# Patient Record
Sex: MALE | Race: BLACK OR AFRICAN AMERICAN | NOT HISPANIC OR LATINO | Employment: OTHER | RURAL
[De-identification: names, ages, dates, MRNs, and addresses within clinical notes are randomized per-mention and may not be internally consistent; named-entity substitution may affect disease eponyms.]

---

## 2020-08-09 ENCOUNTER — HISTORICAL (OUTPATIENT)
Dept: ADMINISTRATIVE | Facility: HOSPITAL | Age: 50
End: 2020-08-09

## 2020-08-09 LAB
ANION GAP SERPL CALCULATED.3IONS-SCNC: 14 MMOL/L
ANISOCYTOSIS BLD QL SMEAR: ABNORMAL
APTT PPP: 38.9 SECONDS (ref 25.2–37.3)
BASOPHILS # BLD AUTO: 0.03 X10E3/UL (ref 0–0.2)
BASOPHILS NFR BLD AUTO: 0.6 % (ref 0–1)
BUN SERPL-MCNC: 41 MG/DL (ref 7–18)
CALCIUM SERPL-MCNC: 7.2 MG/DL (ref 8.5–10.1)
CHLORIDE SERPL-SCNC: 88 MMOL/L (ref 98–107)
CK MB SERPL-MCNC: <1 NG/ML (ref 1–3.6)
CK SERPL-CCNC: 189 U/L (ref 39–308)
CO2 SERPL-SCNC: 31 MMOL/L (ref 21–32)
CREAT SERPL-MCNC: 12.18 MG/DL (ref 0.7–1.3)
D DIMER PPP FEU-MCNC: 5.9 UG/ML (ref 0–0.47)
EOSINOPHIL # BLD AUTO: 0.04 X10E3/UL (ref 0–0.5)
EOSINOPHIL NFR BLD AUTO: 0.8 % (ref 1–4)
EOSINOPHIL NFR BLD MANUAL: 1 % (ref 1–4)
ERYTHROCYTE [DISTWIDTH] IN BLOOD BY AUTOMATED COUNT: 16.8 % (ref 11.5–14.5)
GLUCOSE SERPL-MCNC: 124 MG/DL (ref 74–106)
HCT VFR BLD AUTO: 33.2 % (ref 40–54)
HGB BLD-MCNC: 10.8 G/DL (ref 13.5–18)
IMM GRANULOCYTES # BLD AUTO: 0.07 X10E3/UL (ref 0–0.04)
IMM GRANULOCYTES NFR BLD: 1.4 % (ref 0–0.4)
INR BLD: 1.17 (ref 0–3.3)
LACTATE SERPL-SCNC: 0.8 MMOL/L (ref 0.4–2)
LYMPHOCYTES # BLD AUTO: 0.78 X10E3/UL (ref 1–4.8)
LYMPHOCYTES NFR BLD AUTO: 15.8 % (ref 27–41)
LYMPHOCYTES NFR BLD MANUAL: 12 % (ref 27–41)
MAGNESIUM SERPL-MCNC: 2 MG/DL (ref 1.7–2.3)
MCH RBC QN AUTO: 31.4 PG (ref 27–31)
MCHC RBC AUTO-ENTMCNC: 32.5 G/DL (ref 32–36)
MCV RBC AUTO: 96.5 FL (ref 80–96)
MONOCYTES # BLD AUTO: 0.17 X10E3/UL (ref 0–0.8)
MONOCYTES NFR BLD AUTO: 3.4 % (ref 2–6)
MONOCYTES NFR BLD MANUAL: 2 % (ref 2–6)
MPC BLD CALC-MCNC: 12.6 FL (ref 9.4–12.4)
MYOGLOBIN SERPL-MCNC: 585 NG/ML (ref 16–116)
NEUTROPHILS # BLD AUTO: 3.86 X10E3/UL (ref 1.8–7.7)
NEUTROPHILS NFR BLD AUTO: 78 % (ref 53–65)
NEUTS BAND NFR BLD MANUAL: 10 % (ref 1–5)
NEUTS SEG NFR BLD MANUAL: 75 % (ref 50–62)
NRBC # BLD AUTO: 0 X10E3/UL (ref 0–0)
NRBC, AUTO (.00): 0 /100 (ref 0–0)
NT-PROBNP SERPL-MCNC: ABNORMAL PG/ML (ref 1–125)
PHOSPHATE SERPL-MCNC: 3.8 MG/DL (ref 2.5–4.5)
PLATELET # BLD AUTO: 172 X10E3/UL (ref 150–400)
PLATELET MORPHOLOGY: ABNORMAL
POTASSIUM SERPL-SCNC: 4.3 MMOL/L (ref 3.5–5.1)
PROTHROMBIN TIME: 14.3 SECONDS (ref 11.7–14.7)
RBC # BLD AUTO: 3.44 X10E6/UL (ref 4.6–6.2)
SARS-COV-2 RNA AMPLIFICATION, QUAL: POSITIVE
SODIUM SERPL-SCNC: 129 MMOL/L (ref 136–145)
TROPONIN I SERPL-MCNC: 0.11 NG/ML (ref 0–0.06)
TROPONIN I SERPL-MCNC: 0.12 NG/ML (ref 0–0.06)
TROPONIN I SERPL-MCNC: 0.14 NG/ML (ref 0–0.06)
WBC # BLD AUTO: 4.95 X10E3/UL (ref 4.5–11)

## 2020-08-10 ENCOUNTER — HISTORICAL (OUTPATIENT)
Dept: ADMINISTRATIVE | Facility: HOSPITAL | Age: 50
End: 2020-08-10

## 2020-08-10 LAB
ALBUMIN SERPL BCP-MCNC: 3 G/DL (ref 3.5–5)
ALBUMIN/GLOB SERPL: 0.6 {RATIO}
ALP SERPL-CCNC: 44 U/L (ref 45–115)
ALT SERPL W P-5'-P-CCNC: 11 U/L (ref 16–61)
ANION GAP SERPL CALCULATED.3IONS-SCNC: 19 MMOL/L
ANISOCYTOSIS BLD QL SMEAR: ABNORMAL
AST SERPL W P-5'-P-CCNC: 50 U/L (ref 15–37)
BASOPHILS # BLD AUTO: 0.03 X10E3/UL (ref 0–0.2)
BASOPHILS NFR BLD AUTO: 0.5 % (ref 0–1)
BILIRUB SERPL-MCNC: 0.6 MG/DL (ref 0–1.2)
BUN SERPL-MCNC: 36 MG/DL (ref 7–18)
BUN/CREAT SERPL: 3.6
CALCIUM SERPL-MCNC: 8.1 MG/DL (ref 8.5–10.1)
CHLORIDE SERPL-SCNC: 92 MMOL/L (ref 98–107)
CO2 SERPL-SCNC: 28 MMOL/L (ref 21–32)
CREAT SERPL-MCNC: 9.95 MG/DL (ref 0.7–1.3)
EOSINOPHIL # BLD AUTO: 0 X10E3/UL (ref 0–0.5)
EOSINOPHIL NFR BLD AUTO: 0 % (ref 1–4)
ERYTHROCYTE [DISTWIDTH] IN BLOOD BY AUTOMATED COUNT: 16.8 % (ref 11.5–14.5)
GLOBULIN SER-MCNC: 5.1 G/DL (ref 2–4)
GLUCOSE SERPL-MCNC: 188 MG/DL (ref 74–106)
HAV IGM SER QL: NORMAL
HBV CORE IGM SER QL: NORMAL
HBV SURFACE AG SERPL QL IA: NORMAL
HCT VFR BLD AUTO: 34.9 % (ref 40–54)
HCV AB SER QL: NORMAL
HGB BLD-MCNC: 11.3 G/DL (ref 13.5–18)
IMM GRANULOCYTES # BLD AUTO: 0.1 X10E3/UL (ref 0–0.04)
IMM GRANULOCYTES NFR BLD: 1.7 % (ref 0–0.4)
LYMPHOCYTES # BLD AUTO: 0.39 X10E3/UL (ref 1–4.8)
LYMPHOCYTES NFR BLD AUTO: 6.6 % (ref 27–41)
LYMPHOCYTES NFR BLD MANUAL: 4 % (ref 27–41)
MAGNESIUM SERPL-MCNC: 2.3 MG/DL (ref 1.7–2.3)
MCH RBC QN AUTO: 31.2 PG (ref 27–31)
MCHC RBC AUTO-ENTMCNC: 32.4 G/DL (ref 32–36)
MCV RBC AUTO: 96.4 FL (ref 80–96)
MONOCYTES # BLD AUTO: 0.11 X10E3/UL (ref 0–0.8)
MONOCYTES NFR BLD AUTO: 1.9 % (ref 2–6)
MONOCYTES NFR BLD MANUAL: 2 % (ref 2–6)
MPC BLD CALC-MCNC: 12.2 FL (ref 9.4–12.4)
NEUTROPHILS # BLD AUTO: 5.3 X10E3/UL (ref 1.8–7.7)
NEUTROPHILS NFR BLD AUTO: 89.3 % (ref 53–65)
NEUTS BAND NFR BLD MANUAL: 10 % (ref 1–5)
NEUTS SEG NFR BLD MANUAL: 84 % (ref 50–62)
NRBC # BLD AUTO: 0 X10E3/UL (ref 0–0)
NRBC, AUTO (.00): 0 /100 (ref 0–0)
PHOSPHATE SERPL-MCNC: 5.1 MG/DL (ref 2.5–4.5)
PLATELET # BLD AUTO: 200 X10E3/UL (ref 150–400)
PLATELET MORPHOLOGY: ABNORMAL
POTASSIUM SERPL-SCNC: 4.6 MMOL/L (ref 3.5–5.1)
PROT SERPL-MCNC: 8.1 G/DL (ref 6.4–8.2)
RBC # BLD AUTO: 3.62 X10E6/UL (ref 4.6–6.2)
SODIUM SERPL-SCNC: 134 MMOL/L (ref 136–145)
TROPONIN I SERPL-MCNC: 0.1 NG/ML (ref 0–0.06)
WBC # BLD AUTO: 5.93 X10E3/UL (ref 4.5–11)

## 2020-08-11 ENCOUNTER — HISTORICAL (OUTPATIENT)
Dept: ADMINISTRATIVE | Facility: HOSPITAL | Age: 50
End: 2020-08-11

## 2020-08-11 LAB
ALBUMIN SERPL BCP-MCNC: 2.8 G/DL (ref 3.5–5)
ALBUMIN/GLOB SERPL: 0.6 {RATIO}
ALP SERPL-CCNC: 44 U/L (ref 45–115)
ALT SERPL W P-5'-P-CCNC: 12 U/L (ref 16–61)
ANION GAP SERPL CALCULATED.3IONS-SCNC: 18 MMOL/L
AST SERPL W P-5'-P-CCNC: 41 U/L (ref 15–37)
BASOPHILS # BLD AUTO: 0.01 X10E3/UL (ref 0–0.2)
BASOPHILS NFR BLD AUTO: 0.2 % (ref 0–1)
BILIRUB SERPL-MCNC: 0.5 MG/DL (ref 0–1.2)
BUN SERPL-MCNC: 53 MG/DL (ref 7–18)
BUN/CREAT SERPL: 4.6
CALCIUM SERPL-MCNC: 7.9 MG/DL (ref 8.5–10.1)
CHLORIDE SERPL-SCNC: 92 MMOL/L (ref 98–107)
CO2 SERPL-SCNC: 26 MMOL/L (ref 21–32)
CREAT SERPL-MCNC: 11.53 MG/DL (ref 0.7–1.3)
EOSINOPHIL # BLD AUTO: 0 X10E3/UL (ref 0–0.5)
EOSINOPHIL NFR BLD AUTO: 0 % (ref 1–4)
ERYTHROCYTE [DISTWIDTH] IN BLOOD BY AUTOMATED COUNT: 16.7 % (ref 11.5–14.5)
GLOBULIN SER-MCNC: 4.8 G/DL (ref 2–4)
GLUCOSE SERPL-MCNC: 182 MG/DL (ref 74–106)
HCT VFR BLD AUTO: 31.3 % (ref 40–54)
HGB BLD-MCNC: 10.3 G/DL (ref 13.5–18)
IMM GRANULOCYTES # BLD AUTO: 0.11 X10E3/UL (ref 0–0.04)
IMM GRANULOCYTES NFR BLD: 1.7 % (ref 0–0.4)
LYMPHOCYTES # BLD AUTO: 0.47 X10E3/UL (ref 1–4.8)
LYMPHOCYTES NFR BLD AUTO: 7.2 % (ref 27–41)
LYMPHOCYTES NFR BLD MANUAL: 5 % (ref 27–41)
MCH RBC QN AUTO: 30.9 PG (ref 27–31)
MCHC RBC AUTO-ENTMCNC: 32.9 G/DL (ref 32–36)
MCV RBC AUTO: 94 FL (ref 80–96)
MONOCYTES # BLD AUTO: 0.22 X10E3/UL (ref 0–0.8)
MONOCYTES NFR BLD AUTO: 3.4 % (ref 2–6)
MONOCYTES NFR BLD MANUAL: 2 % (ref 2–6)
MPC BLD CALC-MCNC: 12.9 FL (ref 9.4–12.4)
NEUTROPHILS # BLD AUTO: 5.75 X10E3/UL (ref 1.8–7.7)
NEUTROPHILS NFR BLD AUTO: 87.5 % (ref 53–65)
NEUTS BAND NFR BLD MANUAL: 2 % (ref 1–5)
NEUTS SEG NFR BLD MANUAL: 91 % (ref 50–62)
NRBC # BLD AUTO: 0 X10E3/UL (ref 0–0)
NRBC, AUTO (.00): 0.3 /100 (ref 0–0)
PHOSPHATE SERPL-MCNC: 3.8 MG/DL (ref 2.5–4.5)
PLATELET # BLD AUTO: 244 X10E3/UL (ref 150–400)
PLATELET MORPHOLOGY: ABNORMAL
POLYCHROMASIA BLD QL SMEAR: ABNORMAL
POTASSIUM SERPL-SCNC: 4.5 MMOL/L (ref 3.5–5.1)
PROT SERPL-MCNC: 7.6 G/DL (ref 6.4–8.2)
RBC # BLD AUTO: 3.33 X10E6/UL (ref 4.6–6.2)
SODIUM SERPL-SCNC: 131 MMOL/L (ref 136–145)
WBC # BLD AUTO: 6.56 X10E3/UL (ref 4.5–11)

## 2020-08-12 ENCOUNTER — HISTORICAL (OUTPATIENT)
Dept: ADMINISTRATIVE | Facility: HOSPITAL | Age: 50
End: 2020-08-12

## 2020-08-12 LAB
ALBUMIN SERPL BCP-MCNC: 2.9 G/DL (ref 3.5–5)
ALBUMIN/GLOB SERPL: 0.6 {RATIO}
ALP SERPL-CCNC: 42 U/L (ref 45–115)
ALT SERPL W P-5'-P-CCNC: 21 U/L (ref 16–61)
ANION GAP SERPL CALCULATED.3IONS-SCNC: 18 MMOL/L
AST SERPL W P-5'-P-CCNC: 41 U/L (ref 15–37)
BASOPHILS # BLD AUTO: 0.02 X10E3/UL (ref 0–0.2)
BASOPHILS NFR BLD AUTO: 0.2 % (ref 0–1)
BILIRUB SERPL-MCNC: 0.4 MG/DL (ref 0–1.2)
BUN SERPL-MCNC: 42 MG/DL (ref 7–18)
BUN/CREAT SERPL: 4.6
CALCIUM SERPL-MCNC: 8 MG/DL (ref 8.5–10.1)
CHLORIDE SERPL-SCNC: 93 MMOL/L (ref 98–107)
CO2 SERPL-SCNC: 25 MMOL/L (ref 21–32)
CREAT SERPL-MCNC: 9.11 MG/DL (ref 0.7–1.3)
EOSINOPHIL # BLD AUTO: 0 X10E3/UL (ref 0–0.5)
EOSINOPHIL NFR BLD AUTO: 0 % (ref 1–4)
ERYTHROCYTE [DISTWIDTH] IN BLOOD BY AUTOMATED COUNT: 16.9 % (ref 11.5–14.5)
GLOBULIN SER-MCNC: 5.1 G/DL (ref 2–4)
GLUCOSE SERPL-MCNC: 178 MG/DL (ref 74–106)
HCT VFR BLD AUTO: 33.5 % (ref 40–54)
HGB BLD-MCNC: 10.8 G/DL (ref 13.5–18)
IMM GRANULOCYTES # BLD AUTO: 0.28 X10E3/UL (ref 0–0.04)
IMM GRANULOCYTES NFR BLD: 3.3 % (ref 0–0.4)
LYMPHOCYTES # BLD AUTO: 0.64 X10E3/UL (ref 1–4.8)
LYMPHOCYTES NFR BLD AUTO: 7.6 % (ref 27–41)
LYMPHOCYTES NFR BLD MANUAL: 8 % (ref 27–41)
MCH RBC QN AUTO: 30.4 PG (ref 27–31)
MCHC RBC AUTO-ENTMCNC: 32.2 G/DL (ref 32–36)
MCV RBC AUTO: 94.4 FL (ref 80–96)
MONOCYTES # BLD AUTO: 0.43 X10E3/UL (ref 0–0.8)
MONOCYTES NFR BLD AUTO: 5.1 % (ref 2–6)
MONOCYTES NFR BLD MANUAL: 6 % (ref 2–6)
MPC BLD CALC-MCNC: 12.5 FL (ref 9.4–12.4)
MYELOCYTES NFR BLD MANUAL: 1 %
NEUTROPHILS # BLD AUTO: 7.01 X10E3/UL (ref 1.8–7.7)
NEUTROPHILS NFR BLD AUTO: 83.8 % (ref 53–65)
NEUTS BAND NFR BLD MANUAL: 3 % (ref 1–5)
NEUTS SEG NFR BLD MANUAL: 82 % (ref 50–62)
NRBC # BLD AUTO: 0 X10E3/UL (ref 0–0)
NRBC, AUTO (.00): 0.2 /100 (ref 0–0)
PHOSPHATE SERPL-MCNC: 3.8 MG/DL (ref 2.5–4.5)
PLATELET # BLD AUTO: 311 X10E3/UL (ref 150–400)
PLATELET MORPHOLOGY: ABNORMAL
POTASSIUM SERPL-SCNC: 4.6 MMOL/L (ref 3.5–5.1)
PROT SERPL-MCNC: 8 G/DL (ref 6.4–8.2)
RBC # BLD AUTO: 3.55 X10E6/UL (ref 4.6–6.2)
RBC MORPH BLD: NORMAL
SODIUM SERPL-SCNC: 131 MMOL/L (ref 136–145)
WBC # BLD AUTO: 8.38 X10E3/UL (ref 4.5–11)

## 2020-08-13 ENCOUNTER — HISTORICAL (OUTPATIENT)
Dept: ADMINISTRATIVE | Facility: HOSPITAL | Age: 50
End: 2020-08-13

## 2020-08-13 LAB
ALBUMIN SERPL BCP-MCNC: 2.9 G/DL (ref 3.5–5)
ALBUMIN/GLOB SERPL: 0.6 {RATIO}
ALP SERPL-CCNC: 46 U/L (ref 45–115)
ALT SERPL W P-5'-P-CCNC: 22 U/L (ref 16–61)
ANION GAP SERPL CALCULATED.3IONS-SCNC: 16 MMOL/L
AST SERPL W P-5'-P-CCNC: 30 U/L (ref 15–37)
BASOPHILS # BLD AUTO: 0.02 X10E3/UL (ref 0–0.2)
BASOPHILS NFR BLD AUTO: 0.2 % (ref 0–1)
BILIRUB SERPL-MCNC: 0.4 MG/DL (ref 0–1.2)
BUN SERPL-MCNC: 42 MG/DL (ref 7–18)
BUN/CREAT SERPL: 5.1
CALCIUM SERPL-MCNC: 8.2 MG/DL (ref 8.5–10.1)
CHLORIDE SERPL-SCNC: 96 MMOL/L (ref 98–107)
CO2 SERPL-SCNC: 25 MMOL/L (ref 21–32)
CREAT SERPL-MCNC: 8.25 MG/DL (ref 0.7–1.3)
EOSINOPHIL # BLD AUTO: 0 X10E3/UL (ref 0–0.5)
EOSINOPHIL NFR BLD AUTO: 0 % (ref 1–4)
ERYTHROCYTE [DISTWIDTH] IN BLOOD BY AUTOMATED COUNT: 17 % (ref 11.5–14.5)
GLOBULIN SER-MCNC: 5.2 G/DL (ref 2–4)
GLUCOSE SERPL-MCNC: 207 MG/DL (ref 74–106)
HCT VFR BLD AUTO: 34.7 % (ref 40–54)
HGB BLD-MCNC: 11.2 G/DL (ref 13.5–18)
IMM GRANULOCYTES # BLD AUTO: 0.47 X10E3/UL (ref 0–0.04)
IMM GRANULOCYTES NFR BLD: 5.2 % (ref 0–0.4)
LYMPHOCYTES # BLD AUTO: 1.23 X10E3/UL (ref 1–4.8)
LYMPHOCYTES NFR BLD AUTO: 13.6 % (ref 27–41)
LYMPHOCYTES NFR BLD MANUAL: 16 % (ref 27–41)
MCH RBC QN AUTO: 31.6 PG (ref 27–31)
MCHC RBC AUTO-ENTMCNC: 32.3 G/DL (ref 32–36)
MCV RBC AUTO: 98 FL (ref 80–96)
MONOCYTES # BLD AUTO: 0.59 X10E3/UL (ref 0–0.8)
MONOCYTES NFR BLD AUTO: 6.5 % (ref 2–6)
MONOCYTES NFR BLD MANUAL: 6 % (ref 2–6)
MPC BLD CALC-MCNC: 12.1 FL (ref 9.4–12.4)
MYELOCYTES NFR BLD MANUAL: 2 %
NEUTROPHILS # BLD AUTO: 6.74 X10E3/UL (ref 1.8–7.7)
NEUTROPHILS NFR BLD AUTO: 74.5 % (ref 53–65)
NEUTS BAND NFR BLD MANUAL: 1 % (ref 1–5)
NEUTS SEG NFR BLD MANUAL: 75 % (ref 50–62)
NRBC # BLD AUTO: 0 X10E3/UL (ref 0–0)
NRBC BLD MANUAL-RTO: 1 /100 (ref 0–0)
NRBC, AUTO (.00): 0 /100 (ref 0–0)
PHOSPHATE SERPL-MCNC: 3.9 MG/DL (ref 2.5–4.5)
PLATELET # BLD AUTO: 352 X10E3/UL (ref 150–400)
PLATELET MORPHOLOGY: ABNORMAL
POLYCHROMASIA BLD QL SMEAR: ABNORMAL
POTASSIUM SERPL-SCNC: 4.2 MMOL/L (ref 3.5–5.1)
PROT SERPL-MCNC: 8.1 G/DL (ref 6.4–8.2)
RBC # BLD AUTO: 3.54 X10E6/UL (ref 4.6–6.2)
SODIUM SERPL-SCNC: 133 MMOL/L (ref 136–145)
WBC # BLD AUTO: 9.05 X10E3/UL (ref 4.5–11)

## 2020-08-14 ENCOUNTER — HISTORICAL (OUTPATIENT)
Dept: ADMINISTRATIVE | Facility: HOSPITAL | Age: 50
End: 2020-08-14

## 2020-08-14 LAB
ALBUMIN SERPL BCP-MCNC: 2.7 G/DL (ref 3.5–5)
ALBUMIN/GLOB SERPL: 0.6 {RATIO}
ALP SERPL-CCNC: 46 U/L (ref 45–115)
ALT SERPL W P-5'-P-CCNC: 20 U/L (ref 16–61)
ANION GAP SERPL CALCULATED.3IONS-SCNC: 19 MMOL/L
ANISOCYTOSIS BLD QL SMEAR: ABNORMAL
AST SERPL W P-5'-P-CCNC: 23 U/L (ref 15–37)
BASOPHILS # BLD AUTO: 0.03 X10E3/UL (ref 0–0.2)
BASOPHILS NFR BLD AUTO: 0.2 % (ref 0–1)
BILIRUB SERPL-MCNC: 0.4 MG/DL (ref 0–1.2)
BUN SERPL-MCNC: 68 MG/DL (ref 7–18)
BUN/CREAT SERPL: 6.2
CALCIUM SERPL-MCNC: 7.8 MG/DL (ref 8.5–10.1)
CHLORIDE SERPL-SCNC: 93 MMOL/L (ref 98–107)
CO2 SERPL-SCNC: 23 MMOL/L (ref 21–32)
CREAT SERPL-MCNC: 10.97 MG/DL (ref 0.7–1.3)
EOSINOPHIL # BLD AUTO: 0.01 X10E3/UL (ref 0–0.5)
EOSINOPHIL NFR BLD AUTO: 0.1 % (ref 1–4)
ERYTHROCYTE [DISTWIDTH] IN BLOOD BY AUTOMATED COUNT: 16.3 % (ref 11.5–14.5)
GLOBULIN SER-MCNC: 4.7 G/DL (ref 2–4)
GLUCOSE SERPL-MCNC: 244 MG/DL (ref 74–106)
HCT VFR BLD AUTO: 32.1 % (ref 40–54)
HGB BLD-MCNC: 10.6 G/DL (ref 13.5–18)
IMM GRANULOCYTES # BLD AUTO: 0.9 X10E3/UL (ref 0–0.04)
IMM GRANULOCYTES NFR BLD: 7.2 % (ref 0–0.4)
LYMPHOCYTES # BLD AUTO: 1.61 X10E3/UL (ref 1–4.8)
LYMPHOCYTES NFR BLD AUTO: 12.8 % (ref 27–41)
LYMPHOCYTES NFR BLD MANUAL: 19 % (ref 27–41)
MCH RBC QN AUTO: 31.9 PG (ref 27–31)
MCHC RBC AUTO-ENTMCNC: 33 G/DL (ref 32–36)
MCV RBC AUTO: 96.7 FL (ref 80–96)
MONOCYTES # BLD AUTO: 0.86 X10E3/UL (ref 0–0.8)
MONOCYTES NFR BLD AUTO: 6.8 % (ref 2–6)
MONOCYTES NFR BLD MANUAL: 6 % (ref 2–6)
MPC BLD CALC-MCNC: 12 FL (ref 9.4–12.4)
NEUTROPHILS # BLD AUTO: 9.16 X10E3/UL (ref 1.8–7.7)
NEUTROPHILS NFR BLD AUTO: 72.9 % (ref 53–65)
NEUTS SEG NFR BLD MANUAL: 75 % (ref 50–62)
NRBC # BLD AUTO: 0 X10E3/UL (ref 0–0)
NRBC, AUTO (.00): 0.2 /100 (ref 0–0)
PHOSPHATE SERPL-MCNC: 4.9 MG/DL (ref 2.5–4.5)
PLATELET # BLD AUTO: 363 X10E3/UL (ref 150–400)
PLATELET MORPHOLOGY: ABNORMAL
POLYCHROMASIA BLD QL SMEAR: ABNORMAL
POTASSIUM SERPL-SCNC: 4.6 MMOL/L (ref 3.5–5.1)
PROT SERPL-MCNC: 7.4 G/DL (ref 6.4–8.2)
RBC # BLD AUTO: 3.32 X10E6/UL (ref 4.6–6.2)
SODIUM SERPL-SCNC: 130 MMOL/L (ref 136–145)
WBC # BLD AUTO: 12.57 X10E3/UL (ref 4.5–11)

## 2020-08-15 LAB
REPORT: NORMAL

## 2020-08-25 ENCOUNTER — HISTORICAL (OUTPATIENT)
Dept: ADMINISTRATIVE | Facility: HOSPITAL | Age: 50
End: 2020-08-25

## 2020-11-01 ENCOUNTER — HISTORICAL (OUTPATIENT)
Dept: ADMINISTRATIVE | Facility: HOSPITAL | Age: 50
End: 2020-11-01

## 2020-11-01 LAB
ANION GAP SERPL CALCULATED.3IONS-SCNC: 16 MMOL/L
APTT PPP: 33.1 SECONDS (ref 25.2–37.3)
BASOPHILS # BLD AUTO: 0.08 X10E3/UL (ref 0–0.2)
BASOPHILS NFR BLD AUTO: 0.9 % (ref 0–1)
BUN SERPL-MCNC: 33 MG/DL (ref 7–18)
CALCIUM SERPL-MCNC: 9.1 MG/DL (ref 8.5–10.1)
CHLORIDE SERPL-SCNC: 97 MMOL/L (ref 98–107)
CO2 SERPL-SCNC: 31 MMOL/L (ref 21–32)
CREAT SERPL-MCNC: 8.96 MG/DL (ref 0.7–1.3)
EOSINOPHIL # BLD AUTO: 0.33 X10E3/UL (ref 0–0.5)
EOSINOPHIL NFR BLD AUTO: 3.9 % (ref 1–4)
ERYTHROCYTE [DISTWIDTH] IN BLOOD BY AUTOMATED COUNT: 15.8 % (ref 11.5–14.5)
GLUCOSE SERPL-MCNC: 118 MG/DL (ref 74–106)
HCT VFR BLD AUTO: 32.6 % (ref 40–54)
HGB BLD-MCNC: 10.2 G/DL (ref 13.5–18)
IMM GRANULOCYTES # BLD AUTO: 0.1 X10E3/UL (ref 0–0.04)
IMM GRANULOCYTES NFR BLD: 1.2 % (ref 0–0.4)
INR BLD: 1.05 (ref 0–3.3)
LYMPHOCYTES # BLD AUTO: 1.73 X10E3/UL (ref 1–4.8)
LYMPHOCYTES NFR BLD AUTO: 20.5 % (ref 27–41)
MCH RBC QN AUTO: 31.5 PG (ref 27–31)
MCHC RBC AUTO-ENTMCNC: 31.3 G/DL (ref 32–36)
MCV RBC AUTO: 100.6 FL (ref 80–96)
MONOCYTES # BLD AUTO: 0.8 X10E3/UL (ref 0–0.8)
MONOCYTES NFR BLD AUTO: 9.5 % (ref 2–6)
MPC BLD CALC-MCNC: 11.7 FL (ref 9.4–12.4)
NEUTROPHILS # BLD AUTO: 5.41 X10E3/UL (ref 1.8–7.7)
NEUTROPHILS NFR BLD AUTO: 64 % (ref 53–65)
NRBC # BLD AUTO: 0 X10E3/UL (ref 0–0)
NRBC, AUTO (.00): 0 /100 (ref 0–0)
NT-PROBNP SERPL-MCNC: ABNORMAL PG/ML (ref 1–125)
PLATELET # BLD AUTO: 218 X10E3/UL (ref 150–400)
POTASSIUM SERPL-SCNC: 4.2 MMOL/L (ref 3.5–5.1)
PROTHROMBIN TIME: 13.2 SECONDS (ref 11.7–14.7)
RBC # BLD AUTO: 3.24 X10E6/UL (ref 4.6–6.2)
SODIUM SERPL-SCNC: 140 MMOL/L (ref 136–145)
WBC # BLD AUTO: 8.45 X10E3/UL (ref 4.5–11)

## 2020-11-16 ENCOUNTER — HISTORICAL (OUTPATIENT)
Dept: ADMINISTRATIVE | Facility: HOSPITAL | Age: 50
End: 2020-11-16

## 2020-11-16 LAB
FLUAV AG UPPER RESP QL IA.RAPID: NEGATIVE
FLUBV AG UPPER RESP QL IA.RAPID: NEGATIVE
SARS-COV-2 RNA AMPLIFICATION, QUAL: NEGATIVE

## 2021-05-04 ENCOUNTER — HOSPITAL ENCOUNTER (EMERGENCY)
Facility: HOSPITAL | Age: 51
Discharge: HOME | End: 2021-05-04
Attending: FAMILY MEDICINE
Payer: MEDICARE

## 2021-05-04 VITALS
HEART RATE: 73 BPM | WEIGHT: 250 LBS | TEMPERATURE: 99 F | BODY MASS INDEX: 31.08 KG/M2 | SYSTOLIC BLOOD PRESSURE: 172 MMHG | DIASTOLIC BLOOD PRESSURE: 102 MMHG | HEIGHT: 75 IN | OXYGEN SATURATION: 96 % | RESPIRATION RATE: 22 BRPM

## 2021-05-04 DIAGNOSIS — R07.9 CHEST PAIN OF UNCERTAIN ETIOLOGY: ICD-10-CM

## 2021-05-04 DIAGNOSIS — S81.812A LACERATION OF LEFT LEG: ICD-10-CM

## 2021-05-04 DIAGNOSIS — S21.139A: ICD-10-CM

## 2021-05-04 DIAGNOSIS — T14.8XXA PENETRATING WOUND: ICD-10-CM

## 2021-05-04 DIAGNOSIS — S89.92XA LEFT LEG INJURY, INITIAL ENCOUNTER: ICD-10-CM

## 2021-05-04 LAB
ALBUMIN SERPL BCP-MCNC: 3.9 G/DL (ref 3.5–5)
ALBUMIN/GLOB SERPL: 0.9 {RATIO}
ALP SERPL-CCNC: 80 U/L (ref 45–115)
ALT SERPL W P-5'-P-CCNC: 21 U/L (ref 16–61)
ANION GAP SERPL CALCULATED.3IONS-SCNC: 20 MMOL/L (ref 7–16)
APTT PPP: 30.9 SECONDS (ref 25.2–37.3)
AST SERPL W P-5'-P-CCNC: 24 U/L (ref 15–37)
BASOPHILS # BLD AUTO: 0.07 K/UL (ref 0–0.2)
BASOPHILS NFR BLD AUTO: 0.9 % (ref 0–1)
BILIRUB SERPL-MCNC: 0.4 MG/DL (ref 0–1.2)
BUN SERPL-MCNC: 30 MG/DL (ref 7–18)
BUN/CREAT SERPL: 3 (ref 6–20)
CALCIUM SERPL-MCNC: 8.1 MG/DL (ref 8.5–10.1)
CHLORIDE SERPL-SCNC: 97 MMOL/L (ref 98–107)
CO2 SERPL-SCNC: 28 MMOL/L (ref 21–32)
CREAT SERPL-MCNC: 9.74 MG/DL (ref 0.7–1.3)
DIFFERENTIAL METHOD BLD: ABNORMAL
EOSINOPHIL # BLD AUTO: 0.26 K/UL (ref 0–0.5)
EOSINOPHIL NFR BLD AUTO: 3.5 % (ref 1–4)
ERYTHROCYTE [DISTWIDTH] IN BLOOD BY AUTOMATED COUNT: 17.8 % (ref 11.5–14.5)
ETHANOL, BLOOD (CATEGORY): NOT DETECTED
GLOBULIN SER-MCNC: 4.2 G/DL (ref 2–4)
GLUCOSE SERPL-MCNC: 99 MG/DL (ref 74–106)
HCT VFR BLD AUTO: 35 % (ref 40–54)
HGB BLD-MCNC: 11.1 G/DL (ref 13.5–18)
HOLD SPECIMEN: NORMAL
IMM GRANULOCYTES # BLD AUTO: 0.06 K/UL (ref 0–0.04)
IMM GRANULOCYTES NFR BLD: 0.8 % (ref 0–0.4)
INR BLD: 0.99 (ref 0.9–1.1)
LACTATE SERPL-SCNC: 4.6 MMOL/L (ref 0.4–2)
LYMPHOCYTES # BLD AUTO: 1.47 K/UL (ref 1–4.8)
LYMPHOCYTES NFR BLD AUTO: 19.7 % (ref 27–41)
MCH RBC QN AUTO: 30.4 PG (ref 27–31)
MCHC RBC AUTO-ENTMCNC: 31.7 G/DL (ref 32–36)
MCV RBC AUTO: 95.9 FL (ref 80–96)
MONOCYTES # BLD AUTO: 0.74 K/UL (ref 0–0.8)
MONOCYTES NFR BLD AUTO: 9.9 % (ref 2–6)
MPC BLD CALC-MCNC: 12.2 FL (ref 9.4–12.4)
NEUTROPHILS # BLD AUTO: 4.85 K/UL (ref 1.8–7.7)
NEUTROPHILS NFR BLD AUTO: 65.2 % (ref 53–65)
NRBC # BLD AUTO: 0 X10E3/UL
NRBC, AUTO (.00): 0 %
PLATELET # BLD AUTO: 214 K/UL (ref 150–400)
POTASSIUM SERPL-SCNC: 4.4 MMOL/L (ref 3.5–5.1)
PROT SERPL-MCNC: 8.1 G/DL (ref 6.4–8.2)
PROTHROMBIN TIME: 12.6 SECONDS (ref 11.7–14.7)
RBC # BLD AUTO: 3.65 M/UL (ref 4.6–6.2)
SODIUM SERPL-SCNC: 141 MMOL/L (ref 136–145)
WBC # BLD AUTO: 7.45 K/UL (ref 4.5–11)

## 2021-05-04 PROCEDURE — 83605 ASSAY OF LACTIC ACID: CPT | Performed by: FAMILY MEDICINE

## 2021-05-04 PROCEDURE — 96365 THER/PROPH/DIAG IV INF INIT: CPT

## 2021-05-04 PROCEDURE — 85025 COMPLETE CBC W/AUTO DIFF WBC: CPT | Performed by: FAMILY MEDICINE

## 2021-05-04 PROCEDURE — 96361 HYDRATE IV INFUSION ADD-ON: CPT

## 2021-05-04 PROCEDURE — 85730 THROMBOPLASTIN TIME PARTIAL: CPT | Performed by: FAMILY MEDICINE

## 2021-05-04 PROCEDURE — 99285 EMERGENCY DEPT VISIT HI MDM: CPT | Mod: ,,, | Performed by: SURGERY

## 2021-05-04 PROCEDURE — 63600175 PHARM REV CODE 636 W HCPCS: Performed by: FAMILY MEDICINE

## 2021-05-04 PROCEDURE — 99285 EMERGENCY DEPT VISIT HI MDM: CPT | Mod: 25

## 2021-05-04 PROCEDURE — 99283 EMERGENCY DEPT VISIT LOW MDM: CPT | Mod: ,,, | Performed by: FAMILY MEDICINE

## 2021-05-04 PROCEDURE — 25000003 PHARM REV CODE 250: Performed by: FAMILY MEDICINE

## 2021-05-04 PROCEDURE — G0390 TRAUMA RESPONS W/HOSP CRITI: HCPCS

## 2021-05-04 PROCEDURE — 99285 PR EMERGENCY DEPT VISIT,LEVEL V: ICD-10-PCS | Mod: ,,, | Performed by: SURGERY

## 2021-05-04 PROCEDURE — 99283 PR EMERGENCY DEPT VISIT,LEVEL III: ICD-10-PCS | Mod: ,,, | Performed by: FAMILY MEDICINE

## 2021-05-04 PROCEDURE — 36415 COLL VENOUS BLD VENIPUNCTURE: CPT | Performed by: FAMILY MEDICINE

## 2021-05-04 PROCEDURE — 25000003 PHARM REV CODE 250: Performed by: EMERGENCY MEDICINE

## 2021-05-04 PROCEDURE — 85610 PROTHROMBIN TIME: CPT | Performed by: FAMILY MEDICINE

## 2021-05-04 PROCEDURE — 80053 COMPREHEN METABOLIC PANEL: CPT | Performed by: FAMILY MEDICINE

## 2021-05-04 PROCEDURE — 80307 DRUG TEST PRSMV CHEM ANLYZR: CPT | Performed by: FAMILY MEDICINE

## 2021-05-04 RX ORDER — HYDROCODONE BITARTRATE AND ACETAMINOPHEN 7.5; 325 MG/1; MG/1
1 TABLET ORAL EVERY 4 HOURS PRN
Qty: 7 TABLET | Refills: 0 | Status: ON HOLD | OUTPATIENT
Start: 2021-05-04 | End: 2023-04-22 | Stop reason: HOSPADM

## 2021-05-04 RX ORDER — SODIUM CHLORIDE 9 MG/ML
INJECTION, SOLUTION INTRAVENOUS
Status: COMPLETED | OUTPATIENT
Start: 2021-05-04 | End: 2021-05-04

## 2021-05-04 RX ADMIN — SODIUM CHLORIDE: 9 INJECTION, SOLUTION INTRAVENOUS at 06:05

## 2021-05-04 RX ADMIN — CEFAZOLIN 500 MG: 1 INJECTION, POWDER, FOR SOLUTION INTRAMUSCULAR; INTRAVENOUS at 06:05

## 2022-03-10 ENCOUNTER — HOSPITAL ENCOUNTER (EMERGENCY)
Facility: HOSPITAL | Age: 52
Discharge: HOME | End: 2022-03-11
Attending: EMERGENCY MEDICINE
Payer: MEDICARE

## 2022-03-10 DIAGNOSIS — K13.79 BLEEDING FROM MOUTH: Primary | ICD-10-CM

## 2022-03-10 DIAGNOSIS — R07.9 CHEST PAIN OF UNCERTAIN ETIOLOGY: ICD-10-CM

## 2022-03-10 DIAGNOSIS — I16.9 HYPERTENSIVE CRISIS: ICD-10-CM

## 2022-03-10 DIAGNOSIS — S89.92XA LEFT LEG INJURY, INITIAL ENCOUNTER: ICD-10-CM

## 2022-03-10 LAB
ALBUMIN SERPL BCP-MCNC: 3.6 G/DL (ref 3.5–5)
ALBUMIN/GLOB SERPL: 0.9 {RATIO}
ALP SERPL-CCNC: 68 U/L (ref 45–115)
ALT SERPL W P-5'-P-CCNC: 19 U/L (ref 16–61)
ANION GAP SERPL CALCULATED.3IONS-SCNC: 16 MMOL/L (ref 7–16)
APTT PPP: 28.2 SECONDS (ref 25.2–37.3)
AST SERPL W P-5'-P-CCNC: 19 U/L (ref 15–37)
BASOPHILS # BLD AUTO: 0.02 K/UL (ref 0–0.2)
BASOPHILS NFR BLD AUTO: 0.2 % (ref 0–1)
BILIRUB SERPL-MCNC: 0.4 MG/DL (ref 0–1.2)
BUN SERPL-MCNC: 51 MG/DL (ref 7–18)
BUN/CREAT SERPL: 5 (ref 6–20)
CALCIUM SERPL-MCNC: 8.8 MG/DL (ref 8.5–10.1)
CHLORIDE SERPL-SCNC: 98 MMOL/L (ref 98–107)
CO2 SERPL-SCNC: 28 MMOL/L (ref 21–32)
CREAT SERPL-MCNC: 10.7 MG/DL (ref 0.7–1.3)
DIFFERENTIAL METHOD BLD: ABNORMAL
EOSINOPHIL # BLD AUTO: 0.01 K/UL (ref 0–0.5)
EOSINOPHIL NFR BLD AUTO: 0.1 % (ref 1–4)
ERYTHROCYTE [DISTWIDTH] IN BLOOD BY AUTOMATED COUNT: 19.9 % (ref 11.5–14.5)
GLOBULIN SER-MCNC: 4.2 G/DL (ref 2–4)
GLUCOSE SERPL-MCNC: 183 MG/DL (ref 74–106)
HCT VFR BLD AUTO: 30.2 % (ref 40–54)
HGB BLD-MCNC: 9.7 G/DL (ref 13.5–18)
IMM GRANULOCYTES # BLD AUTO: 0.04 K/UL (ref 0–0.04)
IMM GRANULOCYTES NFR BLD: 0.4 % (ref 0–0.4)
INR BLD: 0.98 (ref 0.9–1.1)
LYMPHOCYTES # BLD AUTO: 0.67 K/UL (ref 1–4.8)
LYMPHOCYTES NFR BLD AUTO: 6.8 % (ref 27–41)
MCH RBC QN AUTO: 29.7 PG (ref 27–31)
MCHC RBC AUTO-ENTMCNC: 32.1 G/DL (ref 32–36)
MCV RBC AUTO: 92.4 FL (ref 80–96)
MONOCYTES # BLD AUTO: 0.52 K/UL (ref 0–0.8)
MONOCYTES NFR BLD AUTO: 5.3 % (ref 2–6)
MPC BLD CALC-MCNC: 11.9 FL (ref 9.4–12.4)
NEUTROPHILS # BLD AUTO: 8.55 K/UL (ref 1.8–7.7)
NEUTROPHILS NFR BLD AUTO: 87.2 % (ref 53–65)
NRBC # BLD AUTO: 0 X10E3/UL
NRBC, AUTO (.00): 0 %
PLATELET # BLD AUTO: 235 K/UL (ref 150–400)
POTASSIUM SERPL-SCNC: 5.8 MMOL/L (ref 3.5–5.1)
PROT SERPL-MCNC: 7.8 G/DL (ref 6.4–8.2)
PROTHROMBIN TIME: 13 SECONDS (ref 11.7–14.7)
RBC # BLD AUTO: 3.27 M/UL (ref 4.6–6.2)
SODIUM SERPL-SCNC: 136 MMOL/L (ref 136–145)
WBC # BLD AUTO: 9.81 K/UL (ref 4.5–11)

## 2022-03-10 PROCEDURE — 36415 COLL VENOUS BLD VENIPUNCTURE: CPT | Performed by: EMERGENCY MEDICINE

## 2022-03-10 PROCEDURE — 96365 THER/PROPH/DIAG IV INF INIT: CPT

## 2022-03-10 PROCEDURE — 85730 THROMBOPLASTIN TIME PARTIAL: CPT | Performed by: EMERGENCY MEDICINE

## 2022-03-10 PROCEDURE — 99291 CRITICAL CARE FIRST HOUR: CPT | Mod: ,,, | Performed by: EMERGENCY MEDICINE

## 2022-03-10 PROCEDURE — 99291 PR CRITICAL CARE, E/M 30-74 MINUTES: ICD-10-PCS | Mod: ,,, | Performed by: EMERGENCY MEDICINE

## 2022-03-10 PROCEDURE — 85025 COMPLETE CBC W/AUTO DIFF WBC: CPT | Performed by: EMERGENCY MEDICINE

## 2022-03-10 PROCEDURE — 99291 CRITICAL CARE FIRST HOUR: CPT

## 2022-03-10 PROCEDURE — 80053 COMPREHEN METABOLIC PANEL: CPT | Performed by: EMERGENCY MEDICINE

## 2022-03-10 PROCEDURE — 25000003 PHARM REV CODE 250: Performed by: EMERGENCY MEDICINE

## 2022-03-10 PROCEDURE — 96375 TX/PRO/DX INJ NEW DRUG ADDON: CPT

## 2022-03-10 RX ORDER — LABETALOL HYDROCHLORIDE 5 MG/ML
10 INJECTION, SOLUTION INTRAVENOUS
Status: COMPLETED | OUTPATIENT
Start: 2022-03-10 | End: 2022-03-11

## 2022-03-10 RX ORDER — AMLODIPINE BESYLATE 10 MG/1
TABLET ORAL
Status: ON HOLD | COMMUNITY
End: 2023-04-22 | Stop reason: HOSPADM

## 2022-03-10 RX ORDER — CLONIDINE HYDROCHLORIDE 0.1 MG/1
0.1 TABLET ORAL
Status: ON HOLD | COMMUNITY
Start: 2021-07-30 | End: 2023-04-22 | Stop reason: HOSPADM

## 2022-03-10 RX ORDER — TRANEXAMIC ACID 100 MG/ML
INJECTION, SOLUTION INTRAVENOUS
Status: COMPLETED
Start: 2022-03-10 | End: 2022-03-11

## 2022-03-10 RX ADMIN — TRANEXAMIC ACID 1000 MG: 100 INJECTION, SOLUTION INTRAVENOUS at 10:03

## 2022-03-10 RX ADMIN — NITROGLYCERIN 1 INCH: 20 OINTMENT TOPICAL at 10:03

## 2022-03-11 VITALS
DIASTOLIC BLOOD PRESSURE: 85 MMHG | BODY MASS INDEX: 34.07 KG/M2 | OXYGEN SATURATION: 97 % | HEIGHT: 69 IN | HEART RATE: 85 BPM | RESPIRATION RATE: 14 BRPM | WEIGHT: 230 LBS | SYSTOLIC BLOOD PRESSURE: 155 MMHG | TEMPERATURE: 99 F

## 2022-03-11 DIAGNOSIS — Z71.89 COMPLEX CARE COORDINATION: ICD-10-CM

## 2022-03-11 PROCEDURE — 25000003 PHARM REV CODE 250: Performed by: EMERGENCY MEDICINE

## 2022-03-11 PROCEDURE — 25000003 PHARM REV CODE 250

## 2022-03-11 RX ORDER — MINOXIDIL 2.5 MG/1
2.5 TABLET ORAL ONCE
Status: COMPLETED | OUTPATIENT
Start: 2022-03-11 | End: 2022-03-11

## 2022-03-11 RX ORDER — LABETALOL HYDROCHLORIDE 5 MG/ML
10 INJECTION, SOLUTION INTRAVENOUS ONCE
Status: COMPLETED | OUTPATIENT
Start: 2022-03-11 | End: 2022-03-11

## 2022-03-11 RX ADMIN — MINOXIDIL 2.5 MG: 2.5 TABLET ORAL at 01:03

## 2022-03-11 RX ADMIN — LABETALOL HYDROCHLORIDE 10 MG: 5 INJECTION INTRAVENOUS at 12:03

## 2022-03-11 RX ADMIN — TRANEXAMIC ACID 500 MG: 100 INJECTION, SOLUTION INTRAVENOUS at 12:03

## 2022-03-11 RX ADMIN — PHENYLEPHRINE HYDROCHLORIDE 2 SPRAY: 0.5 SPRAY NASAL at 01:03

## 2022-03-11 RX ADMIN — TRANEXAMIC ACID 1000 MG: 100 INJECTION, SOLUTION INTRAVENOUS at 12:03
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
## 2022-09-13 ENCOUNTER — OFFICE VISIT (OUTPATIENT)
Dept: FAMILY MEDICINE | Facility: CLINIC | Age: 52
End: 2022-09-13
Payer: MEDICARE

## 2022-09-13 VITALS
DIASTOLIC BLOOD PRESSURE: 84 MMHG | WEIGHT: 225 LBS | TEMPERATURE: 98 F | RESPIRATION RATE: 17 BRPM | BODY MASS INDEX: 33.33 KG/M2 | HEIGHT: 69 IN | OXYGEN SATURATION: 100 % | SYSTOLIC BLOOD PRESSURE: 136 MMHG | HEART RATE: 71 BPM

## 2022-09-13 DIAGNOSIS — Z11.52 ENCOUNTER FOR SCREENING LABORATORY TESTING FOR COVID-19 VIRUS: ICD-10-CM

## 2022-09-13 DIAGNOSIS — M54.50 ACUTE LEFT-SIDED LOW BACK PAIN WITHOUT SCIATICA: Primary | ICD-10-CM

## 2022-09-13 DIAGNOSIS — M62.838 MUSCLE SPASM: ICD-10-CM

## 2022-09-13 LAB
CTP QC/QA: YES
SARS-COV-2 AG RESP QL IA.RAPID: NEGATIVE

## 2022-09-13 PROCEDURE — 87426 SARSCOV CORONAVIRUS AG IA: CPT | Mod: RHCUB,CR | Performed by: FAMILY MEDICINE

## 2022-09-13 PROCEDURE — 99213 PR OFFICE/OUTPT VISIT, EST, LEVL III, 20-29 MIN: ICD-10-PCS | Mod: ,,, | Performed by: FAMILY MEDICINE

## 2022-09-13 PROCEDURE — 99213 OFFICE O/P EST LOW 20 MIN: CPT | Mod: ,,, | Performed by: FAMILY MEDICINE

## 2022-09-13 RX ORDER — HYDROCODONE BITARTRATE AND ACETAMINOPHEN 7.5; 325 MG/1; MG/1
1 TABLET ORAL EVERY 6 HOURS PRN
Qty: 8 TABLET | Refills: 0 | Status: ON HOLD | OUTPATIENT
Start: 2022-09-13 | End: 2023-04-22 | Stop reason: HOSPADM

## 2022-09-13 NOTE — PROGRESS NOTES
Subjective:       Patient ID: Saúl Butt is a 52 y.o. male.    Chief Complaint: COVID-19 Concerns (Chills for 2-3wks. )    Pt reports intermittent chills for 2 weeks. Pt reports last episode was yesterday. Reports 2 days of left lower back pain after moving dresser, pt is on dialysis and requests pain meds as unable to take nsaids.     Review of Systems   Constitutional:  Positive for chills. Negative for activity change, appetite change, diaphoresis, fatigue, fever and unexpected weight change.   HENT:  Negative for nasal congestion, dental problem, drooling, ear discharge, ear pain, facial swelling, hearing loss, mouth sores, nosebleeds, postnasal drip, rhinorrhea, sinus pressure/congestion, sneezing, sore throat, tinnitus, trouble swallowing, voice change and goiter.    Eyes:  Negative for photophobia, pain, discharge, redness, itching and visual disturbance.   Respiratory:  Negative for apnea, cough, choking, chest tightness, shortness of breath, wheezing and stridor.    Cardiovascular:  Negative for chest pain, palpitations, leg swelling and claudication.   Gastrointestinal:  Negative for abdominal distention, abdominal pain, anal bleeding, blood in stool, change in bowel habit, constipation, diarrhea, nausea, vomiting, reflux, fecal incontinence and change in bowel habit.   Endocrine: Negative for cold intolerance, heat intolerance, polydipsia, polyphagia and polyuria.   Genitourinary:  Negative for bladder incontinence, decreased urine volume, difficulty urinating, discharge, dysuria, enuresis, erectile dysfunction, flank pain, frequency, genital sores, hematuria, penile pain, testicular pain and urgency.   Musculoskeletal:  Positive for back pain. Negative for arthralgias, gait problem, joint swelling, leg pain, myalgias, neck pain, neck stiffness and joint deformity.   Integumentary:  Negative for pallor, rash, wound and mole/lesion.   Allergic/Immunologic: Negative for environmental allergies, food  allergies and frequent infections.   Neurological:  Negative for dizziness, vertigo, tremors, seizures, syncope, facial asymmetry, speech difficulty, weakness, light-headedness, numbness, headaches, coordination difficulties, memory loss and coordination difficulties.   Hematological:  Negative for adenopathy. Does not bruise/bleed easily.   Psychiatric/Behavioral:  Negative for agitation, behavioral problems, confusion, decreased concentration, dysphoric mood, hallucinations, self-injury, sleep disturbance and suicidal ideas. The patient is not nervous/anxious and is not hyperactive.        Objective:      Physical Exam  Vitals reviewed.   Constitutional:       Appearance: Normal appearance. He is normal weight.   HENT:      Head: Normocephalic and atraumatic.      Right Ear: Tympanic membrane and ear canal normal.      Left Ear: Tympanic membrane, ear canal and external ear normal.      Nose: Nose normal.      Mouth/Throat:      Mouth: Mucous membranes are moist.      Pharynx: Oropharynx is clear.   Eyes:      Extraocular Movements: Extraocular movements intact.      Conjunctiva/sclera: Conjunctivae normal.      Pupils: Pupils are equal, round, and reactive to light.   Cardiovascular:      Rate and Rhythm: Normal rate and regular rhythm.      Pulses: Normal pulses.      Heart sounds: Normal heart sounds.   Pulmonary:      Effort: Pulmonary effort is normal.      Breath sounds: Normal breath sounds.   Abdominal:      General: Abdomen is flat. Bowel sounds are normal.      Palpations: Abdomen is soft.   Musculoskeletal:         General: Tenderness present. Normal range of motion.      Cervical back: Normal range of motion and neck supple.      Comments: Left lower back ttp.    Skin:     General: Skin is warm and dry.   Neurological:      General: No focal deficit present.      Mental Status: He is alert and oriented to person, place, and time. Mental status is at baseline.   Psychiatric:         Mood and Affect:  Mood normal.         Behavior: Behavior normal.         Thought Content: Thought content normal.         Judgment: Judgment normal.       Assessment:       1. Acute left-sided low back pain without sciatica    2. Encounter for screening laboratory testing for COVID-19 virus    3. Muscle spasm          Plan:     Acute left-sided low back pain without sciatica  -     HYDROcodone-acetaminophen (NORCO) 7.5-325 mg per tablet; Take 1 tablet by mouth every 6 (six) hours as needed for Pain.  Dispense: 8 tablet; Refill: 0    Encounter for screening laboratory testing for COVID-19 virus  -     SARS Coronavirus 2 Antigen, POCT    Muscle spasm       Covid negative, will treat with small amount of pain meds, f/u if no improvement.

## 2022-10-09 DIAGNOSIS — Z71.89 COMPLEX CARE COORDINATION: ICD-10-CM

## 2022-12-27 ENCOUNTER — HOSPITAL ENCOUNTER (EMERGENCY)
Facility: HOSPITAL | Age: 52
Discharge: HOME | End: 2022-12-27
Payer: MEDICARE

## 2022-12-27 VITALS
DIASTOLIC BLOOD PRESSURE: 83 MMHG | SYSTOLIC BLOOD PRESSURE: 126 MMHG | OXYGEN SATURATION: 98 % | RESPIRATION RATE: 18 BRPM | HEART RATE: 67 BPM | HEIGHT: 72 IN | WEIGHT: 230 LBS | BODY MASS INDEX: 31.15 KG/M2 | TEMPERATURE: 98 F

## 2022-12-27 DIAGNOSIS — M62.830 SPASM OF LUMBAR PARASPINOUS MUSCLE: Primary | ICD-10-CM

## 2022-12-27 PROCEDURE — 63600175 PHARM REV CODE 636 W HCPCS: Performed by: NURSE PRACTITIONER

## 2022-12-27 PROCEDURE — 99284 PR EMERGENCY DEPT VISIT,LEVEL IV: ICD-10-PCS | Mod: ,,, | Performed by: NURSE PRACTITIONER

## 2022-12-27 PROCEDURE — 96372 THER/PROPH/DIAG INJ SC/IM: CPT | Performed by: NURSE PRACTITIONER

## 2022-12-27 PROCEDURE — 99284 EMERGENCY DEPT VISIT MOD MDM: CPT

## 2022-12-27 PROCEDURE — 99284 EMERGENCY DEPT VISIT MOD MDM: CPT | Mod: ,,, | Performed by: NURSE PRACTITIONER

## 2022-12-27 RX ORDER — TIZANIDINE 4 MG/1
4 TABLET ORAL EVERY 8 HOURS PRN
Qty: 12 TABLET | Refills: 0 | Status: SHIPPED | OUTPATIENT
Start: 2022-12-27 | End: 2023-01-06

## 2022-12-27 RX ORDER — ORPHENADRINE CITRATE 30 MG/ML
30 INJECTION INTRAMUSCULAR; INTRAVENOUS
Status: COMPLETED | OUTPATIENT
Start: 2022-12-27 | End: 2022-12-27

## 2022-12-27 RX ADMIN — ORPHENADRINE CITRATE 30 MG: 30 INJECTION INTRAMUSCULAR; INTRAVENOUS at 08:12

## 2022-12-27 NOTE — ED TRIAGE NOTES
Presents to ED for complaints of lower back pain for 4 days.  Denies any injury or heavy lifting.  Denies problems with bowel or bladder.  Patient is currently on dialysis.
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
## 2022-12-27 NOTE — DISCHARGE INSTRUCTIONS
Take prescriptions as directed. Alternate with tylenol as needed. Alternate with your prescribed pain medication, do not take them together. Follow up wit your primary care provider as needed. Return to the ED for worsening signs and symptoms or otherwise as needed.

## 2023-01-24 ENCOUNTER — HOSPITAL ENCOUNTER (EMERGENCY)
Facility: HOSPITAL | Age: 53
Discharge: HOME | End: 2023-01-24
Attending: EMERGENCY MEDICINE
Payer: MEDICARE

## 2023-01-24 VITALS
HEART RATE: 53 BPM | HEIGHT: 72 IN | RESPIRATION RATE: 14 BRPM | TEMPERATURE: 98 F | DIASTOLIC BLOOD PRESSURE: 74 MMHG | BODY MASS INDEX: 30.61 KG/M2 | SYSTOLIC BLOOD PRESSURE: 162 MMHG | WEIGHT: 226 LBS | OXYGEN SATURATION: 97 %

## 2023-01-24 DIAGNOSIS — R07.9 CHEST PAIN: ICD-10-CM

## 2023-01-24 DIAGNOSIS — E87.5 HYPERKALEMIA: ICD-10-CM

## 2023-01-24 DIAGNOSIS — N18.6 END STAGE RENAL DISEASE: Primary | ICD-10-CM

## 2023-01-24 LAB
ANION GAP SERPL CALCULATED.3IONS-SCNC: 14 MMOL/L (ref 7–16)
ANISOCYTOSIS BLD QL SMEAR: ABNORMAL
BASOPHILS # BLD AUTO: 0.07 K/UL (ref 0–0.2)
BASOPHILS NFR BLD AUTO: 1.1 % (ref 0–1)
BUN SERPL-MCNC: 49 MG/DL (ref 7–18)
BUN/CREAT SERPL: 5 (ref 6–20)
CALCIUM SERPL-MCNC: 8.4 MG/DL (ref 8.5–10.1)
CHLORIDE SERPL-SCNC: 99 MMOL/L (ref 98–107)
CO2 SERPL-SCNC: 32 MMOL/L (ref 21–32)
CREAT SERPL-MCNC: 10.46 MG/DL (ref 0.7–1.3)
DIFFERENTIAL METHOD BLD: ABNORMAL
EGFR (NO RACE VARIABLE) (RUSH/TITUS): 5 ML/MIN/1.73M²
EOSINOPHIL # BLD AUTO: 0.34 K/UL (ref 0–0.5)
EOSINOPHIL NFR BLD AUTO: 5.4 % (ref 1–4)
EOSINOPHIL NFR BLD MANUAL: 5 % (ref 1–4)
ERYTHROCYTE [DISTWIDTH] IN BLOOD BY AUTOMATED COUNT: 17.6 % (ref 11.5–14.5)
GLUCOSE SERPL-MCNC: 122 MG/DL (ref 74–106)
HCT VFR BLD AUTO: 28 % (ref 40–54)
HGB BLD-MCNC: 8.8 G/DL (ref 13.5–18)
HYPOCHROMIA BLD QL SMEAR: ABNORMAL
IMM GRANULOCYTES # BLD AUTO: 0.01 K/UL (ref 0–0.04)
IMM GRANULOCYTES NFR BLD: 0.2 % (ref 0–0.4)
LYMPHOCYTES # BLD AUTO: 1.36 K/UL (ref 1–4.8)
LYMPHOCYTES NFR BLD AUTO: 21.7 % (ref 27–41)
LYMPHOCYTES NFR BLD MANUAL: 23 % (ref 27–41)
MACROCYTES BLD QL SMEAR: ABNORMAL
MCH RBC QN AUTO: 30.7 PG (ref 27–31)
MCHC RBC AUTO-ENTMCNC: 31.4 G/DL (ref 32–36)
MCV RBC AUTO: 97.6 FL (ref 80–96)
MONOCYTES # BLD AUTO: 0.84 K/UL (ref 0–0.8)
MONOCYTES NFR BLD AUTO: 13.4 % (ref 2–6)
MONOCYTES NFR BLD MANUAL: 10 % (ref 2–6)
MPC BLD CALC-MCNC: 12.1 FL (ref 9.4–12.4)
NEUTROPHILS # BLD AUTO: 3.66 K/UL (ref 1.8–7.7)
NEUTROPHILS NFR BLD AUTO: 58.2 % (ref 53–65)
NEUTS SEG NFR BLD MANUAL: 62 % (ref 50–62)
NRBC # BLD AUTO: 0 X10E3/UL
NRBC, AUTO (.00): 0 %
OVALOCYTES BLD QL SMEAR: ABNORMAL
PLATELET # BLD AUTO: 154 K/UL (ref 150–400)
PLATELET MORPHOLOGY: ABNORMAL
POLYCHROMASIA BLD QL SMEAR: ABNORMAL
POTASSIUM SERPL-SCNC: 4.7 MMOL/L (ref 3.5–5.1)
RBC # BLD AUTO: 2.87 M/UL (ref 4.6–6.2)
SODIUM SERPL-SCNC: 140 MMOL/L (ref 136–145)
WBC # BLD AUTO: 6.28 K/UL (ref 4.5–11)

## 2023-01-24 PROCEDURE — 99284 EMERGENCY DEPT VISIT MOD MDM: CPT | Mod: ,,, | Performed by: EMERGENCY MEDICINE

## 2023-01-24 PROCEDURE — 99284 PR EMERGENCY DEPT VISIT,LEVEL IV: ICD-10-PCS | Mod: ,,, | Performed by: EMERGENCY MEDICINE

## 2023-01-24 PROCEDURE — 85025 COMPLETE CBC W/AUTO DIFF WBC: CPT | Performed by: EMERGENCY MEDICINE

## 2023-01-24 PROCEDURE — 99284 EMERGENCY DEPT VISIT MOD MDM: CPT | Mod: 25

## 2023-01-24 PROCEDURE — 93005 ELECTROCARDIOGRAM TRACING: CPT

## 2023-01-24 PROCEDURE — 80048 BASIC METABOLIC PNL TOTAL CA: CPT | Performed by: EMERGENCY MEDICINE

## 2023-01-24 PROCEDURE — 93010 ELECTROCARDIOGRAM REPORT: CPT | Mod: ,,, | Performed by: STUDENT IN AN ORGANIZED HEALTH CARE EDUCATION/TRAINING PROGRAM

## 2023-01-24 PROCEDURE — 93010 EKG 12-LEAD: ICD-10-PCS | Mod: ,,, | Performed by: STUDENT IN AN ORGANIZED HEALTH CARE EDUCATION/TRAINING PROGRAM
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
## 2023-01-24 NOTE — ED TRIAGE NOTES
Presents to ED for complaints of high potassium levels.  Patient states that he had dialysis yesterday and they called him and told him his potassium was elevated and that he needs to be evaluated in the Emergency Department.  States that he did have chest tightness last night but denies chest pain at present.

## 2023-01-24 NOTE — DISCHARGE INSTRUCTIONS
Continue with dialysis tomorrow as scheduled.    Seek care if symptoms are worsening or new symptoms develop

## 2023-04-21 ENCOUNTER — HOSPITAL ENCOUNTER (OUTPATIENT)
Facility: HOSPITAL | Age: 53
Discharge: HOME | End: 2023-04-22
Attending: EMERGENCY MEDICINE | Admitting: FAMILY MEDICINE
Payer: MEDICARE

## 2023-04-21 DIAGNOSIS — Z99.2 ESRD ON HEMODIALYSIS: ICD-10-CM

## 2023-04-21 DIAGNOSIS — I10 HTN (HYPERTENSION), BENIGN: ICD-10-CM

## 2023-04-21 DIAGNOSIS — N18.6 ESRD ON HEMODIALYSIS: ICD-10-CM

## 2023-04-21 DIAGNOSIS — R07.9 CHEST PAIN: Primary | ICD-10-CM

## 2023-04-21 DIAGNOSIS — I48.0 PAROXYSMAL A-FIB: ICD-10-CM

## 2023-04-21 DIAGNOSIS — I21.A1 TYPE 2 MYOCARDIAL INFARCTION: ICD-10-CM

## 2023-04-21 DIAGNOSIS — I48.91 ATRIAL FIBRILLATION WITH RVR: ICD-10-CM

## 2023-04-21 DIAGNOSIS — R07.9 CHEST PAIN, UNSPECIFIED TYPE: ICD-10-CM

## 2023-04-21 LAB
ALBUMIN SERPL BCP-MCNC: 3.5 G/DL (ref 3.5–5)
ALBUMIN/GLOB SERPL: 0.8 {RATIO}
ALP SERPL-CCNC: 92 U/L (ref 45–115)
ALT SERPL W P-5'-P-CCNC: 19 U/L (ref 16–61)
ANION GAP SERPL CALCULATED.3IONS-SCNC: 16 MMOL/L (ref 7–16)
ANISOCYTOSIS BLD QL SMEAR: ABNORMAL
AST SERPL W P-5'-P-CCNC: 18 U/L (ref 15–37)
BASOPHILS # BLD AUTO: 0.05 K/UL (ref 0–0.2)
BASOPHILS NFR BLD AUTO: 0.8 % (ref 0–1)
BILIRUB SERPL-MCNC: 0.3 MG/DL (ref ?–1.2)
BUN SERPL-MCNC: 24 MG/DL (ref 7–18)
BUN/CREAT SERPL: 3 (ref 6–20)
CALCIUM SERPL-MCNC: 8.9 MG/DL (ref 8.5–10.1)
CHLORIDE SERPL-SCNC: 96 MMOL/L (ref 98–107)
CO2 SERPL-SCNC: 31 MMOL/L (ref 21–32)
CREAT SERPL-MCNC: 8.18 MG/DL (ref 0.7–1.3)
DIFFERENTIAL METHOD BLD: ABNORMAL
EGFR (NO RACE VARIABLE) (RUSH/TITUS): 7 ML/MIN/1.73M²
EOSINOPHIL # BLD AUTO: 0.33 K/UL (ref 0–0.5)
EOSINOPHIL NFR BLD AUTO: 5 % (ref 1–4)
EOSINOPHIL NFR BLD MANUAL: 5 % (ref 1–4)
ERYTHROCYTE [DISTWIDTH] IN BLOOD BY AUTOMATED COUNT: 16.5 % (ref 11.5–14.5)
GLOBULIN SER-MCNC: 4.2 G/DL (ref 2–4)
GLUCOSE SERPL-MCNC: 186 MG/DL (ref 74–106)
HCT VFR BLD AUTO: 35.7 % (ref 40–54)
HGB BLD-MCNC: 11.3 G/DL (ref 13.5–18)
IMM GRANULOCYTES # BLD AUTO: 0.02 K/UL (ref 0–0.04)
IMM GRANULOCYTES NFR BLD: 0.3 % (ref 0–0.4)
LYMPHOCYTES # BLD AUTO: 1.05 K/UL (ref 1–4.8)
LYMPHOCYTES NFR BLD AUTO: 15.8 % (ref 27–41)
LYMPHOCYTES NFR BLD MANUAL: 16 % (ref 27–41)
MACROCYTES BLD QL SMEAR: ABNORMAL
MCH RBC QN AUTO: 31.9 PG (ref 27–31)
MCHC RBC AUTO-ENTMCNC: 31.7 G/DL (ref 32–36)
MCV RBC AUTO: 100.8 FL (ref 80–96)
METAMYELOCYTES NFR BLD MANUAL: 1 %
MONOCYTES # BLD AUTO: 0.78 K/UL (ref 0–0.8)
MONOCYTES NFR BLD AUTO: 11.7 % (ref 2–6)
MONOCYTES NFR BLD MANUAL: 12 % (ref 2–6)
MPC BLD CALC-MCNC: 11.8 FL (ref 9.4–12.4)
NEUTROPHILS # BLD AUTO: 4.41 K/UL (ref 1.8–7.7)
NEUTROPHILS NFR BLD AUTO: 66.4 % (ref 53–65)
NEUTS SEG NFR BLD MANUAL: 66 % (ref 50–62)
NRBC # BLD AUTO: 0 X10E3/UL
NRBC, AUTO (.00): 0 %
NT-PROBNP SERPL-MCNC: ABNORMAL PG/ML (ref 1–125)
PLATELET # BLD AUTO: 247 K/UL (ref 150–400)
PLATELET MORPHOLOGY: ABNORMAL
POTASSIUM SERPL-SCNC: 4 MMOL/L (ref 3.5–5.1)
PROT SERPL-MCNC: 7.7 G/DL (ref 6.4–8.2)
RBC # BLD AUTO: 3.54 M/UL (ref 4.6–6.2)
SARS-COV-2 RDRP RESP QL NAA+PROBE: NEGATIVE
SODIUM SERPL-SCNC: 139 MMOL/L (ref 136–145)
STOMATOCYTES BLD QL SMEAR: ABNORMAL
TROPONIN I SERPL HS-MCNC: 56.6 PG/ML
TROPONIN I SERPL HS-MCNC: 66.2 PG/ML
WBC # BLD AUTO: 6.64 K/UL (ref 4.5–11)

## 2023-04-21 PROCEDURE — 99285 EMERGENCY DEPT VISIT HI MDM: CPT | Mod: 25,CS

## 2023-04-21 PROCEDURE — 80053 COMPREHEN METABOLIC PANEL: CPT | Performed by: NURSE PRACTITIONER

## 2023-04-21 PROCEDURE — 83880 ASSAY OF NATRIURETIC PEPTIDE: CPT | Performed by: NURSE PRACTITIONER

## 2023-04-21 PROCEDURE — 93010 EKG 12-LEAD: ICD-10-PCS | Mod: ,,, | Performed by: STUDENT IN AN ORGANIZED HEALTH CARE EDUCATION/TRAINING PROGRAM

## 2023-04-21 PROCEDURE — G0378 HOSPITAL OBSERVATION PER HR: HCPCS

## 2023-04-21 PROCEDURE — 87635 SARS-COV-2 COVID-19 AMP PRB: CPT | Performed by: FAMILY MEDICINE

## 2023-04-21 PROCEDURE — 25000003 PHARM REV CODE 250: Performed by: NURSE PRACTITIONER

## 2023-04-21 PROCEDURE — 93010 ELECTROCARDIOGRAM REPORT: CPT | Mod: ,,, | Performed by: STUDENT IN AN ORGANIZED HEALTH CARE EDUCATION/TRAINING PROGRAM

## 2023-04-21 PROCEDURE — 85025 COMPLETE CBC W/AUTO DIFF WBC: CPT | Performed by: NURSE PRACTITIONER

## 2023-04-21 PROCEDURE — 84484 ASSAY OF TROPONIN QUANT: CPT | Performed by: NURSE PRACTITIONER

## 2023-04-21 PROCEDURE — 99285 EMERGENCY DEPT VISIT HI MDM: CPT | Mod: ,,, | Performed by: EMERGENCY MEDICINE

## 2023-04-21 PROCEDURE — G0378 HOSPITAL OBSERVATION PER HR: HCPCS | Mod: CS

## 2023-04-21 PROCEDURE — 99223 1ST HOSP IP/OBS HIGH 75: CPT | Mod: AI,,, | Performed by: FAMILY MEDICINE

## 2023-04-21 PROCEDURE — 93005 ELECTROCARDIOGRAM TRACING: CPT

## 2023-04-21 PROCEDURE — 99223 PR INITIAL HOSPITAL CARE,LEVL III: ICD-10-PCS | Mod: AI,,, | Performed by: FAMILY MEDICINE

## 2023-04-21 PROCEDURE — 99285 PR EMERGENCY DEPT VISIT,LEVEL V: ICD-10-PCS | Mod: ,,, | Performed by: EMERGENCY MEDICINE

## 2023-04-21 RX ORDER — ASPIRIN 325 MG
325 TABLET ORAL
Status: COMPLETED | OUTPATIENT
Start: 2023-04-21 | End: 2023-04-21

## 2023-04-21 RX ORDER — HYDRALAZINE HYDROCHLORIDE 100 MG/1
100 TABLET, FILM COATED ORAL 3 TIMES DAILY
COMMUNITY
Start: 2023-02-27

## 2023-04-21 RX ORDER — ISOSORBIDE MONONITRATE 60 MG/1
60 TABLET, EXTENDED RELEASE ORAL EVERY MORNING
COMMUNITY
Start: 2023-03-22

## 2023-04-21 RX ORDER — SODIUM CHLORIDE 0.9 % (FLUSH) 0.9 %
10 SYRINGE (ML) INJECTION
Status: DISCONTINUED | OUTPATIENT
Start: 2023-04-21 | End: 2023-04-22 | Stop reason: HOSPADM

## 2023-04-21 RX ORDER — METOPROLOL TARTRATE 100 MG/1
100 TABLET ORAL 2 TIMES DAILY
Status: ON HOLD | COMMUNITY
Start: 2023-02-20 | End: 2023-04-22 | Stop reason: SDUPTHER

## 2023-04-21 RX ORDER — METOPROLOL TARTRATE 100 MG/1
100 TABLET ORAL 2 TIMES DAILY
Status: DISCONTINUED | OUTPATIENT
Start: 2023-04-21 | End: 2023-04-22 | Stop reason: HOSPADM

## 2023-04-21 RX ORDER — PRAZOSIN HYDROCHLORIDE 1 MG/1
1 CAPSULE ORAL 2 TIMES DAILY
Status: ON HOLD | COMMUNITY
Start: 2023-02-07 | End: 2023-04-22 | Stop reason: SDUPTHER

## 2023-04-21 RX ORDER — LOSARTAN POTASSIUM 50 MG/1
50 TABLET ORAL 2 TIMES DAILY
COMMUNITY
Start: 2023-03-22

## 2023-04-21 RX ORDER — NIFEDIPINE 60 MG/1
60 TABLET, EXTENDED RELEASE ORAL
COMMUNITY
Start: 2023-03-07

## 2023-04-21 RX ADMIN — ASPIRIN 325 MG ORAL TABLET 325 MG: 325 PILL ORAL at 02:04

## 2023-04-21 NOTE — ASSESSMENT & PLAN NOTE
Patient undergoes hemodialysis on Monday Wednesday Friday.  Will need consultation to Nephrology if he stays beyond the weekend.
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
## 2023-04-21 NOTE — ASSESSMENT & PLAN NOTE
Chest pain associated AFib with RVR.  History of coronary artery disease.  Cardiology consulted to rule out ACS.
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
## 2023-04-21 NOTE — ASSESSMENT & PLAN NOTE
Patient with Paroxysmal (<7 days) atrial fibrillation which is uncontrolled currently with Beta Blocker. Patient is currently in sinus rhythm.  Patient presented to the emergency room with AFib with RVR.  Patient is not on any beta-blocker as he has not filled his metoprolol in the last 3 days.  Anticoagulation indicated. Anticoagulation done with Eliquis.  Patient has not refilled Eliquis in the last 2 months.

## 2023-04-21 NOTE — HPI
Patient is a 52-year-old  male with past medical history of ESRD, AFib, essential hypertension presents to the emergency room with complaints of chest pain and palpitation with shortness of breadth.  Patient informs that patient's chest pain started during the dialysis.  This was localized with no radiation.  This was shared with increased palpitation and shortness of breath.  Patient was at his regarding his episode.  Patient came to the emergency room after his dialysis for further evaluation where EKG noted to have AFib with RVR with ischemic changes.  Labs noted to have elevated proBNP and troponin.  Upon further questioning patient informs that he had ran out of his metoprolol for the last 3 days and his Eliquis for the last 2-3 months.  Patient's social situations which he could not control that has caused a lapse in his care.  He has not called his doctor for refills.  Last refilled at [facility] in February 2023.  Hospitalist service consulted for further evaluation management.  Will consult cardiology for recommendations.
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
## 2023-04-22 VITALS
HEART RATE: 57 BPM | RESPIRATION RATE: 16 BRPM | HEIGHT: 72 IN | SYSTOLIC BLOOD PRESSURE: 208 MMHG | TEMPERATURE: 97 F | DIASTOLIC BLOOD PRESSURE: 101 MMHG | BODY MASS INDEX: 30.88 KG/M2 | OXYGEN SATURATION: 95 % | WEIGHT: 228 LBS

## 2023-04-22 PROBLEM — I35.0 MODERATE AORTIC STENOSIS: Status: ACTIVE | Noted: 2023-04-22

## 2023-04-22 PROBLEM — I21.A1 TYPE 2 MYOCARDIAL INFARCTION: Status: RESOLVED | Noted: 2023-04-22 | Resolved: 2023-04-22

## 2023-04-22 PROBLEM — I48.0 PAROXYSMAL ATRIAL FIBRILLATION: Status: ACTIVE | Noted: 2023-04-21

## 2023-04-22 PROBLEM — I48.0 PAROXYSMAL A-FIB: Status: RESOLVED | Noted: 2023-04-21 | Resolved: 2023-04-22

## 2023-04-22 PROBLEM — R07.9 CHEST PAIN: Status: RESOLVED | Noted: 2021-05-04 | Resolved: 2023-04-22

## 2023-04-22 PROBLEM — I48.91 ATRIAL FIBRILLATION WITH RVR: Status: ACTIVE | Noted: 2023-04-22

## 2023-04-22 PROBLEM — I21.A1 TYPE 2 MYOCARDIAL INFARCTION: Status: ACTIVE | Noted: 2023-04-22

## 2023-04-22 PROBLEM — I10 HTN (HYPERTENSION), BENIGN: Status: ACTIVE | Noted: 2023-04-22

## 2023-04-22 LAB
AORTIC VALVE CUSP SEPERATION: 2.17 CM
AV INDEX (PROSTH): 0.39
AV MEAN GRADIENT: 22 MMHG
AV PEAK GRADIENT: 41 MMHG
AV REGURGITATION PRESSURE HALF TIME: 585 MS
AV VALVE AREA: 1.35 CM2
AV VELOCITY RATIO: 0.38
BASOPHILS # BLD AUTO: 0.08 K/UL (ref 0–0.2)
BASOPHILS NFR BLD AUTO: 1.3 % (ref 0–1)
BSA FOR ECHO PROCEDURE: 2.29 M2
CV ECHO LV RWT: 0.42 CM
DIFFERENTIAL METHOD BLD: ABNORMAL
DOP CALC AO PEAK VEL: 3.2 M/S
DOP CALC AO VTI: 73.23 CM
DOP CALC LVOT AREA: 3.5 CM2
DOP CALC LVOT DIAMETER: 2.1 CM
DOP CALC LVOT PEAK VEL: 1.2 M/S
DOP CALC LVOT STROKE VOLUME: 98.7 CM3
DOP CALC MV VTI: 41.6 CM
DOP CALCLVOT PEAK VEL VTI: 28.51 CM
E WAVE DECELERATION TIME: 238 MSEC
ECHO LV POSTERIOR WALL: 1.24 CM (ref 0.6–1.1)
EJECTION FRACTION: 50 %
EOSINOPHIL # BLD AUTO: 0.4 K/UL (ref 0–0.5)
EOSINOPHIL NFR BLD AUTO: 6.5 % (ref 1–4)
ERYTHROCYTE [DISTWIDTH] IN BLOOD BY AUTOMATED COUNT: 16.6 % (ref 11.5–14.5)
FRACTIONAL SHORTENING: 28 % (ref 28–44)
HCT VFR BLD AUTO: 35.8 % (ref 40–54)
HGB BLD-MCNC: 11.3 G/DL (ref 13.5–18)
IMM GRANULOCYTES # BLD AUTO: 0.03 K/UL (ref 0–0.04)
IMM GRANULOCYTES NFR BLD: 0.5 % (ref 0–0.4)
INTERVENTRICULAR SEPTUM: 1.43 CM (ref 0.6–1.1)
LEFT ATRIUM SIZE: 4.4 CM
LEFT INTERNAL DIMENSION IN SYSTOLE: 4.27 CM (ref 2.1–4)
LEFT VENTRICLE DIASTOLIC VOLUME INDEX: 77.58 ML/M2
LEFT VENTRICLE DIASTOLIC VOLUME: 174.56 ML
LEFT VENTRICLE MASS INDEX: 158 G/M2
LEFT VENTRICLE SYSTOLIC VOLUME INDEX: 36.3 ML/M2
LEFT VENTRICLE SYSTOLIC VOLUME: 81.71 ML
LEFT VENTRICULAR INTERNAL DIMENSION IN DIASTOLE: 5.92 CM (ref 3.5–6)
LEFT VENTRICULAR MASS: 355.34 G
LVOT MG: 3 MMHG
LYMPHOCYTES # BLD AUTO: 1.19 K/UL (ref 1–4.8)
LYMPHOCYTES NFR BLD AUTO: 19.3 % (ref 27–41)
MCH RBC QN AUTO: 31.7 PG (ref 27–31)
MCHC RBC AUTO-ENTMCNC: 31.6 G/DL (ref 32–36)
MCV RBC AUTO: 100.3 FL (ref 80–96)
MONOCYTES # BLD AUTO: 0.81 K/UL (ref 0–0.8)
MONOCYTES NFR BLD AUTO: 13.2 % (ref 2–6)
MPC BLD CALC-MCNC: 11.6 FL (ref 9.4–12.4)
MV MEAN GRADIENT: 4 MMHG
MV PEAK E VEL: 1.11 M/S
MV VALVE AREA BY CONTINUITY EQUATION: 2.37 CM2
NEUTROPHILS # BLD AUTO: 3.64 K/UL (ref 1.8–7.7)
NEUTROPHILS NFR BLD AUTO: 59.2 % (ref 53–65)
NRBC # BLD AUTO: 0 X10E3/UL
NRBC, AUTO (.00): 0 %
PISA AR MAX VEL: 4.71 M/S
PISA TR MAX VEL: 2.9 M/S
PLATELET # BLD AUTO: 241 K/UL (ref 150–400)
RA PRESSURE: 15 MMHG
RBC # BLD AUTO: 3.57 M/UL (ref 4.6–6.2)
RIGHT ATRIAL AREA: 22.3 CM2
RIGHT VENTRICULAR END-DIASTOLIC DIMENSION: 3.6 CM
TR MAX PG: 34 MMHG
TRICUSPID ANNULAR PLANE SYSTOLIC EXCURSION: 2.3 CM
TROPONIN I SERPL HS-MCNC: 98.4 PG/ML
TV REST PULMONARY ARTERY PRESSURE: 49 MMHG
WBC # BLD AUTO: 6.15 K/UL (ref 4.5–11)

## 2023-04-22 PROCEDURE — G0378 HOSPITAL OBSERVATION PER HR: HCPCS | Mod: CS

## 2023-04-22 PROCEDURE — 25000003 PHARM REV CODE 250

## 2023-04-22 PROCEDURE — 99232 PR SUBSEQUENT HOSPITAL CARE,LEVL II: ICD-10-PCS | Mod: ,,, | Performed by: FAMILY MEDICINE

## 2023-04-22 PROCEDURE — 25000003 PHARM REV CODE 250: Performed by: FAMILY MEDICINE

## 2023-04-22 PROCEDURE — 99232 SBSQ HOSP IP/OBS MODERATE 35: CPT | Mod: ,,, | Performed by: FAMILY MEDICINE

## 2023-04-22 PROCEDURE — 84484 ASSAY OF TROPONIN QUANT: CPT | Performed by: STUDENT IN AN ORGANIZED HEALTH CARE EDUCATION/TRAINING PROGRAM

## 2023-04-22 PROCEDURE — 85025 COMPLETE CBC W/AUTO DIFF WBC: CPT | Performed by: STUDENT IN AN ORGANIZED HEALTH CARE EDUCATION/TRAINING PROGRAM

## 2023-04-22 PROCEDURE — 99223 1ST HOSP IP/OBS HIGH 75: CPT | Mod: ,,, | Performed by: STUDENT IN AN ORGANIZED HEALTH CARE EDUCATION/TRAINING PROGRAM

## 2023-04-22 PROCEDURE — 99223 PR INITIAL HOSPITAL CARE,LEVL III: ICD-10-PCS | Mod: ,,, | Performed by: STUDENT IN AN ORGANIZED HEALTH CARE EDUCATION/TRAINING PROGRAM

## 2023-04-22 RX ORDER — HYDRALAZINE HYDROCHLORIDE 100 MG/1
100 TABLET, FILM COATED ORAL EVERY 8 HOURS
Status: DISCONTINUED | OUTPATIENT
Start: 2023-04-22 | End: 2023-04-22 | Stop reason: HOSPADM

## 2023-04-22 RX ORDER — METOPROLOL TARTRATE 100 MG/1
100 TABLET ORAL 2 TIMES DAILY
Qty: 60 TABLET | Refills: 1 | Status: SHIPPED | OUTPATIENT
Start: 2023-04-22 | End: 2023-06-21

## 2023-04-22 RX ORDER — NIFEDIPINE 60 MG/1
60 TABLET, EXTENDED RELEASE ORAL DAILY
Status: DISCONTINUED | OUTPATIENT
Start: 2023-04-22 | End: 2023-04-22 | Stop reason: HOSPADM

## 2023-04-22 RX ORDER — PRAZOSIN HYDROCHLORIDE 1 MG/1
1 CAPSULE ORAL 2 TIMES DAILY
Qty: 60 CAPSULE | Refills: 1 | Status: SHIPPED | OUTPATIENT
Start: 2023-04-22 | End: 2023-06-21

## 2023-04-22 RX ADMIN — METOPROLOL TARTRATE 100 MG: 100 TABLET, FILM COATED ORAL at 09:04

## 2023-04-22 RX ADMIN — HYDRALAZINE HYDROCHLORIDE 100 MG: 100 TABLET, FILM COATED ORAL at 02:04

## 2023-04-22 RX ADMIN — NIFEDIPINE 60 MG: 60 TABLET, FILM COATED, EXTENDED RELEASE ORAL at 02:04

## 2023-04-22 RX ADMIN — METOPROLOL TARTRATE 100 MG: 100 TABLET, FILM COATED ORAL at 12:04
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
## 2023-04-22 NOTE — ASSESSMENT & PLAN NOTE
Chest pain consistent with palpitations  Troponin elevation in the setting of moderate concentric LVH, ESRD and moderate aortic stenosis  2/2 to demand  Restart home meds  Patient reports having a [facility] at [facility] - will get records  Currently CP free; no ischemic evaluation at this time  Follow up in outpatient clinic in 1-2 weeks.

## 2023-04-22 NOTE — DISCHARGE SUMMARY
[facility]  [facility] Medicine  Discharge Summary      Patient Name: Saúl Butt  MRN: [id number]  JAY: [id number]  Patient Class: OP- Observation  Admission Date: 4/21/2023  Hospital Length of Stay: 0 days  Discharge Date and Time:  04/22/2023 11:37 AM  Attending Physician: Kiet Velasquez Jr., MD   Discharging Provider: Kiet Velasquez Jr, MD  Primary Care Provider: JW Zapien    Primary Care Team: Networked reference to record PCT     HPI:   Patient is a 52-year-old  male with past medical history of ESRD, AFib, essential hypertension presents to the emergency room with complaints of chest pain and palpitation with shortness of breadth.  Patient informs that patient's chest pain started during the dialysis.  This was localized with no radiation.  This was shared with increased palpitation and shortness of breath.  Patient was at his regarding his episode.  Patient came to the emergency room after his dialysis for further evaluation where EKG noted to have AFib with RVR with ischemic changes.  Labs noted to have elevated proBNP and troponin.  Upon further questioning patient informs that he had ran out of his metoprolol for the last 3 days and his Eliquis for the last 2-3 months.  Patient's social situations which he could not control that has caused a lapse in his care.  He has not called his doctor for refills.  Last refilled at [facility] in February 2023.  Hospitalist service consulted for further evaluation management.  Will consult cardiology for recommendations.      * No surgery found *      Hospital Course:   Patient is a 51 y/o AA male with ESRD with hx of PAF who presented to ER with palpitations, chest pain.  Patient in Afib with RVR and given BB and subsequently converted to NSR.  Mild troponin elevation noted. Last cath 2 years ago with [facility] with no intervention.  We consulted cardiology and it was felt that symptoms and labs explained by afib  RVR.  No need for invasive evaluation at this time.  His doctor is leaving [facility] and he elects to f/u at our cardiology clinic.  At this point patient has reached maximum hospital benefit and is being discharged home.   Echo showed EF of 50% with moderate AS.   Contributing to this event is patients lack of metoprolol the past 3 days. This is being refilled.  IN addition he has been out of Eliquis for 2 months. We are resuming this as well.         F/u Dr. Hilton's clinic in 2 weeks. MWF HD continues at [address].        Goals of Care Treatment Preferences:  Code Status: Full Code      Consults:   Consults (From admission, onward)        Status Ordering Provider     Inpatient consult to Cardiology  Once        Provider:  Sudeep Hilton MD    Completed COURTNEY POWELL          Cardiac/Vascular  HTN (hypertension), benign    Urged compliance. Has been out of meds.     Moderate aortic stenosis    FAMILIA 1.35    Atrial fibrillation with RVR        Renal/  ESRD on hemodialysis    Patient to continue HD MWF.      Final Active Diagnoses:    Diagnosis Date Noted POA    Atrial fibrillation with RVR [I48.91] 04/22/2023 Yes    Moderate aortic stenosis [I35.0] 04/22/2023 Yes    ESRD on hemodialysis [N18.6, Z99.2] 06/17/2021 Not Applicable    HTN (hypertension), benign [I10] 04/22/2023 Unknown    Anemia in chronic kidney disease [N18.9, D63.1] 12/06/2014 Yes      Problems Resolved During this Admission:    Diagnosis Date Noted Date Resolved POA    PRINCIPAL PROBLEM:  Chest pain [R07.9] 05/04/2021 04/22/2023 Yes    Type 2 myocardial infarction [I21.A1] 04/22/2023 04/22/2023 Unknown    Paroxysmal A-fib [I48.0] 04/21/2023 04/22/2023 Yes       Discharged Condition: good    Disposition: Home or Self Care    Follow Up:   Follow-up Information     Sudeep Hilton MD Follow up in 2 day(s).    Specialties: Interventional Cardiology, Cardiology  Contact information:  [address] [place] [address] [place]  [id number]  [phone]                       Patient Instructions:   No discharge procedures on file.    Significant Diagnostic Studies: N/A    Pending Diagnostic Studies:     Procedure Component Value Units Date/Time    EKG 12-lead [[id number]]     Order Status: Sent Lab Status: No result     EXTRA TUBES [[id number]] Collected: 04/22/23 0956    Order Status: Sent Lab Status: In process Updated: 04/22/23 0956    Specimen: Blood, Venous     Narrative:      The following orders were created for panel order EXTRA TUBES.  Procedure                               Abnormality         Status                     ---------                               -----------         ------                     Gold Top Hold[[id number]]                                    In process                   Please view results for these tests on the individual orders.         Medications:  Reconciled Home Medications:      Medication List      START taking these medications    apixaban 2.5 mg Tab  Commonly known as: ELIQUIS  Take 1 tablet (2.5 mg total) by mouth 2 (two) times daily.        CONTINUE taking these medications    hydrALAZINE 100 MG tablet  Commonly known as: APRESOLINE  Take 100 mg by mouth 3 (three) times daily.     isosorbide mononitrate 60 MG 24 hr tablet  Commonly known as: IMDUR  Take 60 mg by mouth every morning.     losartan 50 MG tablet  Commonly known as: COZAAR  Take 50 mg by mouth 2 (two) times daily.     metoprolol tartrate 100 MG tablet  Commonly known as: LOPRESSOR  Take 1 tablet (100 mg total) by mouth 2 (two) times daily.     NIFEdipine 60 MG (OSM) 24 hr tablet  Commonly known as: PROCARDIA-XL  Take 60 mg by mouth.     prazosin 1 MG Cap  Commonly known as: MINIPRESS  Take 1 capsule (1 mg total) by mouth 2 (two) times daily.        STOP taking these medications    amLODIPine 10 MG tablet  Commonly known as: NORVASC     cloNIDine 0.1 MG tablet  Commonly known as: CATAPRES     HYDROcodone-acetaminophen 7.5-325 mg per  tablet  Commonly known as: NORCO            Indwelling Lines/Drains at time of discharge:   Lines/Drains/Airways     None                 Time spent on the discharge of patient: 35 minutes         Kiet Velasquez Jr, MD  Department of Hospital Medicine  [facility]
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
## 2023-04-22 NOTE — HOSPITAL COURSE
Patient is a 51 y/o AA male with ESRD with hx of PAF who presented to ER with palpitations, chest pain.  Patient in Afib with RVR and given BB and subsequently converted to NSR.  Mild troponin elevation noted. Last cath 2 years ago with [facility] with no intervention.  We consulted cardiology and it was felt that symptoms and labs explained by afib RVR.  No need for invasive evaluation at this time.  His doctor is leaving [facility] and he elects to f/u at our cardiology clinic.  At this point patient has reached maximum hospital benefit and is being discharged home.   Echo showed EF of 50% with moderate AS.   Contributing to this event is patients lack of metoprolol the past 3 days. This is being refilled.  IN addition he has been out of Eliquis for 2 months. We are resuming this as well.         F/u Dr. Hilton's clinic in 2 weeks. MWF HD continues at [address].

## 2023-04-22 NOTE — ASSESSMENT & PLAN NOTE
Known hx  Symptoms consistent with afib with RVR  Spontaneously cardioverted with po metoprolol  Restart eliquis 5mg bid  Follwouemilee in clinic

## 2023-04-22 NOTE — HPI
Patient is a 52-year-old  male with past medical history of ESRD, AFib, essential hypertension presents to the emergency room with complaints of chest pain and palpitation with shortness of breath.     Patient informs that patient's chest pain started during the dialysis.  This was localized with no radiation.  This was shared with increased palpitation and shortness of breath.  Patient was at his regarding his episode.  Patient came to the emergency room after his dialysis for further evaluation where EKG noted to have AFib with RVR with ischemic changes.  Labs noted to have elevated proBNP and troponin.  Upon further questioning patient informs that he had ran out of his metoprolol for the last 3 days and his Eliquis for the last 2-3 months.  Patient's social situations which he could not control that has caused a lapse in his care.  He has not called his doctor for refills.  Last refilled at [facility] in February 2023.    Cardiology consulted for ACS evaluation.   Patient reports having a heart catheterization without stent placement at [facility] 2 years back - will get records

## 2023-05-04 DIAGNOSIS — I10 HTN (HYPERTENSION), BENIGN: Primary | ICD-10-CM

## 2023-08-07 ENCOUNTER — HOSPITAL ENCOUNTER (EMERGENCY)
Facility: HOSPITAL | Age: 53
Discharge: HOME | End: 2023-08-07
Attending: EMERGENCY MEDICINE
Payer: MEDICARE

## 2023-08-07 VITALS
WEIGHT: 230 LBS | TEMPERATURE: 99 F | BODY MASS INDEX: 31.15 KG/M2 | HEIGHT: 72 IN | RESPIRATION RATE: 14 BRPM | HEART RATE: 88 BPM | SYSTOLIC BLOOD PRESSURE: 108 MMHG | OXYGEN SATURATION: 97 % | DIASTOLIC BLOOD PRESSURE: 71 MMHG

## 2023-08-07 DIAGNOSIS — I48.92 ATRIAL FLUTTER WITH RAPID VENTRICULAR RESPONSE: Primary | ICD-10-CM

## 2023-08-07 DIAGNOSIS — I49.9 CARDIAC RHYTHM DISORDER OR DISTURBANCE OR CHANGE: ICD-10-CM

## 2023-08-07 DIAGNOSIS — Z91.148 NONCOMPLIANCE WITH MEDICATION REGIMEN: ICD-10-CM

## 2023-08-07 DIAGNOSIS — R06.02 SHORTNESS OF BREATH: ICD-10-CM

## 2023-08-07 LAB
ALBUMIN SERPL BCP-MCNC: 3.2 G/DL (ref 3.5–5)
ALBUMIN/GLOB SERPL: 0.6 {RATIO}
ALP SERPL-CCNC: 71 U/L (ref 45–115)
ALT SERPL W P-5'-P-CCNC: 10 U/L (ref 16–61)
ANION GAP SERPL CALCULATED.3IONS-SCNC: 19 MMOL/L (ref 7–16)
AST SERPL W P-5'-P-CCNC: 18 U/L (ref 15–37)
BASOPHILS # BLD AUTO: 0.06 K/UL (ref 0–0.2)
BASOPHILS NFR BLD AUTO: 0.5 % (ref 0–1)
BILIRUB SERPL-MCNC: 0.5 MG/DL (ref ?–1.2)
BUN SERPL-MCNC: 30 MG/DL (ref 7–18)
BUN/CREAT SERPL: 3 (ref 6–20)
CALCIUM SERPL-MCNC: 9.5 MG/DL (ref 8.5–10.1)
CHLORIDE SERPL-SCNC: 94 MMOL/L (ref 98–107)
CO2 SERPL-SCNC: 25 MMOL/L (ref 21–32)
CREAT SERPL-MCNC: 9.1 MG/DL (ref 0.7–1.3)
DIFFERENTIAL METHOD BLD: ABNORMAL
EGFR (NO RACE VARIABLE) (RUSH/TITUS): 6 ML/MIN/1.73M2
EOSINOPHIL # BLD AUTO: 0.23 K/UL (ref 0–0.5)
EOSINOPHIL NFR BLD AUTO: 2.1 % (ref 1–4)
ERYTHROCYTE [DISTWIDTH] IN BLOOD BY AUTOMATED COUNT: 15.9 % (ref 11.5–14.5)
GLOBULIN SER-MCNC: 5.1 G/DL (ref 2–4)
GLUCOSE SERPL-MCNC: 133 MG/DL (ref 74–106)
HCT VFR BLD AUTO: 38.4 % (ref 40–54)
HGB BLD-MCNC: 12.3 G/DL (ref 13.5–18)
IMM GRANULOCYTES # BLD AUTO: 0.05 K/UL (ref 0–0.04)
IMM GRANULOCYTES NFR BLD: 0.5 % (ref 0–0.4)
LYMPHOCYTES # BLD AUTO: 1.06 K/UL (ref 1–4.8)
LYMPHOCYTES NFR BLD AUTO: 9.5 % (ref 27–41)
MCH RBC QN AUTO: 31.5 PG (ref 27–31)
MCHC RBC AUTO-ENTMCNC: 32 G/DL (ref 32–36)
MCV RBC AUTO: 98.5 FL (ref 80–96)
MONOCYTES # BLD AUTO: 0.97 K/UL (ref 0–0.8)
MONOCYTES NFR BLD AUTO: 8.7 % (ref 2–6)
MPC BLD CALC-MCNC: 12.1 FL (ref 9.4–12.4)
NEUTROPHILS # BLD AUTO: 8.73 K/UL (ref 1.8–7.7)
NEUTROPHILS NFR BLD AUTO: 78.7 % (ref 53–65)
NRBC # BLD AUTO: 0 X10E3/UL
NRBC, AUTO (.00): 0 %
NT-PROBNP SERPL-MCNC: ABNORMAL PG/ML (ref 1–125)
PLATELET # BLD AUTO: 228 K/UL (ref 150–400)
POTASSIUM SERPL-SCNC: 3.9 MMOL/L (ref 3.5–5.1)
PROT SERPL-MCNC: 8.3 G/DL (ref 6.4–8.2)
RBC # BLD AUTO: 3.9 M/UL (ref 4.6–6.2)
SODIUM SERPL-SCNC: 134 MMOL/L (ref 136–145)
TROPONIN I SERPL DL<=0.01 NG/ML-MCNC: 62.4 PG/ML
WBC # BLD AUTO: 11.1 K/UL (ref 4.5–11)

## 2023-08-07 PROCEDURE — 85025 COMPLETE CBC W/AUTO DIFF WBC: CPT | Performed by: EMERGENCY MEDICINE

## 2023-08-07 PROCEDURE — 25000003 PHARM REV CODE 250: Performed by: EMERGENCY MEDICINE

## 2023-08-07 PROCEDURE — 96361 HYDRATE IV INFUSION ADD-ON: CPT

## 2023-08-07 PROCEDURE — 99284 EMERGENCY DEPT VISIT MOD MDM: CPT | Mod: ,,, | Performed by: EMERGENCY MEDICINE

## 2023-08-07 PROCEDURE — 80053 COMPREHEN METABOLIC PANEL: CPT | Performed by: EMERGENCY MEDICINE

## 2023-08-07 PROCEDURE — 99284 PR EMERGENCY DEPT VISIT,LEVEL IV: ICD-10-PCS | Mod: ,,, | Performed by: EMERGENCY MEDICINE

## 2023-08-07 PROCEDURE — 93010 EKG 12-LEAD: ICD-10-PCS | Mod: ,,, | Performed by: STUDENT IN AN ORGANIZED HEALTH CARE EDUCATION/TRAINING PROGRAM

## 2023-08-07 PROCEDURE — 93010 ELECTROCARDIOGRAM REPORT: CPT | Mod: 76,,, | Performed by: STUDENT IN AN ORGANIZED HEALTH CARE EDUCATION/TRAINING PROGRAM

## 2023-08-07 PROCEDURE — 93005 ELECTROCARDIOGRAM TRACING: CPT

## 2023-08-07 PROCEDURE — 83880 ASSAY OF NATRIURETIC PEPTIDE: CPT | Performed by: EMERGENCY MEDICINE

## 2023-08-07 PROCEDURE — 99284 EMERGENCY DEPT VISIT MOD MDM: CPT | Mod: 25

## 2023-08-07 PROCEDURE — 96374 THER/PROPH/DIAG INJ IV PUSH: CPT

## 2023-08-07 PROCEDURE — 84484 ASSAY OF TROPONIN QUANT: CPT | Performed by: EMERGENCY MEDICINE

## 2023-08-07 RX ORDER — DILTIAZEM HYDROCHLORIDE 5 MG/ML
20 INJECTION INTRAVENOUS
Status: COMPLETED | OUTPATIENT
Start: 2023-08-07 | End: 2023-08-07

## 2023-08-07 RX ADMIN — SODIUM CHLORIDE 500 ML: 9 INJECTION, SOLUTION INTRAVENOUS at 01:08

## 2023-08-07 RX ADMIN — DILTIAZEM HYDROCHLORIDE 20 MG: 5 INJECTION INTRAVENOUS at 01:08
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
## 2023-08-07 NOTE — ED NOTES
Lab attempted to call to give ED a critical but line disconnected so nurse called lab back to get critical.

## 2023-08-07 NOTE — DISCHARGE INSTRUCTIONS
Get your metoprolol prescription filled today and start taking it.  Return to emergency department for any worsening or further problems.  Follow up in clinic with primary care provider in 2-3 days for recheck.
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
## 2023-08-08 ENCOUNTER — TELEPHONE (OUTPATIENT)
Dept: EMERGENCY MEDICINE | Facility: HOSPITAL | Age: 53
End: 2023-08-08
Payer: MEDICARE

## 2023-09-07 ENCOUNTER — LAB VISIT (OUTPATIENT)
Dept: PRIMARY CARE CLINIC | Facility: CLINIC | Age: 53
End: 2023-09-07
Payer: MEDICARE

## 2023-09-07 DIAGNOSIS — Z02.83 ENCOUNTER FOR DRUG SCREENING: Primary | ICD-10-CM

## 2023-09-07 PROCEDURE — 99000 PR SPECIMEN HANDLING,DR OFF->LAB: ICD-10-PCS | Mod: ,,, | Performed by: NURSE PRACTITIONER

## 2023-09-07 PROCEDURE — 99000 SPECIMEN HANDLING OFFICE-LAB: CPT | Mod: ,,, | Performed by: NURSE PRACTITIONER
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
## 2024-01-29 ENCOUNTER — HOSPITAL ENCOUNTER (EMERGENCY)
Facility: HOSPITAL | Age: 54
Discharge: HOME | End: 2024-01-29
Payer: MEDICARE

## 2024-01-29 VITALS
OXYGEN SATURATION: 99 % | TEMPERATURE: 97 F | DIASTOLIC BLOOD PRESSURE: 77 MMHG | RESPIRATION RATE: 18 BRPM | BODY MASS INDEX: 32.51 KG/M2 | HEART RATE: 79 BPM | HEIGHT: 72 IN | WEIGHT: 240 LBS | SYSTOLIC BLOOD PRESSURE: 121 MMHG
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
## 2024-01-29 DIAGNOSIS — M54.50 LOW BACK PAIN, UNSPECIFIED BACK PAIN LATERALITY, UNSPECIFIED CHRONICITY, UNSPECIFIED WHETHER SCIATICA PRESENT: ICD-10-CM

## 2024-01-29 DIAGNOSIS — M79.605 PAIN OF LEFT LOWER EXTREMITY: Primary | ICD-10-CM

## 2024-01-29 PROCEDURE — 99284 EMERGENCY DEPT VISIT MOD MDM: CPT

## 2024-01-29 PROCEDURE — 99284 EMERGENCY DEPT VISIT MOD MDM: CPT | Mod: ,,, | Performed by: NURSE PRACTITIONER

## 2024-01-29 RX ORDER — ORPHENADRINE CITRATE 30 MG/ML
60 INJECTION INTRAMUSCULAR; INTRAVENOUS
Status: DISCONTINUED | OUTPATIENT
Start: 2024-01-29 | End: 2024-01-29 | Stop reason: HOSPADM

## 2024-01-29 NOTE — ED NOTES
Called patient's name twice to bring him back to give him his Orphenadrine IM shot, no response was given; Will try again 5 minutes

## 2024-01-30 ENCOUNTER — TELEPHONE (OUTPATIENT)
Dept: EMERGENCY MEDICINE | Facility: HOSPITAL | Age: 54
End: 2024-01-30
Payer: MEDICARE

## 2024-02-01 DIAGNOSIS — M54.50 LOW BACK PAIN, UNSPECIFIED: Primary | ICD-10-CM

## 2024-02-08 ENCOUNTER — TELEPHONE (OUTPATIENT)
Dept: TRANSPLANT | Facility: CLINIC | Age: 54
End: 2024-02-08

## 2024-02-13 ENCOUNTER — CLINICAL SUPPORT (OUTPATIENT)
Dept: REHABILITATION | Facility: HOSPITAL | Age: 54
End: 2024-02-13
Payer: MEDICARE

## 2024-02-13 DIAGNOSIS — R20.2 TINGLING: ICD-10-CM

## 2024-02-13 DIAGNOSIS — R52 PAIN: ICD-10-CM

## 2024-02-13 DIAGNOSIS — M54.42 ACUTE LEFT-SIDED LOW BACK PAIN WITH LEFT-SIDED SCIATICA: ICD-10-CM

## 2024-02-13 DIAGNOSIS — M54.50 LOW BACK PAIN, UNSPECIFIED: Primary | ICD-10-CM

## 2024-02-13 PROCEDURE — 97161 PT EVAL LOW COMPLEX 20 MIN: CPT
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
## 2024-02-20 ENCOUNTER — CLINICAL SUPPORT (OUTPATIENT)
Dept: REHABILITATION | Facility: HOSPITAL | Age: 54
End: 2024-02-20
Payer: MEDICARE

## 2024-02-20 DIAGNOSIS — M54.42 ACUTE LEFT-SIDED LOW BACK PAIN WITH LEFT-SIDED SCIATICA: Primary | ICD-10-CM

## 2024-02-20 PROCEDURE — 97112 NEUROMUSCULAR REEDUCATION: CPT | Mod: CQ

## 2024-02-20 PROCEDURE — 97110 THERAPEUTIC EXERCISES: CPT | Mod: CQ

## 2024-02-20 NOTE — PROGRESS NOTES
[facility]   Physical Therapy Treatment Note      Name: Saúl Butt  Clinic Number: [id number]    Therapy Diagnosis: No diagnosis found.  Physician: Martínez Munoz II, MD    Visit Date: 2/20/2024    Physician Orders: PT Eval and Treat   Medical Diagnosis from Referral: LBP  Evaluation Date: 2/13/2024  Authorization Period Expiration: 1/31/25  Plan of Care Expiration: 4/7/24  Visit # / Visits authorized: 2/25     Time In: 10:45 am  Time Out: 11:23 am   Total Appointment Time (timed & untimed codes): 38 minutes     Precautions: Standard  PTA Visit #: 1/5       Subjective     Patient reports: He is in severe pain .  He was compliant with home exercise program.  Response to previous treatment: first treatment after evaluation   Functional change: none     Pain: 9/10  Location: bilateral back      Objective      Objective Measures updated at progress report unless specified.     Treatment     Saúl received the treatments listed below:      therapeutic exercises to develop strength, endurance, posture, and core stabilization for 28 minutes including:  Slant board 3 x 30 second hold   Hamstring stretch 3 x 30 second hold   Ball roll outs 5 x 5 second hold   Truck rotation stretch 4 x 15 second hold     manual therapy techniques: Joint mobilizations, Manual traction, and Myofacial release were applied to the: - for - minutes, including:    neuromuscular re-education activities to improve: Balance, Coordination, Proprioception, and Posture for 10 minutes. The following activities were included:  Clamshells 2 x 10 blue theraband       Patient Education and Home Exercises       Education provided:   - home exercise program will be given     Written Home Exercises Provided:  N/A . Exercises were reviewed and Saúl was able to demonstrate them prior to the end of the session.  Saúl demonstrated  N/A  understanding of the education provided. See Electronic Medical Record under Patient  Instructions for exercises provided during therapy sessions    Assessment     Case conference with Nanda Diaz DPT. Patient was informed on correct body mechanics to perform exercises correctly. Patient was able to perform exercises today but had complaints of pain with every exercise. He states that he is unable to lay on his back or side. He was grunting with stretches also. Patient needed cues throughout treatment session to perform exercises correctly. Will continue to progress patient as tolerated. Traction was not performed today due to patient unable to lay on his back for over an extended period of time.    Saúl Is progressing well towards his goals.   Patient prognosis is Fair.     Patient will continue to benefit from skilled outpatient physical therapy to address the deficits listed in the problem list box on initial evaluation, provide pt/family education and to maximize pt's level of independence in the home and community environment.     Patient's spiritual, cultural and educational needs considered and pt agreeable to plan of care and goals.     Anticipated barriers to physical therapy: none     Goals:   Short Term Goals: 6 weeks   Patient will be able to sleep ~ 3 hours without waking from pain  Patient will improve manual muscle test to 4/5 for lower extremities stability  Patient will maintain prolonged positions x 15 minutes with 2 /10 pain  Patient will report no radicular symptoms by 6 weeks     Long Term Goals: 12 weeks   Patient will be able to sleep through the night without waking from pain  Patient will improve manual muscle test to 4+/5 for lower extremities stability  Patient will maintain prolonged positions x 30 minutes with  0 /10 pain    Plan       Plan of care Certification: 2/13/2024 to 4/7/24.     Outpatient Physical Therapy 2 times weekly for 12 weeks to include the following interventions: Aquatic Therapy, Hybresis with Dex, Cervical/Lumbar Traction, Electrical  Stimulation IFC/Premod, Gait Training, Manual Therapy, Moist Heat/ Ice, Neuromuscular Re-ed, Patient Education, Self Care, Therapeutic Activities, Therapeutic Exercise, Ultrasound, and Dry Needling.   Randi Doyle, PTA

## 2024-02-27 ENCOUNTER — TELEPHONE (OUTPATIENT)
Dept: TRANSPLANT | Facility: CLINIC | Age: 54
End: 2024-02-27

## 2024-02-29 ENCOUNTER — CLINICAL SUPPORT (OUTPATIENT)
Dept: REHABILITATION | Facility: HOSPITAL | Age: 54
End: 2024-02-29
Payer: MEDICARE

## 2024-02-29 DIAGNOSIS — R20.2 TINGLING: ICD-10-CM

## 2024-02-29 DIAGNOSIS — R52 PAIN: Primary | ICD-10-CM

## 2024-02-29 PROCEDURE — 97012 MECHANICAL TRACTION THERAPY: CPT

## 2024-02-29 PROCEDURE — 97110 THERAPEUTIC EXERCISES: CPT

## 2024-02-29 NOTE — PROGRESS NOTES
[facility]   Physical Therapy Treatment Note      Name: Saúl Butt  Clinic Number: [id number]    Therapy Diagnosis:   Encounter Diagnoses   Name Primary?    Pain Yes    Tingling      Physician: Martínez Munoz II, MD    Visit Date: 2/29/2024    Physician Orders: PT Eval and Treat   Medical Diagnosis from Referral: LBP  Evaluation Date: 2/13/2024  Authorization Period Expiration: 1/31/25  Plan of Care Expiration: 4/7/24  Visit # / Visits authorized: 3/25 until 4/7     Time In: 10:45 am  Time Out: 1130  am   Total Appointment Time (timed & untimed codes):  45 minutes     Precautions: Standard  PTA Visit #: 1/5       Subjective     Patient reports: He is in severe pain . No change and hurt so bad. Still having trouble with toileting, putting on clothes. The tennis ball is helping.     He was compliant with home exercise program.  Response to previous treatment: first treatment after evaluation   Functional change: none     Pain: 8-9/10  Location: bilateral back      Objective      Objective Measures updated at progress report unless specified.     Treatment     Saúl received the treatments listed below:      Mechanical lumbar traction 90/90 x 8 minutes 45/15 rest 75lb pull      therapeutic exercises to develop strength, endurance, posture, and core stabilization for 25 minutes including:  Nustep x 5 min  Slant board 3 x 30 second hold   Hamstring stretch 3 x 30 second hold   Ball roll outs 5 x 5 second hold   Truck rotation stretch 4 x 15 second hold (not today)    manual therapy techniques: Joint mobilizations, Manual traction, and Myofacial release were applied to the: - for - minutes, including:    neuromuscular re-education activities to improve: Balance, Coordination, Proprioception, and Posture for 7 minutes. The following activities were included:  Cybex back extension 4pl x 20    (Not today)  Clamshells 2 x 10 blue theraband       Patient Education and Home Exercises        Education provided:   - home exercise program will be given     Written Home Exercises Provided:  N/A . Exercises were reviewed and Saúl was able to demonstrate them prior to the end of the session.  Saúl demonstrated  N/A  understanding of the education provided. See Electronic Medical Record under Patient Instructions for exercises provided during therapy sessions    Assessment     Started treatment with lumbar traction to attempt to ease the pain with spinal decompression. Patient was unsure if relief or no change during the traction treatment. Patient pushed the button to stop the machine at 8 minutes secondary to feeling anxiety and not liking be strapped in. The patient had relief and pain went to a 6/10 after the traction. Patient was agreeable to try the traction again if someone is sitting in the room with him. Patient moves guarded and slow. Patient was informed on correct body mechanics to perform exercises correctly.  Will continue to progress patient as tolerated.       Saúl Is progressing well towards his goals.   Patient prognosis is Fair.     Patient will continue to benefit from skilled outpatient physical therapy to address the deficits listed in the problem list box on initial evaluation, provide pt/family education and to maximize pt's level of independence in the home and community environment.     Patient's spiritual, cultural and educational needs considered and pt agreeable to plan of care and goals.     Anticipated barriers to physical therapy: none     Goals:   Short Term Goals: 6 weeks   Patient will be able to sleep ~ 3 hours without waking from pain  Patient will improve manual muscle test to 4/5 for lower extremities stability  Patient will maintain prolonged positions x 15 minutes with 2 /10 pain  Patient will report no radicular symptoms by 6 weeks     Long Term Goals: 12 weeks   Patient will be able to sleep through the night without waking from pain  Patient will  improve manual muscle test to 4+/5 for lower extremities stability  Patient will maintain prolonged positions x 30 minutes with  0 /10 pain    Plan       Plan of care Certification: 2/13/2024 to 4/7/24.     Outpatient Physical Therapy 2 times weekly for 12 weeks to include the following interventions: Aquatic Therapy, Hybresis with Dex, Cervical/Lumbar Traction, Electrical Stimulation IFC/Premod, Gait Training, Manual Therapy, Moist Heat/ Ice, Neuromuscular Re-ed, Patient Education, Self Care, Therapeutic Activities, Therapeutic Exercise, Ultrasound, and Dry Needling.   IVORY BOB, PT

## 2024-03-07 ENCOUNTER — CLINICAL SUPPORT (OUTPATIENT)
Dept: REHABILITATION | Facility: HOSPITAL | Age: 54
End: 2024-03-07
Payer: MEDICARE

## 2024-03-07 DIAGNOSIS — R52 PAIN: Primary | ICD-10-CM

## 2024-03-07 DIAGNOSIS — R20.2 TINGLING: ICD-10-CM

## 2024-03-07 PROCEDURE — 97110 THERAPEUTIC EXERCISES: CPT | Mod: CQ

## 2024-03-07 PROCEDURE — 97112 NEUROMUSCULAR REEDUCATION: CPT | Mod: CQ

## 2024-03-07 PROCEDURE — 97012 MECHANICAL TRACTION THERAPY: CPT | Mod: CQ

## 2024-03-07 NOTE — PROGRESS NOTES
[facility]   Physical Therapy Treatment Note      Name: Saúl Butt  Clinic Number: [id number]    Therapy Diagnosis:   No diagnosis found.    Physician: Martínez Munoz II, MD    Visit Date: 3/7/2024    Physician Orders: PT Eval and Treat   Medical Diagnosis from Referral: LBP  Evaluation Date: 2/13/2024  Authorization Period Expiration: 1/31/25  Plan of Care Expiration: 4/7/24  Visit # / Visits authorized: 4/25 until 4/7     Time In: 10:50 am  Time Out: 11:43 am   Total Appointment Time (timed & untimed codes): 53 minutes     Precautions: Standard  PTA Visit #: 1/5       Subjective     Patient reports: He is in severe pain . No change and hurt so bad.     He was compliant with home exercise program.  Response to previous treatment: first treatment after evaluation   Functional change: none     Pain: 8-9/10  Location: bilateral back      Objective      Objective Measures updated at progress report unless specified.     Treatment     Saúl received the treatments listed below:      Mechanical lumbar traction 90/90 x 8 minutes 45/15 rest 75lb pull      therapeutic exercises to develop strength, endurance, posture, and core stabilization for 25 minutes including:  Nustep x 5 min  Slant board 3 x 30 second hold   Hamstring stretch 3 x 30 second hold   Ball roll outs 5 x 5 second hold   Truck rotation stretch 4 x 15 second hold (not today)    manual therapy techniques: Joint mobilizations, Manual traction, and Myofacial release were applied to the: - for - minutes, including:    neuromuscular re-education activities to improve: Balance, Coordination, Proprioception, and Posture for 13 minutes. The following activities were included:  Cybex back extension 5pl x 20  Cybex hip abduction 2 x 10 2#    (Not today)  Clamshells 2 x 10 blue theraband       Patient Education and Home Exercises       Education provided:   - home exercise program will be given     Written Home Exercises Provided:   N/A . Exercises were reviewed and Saúl was able to demonstrate them prior to the end of the session.  Saúl demonstrated  N/A  understanding of the education provided. See Electronic Medical Record under Patient Instructions for exercises provided during therapy sessions    Assessment     Started treatment with lumbar traction to attempt to ease the pain with spinal decompression. Patinet was Patient moves guarded and slow. Patient was informed on correct body mechanics to perform exercises correctly.  Ended session with traction for decompression of the spine. Will continue to progress patient as tolerated.       Saúl Is progressing well towards his goals.   Patient prognosis is Fair.     Patient will continue to benefit from skilled outpatient physical therapy to address the deficits listed in the problem list box on initial evaluation, provide pt/family education and to maximize pt's level of independence in the home and community environment.     Patient's spiritual, cultural and educational needs considered and pt agreeable to plan of care and goals.     Anticipated barriers to physical therapy: none     Goals:   Short Term Goals: 6 weeks   Patient will be able to sleep ~ 3 hours without waking from pain  Patient will improve manual muscle test to 4/5 for lower extremities stability  Patient will maintain prolonged positions x 15 minutes with 2 /10 pain  Patient will report no radicular symptoms by 6 weeks     Long Term Goals: 12 weeks   Patient will be able to sleep through the night without waking from pain  Patient will improve manual muscle test to 4+/5 for lower extremities stability  Patient will maintain prolonged positions x 30 minutes with  0 /10 pain    Plan       Plan of care Certification: 2/13/2024 to 4/7/24.     Outpatient Physical Therapy 2 times weekly for 12 weeks to include the following interventions: Aquatic Therapy, Hybresis with Dex, Cervical/Lumbar Traction, Electrical Stimulation  IFC/Premod, Gait Training, Manual Therapy, Moist Heat/ Ice, Neuromuscular Re-ed, Patient Education, Self Care, Therapeutic Activities, Therapeutic Exercise, Ultrasound, and Dry Needling.   Randi Doyle, PTA

## 2024-03-22 ENCOUNTER — DOCUMENTATION ONLY (OUTPATIENT)
Dept: REHABILITATION | Facility: HOSPITAL | Age: 54
End: 2024-03-22
Payer: MEDICARE

## 2024-03-22 PROBLEM — R20.2 TINGLING: Status: RESOLVED | Noted: 2024-02-13 | Resolved: 2024-03-22

## 2024-03-22 PROBLEM — R52 PAIN: Status: RESOLVED | Noted: 2024-02-13 | Resolved: 2024-03-22

## 2024-03-22 NOTE — PROGRESS NOTES
"Called patient to discuss no-shows this past week. Patient states "y'all went on vacation and I never heard back".  Patient was informed of future sessions and that a notification should have been sent to inform of appointments. Patient said he did receive those but would like to keep using the tennis ball that was given to him for carryover.  Patient would like to discharge from therapy.    "

## 2024-04-01 ENCOUNTER — TELEPHONE (OUTPATIENT)
Dept: TRANSPLANT | Facility: CLINIC | Age: 54
End: 2024-04-01
Payer: MEDICARE

## 2024-04-02 ENCOUNTER — TELEPHONE (OUTPATIENT)
Dept: TRANSPLANT | Facility: CLINIC | Age: 54
End: 2024-04-02
Payer: MEDICARE

## 2024-04-02 DIAGNOSIS — Z76.82 ORGAN TRANSPLANT CANDIDATE: Primary | ICD-10-CM

## 2024-04-02 NOTE — TELEPHONE ENCOUNTER
"Returned call and informed patient that he will need to wait until he receives his appointment paperwork to determine what general testing he can have done prior to his appointment on 4/30/2024. Patient verbalized understanding.     ----- Message from Chaim Jaquez sent at 4/2/2024 11:34 AM CDT -----  Consult/Advisory:      Name Of Caller: Self    Contact Preference?: [phone]     What is the nature of the call?: Calling to speak w/ Ross about what is needed for his Cardiologist       Additional Notes:  "Thank you for all that you do for our patients"          "

## 2024-04-22 ENCOUNTER — TELEPHONE (OUTPATIENT)
Dept: TRANSPLANT | Facility: CLINIC | Age: 54
End: 2024-04-22
Payer: MEDICARE

## 2024-04-25 DIAGNOSIS — M54.9 DORSALGIA: Primary | ICD-10-CM

## 2024-05-22 ENCOUNTER — OFFICE VISIT (OUTPATIENT)
Dept: PAIN MEDICINE | Facility: CLINIC | Age: 54
End: 2024-05-22
Payer: MEDICARE

## 2024-05-22 VITALS
DIASTOLIC BLOOD PRESSURE: 66 MMHG | SYSTOLIC BLOOD PRESSURE: 112 MMHG | WEIGHT: 236 LBS | BODY MASS INDEX: 31.97 KG/M2 | RESPIRATION RATE: 18 BRPM | HEART RATE: 75 BPM | HEIGHT: 72 IN

## 2024-05-22 DIAGNOSIS — Z79.899 ENCOUNTER FOR LONG-TERM (CURRENT) USE OF OTHER MEDICATIONS: Primary | ICD-10-CM

## 2024-05-22 DIAGNOSIS — M54.17 LUMBOSACRAL RADICULOPATHY: Chronic | ICD-10-CM

## 2024-05-22 DIAGNOSIS — M54.9 DORSALGIA: Chronic | ICD-10-CM

## 2024-05-22 PROCEDURE — 99204 OFFICE O/P NEW MOD 45 MIN: CPT | Mod: S$PBB,,, | Performed by: PAIN MEDICINE

## 2024-05-22 PROCEDURE — 99215 OFFICE O/P EST HI 40 MIN: CPT | Mod: PBBFAC | Performed by: PAIN MEDICINE

## 2024-05-22 PROCEDURE — 3078F DIAST BP <80 MM HG: CPT | Mod: CPTII,,, | Performed by: PAIN MEDICINE

## 2024-05-22 PROCEDURE — 3008F BODY MASS INDEX DOCD: CPT | Mod: CPTII,,, | Performed by: PAIN MEDICINE

## 2024-05-22 PROCEDURE — 4010F ACE/ARB THERAPY RXD/TAKEN: CPT | Mod: CPTII,,, | Performed by: PAIN MEDICINE

## 2024-05-22 PROCEDURE — 3074F SYST BP LT 130 MM HG: CPT | Mod: CPTII,,, | Performed by: PAIN MEDICINE

## 2024-05-22 PROCEDURE — 1159F MED LIST DOCD IN RCRD: CPT | Mod: CPTII,,, | Performed by: PAIN MEDICINE

## 2024-05-22 RX ORDER — CALCIUM CARBONATE 750 MG/1
2 TABLET ORAL
Status: ON HOLD | COMMUNITY
Start: 2024-03-05

## 2024-05-22 RX ORDER — AMLODIPINE BESYLATE 10 MG/1
1 TABLET ORAL DAILY
Status: ON HOLD | COMMUNITY
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
## 2024-05-22 NOTE — PATIENT INSTRUCTIONS
YOU ARE SCHEDULED ON 06/11/2024 AT 7:45 AM FOR YOUR PROCEDURE- LEFT L5-S1 TFESI WITH  .      Procedure Instructions:    Nothing to eat or drink for 8 hours or after midnight including gum, candy, mints, or tobacco products.  If you are scheduled for 1:30 or later nothing to eat or drink after 5 a.m. the morning of the procedure, including gum, candy, mints, or tobacco products.  Must have a  at least 18 yrs of age to stay present at all times  No Diabetic medications the morning of procedure, check blood sugar the morning of procedure, if it is greater than 200 call the office at [phone]  If you are started on antibiotics or have been prescribed antibiotics, have a fever, or have any other type of infection call to reschedule procedure.  If you take blood pressure medications you can take it at your regular scheduled time with a small sip of WATER!  Dentures are to be removed prior to procedure or we can provide you with a denture cup to remove them prior to being taken back for procedure.   False eyelashes are to be removed before the morning of procedure.    Contacts will have to be removed prior to procedure.  No jewelry is to be worn to the procedure.     HOLD ASPIRIN AND ASPIRIN PRODUCTS  (ASPIRIN, BC POWDER ETC. ) FOR 7 DAYS  PRIOR TO PROCEDURE  HOLD NSAIDS( ibuprofen, mobic, meloxicam, advil, diclofenac, naproxen, relafen, celebrex,  methotrexate, aleve etc....)  FOR 3 DAYS   PRIOR TO PROCEDURE        SIGNATURE________________________________________________________________________________________
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
## 2024-05-30 ENCOUNTER — TELEPHONE (OUTPATIENT)
Dept: TRANSPLANT | Facility: CLINIC | Age: 54
End: 2024-05-30
Payer: MEDICARE

## 2024-06-03 ENCOUNTER — TELEPHONE (OUTPATIENT)
Dept: TRANSPLANT | Facility: CLINIC | Age: 54
End: 2024-06-03
Payer: MEDICARE

## 2024-06-03 NOTE — TELEPHONE ENCOUNTER
MA notes per Adherence form     FOR THE PAST THREE MONTHS:    37-AMA's 3/1/24 46 min, 3/15/24 39 min, 3/20/24 27 min, 3/22/24 41 min, 3/29/24 60 min, 4/1/24 59 min, 4/3/24 18 min, 4/5/24 45 min, 4/8/24 50 min, 4/10/24 29 min, 4/15/24 41 min, 4/17/24 29 min, 4/19/24 40 min, 4/22/24 50 min, 4/29/24 19 min, 5/1/24 17 min, 5/3/24 36 min, 5/6/24 27 min, 5/8/24 42 min, 5/10/24 34 min, 5/13/24 39 min, 5/20/24 49 min, 5/24/24 31 min, 5/27/24 37 min, 5/29/24 57 min all due to late arrivals unable to extend tx's.     3/4/24 35 min reason is other    3/8/24 43 min, 3/11/24 37 min, 3/13/24 31 min, 3/18/24 32 min, 4/12/24 31 min, 5/31/24 16 min all due to pt request     5/15/24 161 min medical emergency/redirected to ER    3/27/24 51 min, 4/24/24 36 min, 4/26/24 32 min, 5/22/24 33 min transportation    1-No-shows 5/17/24 hospitalization    No concerns with caregiver.    MA spoke to Anna haile) with [facility]   [phone] she states that the state of [place] is having a major transportation problem and that is the reason for pts ama's.  She also states she has spoken to the pt about the transportation issue but  pt's mother can only bring him for clinic appts.  Pts only means of transportation is medical. Pt does have anxiety but it is unspecified.     Scanned in pt's media    Edith Ruba  Abdominal Transplant MA

## 2024-06-13 ENCOUNTER — HOSPITAL ENCOUNTER (OUTPATIENT)
Facility: HOSPITAL | Age: 54
Discharge: HOME | End: 2024-06-13
Attending: PAIN MEDICINE | Admitting: PAIN MEDICINE
Payer: MEDICARE

## 2024-06-13 ENCOUNTER — ANESTHESIA EVENT (OUTPATIENT)
Dept: PAIN MEDICINE | Facility: HOSPITAL | Age: 54
End: 2024-06-13
Payer: MEDICARE

## 2024-06-13 ENCOUNTER — ANESTHESIA (OUTPATIENT)
Dept: PAIN MEDICINE | Facility: HOSPITAL | Age: 54
End: 2024-06-13
Payer: MEDICARE

## 2024-06-13 VITALS
HEIGHT: 72 IN | SYSTOLIC BLOOD PRESSURE: 163 MMHG | TEMPERATURE: 97 F | HEART RATE: 87 BPM | OXYGEN SATURATION: 94 % | DIASTOLIC BLOOD PRESSURE: 109 MMHG | RESPIRATION RATE: 12 BRPM | WEIGHT: 233 LBS | BODY MASS INDEX: 31.56 KG/M2

## 2024-06-13 DIAGNOSIS — M54.17 LUMBOSACRAL RADICULOPATHY: Primary | ICD-10-CM

## 2024-06-13 LAB — GLUCOSE SERPL-MCNC: 129 MG/DL (ref 70–105)

## 2024-06-13 PROCEDURE — 64483 NJX AA&/STRD TFRM EPI L/S 1: CPT | Mod: LT,,, | Performed by: PAIN MEDICINE

## 2024-06-13 PROCEDURE — 63600175 PHARM REV CODE 636 W HCPCS: Performed by: NURSE ANESTHETIST, CERTIFIED REGISTERED

## 2024-06-13 PROCEDURE — 25000003 PHARM REV CODE 250: Performed by: NURSE ANESTHETIST, CERTIFIED REGISTERED

## 2024-06-13 PROCEDURE — D9220A PRA ANESTHESIA: Mod: 23,,, | Performed by: NURSE ANESTHETIST, CERTIFIED REGISTERED

## 2024-06-13 PROCEDURE — 82962 GLUCOSE BLOOD TEST: CPT

## 2024-06-13 PROCEDURE — 64483 NJX AA&/STRD TFRM EPI L/S 1: CPT | Mod: LT | Performed by: PAIN MEDICINE

## 2024-06-13 PROCEDURE — 25000003 PHARM REV CODE 250: Performed by: PAIN MEDICINE

## 2024-06-13 PROCEDURE — 37000008 HC ANESTHESIA 1ST 15 MINUTES: Performed by: PAIN MEDICINE

## 2024-06-13 PROCEDURE — 63600175 PHARM REV CODE 636 W HCPCS: Performed by: PAIN MEDICINE

## 2024-06-13 RX ORDER — LABETALOL HYDROCHLORIDE 5 MG/ML
20 INJECTION, SOLUTION INTRAVENOUS ONCE
Status: DISCONTINUED | OUTPATIENT
Start: 2024-06-13 | End: 2024-06-13

## 2024-06-13 RX ORDER — LIDOCAINE HYDROCHLORIDE 20 MG/ML
INJECTION, SOLUTION EPIDURAL; INFILTRATION; INTRACAUDAL; PERINEURAL
Status: DISCONTINUED | OUTPATIENT
Start: 2024-06-13 | End: 2024-06-13

## 2024-06-13 RX ORDER — TRIAMCINOLONE ACETONIDE 40 MG/ML
INJECTION, SUSPENSION INTRA-ARTICULAR; INTRAMUSCULAR CODE/TRAUMA/SEDATION MEDICATION
Status: DISCONTINUED | OUTPATIENT
Start: 2024-06-13 | End: 2024-06-13 | Stop reason: HOSPADM

## 2024-06-13 RX ORDER — PROPOFOL 10 MG/ML
INJECTION, EMULSION INTRAVENOUS
Status: DISCONTINUED | OUTPATIENT
Start: 2024-06-13 | End: 2024-06-13

## 2024-06-13 RX ORDER — LABETALOL HYDROCHLORIDE 5 MG/ML
10 INJECTION, SOLUTION INTRAVENOUS ONCE
Status: COMPLETED | OUTPATIENT
Start: 2024-06-13 | End: 2024-06-13

## 2024-06-13 RX ORDER — SODIUM CHLORIDE 9 MG/ML
INJECTION, SOLUTION INTRAVENOUS CONTINUOUS
Status: DISCONTINUED | OUTPATIENT
Start: 2024-06-13 | End: 2024-06-13 | Stop reason: HOSPADM

## 2024-06-13 RX ADMIN — PROPOFOL 100 MG: 10 INJECTION, EMULSION INTRAVENOUS at 08:06

## 2024-06-13 RX ADMIN — LIDOCAINE HYDROCHLORIDE 50 MG: 20 INJECTION, SOLUTION EPIDURAL; INFILTRATION; INTRACAUDAL; PERINEURAL at 08:06

## 2024-06-13 RX ADMIN — SODIUM CHLORIDE: 9 INJECTION, SOLUTION INTRAVENOUS at 08:06

## 2024-06-13 RX ADMIN — LABETALOL HYDROCHLORIDE 10 MG: 5 INJECTION, SOLUTION INTRAVENOUS at 09:06

## 2024-06-13 NOTE — OP NOTE
Procedure Note    Procedure Date: 6/13/2024    Procedure Performed: Left  Transforaminal Epidural @ L5-S1 with Fluoroscopic Guidance    Indications: Patient has failed conservative therapy.      Pre-op diagnosis: Lumbar Radiculopathy    Post-op diagnosis: same    Physician: Lily Cuellar MD    Anesthesia: MAC    Medications injected:  kenalog 40mg, sterile preservative-free normal saline.    IVF: Per Anesthesia    Estimated Blood Loss: Less than 1cc    Complications: None    Technique:  The patient was interviewed in the holding area and Risks/Benefits were discussed, including, but not limited to the possibility of new or different pain, bleeding or infection.   All questions were answered.  The patient understood and accepted risks.  Consent was signed.  A time out was taken to identify the patient, procedure and side of the procedure. The patient was placed in a prone position, then prepped and draped in the usual sterile fashion using ChloraPrep and sterile towels.  The left  L L5-S1 neural foramen were identified under fluoroscopic guidance in AP and oblique view.  Local anesthetic was given by raising a skin wheal with a 25-gauge 1.5 inch needle.  In the oblique view, a 4 inch 22-gauge  touhy needle was introduced into the foramen @ left L4-5 and positioned in the posterior superior quarter of the foramen.  After negative aspiration 1cc from a  mixture of 40 mg Kenalog and  1mL sterile  preservative-free normal saline  was injected slowly and incrementally.  The needle was removed.  The patient tolerated the procedure well.  The patient was monitored after the procedure.  Patient was given post procedure and discharge instructions to follow at home. The patient was discharged in a stable condition and accompanied by an adult .

## 2024-06-13 NOTE — ANESTHESIA POSTPROCEDURE EVALUATION
Anesthesia Post Evaluation    Patient: Saúl Butt    Procedure(s) Performed: Procedure(s) (LRB):  Left L5-S1 TFESi (Left)    Final Anesthesia Type: general      Patient location during evaluation: PACU  Patient participation: Yes- Able to Participate  Level of consciousness: awake and alert  Post-procedure vital signs: reviewed and stable  Pain management: adequate  Airway patency: patent    PONV status at discharge: No PONV  Anesthetic complications: no      Cardiovascular status: blood pressure returned to baseline  Respiratory status: unassisted  Hydration status: euvolemic  Follow-up not needed.              Vitals Value Taken Time   /93 06/13/24 0922   Temp 36.1 °C (97 °F) 06/13/24 0906   Pulse 70 06/13/24 0924   Resp 8 06/13/24 0924   SpO2 97 % 06/13/24 0924   Vitals shown include unfiled device data.      No case tracking events are documented in the log.      Pain/Bernadette Score: Bernadette Score: 10 (6/13/2024  9:19 AM)

## 2024-06-13 NOTE — BRIEF OP NOTE
The  Discharge Note  Short Stay    Admit Date: 6/13/2024    Discharge Date: 6/13/2024    Attending Physician: Lily Cuellar     Discharge Provider: Lily Cuellar    Diagnosis: Lumbosacral radiculopathy    Procedure performed: Left L5-S1 TFESI and epiduralgram under fluoroscopy    Findings: Procedure tolerated well and without complications. Consistent with diagnosis.    EBL: 0cc    Specimens: None    Discharged Condition: Good    Final Diagnoses: Lumbosacral radiculopathy [M54.17]    Disposition: Home or Self Care    Hospital Course: No complications, uneventful    Outcome of Hospitalization, Treatment, Procedure, or Surgery:  Patient was admitted for outpatient interventional pain management procedure. The patient tolerated the procedure well with no complications.    Follow up/Patient Instructions:  Follow up as scheduled in Pain Management office in 3-4 weeks.  Patient has received instructions and follow up date and time.    Medications:  Continue previous medications

## 2024-06-13 NOTE — PLAN OF CARE
Plan:  D/c pt via wheelchair at 1040  Informed pt if does not void in 8 hours to go to ER. Notify if redness, drainage, from injection site or fever over next 3-4 days. Rest and drink plenty of fluids for the remainder of the day. No lifting over 5 lbs. For the remainder of the day. Continue regular medications as prescribed. May take pain medications as prescribed.     Pain [id number]%

## 2024-06-13 NOTE — TRANSFER OF CARE
Anesthesia Transfer of Care Note    Patient: Saúl Butt    Procedure(s) Performed: Procedure(s) (LRB):  Left L5-S1 TFESi (Left)    Patient location: PACU    Anesthesia Type: general    Transport from OR: Transported from OR on room air with adequate spontaneous ventilation    Post pain: adequate analgesia    Post assessment: no apparent anesthetic complications    Post vital signs: stable    Level of consciousness: sedated    Nausea/Vomiting: no nausea/vomiting    Complications: none    Transfer of care protocol was followed      Last vitals: Visit Vitals  BP (!) 181/100   Pulse 65   Temp 36.1 °C (97 °F) (Skin)   Resp 11   Ht 6' (1.829 m)   Wt 105.7 kg (233 lb)   SpO2 (!) 94%   BMI 31.60 kg/m²

## 2024-06-13 NOTE — DISCHARGE SUMMARY
[facility] - Pain Management  Discharge Note  Short Stay    Procedure(s) (LRB):  Left L5-S1 TFESi (Left)      OUTCOME: Patient tolerated treatment/procedure well without complication and is now ready for discharge.    DISPOSITION: Home or Self Care    FINAL DIAGNOSIS:  Lumbosacral radiculopathy    FOLLOWUP: In clinic    DISCHARGE INSTRUCTIONS:  See nurse's notes     TIME SPENT ON DISCHARGE: 5 minutes

## 2024-06-13 NOTE — ANESTHESIA PREPROCEDURE EVALUATION
06/13/2024  Saúl Butt is a 53 y.o., male.      Pre-op Assessment    I have reviewed the Patient Summary Reports.     I have reviewed the Nursing Notes. I have reviewed the NPO Status.   I have reviewed the Medications.     Review of Systems  Anesthesia Hx:  No problems with previous Anesthesia                Social:  Smoker, Recreational Drugs       Cardiovascular:     Hypertension Valvular problems/Murmurs, AS  CAD    Dysrhythmias atrial fibrillation  CHF                                 Renal/:  Chronic Renal Disease, ESRD, Dialysis                Endocrine:        Obesity / BMI > 30      Physical Exam  General: Well nourished, Cooperative and Alert    Airway:  Mouth Opening: Normal  Tongue: Normal  Neck ROM: Normal ROM    Dental:  Loose teeth, Periodontal disease        Anesthesia Plan  Type of Anesthesia, risks & benefits discussed:    Anesthesia Type: MAC  Intra-op Monitoring Plan: Standard ASA Monitors  Post Op Pain Control Plan: multimodal analgesia  Induction:  IV  Informed Consent: Informed consent signed with the Patient and all parties understand the risks and agree with anesthesia plan.  All questions answered.   ASA Score: 4  Day of Surgery Review of History & Physical: H&P Update referred to the surgeon/provider.I have interviewed and examined the patient. I have reviewed the patient's H&P dated: There are no significant changes.     Ready For Surgery From Anesthesia Perspective.     .

## 2024-06-17 ENCOUNTER — OFFICE VISIT (OUTPATIENT)
Dept: FAMILY MEDICINE | Facility: CLINIC | Age: 54
End: 2024-06-17
Payer: MEDICARE

## 2024-06-17 VITALS
WEIGHT: 228 LBS | TEMPERATURE: 101 F | BODY MASS INDEX: 30.88 KG/M2 | OXYGEN SATURATION: 90 % | DIASTOLIC BLOOD PRESSURE: 102 MMHG | SYSTOLIC BLOOD PRESSURE: 189 MMHG | HEART RATE: 105 BPM | HEIGHT: 72 IN | RESPIRATION RATE: 17 BRPM

## 2024-06-17 DIAGNOSIS — R07.89 ATYPICAL CHEST PAIN: ICD-10-CM

## 2024-06-17 DIAGNOSIS — Z20.828 CONTACT WITH AND (SUSPECTED) EXPOSURE TO OTHER VIRAL COMMUNICABLE DISEASES: ICD-10-CM

## 2024-06-17 DIAGNOSIS — J40 BRONCHITIS: Primary | ICD-10-CM

## 2024-06-17 LAB
CTP QC/QA: YES
MOLECULAR STREP A: NEGATIVE
POC MOLECULAR INFLUENZA A AGN: NEGATIVE
POC MOLECULAR INFLUENZA B AGN: NEGATIVE
SARS-COV-2 RDRP RESP QL NAA+PROBE: NEGATIVE
TROPONIN I SERPL DL<=0.01 NG/ML-MCNC: 56.1 PG/ML

## 2024-06-17 PROCEDURE — 4010F ACE/ARB THERAPY RXD/TAKEN: CPT | Mod: ,,, | Performed by: FAMILY MEDICINE

## 2024-06-17 PROCEDURE — 3008F BODY MASS INDEX DOCD: CPT | Mod: ,,, | Performed by: FAMILY MEDICINE

## 2024-06-17 PROCEDURE — 87651 STREP A DNA AMP PROBE: CPT | Mod: QW,,, | Performed by: FAMILY MEDICINE

## 2024-06-17 PROCEDURE — 99214 OFFICE O/P EST MOD 30 MIN: CPT | Mod: 25,,, | Performed by: FAMILY MEDICINE

## 2024-06-17 PROCEDURE — 3077F SYST BP >= 140 MM HG: CPT | Mod: ,,, | Performed by: FAMILY MEDICINE

## 2024-06-17 PROCEDURE — 93010 ELECTROCARDIOGRAM REPORT: CPT | Mod: ,,, | Performed by: FAMILY MEDICINE

## 2024-06-17 PROCEDURE — 93005 ELECTROCARDIOGRAM TRACING: CPT | Mod: ,,, | Performed by: FAMILY MEDICINE

## 2024-06-17 PROCEDURE — 3080F DIAST BP >= 90 MM HG: CPT | Mod: ,,, | Performed by: FAMILY MEDICINE

## 2024-06-17 PROCEDURE — 87635 SARS-COV-2 COVID-19 AMP PRB: CPT | Mod: QW,,, | Performed by: FAMILY MEDICINE

## 2024-06-17 PROCEDURE — 96372 THER/PROPH/DIAG INJ SC/IM: CPT | Mod: ,,, | Performed by: FAMILY MEDICINE

## 2024-06-17 PROCEDURE — 1160F RVW MEDS BY RX/DR IN RCRD: CPT | Mod: ,,, | Performed by: FAMILY MEDICINE

## 2024-06-17 PROCEDURE — 84484 ASSAY OF TROPONIN QUANT: CPT | Mod: ,,, | Performed by: CLINICAL MEDICAL LABORATORY

## 2024-06-17 PROCEDURE — 87502 INFLUENZA DNA AMP PROBE: CPT | Mod: QW,,, | Performed by: FAMILY MEDICINE

## 2024-06-17 PROCEDURE — 1159F MED LIST DOCD IN RCRD: CPT | Mod: ,,, | Performed by: FAMILY MEDICINE

## 2024-06-17 RX ORDER — CEFTRIAXONE 500 MG/1
500 INJECTION, POWDER, FOR SOLUTION INTRAMUSCULAR; INTRAVENOUS
Status: DISCONTINUED | OUTPATIENT
Start: 2024-06-17 | End: 2024-06-17 | Stop reason: HOSPADM

## 2024-06-17 RX ORDER — PREDNISONE 10 MG/1
10 TABLET ORAL DAILY
Qty: 5 TABLET | Refills: 0 | Status: ON HOLD | OUTPATIENT
Start: 2024-06-17 | End: 2024-06-21 | Stop reason: HOSPADM

## 2024-06-17 RX ORDER — BENZONATATE 100 MG/1
100 CAPSULE ORAL 3 TIMES DAILY PRN
Qty: 20 CAPSULE | Refills: 0 | Status: SHIPPED | OUTPATIENT
Start: 2024-06-17

## 2024-06-17 RX ORDER — AZITHROMYCIN 250 MG/1
TABLET, FILM COATED ORAL
Qty: 6 TABLET | Refills: 0 | Status: ON HOLD | OUTPATIENT
Start: 2024-06-17 | End: 2024-06-21 | Stop reason: HOSPADM

## 2024-06-17 RX ADMIN — CEFTRIAXONE 500 MG: 500 INJECTION, POWDER, FOR SOLUTION INTRAMUSCULAR; INTRAVENOUS at 01:06

## 2024-06-17 NOTE — PROGRESS NOTES
Subjective:       Patient ID: Saúl Butt is a 53 y.o. male.    Chief Complaint: Chills (Chills for 2 days. No fever. ), Cough (Productive cough for 2 days.), and Fatigue (/Fatigue for 2 days. )    Patient reports cough, congestion, fatigue, sinus drainage x 2 days. Also reports right side of chest pain where port is, painful to palpation. Not associated with exertion.       Review of Systems   Constitutional:  Negative for activity change, appetite change, chills, diaphoresis, fatigue, fever and unexpected weight change.   HENT:  Negative for nasal congestion, dental problem, drooling, ear discharge, ear pain, facial swelling, hearing loss, mouth sores, nosebleeds, postnasal drip, rhinorrhea, sinus pressure/congestion, sneezing, sore throat, tinnitus, trouble swallowing, voice change and goiter.    Eyes:  Negative for photophobia, pain, discharge, redness, itching and visual disturbance.   Respiratory:  Positive for cough. Negative for apnea, choking, chest tightness, shortness of breath, wheezing and stridor.    Cardiovascular:  Positive for chest pain. Negative for palpitations, leg swelling and claudication.   Gastrointestinal:  Negative for abdominal distention, abdominal pain, anal bleeding, blood in stool, change in bowel habit, constipation, diarrhea, nausea, vomiting, reflux and fecal incontinence.   Endocrine: Negative for cold intolerance, heat intolerance, polydipsia, polyphagia and polyuria.   Genitourinary:  Negative for bladder incontinence, decreased urine volume, difficulty urinating, discharge, dysuria, enuresis, erectile dysfunction, flank pain, frequency, genital sores, hematuria, penile pain, testicular pain and urgency.   Musculoskeletal:  Negative for arthralgias, back pain, gait problem, joint swelling, leg pain, myalgias, neck pain, neck stiffness and joint deformity.   Integumentary:  Negative for pallor, rash, wound and mole/lesion.   Allergic/Immunologic: Negative for environmental  allergies, food allergies and frequent infections.   Neurological:  Negative for dizziness, vertigo, tremors, seizures, syncope, facial asymmetry, speech difficulty, weakness, light-headedness, numbness, headaches, memory loss and coordination difficulties.   Hematological:  Negative for adenopathy. Does not bruise/bleed easily.   Psychiatric/Behavioral:  Negative for agitation, behavioral problems, confusion, decreased concentration, dysphoric mood, hallucinations, self-injury, sleep disturbance and suicidal ideas. The patient is not nervous/anxious and is not hyperactive.          Objective:      Physical Exam  Vitals reviewed.   Constitutional:       Appearance: Normal appearance. He is normal weight.   HENT:      Head: Normocephalic and atraumatic.      Right Ear: Tympanic membrane and ear canal normal.      Left Ear: Tympanic membrane, ear canal and external ear normal.      Nose: Congestion and rhinorrhea present.      Mouth/Throat:      Mouth: Mucous membranes are moist.      Pharynx: Oropharynx is clear.   Eyes:      Extraocular Movements: Extraocular movements intact.      Conjunctiva/sclera: Conjunctivae normal.      Pupils: Pupils are equal, round, and reactive to light.   Cardiovascular:      Rate and Rhythm: Normal rate and regular rhythm.      Pulses: Normal pulses.      Heart sounds: Normal heart sounds.   Pulmonary:      Effort: Pulmonary effort is normal.      Breath sounds: Rhonchi present.   Abdominal:      General: Abdomen is flat. Bowel sounds are normal.      Palpations: Abdomen is soft.   Musculoskeletal:         General: Tenderness present. Normal range of motion.      Cervical back: Normal range of motion and neck supple.      Comments: Right anterior chest ttp near port. This reproduces chest pain patient reports.    Skin:     General: Skin is warm and dry.   Neurological:      General: No focal deficit present.      Mental Status: He is alert and oriented to person, place, and time. Mental  status is at baseline.   Psychiatric:         Mood and Affect: Mood normal.         Behavior: Behavior normal.         Thought Content: Thought content normal.         Judgment: Judgment normal.         Assessment:       1. Bronchitis    2. Contact with and (suspected) exposure to other viral communicable diseases    3. Atypical chest pain        Plan:     Bronchitis  -     cefTRIAXone injection 500 mg  -     azithromycin (Z-MARQUITA) 250 MG tablet; Take 2 tablets by mouth on day 1; Take 1 tablet by mouth on days 2-5  Dispense: 6 tablet; Refill: 0  -     predniSONE (DELTASONE) 10 MG tablet; Take 1 tablet (10 mg total) by mouth once daily. for 5 days  Dispense: 5 tablet; Refill: 0  -     benzonatate (TESSALON) 100 MG capsule; Take 1 capsule (100 mg total) by mouth 3 (three) times daily as needed for Cough.  Dispense: 20 capsule; Refill: 0    Contact with and (suspected) exposure to other viral communicable diseases  -     POCT COVID-19 Rapid Screening  -     POCT Influenza A/B Molecular  -     POCT Strep A, Molecular    Atypical chest pain  -     EKG 12-lead; Future  -     Troponin I; Future; Expected date: 06/17/2024       EKG shows no acute process, will check troponin. Chest pain appears to be musculoskeletal. Will treat for bronchitis. Instructed to take his bp meds when he gets home.

## 2024-06-18 ENCOUNTER — HOSPITAL ENCOUNTER (INPATIENT)
Facility: HOSPITAL | Age: 54
LOS: 1 days | Discharge: HOME | DRG: 291 | End: 2024-06-21
Attending: HOSPITALIST | Admitting: HOSPITALIST
Payer: MEDICARE

## 2024-06-18 DIAGNOSIS — N18.6 ESRD (END STAGE RENAL DISEASE) ON DIALYSIS: ICD-10-CM

## 2024-06-18 DIAGNOSIS — R07.9 CHEST PAIN: ICD-10-CM

## 2024-06-18 DIAGNOSIS — Z99.2 ESRD (END STAGE RENAL DISEASE) ON DIALYSIS: ICD-10-CM

## 2024-06-18 DIAGNOSIS — I50.9 CONGESTIVE HEART FAILURE, UNSPECIFIED HF CHRONICITY, UNSPECIFIED HEART FAILURE TYPE: Primary | ICD-10-CM

## 2024-06-18 DIAGNOSIS — J96.01 ACUTE HYPOXEMIC RESPIRATORY FAILURE: ICD-10-CM

## 2024-06-18 DIAGNOSIS — R06.02 SHORTNESS OF BREATH: ICD-10-CM

## 2024-06-18 PROBLEM — I35.0 MODERATE AORTIC STENOSIS: Status: RESOLVED | Noted: 2023-04-22 | Resolved: 2024-06-18

## 2024-06-18 PROBLEM — I50.33 ACUTE ON CHRONIC DIASTOLIC CHF (CONGESTIVE HEART FAILURE): Status: ACTIVE | Noted: 2024-06-18

## 2024-06-18 PROBLEM — S89.92XA LEFT LEG INJURY, INITIAL ENCOUNTER: Status: RESOLVED | Noted: 2021-05-04 | Resolved: 2024-06-18

## 2024-06-18 LAB
ALBUMIN SERPL BCP-MCNC: 3.3 G/DL (ref 3.5–5)
ALBUMIN/GLOB SERPL: 0.7 {RATIO}
ALP SERPL-CCNC: 76 U/L (ref 45–115)
ALT SERPL W P-5'-P-CCNC: 10 U/L (ref 16–61)
ANION GAP SERPL CALCULATED.3IONS-SCNC: 15 MMOL/L (ref 7–16)
ANISOCYTOSIS BLD QL SMEAR: ABNORMAL
AST SERPL W P-5'-P-CCNC: 20 U/L (ref 15–37)
BASOPHILS # BLD AUTO: 0.05 K/UL (ref 0–0.2)
BASOPHILS NFR BLD AUTO: 0.5 % (ref 0–1)
BILIRUB SERPL-MCNC: 0.9 MG/DL (ref ?–1.2)
BUN SERPL-MCNC: 38 MG/DL (ref 7–18)
BUN/CREAT SERPL: 4 (ref 6–20)
CALCIUM SERPL-MCNC: 9 MG/DL (ref 8.5–10.1)
CHLORIDE SERPL-SCNC: 94 MMOL/L (ref 98–107)
CO2 SERPL-SCNC: 32 MMOL/L (ref 21–32)
CREAT SERPL-MCNC: 10.43 MG/DL (ref 0.7–1.3)
DIFFERENTIAL METHOD BLD: ABNORMAL
EGFR (NO RACE VARIABLE) (RUSH/TITUS): 5 ML/MIN/1.73M2
EOSINOPHIL # BLD AUTO: 0.06 K/UL (ref 0–0.5)
EOSINOPHIL NFR BLD AUTO: 0.6 % (ref 1–4)
ERYTHROCYTE [DISTWIDTH] IN BLOOD BY AUTOMATED COUNT: 17.5 % (ref 11.5–14.5)
GLOBULIN SER-MCNC: 4.6 G/DL (ref 2–4)
GLUCOSE SERPL-MCNC: 141 MG/DL (ref 74–106)
HCT VFR BLD AUTO: 32.9 % (ref 40–54)
HGB BLD-MCNC: 9.9 G/DL (ref 13.5–18)
IMM GRANULOCYTES # BLD AUTO: 0.11 K/UL (ref 0–0.04)
IMM GRANULOCYTES NFR BLD: 1.2 % (ref 0–0.4)
LYMPHOCYTES # BLD AUTO: 0.68 K/UL (ref 1–4.8)
LYMPHOCYTES NFR BLD AUTO: 7.2 % (ref 27–41)
MCH RBC QN AUTO: 29.1 PG (ref 27–31)
MCHC RBC AUTO-ENTMCNC: 30.1 G/DL (ref 32–36)
MCV RBC AUTO: 96.8 FL (ref 80–96)
MONOCYTES # BLD AUTO: 0.47 K/UL (ref 0–0.8)
MONOCYTES NFR BLD AUTO: 5 % (ref 2–6)
MPC BLD CALC-MCNC: 12.7 FL (ref 9.4–12.4)
NEUTROPHILS # BLD AUTO: 8.09 K/UL (ref 1.8–7.7)
NEUTROPHILS NFR BLD AUTO: 85.5 % (ref 53–65)
NRBC # BLD AUTO: 0 X10E3/UL
NRBC, AUTO (.00): 0 %
NT-PROBNP SERPL-MCNC: ABNORMAL PG/ML (ref 1–125)
PLATELET # BLD AUTO: 197 K/UL (ref 150–400)
PLATELET MORPHOLOGY: ABNORMAL
POTASSIUM SERPL-SCNC: 5 MMOL/L (ref 3.5–5.1)
PROT SERPL-MCNC: 7.9 G/DL (ref 6.4–8.2)
RBC # BLD AUTO: 3.4 M/UL (ref 4.6–6.2)
SODIUM SERPL-SCNC: 136 MMOL/L (ref 136–145)
TROPONIN I SERPL DL<=0.01 NG/ML-MCNC: 64.7 PG/ML
WBC # BLD AUTO: 9.46 K/UL (ref 4.5–11)

## 2024-06-18 PROCEDURE — 85025 COMPLETE CBC W/AUTO DIFF WBC: CPT

## 2024-06-18 PROCEDURE — 93005 ELECTROCARDIOGRAM TRACING: CPT

## 2024-06-18 PROCEDURE — 83880 ASSAY OF NATRIURETIC PEPTIDE: CPT

## 2024-06-18 PROCEDURE — 93010 ELECTROCARDIOGRAM REPORT: CPT | Mod: ,,, | Performed by: INTERNAL MEDICINE

## 2024-06-18 PROCEDURE — 99285 EMERGENCY DEPT VISIT HI MDM: CPT | Mod: 25

## 2024-06-18 PROCEDURE — 99223 1ST HOSP IP/OBS HIGH 75: CPT | Mod: AI,,, | Performed by: HOSPITALIST

## 2024-06-18 PROCEDURE — 84484 ASSAY OF TROPONIN QUANT: CPT

## 2024-06-18 PROCEDURE — G0378 HOSPITAL OBSERVATION PER HR: HCPCS

## 2024-06-18 PROCEDURE — 80053 COMPREHEN METABOLIC PANEL: CPT

## 2024-06-18 PROCEDURE — 25000003 PHARM REV CODE 250

## 2024-06-18 RX ORDER — CLONIDINE HYDROCHLORIDE 0.1 MG/1
0.1 TABLET ORAL
Status: COMPLETED | OUTPATIENT
Start: 2024-06-18 | End: 2024-06-18

## 2024-06-18 RX ORDER — PREDNISONE 10 MG/1
10 TABLET ORAL DAILY
Status: CANCELLED | OUTPATIENT
Start: 2024-06-19

## 2024-06-18 RX ORDER — GABAPENTIN 600 MG/1
600 TABLET ORAL 2 TIMES DAILY
COMMUNITY
Start: 2024-05-28

## 2024-06-18 RX ADMIN — CLONIDINE HYDROCHLORIDE 0.1 MG: 0.1 TABLET ORAL at 03:06

## 2024-06-18 NOTE — ASSESSMENT & PLAN NOTE
May be from aortic stenosis and acute diastolic CHF exacerbation; will place on Lasix  Negative COVID and Influenza  Negative CXR for infection or fluid  He was recently diagnosed with bronchitis and placed on abx by PCP--he does not appear to have infection; will not resume these abx

## 2024-06-18 NOTE — HPI
53 year old male with a PMH of ESRD and HTN presents to the ER from HD with SOB for the last 3 days.  He was also having trouble with his BP.  He is sleepy and not a good historian.  Family at bedside.

## 2024-06-18 NOTE — ASSESSMENT & PLAN NOTE
Will resume home BP meds; he was placed on steroids, which will increase his BP--will d/c steroids
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
## 2024-06-18 NOTE — ASSESSMENT & PLAN NOTE
Echo shows ejection fraction is 50%; grade II left ventricular diastolic dysfunction; will start Lasix
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
## 2024-06-18 NOTE — ED NOTES
Called to check on status of Pro-BNP lab result. They state that they are looking into it and will get the result sent out.

## 2024-06-18 NOTE — ED TRIAGE NOTES
Chief Complaint   Patient presents with    Shortness of Breath     Pt presents to the ed w/ c/o of shortness of breath for the past three days

## 2024-06-19 LAB
ANION GAP SERPL CALCULATED.3IONS-SCNC: 16 MMOL/L (ref 7–16)
BASOPHILS # BLD AUTO: 0.09 K/UL (ref 0–0.2)
BASOPHILS NFR BLD AUTO: 0.8 % (ref 0–1)
BUN SERPL-MCNC: 49 MG/DL (ref 7–18)
BUN/CREAT SERPL: 4 (ref 6–20)
CALCIUM SERPL-MCNC: 8.1 MG/DL (ref 8.5–10.1)
CHLORIDE SERPL-SCNC: 94 MMOL/L (ref 98–107)
CO2 SERPL-SCNC: 31 MMOL/L (ref 21–32)
CREAT SERPL-MCNC: 12.2 MG/DL (ref 0.7–1.3)
DIFFERENTIAL METHOD BLD: ABNORMAL
EGFR (NO RACE VARIABLE) (RUSH/TITUS): 5 ML/MIN/1.73M2
EOSINOPHIL # BLD AUTO: 0.26 K/UL (ref 0–0.5)
EOSINOPHIL NFR BLD AUTO: 2.2 % (ref 1–4)
ERYTHROCYTE [DISTWIDTH] IN BLOOD BY AUTOMATED COUNT: 17.7 % (ref 11.5–14.5)
GLUCOSE SERPL-MCNC: 154 MG/DL (ref 74–106)
HBV SURFACE AG SERPL QL IA: NORMAL
HCT VFR BLD AUTO: 32.8 % (ref 40–54)
HGB BLD-MCNC: 10.1 G/DL (ref 13.5–18)
IMM GRANULOCYTES # BLD AUTO: 0.1 K/UL (ref 0–0.04)
IMM GRANULOCYTES NFR BLD: 0.9 % (ref 0–0.4)
LYMPHOCYTES # BLD AUTO: 0.99 K/UL (ref 1–4.8)
LYMPHOCYTES NFR BLD AUTO: 8.5 % (ref 27–41)
MCH RBC QN AUTO: 29.2 PG (ref 27–31)
MCHC RBC AUTO-ENTMCNC: 30.8 G/DL (ref 32–36)
MCV RBC AUTO: 94.8 FL (ref 80–96)
MONOCYTES # BLD AUTO: 0.78 K/UL (ref 0–0.8)
MONOCYTES NFR BLD AUTO: 6.7 % (ref 2–6)
MPC BLD CALC-MCNC: 12.4 FL (ref 9.4–12.4)
NEUTROPHILS # BLD AUTO: 9.41 K/UL (ref 1.8–7.7)
NEUTROPHILS NFR BLD AUTO: 80.9 % (ref 53–65)
NRBC # BLD AUTO: 0 X10E3/UL
NRBC, AUTO (.00): 0 %
PLATELET # BLD AUTO: 245 K/UL (ref 150–400)
POTASSIUM SERPL-SCNC: 4.6 MMOL/L (ref 3.5–5.1)
RBC # BLD AUTO: 3.46 M/UL (ref 4.6–6.2)
SODIUM SERPL-SCNC: 136 MMOL/L (ref 136–145)
WBC # BLD AUTO: 11.63 K/UL (ref 4.5–11)

## 2024-06-19 PROCEDURE — 25000003 PHARM REV CODE 250: Performed by: INTERNAL MEDICINE

## 2024-06-19 PROCEDURE — 94761 N-INVAS EAR/PLS OXIMETRY MLT: CPT

## 2024-06-19 PROCEDURE — 90935 HEMODIALYSIS ONE EVALUATION: CPT

## 2024-06-19 PROCEDURE — 63600175 PHARM REV CODE 636 W HCPCS: Performed by: HOSPITALIST

## 2024-06-19 PROCEDURE — 96375 TX/PRO/DX INJ NEW DRUG ADDON: CPT

## 2024-06-19 PROCEDURE — 96376 TX/PRO/DX INJ SAME DRUG ADON: CPT

## 2024-06-19 PROCEDURE — 25000003 PHARM REV CODE 250: Performed by: HOSPITALIST

## 2024-06-19 PROCEDURE — 5A1D70Z PERFORMANCE OF URINARY FILTRATION, INTERMITTENT, LESS THAN 6 HOURS PER DAY: ICD-10-PCS | Performed by: INTERNAL MEDICINE

## 2024-06-19 PROCEDURE — G0378 HOSPITAL OBSERVATION PER HR: HCPCS

## 2024-06-19 PROCEDURE — 85025 COMPLETE CBC W/AUTO DIFF WBC: CPT | Performed by: HOSPITALIST

## 2024-06-19 PROCEDURE — 63600175 PHARM REV CODE 636 W HCPCS: Performed by: INTERNAL MEDICINE

## 2024-06-19 PROCEDURE — 36415 COLL VENOUS BLD VENIPUNCTURE: CPT | Mod: 91 | Performed by: INTERNAL MEDICINE

## 2024-06-19 PROCEDURE — 87340 HEPATITIS B SURFACE AG IA: CPT | Performed by: INTERNAL MEDICINE

## 2024-06-19 PROCEDURE — 99232 SBSQ HOSP IP/OBS MODERATE 35: CPT | Mod: ,,, | Performed by: HOSPITALIST

## 2024-06-19 PROCEDURE — 96372 THER/PROPH/DIAG INJ SC/IM: CPT | Performed by: HOSPITALIST

## 2024-06-19 PROCEDURE — 80048 BASIC METABOLIC PNL TOTAL CA: CPT | Performed by: HOSPITALIST

## 2024-06-19 PROCEDURE — 36415 COLL VENOUS BLD VENIPUNCTURE: CPT | Performed by: HOSPITALIST

## 2024-06-19 RX ORDER — AMLODIPINE BESYLATE 10 MG/1
10 TABLET ORAL DAILY
Status: DISCONTINUED | OUTPATIENT
Start: 2024-06-19 | End: 2024-06-21 | Stop reason: HOSPADM

## 2024-06-19 RX ORDER — SIMETHICONE 80 MG
1 TABLET,CHEWABLE ORAL 3 TIMES DAILY PRN
Status: DISCONTINUED | OUTPATIENT
Start: 2024-06-19 | End: 2024-06-21 | Stop reason: HOSPADM

## 2024-06-19 RX ORDER — IBUPROFEN 200 MG
16 TABLET ORAL
Status: DISCONTINUED | OUTPATIENT
Start: 2024-06-19 | End: 2024-06-21 | Stop reason: HOSPADM

## 2024-06-19 RX ORDER — FUROSEMIDE 40 MG/1
40 TABLET ORAL DAILY
Status: DISCONTINUED | OUTPATIENT
Start: 2024-06-20 | End: 2024-06-21 | Stop reason: HOSPADM

## 2024-06-19 RX ORDER — FUROSEMIDE 10 MG/ML
40 INJECTION INTRAMUSCULAR; INTRAVENOUS EVERY 12 HOURS
Status: DISCONTINUED | OUTPATIENT
Start: 2024-06-19 | End: 2024-06-19

## 2024-06-19 RX ORDER — ONDANSETRON HYDROCHLORIDE 2 MG/ML
8 INJECTION, SOLUTION INTRAVENOUS EVERY 6 HOURS PRN
Status: DISCONTINUED | OUTPATIENT
Start: 2024-06-19 | End: 2024-06-21 | Stop reason: HOSPADM

## 2024-06-19 RX ORDER — HEPARIN SODIUM 5000 [USP'U]/ML
5000 INJECTION, SOLUTION INTRAVENOUS; SUBCUTANEOUS EVERY 8 HOURS
Status: DISCONTINUED | OUTPATIENT
Start: 2024-06-19 | End: 2024-06-21 | Stop reason: HOSPADM

## 2024-06-19 RX ORDER — LOSARTAN POTASSIUM 50 MG/1
50 TABLET ORAL 2 TIMES DAILY
Status: DISCONTINUED | OUTPATIENT
Start: 2024-06-19 | End: 2024-06-21 | Stop reason: HOSPADM

## 2024-06-19 RX ORDER — SODIUM CHLORIDE 9 MG/ML
INJECTION, SOLUTION INTRAVENOUS
Status: DISCONTINUED | OUTPATIENT
Start: 2024-06-19 | End: 2024-06-21 | Stop reason: HOSPADM

## 2024-06-19 RX ORDER — LABETALOL HYDROCHLORIDE 5 MG/ML
10 INJECTION, SOLUTION INTRAVENOUS ONCE
Status: COMPLETED | OUTPATIENT
Start: 2024-06-19 | End: 2024-06-19

## 2024-06-19 RX ORDER — METOPROLOL TARTRATE 50 MG/1
100 TABLET ORAL 2 TIMES DAILY
Status: DISCONTINUED | OUTPATIENT
Start: 2024-06-19 | End: 2024-06-21 | Stop reason: HOSPADM

## 2024-06-19 RX ORDER — ACETAMINOPHEN 325 MG/1
650 TABLET ORAL EVERY 4 HOURS PRN
Status: DISCONTINUED | OUTPATIENT
Start: 2024-06-19 | End: 2024-06-19

## 2024-06-19 RX ORDER — HYDRALAZINE HYDROCHLORIDE 100 MG/1
100 TABLET, FILM COATED ORAL 3 TIMES DAILY
Status: DISCONTINUED | OUTPATIENT
Start: 2024-06-19 | End: 2024-06-21 | Stop reason: HOSPADM

## 2024-06-19 RX ORDER — ISOSORBIDE MONONITRATE 60 MG/1
60 TABLET, EXTENDED RELEASE ORAL EVERY MORNING
Status: DISCONTINUED | OUTPATIENT
Start: 2024-06-19 | End: 2024-06-21 | Stop reason: HOSPADM

## 2024-06-19 RX ORDER — TRAZODONE HYDROCHLORIDE 50 MG/1
50 TABLET ORAL NIGHTLY PRN
Status: DISCONTINUED | OUTPATIENT
Start: 2024-06-19 | End: 2024-06-21 | Stop reason: HOSPADM

## 2024-06-19 RX ORDER — HEPARIN SODIUM 1000 [USP'U]/ML
4000 INJECTION, SOLUTION INTRAVENOUS; SUBCUTANEOUS
Status: DISCONTINUED | OUTPATIENT
Start: 2024-06-19 | End: 2024-06-21 | Stop reason: HOSPADM

## 2024-06-19 RX ORDER — NALOXONE HCL 0.4 MG/ML
0.02 VIAL (ML) INJECTION
Status: DISCONTINUED | OUTPATIENT
Start: 2024-06-19 | End: 2024-06-21 | Stop reason: HOSPADM

## 2024-06-19 RX ORDER — PRAZOSIN HYDROCHLORIDE 1 MG/1
1 CAPSULE ORAL 2 TIMES DAILY
Status: DISCONTINUED | OUTPATIENT
Start: 2024-06-19 | End: 2024-06-20

## 2024-06-19 RX ORDER — PANTOPRAZOLE SODIUM 40 MG/1
40 TABLET, DELAYED RELEASE ORAL DAILY
COMMUNITY
Start: 2024-05-21

## 2024-06-19 RX ORDER — IBUPROFEN 200 MG
24 TABLET ORAL
Status: DISCONTINUED | OUTPATIENT
Start: 2024-06-19 | End: 2024-06-21 | Stop reason: HOSPADM

## 2024-06-19 RX ORDER — SODIUM CHLORIDE 0.9 % (FLUSH) 0.9 %
10 SYRINGE (ML) INJECTION EVERY 12 HOURS PRN
Status: DISCONTINUED | OUTPATIENT
Start: 2024-06-19 | End: 2024-06-21 | Stop reason: HOSPADM

## 2024-06-19 RX ORDER — GLUCAGON 1 MG
1 KIT INJECTION
Status: DISCONTINUED | OUTPATIENT
Start: 2024-06-19 | End: 2024-06-21 | Stop reason: HOSPADM

## 2024-06-19 RX ORDER — BISACODYL 5 MG
10 TABLET, DELAYED RELEASE (ENTERIC COATED) ORAL DAILY PRN
Status: DISCONTINUED | OUTPATIENT
Start: 2024-06-19 | End: 2024-06-21 | Stop reason: HOSPADM

## 2024-06-19 RX ORDER — HYDRALAZINE HYDROCHLORIDE 20 MG/ML
10 INJECTION INTRAMUSCULAR; INTRAVENOUS EVERY 4 HOURS PRN
Status: DISCONTINUED | OUTPATIENT
Start: 2024-06-19 | End: 2024-06-21 | Stop reason: HOSPADM

## 2024-06-19 RX ORDER — GUAIFENESIN AND DEXTROMETHORPHAN HYDROBROMIDE 10; 100 MG/5ML; MG/5ML
10 SYRUP ORAL EVERY 6 HOURS PRN
Status: DISCONTINUED | OUTPATIENT
Start: 2024-06-19 | End: 2024-06-21 | Stop reason: HOSPADM

## 2024-06-19 RX ORDER — TALC
6 POWDER (GRAM) TOPICAL NIGHTLY PRN
Status: DISCONTINUED | OUTPATIENT
Start: 2024-06-19 | End: 2024-06-21 | Stop reason: HOSPADM

## 2024-06-19 RX ORDER — ACETAMINOPHEN 500 MG
1000 TABLET ORAL EVERY 6 HOURS PRN
Status: DISCONTINUED | OUTPATIENT
Start: 2024-06-19 | End: 2024-06-21 | Stop reason: HOSPADM

## 2024-06-19 RX ADMIN — NITROGLYCERIN 1 INCH: 20 OINTMENT TOPICAL at 12:06

## 2024-06-19 RX ADMIN — HYDRALAZINE HYDROCHLORIDE 10 MG: 20 INJECTION INTRAMUSCULAR; INTRAVENOUS at 02:06

## 2024-06-19 RX ADMIN — LOSARTAN POTASSIUM 50 MG: 50 TABLET, FILM COATED ORAL at 08:06

## 2024-06-19 RX ADMIN — ISOSORBIDE MONONITRATE 60 MG: 60 TABLET, EXTENDED RELEASE ORAL at 08:06

## 2024-06-19 RX ADMIN — HEPARIN SODIUM 5000 UNITS: 5000 INJECTION, SOLUTION INTRAVENOUS; SUBCUTANEOUS at 05:06

## 2024-06-19 RX ADMIN — METOPROLOL TARTRATE 100 MG: 50 TABLET, FILM COATED ORAL at 08:06

## 2024-06-19 RX ADMIN — NITROGLYCERIN 1 INCH: 20 OINTMENT TOPICAL at 11:06

## 2024-06-19 RX ADMIN — Medication 6 MG: at 08:06

## 2024-06-19 RX ADMIN — HYDRALAZINE HYDROCHLORIDE 100 MG: 100 TABLET ORAL at 08:06

## 2024-06-19 RX ADMIN — GUAIFENESIN AND DEXTROMETHORPHAN 10 ML: 100; 10 SYRUP ORAL at 04:06

## 2024-06-19 RX ADMIN — PRAZOSIN HYDROCHLORIDE 1 MG: 1 CAPSULE ORAL at 08:06

## 2024-06-19 RX ADMIN — SODIUM CHLORIDE 2000 ML: 9 INJECTION, SOLUTION INTRAVENOUS at 03:06

## 2024-06-19 RX ADMIN — HYDRALAZINE HYDROCHLORIDE 10 MG: 20 INJECTION INTRAMUSCULAR; INTRAVENOUS at 08:06

## 2024-06-19 RX ADMIN — NITROGLYCERIN 1 INCH: 20 OINTMENT TOPICAL at 06:06

## 2024-06-19 RX ADMIN — HEPARIN SODIUM 5000 UNITS: 5000 INJECTION, SOLUTION INTRAVENOUS; SUBCUTANEOUS at 09:06

## 2024-06-19 RX ADMIN — NITROGLYCERIN 1 INCH: 20 OINTMENT TOPICAL at 05:06

## 2024-06-19 RX ADMIN — LABETALOL HYDROCHLORIDE 10 MG: 5 INJECTION, SOLUTION INTRAVENOUS at 06:06

## 2024-06-19 RX ADMIN — HEPARIN SODIUM 4000 UNITS: 1000 INJECTION, SOLUTION INTRAVENOUS; SUBCUTANEOUS at 03:06

## 2024-06-19 RX ADMIN — AMLODIPINE BESYLATE 10 MG: 10 TABLET ORAL at 08:06

## 2024-06-19 RX ADMIN — FUROSEMIDE 40 MG: 10 INJECTION, SOLUTION INTRAMUSCULAR; INTRAVENOUS at 08:06
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
## 2024-06-19 NOTE — PROGRESS NOTES
[facility] Hospital Medicine  Progress Note    Patient Name: Saúl Butt  MRN: [id number]  Patient Class: OP- Observation   Admission Date: 6/18/2024  Length of Stay: 0 days  Attending Physician: Guerda Wheat DO  Primary Care Provider: Ruth, Primary Doctor        Subjective:     Principal Problem:Acute hypoxemic respiratory failure        HPI:  53 year old male with a PMH of ESRD and HTN presents to the ER from HD with SOB for the last 3 days.  He was also having trouble with his BP.  He is sleepy and not a good historian.  Family at bedside.     Overview/Hospital Course:  53 year old male presents with SOB--this has resolved.  Patient denies chest pain, shortness of breath, nausea, vomiting, or diarrhea.        Vitals:    06/19/24 1400 06/19/24 1430 06/19/24 1500 06/19/24 1530   BP: (!) 148/99 (!) 160/100 (!) 170/94 (!) 183/112   BP Location:       Patient Position:       Pulse: 73 78 87 88   Resp:       Temp:       TempSrc:       SpO2:       Weight:       Height:             PHYSICAL EXAMINATION:    GEN: NAD; alert and oriented x 3  CVS: regular rate and rhythm  RESP: normal respiratory effort; no audible wheezing noted  GI: non-distended  EXTR: mild swelling noted      Assessment/Plan:      * Acute hypoxemic respiratory failure  May be from aortic stenosis and acute diastolic CHF exacerbation; on Lasix--change to po; resolved  Negative COVID and Influenza  Negative CXR for infection or fluid    Acute on chronic diastolic CHF (congestive heart failure)  Echo shows ejection fraction is 50%; grade II left ventricular diastolic dysfunction; on Lasix    HTN (hypertension), benign  BP elevated; Nephrology aware; will try to keep it under better control; continue meds    Atrial fibrillation with RVR  Does not appear to be on anticoagulation; will place on telemetry; on Lopressor    ESRD on hemodialysis  Consult Nephrology    Anemia in chronic kidney disease  Monitor  LINDA Wheat, DO  Department of Hospital Medicine   [facility] - [address] [place] [address]

## 2024-06-19 NOTE — ASSESSMENT & PLAN NOTE
Patient has long history of hypertension, his blood pressure has been severe admission with a systolic in 200 220 mmHg range.  We will continue his present antihypertensives and dialyze him with vigorous ultrafiltration which should help his blood pressure
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
## 2024-06-19 NOTE — PLAN OF CARE
Problem: Adult Inpatient Plan of Care  Goal: Plan of Care Review  6/19/2024 0524 by Stacia Roberts RN  Outcome: Progressing  6/19/2024 0523 by Stacia Roberts RN  Outcome: Progressing  Goal: Patient-Specific Goal (Individualized)  6/19/2024 0524 by Stacia Roberts RN  Outcome: Progressing  6/19/2024 0523 by Stacia Roberts RN  Outcome: Progressing  Goal: Absence of Hospital-Acquired Illness or Injury  6/19/2024 0524 by Stacia Roberts RN  Outcome: Progressing  6/19/2024 0523 by Stacia Roberts RN  Outcome: Progressing  Goal: Optimal Comfort and Wellbeing  6/19/2024 0524 by Stacia Roberts RN  Outcome: Progressing  6/19/2024 0523 by Stacia Roberts RN  Outcome: Progressing  Goal: Readiness for Transition of Care  6/19/2024 0524 by Stacia Roberts RN  Outcome: Progressing  6/19/2024 0523 by Stacia Roberts RN  Outcome: Progressing     Problem: Skin Injury Risk Increased  Goal: Skin Health and Integrity  6/19/2024 0524 by Stacia Roberts RN  Outcome: Progressing  6/19/2024 0523 by Stacia Roberts RN  Outcome: Progressing

## 2024-06-19 NOTE — PLAN OF CARE
Problem: Adult Inpatient Plan of Care  Goal: Plan of Care Review  6/19/2024 0526 by Stacia Roberts RN  Outcome: Progressing  6/19/2024 0524 by Stacia Roberts RN  Outcome: Progressing  6/19/2024 0523 by Stacia Roberts RN  Outcome: Progressing  Goal: Patient-Specific Goal (Individualized)  6/19/2024 0526 by Stacia Roberts RN  Outcome: Progressing  6/19/2024 0524 by Stacia Roberts RN  Outcome: Progressing  6/19/2024 0523 by Stacia Roberts RN  Outcome: Progressing  Goal: Absence of Hospital-Acquired Illness or Injury  6/19/2024 0526 by Stacia Roberts RN  Outcome: Progressing  6/19/2024 0524 by Stacia Roberts RN  Outcome: Progressing  6/19/2024 0523 by Stacia Roberts RN  Outcome: Progressing  Goal: Optimal Comfort and Wellbeing  6/19/2024 0526 by Stacia Roberts RN  Outcome: Progressing  6/19/2024 0524 by Stacia Roberts RN  Outcome: Progressing  6/19/2024 0523 by Stacia Roberts RN  Outcome: Progressing  Goal: Readiness for Transition of Care  6/19/2024 0526 by Stacia Roberts RN  Outcome: Progressing  6/19/2024 0524 by Stacia Roberts RN  Outcome: Progressing  6/19/2024 0523 by Stacia Roberts RN  Outcome: Progressing     Problem: Skin Injury Risk Increased  Goal: Skin Health and Integrity  6/19/2024 0526 by Stacia Roberts, RN  Outcome: Progressing  6/19/2024 0524 by Stacia Roberts RN  Outcome: Progressing  6/19/2024 0523 by Staica Roberts RN  Outcome: Progressing

## 2024-06-19 NOTE — PLAN OF CARE
[facility]  Initial Discharge Assessment       Primary Care Provider: No, Primary Doctor    Admission Diagnosis: Shortness of breath [R06.02]  ESRD (end stage renal disease) on dialysis [N18.6, Z99.2]  Acute hypoxemic respiratory failure [J96.01]  Congestive heart failure, unspecified HF chronicity, unspecified heart failure type [I50.9]    Admission Date: 6/18/2024  Expected Discharge Date:     Transition of Care Barriers: None    Payor: HUMA[employer] MANAGED MEDICARE / Plan: HU[employer]NA MEDICARE PPO / Product Type: Medicare Advantage /     Extended Emergency Contact Information  Primary Emergency Contact: CANDICE McfarlandATUL  Address: [facility]  Home Phone: [phone]  Mobile Phone: [phone]  Relation: Mother  Preferred language: English   needed? No    Discharge Plan A: Home  Discharge Plan B: Home, Home Health      [facility]  [address] [place] [address]  Phone: [phone] Fax: [phone]    [facility] [place] [place] - [address] [place] [address]  [address] [place], [place] [address]  Phone: [phone] Fax: [phone]      Initial Assessment (most recent)       Adult Discharge Assessment - 06/19/24 1103          Discharge Assessment    Assessment Type Discharge Planning Assessment     Confirmed/corrected address, phone number and insurance Yes     Confirmed Demographics Correct on Facesheet     Source of Information patient     People in Home alone     Do you expect to return to your current living situation? Yes     Do you have help at home or someone to help you manage your care at home? Yes     Who are your caregiver(s) and their phone number(s)? Alka Bartolo, mom, [phone]     Prior to hospitilization cognitive status: Unable to Assess     Current cognitive status: Alert/Oriented     Walking or Climbing Stairs Difficulty no     Dressing/Bathing Difficulty no      Home Accessibility wheelchair accessible     Home Layout Other (see comments)   lives in an apartment    Equipment Currently Used at Home none     Readmission within 30 days? No     Patient currently being followed by outpatient case management? No     Do you currently have service(s) that help you manage your care at home? No     Do you take prescription medications? Yes     Do you have prescription coverage? Yes     Coverage [employer] Medicare     Do you have any problems affording any of your prescribed medications? No     Is the patient taking medications as prescribed? yes     Who is going to help you get home at discharge? family or friend     How do you get to doctors appointments? family or friend will provide     Are you on dialysis? Yes     Dialysis Name and Scheduled days [facility] [address]     Do you take coumadin? No     Discharge Plan A Home     Discharge Plan B Home;Home Health     DME Needed Upon Discharge  none     Discharge Plan discussed with: Patient     Transition of Care Barriers None        Physical Activity    On average, how many days per week do you engage in moderate to strenuous exercise (like a brisk walk)? 0 days     On average, how many minutes do you engage in exercise at this level? 0 min        Financial Resource Strain    How hard is it for you to pay for the very basics like food, housing, medical care, and heating? Not hard at all        Housing Stability    In the last 12 months, was there a time when you were not able to pay the mortgage or rent on time? No     At any time in the past 12 months, were you homeless or living in a shelter (including now)? No        Transportation Needs    Has the lack of transportation kept you from medical appointments, meetings, work or from getting things needed for daily living? No        Food Insecurity    Within the past 12 months, you worried that your food would run out before you got the money to buy more. Never true     Within the past  12 months, the food you bought just didn't last and you didn't have money to get more. Never true        Stress    Do you feel stress - tense, restless, nervous, or anxious, or unable to sleep at night because your mind is troubled all the time - these days? Not at all        Social Isolation    How often do you feel lonely or isolated from those around you?  Never        Alcohol Use    Q1: How often do you have a drink containing alcohol? Never     Q2: How many drinks containing alcohol do you have on a typical day when you are drinking? Patient does not drink     Q3: How often do you have six or more drinks on one occasion? Never        Utilities    In the past 12 months has the electric, gas, oil, or water company threatened to shut off services in your home? No        Health Literacy    How often do you need to have someone help you when you read instructions, pamphlets, or other written material from your doctor or pharmacy? Rarely                      SS spoke with pt at bedside. Pt lives at home by himself but his mother lives nearby and checks on him daily. Pt plans to return to current living arrangements when medically ready for discharge. SDOH completed. Pt is not current with hh. Pt received hd [facility] at [facility] on [address]. IM not obtained due to pt being in observation status. SS following for anticipated dc needs.

## 2024-06-19 NOTE — ASSESSMENT & PLAN NOTE
May be from aortic stenosis and acute diastolic CHF exacerbation; on Lasix--change to po; resolved  Negative COVID and Influenza  Negative CXR for infection or fluid

## 2024-06-19 NOTE — HPI
Chief complaint shortness of breath cough fatigue and weakness in a patient on dialysis    History of present illness Mr. Butt is a 53-year-old  gentleman with end-stage renal disease who dialyzes on a Monday Wednesday Friday basis in [facility].  The patient's last dialysis was this past Monday.  The patient presented to the hospital yesterday (Tuesday) with complaints of feeling weak short of breath and having a productive cough of thick sputum.  He states these symptoms for about 3 days.  The patient had dialysis this past Monday and had about 3 L of fluid removed.  He is shortness treatment for 4 hours to 3 hours and 30 minutes that day.  The patient was noted to have significant edema in his legs on his dialysis treatment that day.  Patient had a chest x-ray which showed some increased interstitial markings.  The patient states he has improved greatly since admission and getting some suctioning and breathing treatments.  He also states he has had about 5 bowel movements since he has been in and this helped flush him out and made him feel better apparently.  The patient had seen family medicine the day prior to his admission for the above complaints he was treated with some intramuscular antibiotics (Rocephin), Zithromax, prednisone Tessalon Perles.

## 2024-06-19 NOTE — PLAN OF CARE
Problem: Adult Inpatient Plan of Care  Goal: Plan of Care Review  Outcome: Progressing  Flowsheets (Taken 6/19/2024 0305)  Plan of Care Reviewed With: patient  Goal: Optimal Comfort and Wellbeing  Outcome: Progressing  Intervention: Monitor Pain and Promote Comfort  Flowsheets (Taken 6/19/2024 0305)  Pain Management Interventions:   pain management plan reviewed with patient/caregiver   pillow support provided   quiet environment facilitated   relaxation techniques promoted  Intervention: Provide Person-Centered Care  Flowsheets (Taken 6/19/2024 0305)  Trust Relationship/Rapport:   care explained   choices provided   emotional support provided   empathic listening provided   questions answered   questions encouraged   reassurance provided   thoughts/feelings acknowledged     Problem: Skin Injury Risk Increased  Goal: Skin Health and Integrity  Outcome: Progressing  Intervention: Optimize Skin Protection  Flowsheets (Taken 6/19/2024 0305)  Pressure Reduction Techniques:   frequent weight shift encouraged   heels elevated off bed   positioned off wounds   pressure points protected   rest period provided between sit times   weight shift assistance provided  Pressure Reduction Devices: positioning supports utilized  Skin Protection:   incontinence pads utilized   transparent dressing maintained  Head of Bed (HOB) Positioning: HOB elevated  Intervention: Promote and Optimize Oral Intake  Flowsheets (Taken 6/19/2024 0305)  Oral Nutrition Promotion:   adaptive equipment use encouraged   physical activity promoted   rest periods promoted   social interaction promoted

## 2024-06-19 NOTE — NURSING
"Patient refused AM labs, told the Lab personnel to " Get out!",. He was educated on the need for lab , he then told me " I don't need that mess"  "

## 2024-06-19 NOTE — HOSPITAL COURSE
53 year old male with a PMH of ESRD and HTN presents to the ER from HD with SOB for the last several days. He was also having trouble with HTN.  On admission, he was sleepy.   He has a history of aortic stenosis and acute diastolic CHF exacerbation.  He was placed on Lasix for diuresis.  Negative COVID and Influenza.  Negative CXR for infection or fluid.  His SOB improved.  Nephrology was consulted for dialysis.  His BP was still high, and the patient complained of severe anxiety.  He was placed on Xanax, which helped with his BP.  Nephrology added Clonidine to his BP med regimen.  Prazosin was discontinued.  His BP is better.  He is no longer SOB.  Patient denies chest pain, shortness of breath, nausea, vomiting, or diarrhea and is stable for discharge.

## 2024-06-20 LAB
OHS QRS DURATION: 102 MS
OHS QTC CALCULATION: 530 MS

## 2024-06-20 PROCEDURE — 94761 N-INVAS EAR/PLS OXIMETRY MLT: CPT

## 2024-06-20 PROCEDURE — 96376 TX/PRO/DX INJ SAME DRUG ADON: CPT

## 2024-06-20 PROCEDURE — 63600175 PHARM REV CODE 636 W HCPCS: Performed by: HOSPITALIST

## 2024-06-20 PROCEDURE — 25000003 PHARM REV CODE 250: Performed by: INTERNAL MEDICINE

## 2024-06-20 PROCEDURE — 96372 THER/PROPH/DIAG INJ SC/IM: CPT | Performed by: HOSPITALIST

## 2024-06-20 PROCEDURE — G0378 HOSPITAL OBSERVATION PER HR: HCPCS

## 2024-06-20 PROCEDURE — 25000003 PHARM REV CODE 250: Performed by: HOSPITALIST

## 2024-06-20 PROCEDURE — 99232 SBSQ HOSP IP/OBS MODERATE 35: CPT | Mod: ,,, | Performed by: HOSPITALIST

## 2024-06-20 PROCEDURE — 11000001 HC ACUTE MED/SURG PRIVATE ROOM

## 2024-06-20 RX ORDER — CLONIDINE HYDROCHLORIDE 0.1 MG/1
0.1 TABLET ORAL EVERY 6 HOURS PRN
Status: DISCONTINUED | OUTPATIENT
Start: 2024-06-20 | End: 2024-06-21 | Stop reason: HOSPADM

## 2024-06-20 RX ORDER — ALPRAZOLAM 0.25 MG/1
0.25 TABLET ORAL 2 TIMES DAILY PRN
Status: DISCONTINUED | OUTPATIENT
Start: 2024-06-20 | End: 2024-06-21 | Stop reason: HOSPADM

## 2024-06-20 RX ORDER — PRAZOSIN HYDROCHLORIDE 1 MG/1
2 CAPSULE ORAL 2 TIMES DAILY
Status: DISCONTINUED | OUTPATIENT
Start: 2024-06-20 | End: 2024-06-21

## 2024-06-20 RX ORDER — HYDROCODONE BITARTRATE AND ACETAMINOPHEN 7.5; 325 MG/1; MG/1
1 TABLET ORAL EVERY 6 HOURS PRN
Status: DISCONTINUED | OUTPATIENT
Start: 2024-06-20 | End: 2024-06-21 | Stop reason: HOSPADM

## 2024-06-20 RX ORDER — MUPIROCIN 20 MG/G
OINTMENT TOPICAL 2 TIMES DAILY
Status: DISCONTINUED | OUTPATIENT
Start: 2024-06-20 | End: 2024-06-21 | Stop reason: HOSPADM

## 2024-06-20 RX ADMIN — FUROSEMIDE 40 MG: 40 TABLET ORAL at 09:06

## 2024-06-20 RX ADMIN — MUPIROCIN: 20 OINTMENT TOPICAL at 09:06

## 2024-06-20 RX ADMIN — HYDRALAZINE HYDROCHLORIDE 100 MG: 100 TABLET ORAL at 09:06

## 2024-06-20 RX ADMIN — HEPARIN SODIUM 5000 UNITS: 5000 INJECTION, SOLUTION INTRAVENOUS; SUBCUTANEOUS at 05:06

## 2024-06-20 RX ADMIN — LOSARTAN POTASSIUM 50 MG: 50 TABLET, FILM COATED ORAL at 08:06

## 2024-06-20 RX ADMIN — NITROGLYCERIN 1 INCH: 20 OINTMENT TOPICAL at 05:06

## 2024-06-20 RX ADMIN — ALPRAZOLAM 0.25 MG: 0.25 TABLET ORAL at 09:06

## 2024-06-20 RX ADMIN — HYDRALAZINE HYDROCHLORIDE 100 MG: 100 TABLET ORAL at 04:06

## 2024-06-20 RX ADMIN — PRAZOSIN HYDROCHLORIDE 1 MG: 1 CAPSULE ORAL at 09:06

## 2024-06-20 RX ADMIN — LOSARTAN POTASSIUM 50 MG: 50 TABLET, FILM COATED ORAL at 09:06

## 2024-06-20 RX ADMIN — TRAZODONE HYDROCHLORIDE 50 MG: 50 TABLET ORAL at 08:06

## 2024-06-20 RX ADMIN — ISOSORBIDE MONONITRATE 60 MG: 60 TABLET, EXTENDED RELEASE ORAL at 06:06

## 2024-06-20 RX ADMIN — HEPARIN SODIUM 5000 UNITS: 5000 INJECTION, SOLUTION INTRAVENOUS; SUBCUTANEOUS at 04:06

## 2024-06-20 RX ADMIN — HYDRALAZINE HYDROCHLORIDE 10 MG: 20 INJECTION INTRAMUSCULAR; INTRAVENOUS at 06:06

## 2024-06-20 RX ADMIN — HYDRALAZINE HYDROCHLORIDE 10 MG: 20 INJECTION INTRAMUSCULAR; INTRAVENOUS at 01:06

## 2024-06-20 RX ADMIN — Medication 6 MG: at 08:06

## 2024-06-20 RX ADMIN — HYDROCODONE BITARTRATE AND ACETAMINOPHEN 1 TABLET: 7.5; 325 TABLET ORAL at 05:06

## 2024-06-20 RX ADMIN — AMLODIPINE BESYLATE 10 MG: 10 TABLET ORAL at 09:06

## 2024-06-20 RX ADMIN — PRAZOSIN HYDROCHLORIDE 2 MG: 1 CAPSULE ORAL at 08:06

## 2024-06-20 RX ADMIN — METOPROLOL TARTRATE 100 MG: 50 TABLET, FILM COATED ORAL at 09:06

## 2024-06-20 RX ADMIN — METOPROLOL TARTRATE 100 MG: 50 TABLET, FILM COATED ORAL at 08:06

## 2024-06-20 RX ADMIN — HYDRALAZINE HYDROCHLORIDE 100 MG: 100 TABLET ORAL at 08:06

## 2024-06-20 RX ADMIN — NITROGLYCERIN 1 INCH: 20 OINTMENT TOPICAL at 04:06

## 2024-06-20 RX ADMIN — HEPARIN SODIUM 5000 UNITS: 5000 INJECTION, SOLUTION INTRAVENOUS; SUBCUTANEOUS at 10:06

## 2024-06-20 NOTE — ASSESSMENT & PLAN NOTE
BP elevated--patient states he is very anxious from family problems; I Rx Xanax and his latest BP is better; will monitor; Nephrology raised Prazosin dose; will try to keep it under better control; continue meds

## 2024-06-20 NOTE — PLAN OF CARE
Problem: Adult Inpatient Plan of Care  Goal: Plan of Care Review  Outcome: Progressing  Goal: Patient-Specific Goal (Individualized)  Outcome: Progressing  Goal: Absence of Hospital-Acquired Illness or Injury  Outcome: Progressing  Goal: Optimal Comfort and Wellbeing  Outcome: Progressing  Goal: Readiness for Transition of Care  Outcome: Progressing     Problem: Skin Injury Risk Increased  Goal: Skin Health and Integrity  Outcome: Progressing     Problem: Hemodialysis  Goal: Safe, Effective Therapy Delivery  Outcome: Progressing  Goal: Effective Tissue Perfusion  Outcome: Progressing  Goal: Absence of Infection Signs and Symptoms  Outcome: Progressing     Problem: Infection  Goal: Absence of Infection Signs and Symptoms  Outcome: Progressing

## 2024-06-20 NOTE — PROGRESS NOTES
[facility] - [address] [place] [address] Medicine  Progress Note    Patient Name: Saúl Butt  MRN: [id number]  Patient Class: IP- Inpatient   Admission Date: 6/18/2024  Length of Stay: 0 days  Attending Physician: Guerda Wheat DO  Primary Care Provider: Ruth, Primary Doctor        Subjective:     Principal Problem:Acute hypoxemic respiratory failure        HPI:  53 year old male with a PMH of ESRD and HTN presents to the ER from HD with SOB for the last 3 days.  He was also having trouble with his BP.  He is sleepy and not a good historian.  Family at bedside.     Overview/Hospital Course:  53 year old male presents with SOB--this has resolved.  Patient denies chest pain, shortness of breath, nausea, vomiting, or diarrhea.      Vitals:    06/20/24 0504 06/20/24 0733 06/20/24 0736 06/20/24 1201   BP:   (!) 218/143 (!) 155/81   Pulse:   82 77   Resp: 19 18 18   Temp:   98.4 °F (36.9 °C) 98.2 °F (36.8 °C)   TempSrc:   Oral Oral   SpO2:  96% 96% (!) 94%   Weight:       Height:             PHYSICAL EXAMINATION:    GEN: NAD; alert and oriented x 3  CVS: regular rate and rhythm  RESP: normal respiratory effort; no audible wheezing noted  GI: non-distended  EXTR: no swelling noted      Assessment/Plan:      * Acute hypoxemic respiratory failure  Resolved on Lasix; from aortic stenosis and acute diastolic CHF exacerbation; Negative COVID and Influenza  Negative CXR for infection or fluid    Acute on chronic diastolic CHF (congestive heart failure)  Echo shows ejection fraction is 50%; grade II left ventricular diastolic dysfunction; on Lasix    HTN (hypertension), benign  BP elevated--patient states he is very anxious from family problems; I Rx Xanax and his latest BP is better; will monitor; Nephrology raised Prazosin dose; will try to keep it under better control; continue meds    Atrial fibrillation with RVR  Does not appear to be on anticoagulation; on telemetry; on Lopressor    ESRD on  hemodialysis  Appreciate Nephrology consult    Anemia in chronic kidney disease  Monitor CBC          Guerda Wheat DO  Department of Hospital Medicine   [facility]

## 2024-06-20 NOTE — ASSESSMENT & PLAN NOTE
Resolved on Lasix; from aortic stenosis and acute diastolic CHF exacerbation; Negative COVID and Influenza  Negative CXR for infection or fluid

## 2024-06-20 NOTE — PROGRESS NOTES
Pt. Denies sob, ros gi no n/v\    PE in nad, lungs cta, no edema    Vitals:    06/20/24 0415 06/20/24 0504 06/20/24 0733 06/20/24 0736   BP: (!) 205/103   (!) 218/143   BP Location:       Patient Position:       Pulse: 81   82   Resp: 18 19  18   Temp: 98.8 °F (37.1 °C)   98.4 °F (36.9 °C)   TempSrc: Oral   Oral   SpO2: (!) 90%  96% 96%   Weight: 98.8 kg (217 lb 13 oz)      Height:            A/P 1. HTN - SBP around 218 this morning, will double prazosin dose today. May need to try Minoxidil if titrating up prazosin does not gain us much.  2. ESRD - cont HD support  3. SOB- resolved  4. Anemia- hct 33%

## 2024-06-21 VITALS
BODY MASS INDEX: 29.5 KG/M2 | SYSTOLIC BLOOD PRESSURE: 163 MMHG | TEMPERATURE: 98 F | RESPIRATION RATE: 16 BRPM | HEIGHT: 72 IN | HEART RATE: 83 BPM | DIASTOLIC BLOOD PRESSURE: 91 MMHG | OXYGEN SATURATION: 95 % | WEIGHT: 217.81 LBS

## 2024-06-21 PROCEDURE — 90935 HEMODIALYSIS ONE EVALUATION: CPT

## 2024-06-21 PROCEDURE — 25000003 PHARM REV CODE 250: Performed by: HOSPITALIST

## 2024-06-21 PROCEDURE — 1111F DSCHRG MED/CURRENT MED MERGE: CPT | Mod: CPTII,,, | Performed by: HOSPITALIST

## 2024-06-21 PROCEDURE — 25000003 PHARM REV CODE 250: Performed by: INTERNAL MEDICINE

## 2024-06-21 PROCEDURE — 63600175 PHARM REV CODE 636 W HCPCS: Performed by: HOSPITALIST

## 2024-06-21 PROCEDURE — 63600175 PHARM REV CODE 636 W HCPCS: Performed by: INTERNAL MEDICINE

## 2024-06-21 PROCEDURE — 99239 HOSP IP/OBS DSCHRG MGMT >30: CPT | Mod: ,,, | Performed by: HOSPITALIST

## 2024-06-21 PROCEDURE — 94761 N-INVAS EAR/PLS OXIMETRY MLT: CPT

## 2024-06-21 RX ORDER — CLONIDINE HYDROCHLORIDE 0.3 MG/1
0.3 TABLET ORAL 2 TIMES DAILY
Qty: 60 TABLET | Refills: 0 | Status: SHIPPED | OUTPATIENT
Start: 2024-06-21 | End: 2024-07-21

## 2024-06-21 RX ORDER — FUROSEMIDE 40 MG/1
40 TABLET ORAL DAILY
Qty: 30 TABLET | Refills: 0 | Status: SHIPPED | OUTPATIENT
Start: 2024-06-22 | End: 2024-07-22

## 2024-06-21 RX ORDER — ALPRAZOLAM 0.25 MG/1
0.12 TABLET ORAL 2 TIMES DAILY PRN
Qty: 7 TABLET | Refills: 0 | Status: SHIPPED | OUTPATIENT
Start: 2024-06-21 | End: 2024-06-28

## 2024-06-21 RX ORDER — CLONIDINE HYDROCHLORIDE 0.1 MG/1
0.1 TABLET ORAL EVERY 6 HOURS PRN
Qty: 60 TABLET | Refills: 0 | Status: SHIPPED | OUTPATIENT
Start: 2024-06-21 | End: 2024-07-06

## 2024-06-21 RX ADMIN — HEPARIN SODIUM 4000 UNITS: 1000 INJECTION, SOLUTION INTRAVENOUS; SUBCUTANEOUS at 04:06

## 2024-06-21 RX ADMIN — HYDROCODONE BITARTRATE AND ACETAMINOPHEN 1 TABLET: 7.5; 325 TABLET ORAL at 05:06

## 2024-06-21 RX ADMIN — MUPIROCIN: 20 OINTMENT TOPICAL at 11:06

## 2024-06-21 RX ADMIN — ISOSORBIDE MONONITRATE 60 MG: 60 TABLET, EXTENDED RELEASE ORAL at 07:06

## 2024-06-21 RX ADMIN — HEPARIN SODIUM 5000 UNITS: 5000 INJECTION, SOLUTION INTRAVENOUS; SUBCUTANEOUS at 05:06

## 2024-06-21 RX ADMIN — NITROGLYCERIN 1 INCH: 20 OINTMENT TOPICAL at 11:06

## 2024-06-21 RX ADMIN — HYDRALAZINE HYDROCHLORIDE 100 MG: 100 TABLET ORAL at 07:06

## 2024-06-21 RX ADMIN — AMLODIPINE BESYLATE 10 MG: 10 TABLET ORAL at 07:06

## 2024-06-21 RX ADMIN — METOPROLOL TARTRATE 100 MG: 50 TABLET, FILM COATED ORAL at 07:06

## 2024-06-21 RX ADMIN — HYDROCODONE BITARTRATE AND ACETAMINOPHEN 1 TABLET: 7.5; 325 TABLET ORAL at 11:06

## 2024-06-21 RX ADMIN — FUROSEMIDE 40 MG: 40 TABLET ORAL at 07:06

## 2024-06-21 RX ADMIN — HYDRALAZINE HYDROCHLORIDE 10 MG: 20 INJECTION INTRAMUSCULAR; INTRAVENOUS at 05:06

## 2024-06-21 RX ADMIN — SODIUM CHLORIDE: 9 INJECTION, SOLUTION INTRAVENOUS at 04:06

## 2024-06-21 RX ADMIN — CLONIDINE HYDROCHLORIDE 0.1 MG: 0.1 TABLET ORAL at 07:06

## 2024-06-21 RX ADMIN — NITROGLYCERIN 1 INCH: 20 OINTMENT TOPICAL at 05:06

## 2024-06-21 RX ADMIN — PRAZOSIN HYDROCHLORIDE 2 MG: 1 CAPSULE ORAL at 07:06

## 2024-06-21 RX ADMIN — ALPRAZOLAM 0.25 MG: 0.25 TABLET ORAL at 05:06

## 2024-06-21 RX ADMIN — LOSARTAN POTASSIUM 50 MG: 50 TABLET, FILM COATED ORAL at 07:06

## 2024-06-21 NOTE — PLAN OF CARE
[facility] Telemetry  Discharge Final Note    Primary Care Provider: No, Primary Doctor    Expected Discharge Date: 6/21/2024    Final Discharge Note (most recent)       Final Note - 06/21/24 1402          Final Note    Assessment Type Final Discharge Note     Anticipated Discharge Disposition Home or Self Care     What phone number can be called within the next 1-3 days to see how you are doing after discharge? [phone]        Post-Acute Status    Coverage [employer] Medicare     Discharge Delays None known at this time                     Important Message from Medicare  Important Message from Medicare regarding Discharge Appeal Rights: Given to patient/caregiver, Explained to patient/caregiver, Signed/date by patient/caregiver     Date IMM was signed: 06/21/24  Time IMM was signed: 0900    Contact Info       Roxanna Merrill MD   Specialty: Family Medicine    [address] [facility] [address]   Phone: [phone]       Next Steps: Follow up on 6/27/2024    Instructions: 9:30 am          Pt to dc home with family. IM updated. No further needs at this time.

## 2024-06-21 NOTE — PROGRESS NOTES
[facility]  Nephrology  Progress Note    Patient Name: Saúl Butt  MRN: [id number]  Admission Date: 6/18/2024  Hospital Length of Stay: 1 days  Attending Provider: Guerda Wheat DO   Primary Care Physician: Ruth, Primary Doctor  Principal Problem:Acute hypoxemic respiratory failure    Consults  Subjective:     Interval History:  Mr. Butt is seen in follow-up of his end-stage renal disease.  He is doing well but is having problems with his hypertension.  He reminds me that he takes clonidine 0.3 mg 3 times a day.  I think it is best that we discontinue prazosin and use clonidine.  He was taking hydralazine at home.  It seems that he was having significant problems with worry and anxiety prior to this admission.  He says he was taking his medicine up until admission.          Review of patient's allergies indicates:   Allergen Reactions    Fish containing products Swelling    Iodinated contrast media Swelling    Iodine Shortness Of Breath, Swelling and Anaphylaxis     Sob and swelling/itching/throat closes up per pt  Other reaction(s): Swelling, Hives, DifficultyBreat  Sob and swelling/itching/throat closes up per pt      Shellfish containing products Swelling    Iodine and iodide containing products      Current Facility-Administered Medications   Medication Frequency    0.9%  NaCl infusion PRN    acetaminophen tablet 1,000 mg Q6H PRN    ALPRAZolam tablet 0.25 mg BID PRN    amLODIPine tablet 10 mg Daily    bisacodyL EC tablet 10 mg Daily PRN    cloNIDine tablet 0.1 mg Q6H PRN    cloNIDine tablet 0.3 mg BID    dextromethorphan-guaiFENesin  mg/5 ml liquid 10 mL Q6H PRN    dextrose 10% bolus 125 mL 125 mL PRN    dextrose 10% bolus 250 mL 250 mL PRN    furosemide tablet 40 mg Daily    glucagon (human recombinant) injection 1 mg PRN    glucose chewable tablet 16 g PRN    glucose chewable tablet 24 g PRN    heparin (porcine) injection 4,000 Units PRN    heparin (porcine)  injection 5,000 Units Q8H    hydrALAZINE injection 10 mg Q4H PRN    hydrALAZINE tablet 100 mg TID    HYDROcodone-acetaminophen 7.5-325 mg per tablet 1 tablet Q6H PRN    isosorbide mononitrate 24 hr tablet 60 mg QAM    losartan tablet 50 mg BID    melatonin tablet 6 mg Nightly PRN    metoprolol tartrate (LOPRESSOR) tablet 100 mg BID    mupirocin 2 % ointment BID    naloxone 0.4 mg/mL injection 0.02 mg PRN    nitroGLYCERIN 2% TD oint ointment 1 inch Q6H    ondansetron injection 8 mg Q6H PRN    simethicone chewable tablet 80 mg TID PRN    sodium chloride 0.9% flush 10 mL Q12H PRN    traZODone tablet 50 mg Nightly PRN       Objective:     Vital Signs (Most Recent):  Temp: 98.5 °F (36.9 °C) (06/21/24 0719)  Pulse: 81 (06/21/24 0719)  Resp: 18 (06/21/24 0719)  BP: (!) 190/104 (06/21/24 0719)  SpO2: (!) 94 % (06/21/24 0719) Vital Signs (24h Range):  Temp:  [97.4 °F (36.3 °C)-98.5 °F (36.9 °C)] 98.5 °F (36.9 °C)  Pulse:  [72-95] 81  Resp:  [18] 18  SpO2:  [94 %-97 %] 94 %  BP: (155-207)/() 190/104     Weight: 98.8 kg (217 lb 13 oz) (06/21/24 0427)  Body mass index is 29.54 kg/m².  Body surface area is 2.24 meters squared.    No intake/output data recorded.    Physical Exam he has no edema.  Diastolic blood pressures are between 101 110.  His chest is clear he has in no distress.    Significant Labs:sureBMP:   Recent Labs   Lab 06/19/24  1023   *      K 4.6   CL 94*   CO2 31   BUN 49*   CREATININE 12.20*   CALCIUM 8.1*     CBC:   Recent Labs   Lab 06/19/24  1023   WBC 11.63*   RBC 3.46*   HGB 10.1*   HCT 32.8*      MCV 94.8   MCH 29.2   MCHC 30.8*     All labs within the past 24 hours have been reviewed.    Significant Imaging:  Labs: Reviewed    Assessment/Plan:     Active Diagnoses:    Diagnosis Date Noted POA    PRINCIPAL PROBLEM:  Acute hypoxemic respiratory failure [J96.01] 06/18/2024 Yes    Acute on chronic diastolic CHF (congestive heart failure) [I50.33] 06/18/2024 Yes    HTN (hypertension),  benign [I10] 04/22/2023 Yes    Atrial fibrillation with RVR [I48.91] 04/22/2023 Yes    ESRD on hemodialysis [N18.6, Z99.2] 06/17/2021 Not Applicable    Anemia in chronic kidney disease [N18.9, D63.1] 12/06/2014 Yes      Problems Resolved During this Admission:    Diagnosis Date Noted Date Resolved POA    Moderate aortic stenosis [I35.0] 04/22/2023 06/18/2024 Yes       We will plan to continue with his dialysis.  We will resume clonidine 0.3 b.i.d. and increase to t.i.d. as he was taking if necessary.  We will discontinue prazosin since we are going to use clonidine.    Thank you for your consult. I will follow-up with patient. Please contact us if you have any additional questions.    Main Murillo MD  Nephrology  [facility] - [address] [place] [address]

## 2024-06-21 NOTE — DISCHARGE SUMMARY
[facility] Hospital Medicine  Discharge Summary      Patient Name: Saúl Butt  MRN: [id number]  JAY: [id number]  Patient Class: IP- Inpatient  Admission Date: 6/18/2024  Hospital Length of Stay: 1 days  Discharge Date and Time:  06/21/2024 1:22 PM  Attending Physician: Guerda Wheat DO   Discharging Provider: Guerda Wheat DO  Primary Care Provider: Ruth, Primary Doctor        HPI:   53 year old male with a PMH of ESRD and HTN presents to the ER from HD with SOB for the last 3 days.  He was also having trouble with his BP.  He is sleepy and not a good historian.  Family at bedside.           Hospital Course:   53 year old male with a PMH of ESRD and HTN presents to the ER from HD with SOB for the last several days. He was also having trouble with HTN.  On admission, he was sleepy.   He has a history of aortic stenosis and acute diastolic CHF exacerbation.  He was placed on Lasix for diuresis.  Negative COVID and Influenza.  Negative CXR for infection or fluid.  His SOB improved.  Nephrology was consulted for dialysis.  His BP was still high, and the patient complained of severe anxiety.  He was placed on Xanax, which helped with his BP.  Nephrology added Clonidine to his BP med regimen.  Prazosin was discontinued.  His BP is better.  He is no longer SOB.  Patient denies chest pain, shortness of breath, nausea, vomiting, or diarrhea and is stable for discharge.       Vitals:    06/21/24 0805 06/21/24 0908 06/21/24 1123 06/21/24 1154   BP:  (!) 144/79 137/82    BP Location:   Left arm    Patient Position:   Sitting    Pulse:  77 78    Resp:   18 18   Temp:   98.6 °F (37 °C)    TempSrc:   Oral    SpO2: (!) 94%  95%    Weight:       Height:             PHYSICAL EXAM:    GEN: NAD; alert and oriented x 3  CVS: regular rate and rhythm; no murmurs  RESP: clear to auscultation bilaterally; no rhonchi, rales, or wheezes noted  GI: soft, non-tender, non-distended; + bowel  sounds  EXTR: no clubbing, cyanosis, or edema      Final Active Diagnoses:    Diagnosis Date Noted POA    PRINCIPAL PROBLEM:  Acute hypoxemic respiratory failure [J96.01] 06/18/2024 Yes    Acute on chronic diastolic CHF (congestive heart failure) [I50.33] 06/18/2024 Yes    HTN (hypertension), benign [I10] 04/22/2023 Yes    Atrial fibrillation with RVR [I48.91] 04/22/2023 Yes    ESRD on hemodialysis [N18.6, Z99.2] 06/17/2021 Not Applicable    Anemia in chronic kidney disease [N18.9, D63.1] 12/06/2014 Yes      Problems Resolved During this Admission:    Diagnosis Date Noted Date Resolved POA    Moderate aortic stenosis [I35.0] 04/22/2023 06/18/2024 Yes       Discharged Condition: stable    Disposition: home    Follow Up:   Follow-up Information       Radha Barrera MD Follow up in 1 week(s).    Specialty: Family Medicine  Contact information:  [address] [place] [id number]  [phone]                              Medications:  Reconciled Home Medications:      Medication List        START taking these medications      ALPRAZolam 0.25 MG tablet  Commonly known as: XANAX  Take 0.5 tablets (0.125 mg total) by mouth 2 (two) times daily as needed for Anxiety.     amLODIPine 10 MG tablet  Commonly known as: NORVASC  Take 1 tablet by mouth once daily.     * cloNIDine 0.1 MG tablet  Commonly known as: CATAPRES  Take 1 tablet (0.1 mg total) by mouth every 6 (six) hours as needed (For sbp greater than 190 or diastolic blood pressure greater than 110).     * cloNIDine 0.3 MG tablet  Commonly known as: CATAPRES  Take 1 tablet (0.3 mg total) by mouth 2 (two) times daily.     furosemide 40 MG tablet  Commonly known as: LASIX  Take 1 tablet (40 mg total) by mouth once daily.  Start taking on: June 22, 2024           * This list has 2 medication(s) that are the same as other medications prescribed for you. Read the directions carefully, and ask your doctor or other care provider to review them with you.                 CONTINUE taking these medications      benzonatate 100 MG capsule  Commonly known as: TESSALON  Take 1 capsule (100 mg total) by mouth 3 (three) times daily as needed for Cough.     gabapentin 600 MG tablet  Commonly known as: NEURONTIN  Take 600 mg by mouth 2 (two) times daily.     hydrALAZINE 100 MG tablet  Commonly known as: APRESOLINE  Take 100 mg by mouth 3 (three) times daily.     isosorbide mononitrate 60 MG 24 hr tablet  Commonly known as: IMDUR  Take 60 mg by mouth every morning.     losartan 50 MG tablet  Commonly known as: COZAAR  Take 50 mg by mouth 2 (two) times daily.     metoprolol tartrate 100 MG tablet  Commonly known as: LOPRESSOR  Take 1 tablet (100 mg total) by mouth 2 (two) times a day.     pantoprazole 40 MG tablet  Commonly known as: PROTONIX  Take 40 mg by mouth once daily.     TUMS E-X 300 mg (750 mg) Chew  Generic drug: calcium carbonate  Take 2 tablets by mouth.            STOP taking these medications      azithromycin 250 MG tablet  Commonly known as: Z-MARQUITA     prazosin 1 MG Cap  Commonly known as: MINIPRESS     predniSONE 10 MG tablet  Commonly known as: DELTASONE              Time spent on the discharge of patient: 45 minutes     All of the information has been discussed with the patient who acknowledged verbal understanding.        Guerda Wheat DO  Department of Hospital Medicine  [facility] - [facility]

## 2024-06-24 ENCOUNTER — PATIENT OUTREACH (OUTPATIENT)
Dept: ADMINISTRATIVE | Facility: CLINIC | Age: 54
End: 2024-06-24
Payer: MEDICARE

## 2024-06-24 NOTE — PROGRESS NOTES
C3 nurse attempted to contact Saúl Butt  for a TCC post hospital discharge follow up call. No answer. No voicemail available. Spoke with patient's mother, she agreed to give patient a message to return call. Callback information provided. The patient has a scheduled HOSFU appointment with Dr Roxanna Merrill on 6/24/24 @ 226.

## 2024-06-25 NOTE — PROGRESS NOTES
C3 nurse attempted to contact Saúl Butt  for a TCC post hospital discharge follow up call. No answer. Left voicemail with callback information. The patient has a scheduled HOSFU appointment with Dr Roxanna Merrill on 6/27/24 @ 028.

## 2024-06-25 NOTE — PROGRESS NOTES
C3 nurse attempted to contact Saúl Butt  for a TCC post hospital discharge follow up call. The patient is unable to conduct the call @ this time. The patient requested a callback.    The patient has a scheduled HOSFU appointment with Dr Roxanna Merrill on 6/27/24 @ 473.

## 2024-07-10 ENCOUNTER — TELEPHONE (OUTPATIENT)
Dept: TRANSPLANT | Facility: CLINIC | Age: 54
End: 2024-07-10
Payer: MEDICAID
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
## 2024-07-16 ENCOUNTER — OFFICE VISIT (OUTPATIENT)
Dept: PAIN MEDICINE | Facility: CLINIC | Age: 54
End: 2024-07-16
Payer: MEDICARE

## 2024-07-16 VITALS
HEART RATE: 82 BPM | BODY MASS INDEX: 32.91 KG/M2 | RESPIRATION RATE: 16 BRPM | DIASTOLIC BLOOD PRESSURE: 76 MMHG | SYSTOLIC BLOOD PRESSURE: 118 MMHG | WEIGHT: 243 LBS | HEIGHT: 72 IN

## 2024-07-16 DIAGNOSIS — M15.9 PRIMARY OSTEOARTHRITIS INVOLVING MULTIPLE JOINTS: Chronic | ICD-10-CM

## 2024-07-16 DIAGNOSIS — M54.17 LUMBOSACRAL RADICULOPATHY: Primary | Chronic | ICD-10-CM

## 2024-07-16 DIAGNOSIS — Z79.899 ENCOUNTER FOR LONG-TERM (CURRENT) USE OF OTHER MEDICATIONS: ICD-10-CM

## 2024-07-16 PROBLEM — M15.0 PRIMARY OSTEOARTHRITIS INVOLVING MULTIPLE JOINTS: Chronic | Status: ACTIVE | Noted: 2024-07-16

## 2024-07-16 PROCEDURE — 3008F BODY MASS INDEX DOCD: CPT | Mod: CPTII,,, | Performed by: PHYSICIAN ASSISTANT

## 2024-07-16 PROCEDURE — 3074F SYST BP LT 130 MM HG: CPT | Mod: CPTII,,, | Performed by: PHYSICIAN ASSISTANT

## 2024-07-16 PROCEDURE — 1159F MED LIST DOCD IN RCRD: CPT | Mod: CPTII,,, | Performed by: PHYSICIAN ASSISTANT

## 2024-07-16 PROCEDURE — 4010F ACE/ARB THERAPY RXD/TAKEN: CPT | Mod: CPTII,,, | Performed by: PHYSICIAN ASSISTANT

## 2024-07-16 PROCEDURE — 99213 OFFICE O/P EST LOW 20 MIN: CPT | Mod: S$PBB,,, | Performed by: PHYSICIAN ASSISTANT

## 2024-07-16 PROCEDURE — 3066F NEPHROPATHY DOC TX: CPT | Mod: CPTII,,, | Performed by: PHYSICIAN ASSISTANT

## 2024-07-16 PROCEDURE — 99215 OFFICE O/P EST HI 40 MIN: CPT | Mod: PBBFAC | Performed by: PHYSICIAN ASSISTANT

## 2024-07-16 PROCEDURE — 3078F DIAST BP <80 MM HG: CPT | Mod: CPTII,,, | Performed by: PHYSICIAN ASSISTANT

## 2024-07-16 PROCEDURE — 99999 PR PBB SHADOW E&M-EST. PATIENT-LVL V: CPT | Mod: PBBFAC,,, | Performed by: PHYSICIAN ASSISTANT

## 2024-07-16 PROCEDURE — 1111F DSCHRG MED/CURRENT MED MERGE: CPT | Mod: CPTII,,, | Performed by: PHYSICIAN ASSISTANT

## 2024-07-16 RX ORDER — HYDROCODONE BITARTRATE AND ACETAMINOPHEN 10; 325 MG/1; MG/1
1 TABLET ORAL EVERY 12 HOURS PRN
Qty: 60 TABLET | Refills: 0 | Status: SHIPPED | OUTPATIENT
Start: 2024-07-16 | End: 2024-07-16

## 2024-07-18 ENCOUNTER — HOSPITAL ENCOUNTER (EMERGENCY)
Facility: HOSPITAL | Age: 54
Discharge: HOME | End: 2024-07-18
Payer: MEDICARE

## 2024-07-18 VITALS
HEART RATE: 76 BPM | HEIGHT: 72 IN | TEMPERATURE: 99 F | SYSTOLIC BLOOD PRESSURE: 150 MMHG | BODY MASS INDEX: 33.86 KG/M2 | OXYGEN SATURATION: 98 % | RESPIRATION RATE: 16 BRPM | DIASTOLIC BLOOD PRESSURE: 97 MMHG | WEIGHT: 250 LBS

## 2024-07-18 DIAGNOSIS — M25.551 PAIN OF RIGHT HIP: Primary | ICD-10-CM

## 2024-07-18 DIAGNOSIS — M16.11 PRIMARY OSTEOARTHRITIS OF RIGHT HIP: ICD-10-CM

## 2024-07-18 PROCEDURE — 99284 EMERGENCY DEPT VISIT MOD MDM: CPT | Mod: 25

## 2024-07-18 NOTE — ED TRIAGE NOTES
"Patient complaining of right hip pain x 2-3 days. Patient states that he was walking and he felt "like it popped out of place". Patient states "agonizing pain" ever since. Patient is ambulatory on arrival with a cane.   "

## 2024-07-18 NOTE — DISCHARGE INSTRUCTIONS
Continue current medicine regimens of Neurontin and Norco. Return to ED if any new or worsening of symptoms occur.
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
## 2024-09-20 DIAGNOSIS — M54.50 LOW BACK PAIN: Primary | ICD-10-CM

## 2024-09-23 PROBLEM — J96.01 ACUTE HYPOXEMIC RESPIRATORY FAILURE: Status: RESOLVED | Noted: 2024-06-18 | Resolved: 2024-09-23

## 2024-10-06 ENCOUNTER — HOSPITAL ENCOUNTER (EMERGENCY)
Facility: HOSPITAL | Age: 54
Discharge: HOME | End: 2024-10-06
Attending: EMERGENCY MEDICINE
Payer: MEDICARE

## 2024-10-06 VITALS
RESPIRATION RATE: 12 BRPM | DIASTOLIC BLOOD PRESSURE: 77 MMHG | WEIGHT: 230 LBS | BODY MASS INDEX: 31.15 KG/M2 | SYSTOLIC BLOOD PRESSURE: 122 MMHG | TEMPERATURE: 98 F | HEART RATE: 55 BPM | HEIGHT: 72 IN | OXYGEN SATURATION: 98 %

## 2024-10-06 DIAGNOSIS — S92.335A CLOSED NONDISPLACED FRACTURE OF THIRD METATARSAL BONE OF LEFT FOOT, INITIAL ENCOUNTER: ICD-10-CM

## 2024-10-06 DIAGNOSIS — S92.345A CLOSED NONDISPLACED FRACTURE OF FOURTH METATARSAL BONE OF LEFT FOOT, INITIAL ENCOUNTER: ICD-10-CM
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
## 2024-10-06 DIAGNOSIS — S92.325A CLOSED NONDISPLACED FRACTURE OF SECOND METATARSAL BONE OF LEFT FOOT, INITIAL ENCOUNTER: Primary | ICD-10-CM

## 2024-10-06 PROCEDURE — 25000003 PHARM REV CODE 250: Performed by: EMERGENCY MEDICINE

## 2024-10-06 PROCEDURE — 99283 EMERGENCY DEPT VISIT LOW MDM: CPT | Mod: 25

## 2024-10-06 RX ORDER — OXYCODONE AND ACETAMINOPHEN 5; 325 MG/1; MG/1
1 TABLET ORAL EVERY 4 HOURS PRN
Qty: 12 TABLET | Refills: 0 | Status: SHIPPED | OUTPATIENT
Start: 2024-10-06

## 2024-10-06 RX ORDER — OXYCODONE AND ACETAMINOPHEN 10; 325 MG/1; MG/1
1 TABLET ORAL
Status: COMPLETED | OUTPATIENT
Start: 2024-10-06 | End: 2024-10-06

## 2024-10-06 RX ADMIN — OXYCODONE AND ACETAMINOPHEN 1 TABLET: 10; 325 TABLET ORAL at 07:10

## 2024-10-06 NOTE — DISCHARGE INSTRUCTIONS
Use walking boot as needed but always use while walking    Use ice as needed.  Keep your foot elevated for the 1st couple days as much as possible    Use over-the-counter medication for pain.  Alternatively can use narcotic Percocet use caution with narcotics because they can cause sedation constipation and addiction

## 2024-10-06 NOTE — ED PROVIDER NOTES
Encounter Date: 10/6/2024       History     Chief Complaint   Patient presents with    Ankle Pain     Patient complains of pain to the left shin and the left lateral foot after tripping and falling last night.  Patient says he was at a barbecue and tripped falling onto his left leg.  He has no pain of the hip.  Patient was able to limp last night and put weight on the leg but this morning it was still sore.  No associated head injury or headache or neck pain.  No recent illnesses.  Has end-stage renal disease was last dialyzed 2 days ago on Friday.  No associated shortness of breath chest pain.      Review of patient's allergies indicates:   Allergen Reactions    Fish containing products Swelling    Iodinated contrast media Swelling    Iodine Shortness Of Breath, Swelling and Anaphylaxis     Sob and swelling/itching/throat closes up per pt  Other reaction(s): Swelling, Hives, DifficultyBreat  Sob and swelling/itching/throat closes up per pt      Shellfish containing products Swelling    Iodine and iodide containing products      Past Medical History:   Diagnosis Date    Anemia in chronic kidney disease 12/06/2014    Chronic diastolic congestive heart failure     Deep vein thrombosis     ESRD on hemodialysis 06/17/2021    Hyperlipidemia     Hypertension     Malignant neoplasm of kidney     Moderate aortic stenosis 04/22/2023    Paroxysmal atrial fibrillation     Polycystic kidney disease     Primary osteoarthritis involving multiple joints 07/16/2024     Past Surgical History:   Procedure Laterality Date    AV FISTULA PLACEMENT      EYE SURGERY      HD CATHETER      SELECTIVE INJECTION OF ANESTHETIC AGENT AROUND LUMBAR SPINAL NERVE ROOT BY TRANSFORAMINAL APPROACH Left 6/13/2024    Procedure: Left L5-S1 TFESi;  Surgeon: Lily Cuellar MD;  Location: [facility];  Service: Pain Management;  Laterality: Left;    SURGICAL REMOVAL OF ULCER      clamped ulcer in stomach    TUNNELED VENOUS CATHETER PLACEMENT        No family history on file.  Social History     Tobacco Use    Smoking status: Never    Smokeless tobacco: Never   Substance Use Topics    Alcohol use: Never    Drug use: Never     Review of Systems   Constitutional:  Negative for fever.   HENT:  Negative for sore throat.    Respiratory:  Negative for shortness of breath.    Cardiovascular:  Negative for chest pain.   Gastrointestinal:  Negative for nausea.   Genitourinary:  Negative for dysuria.   Musculoskeletal:  Negative for back pain.   Skin:  Negative for rash.   Neurological:  Negative for weakness.   Hematological:  Does not bruise/bleed easily.       Physical Exam     Initial Vitals [10/06/24 0640]   BP Pulse Resp Temp SpO2   122/77 (!) 55 18 98 °F (36.7 °C) 98 %      MAP       --         Physical Exam    Nursing note and vitals reviewed.  Constitutional: He appears well-developed and well-nourished.   HENT:   Head: Normocephalic and atraumatic.   Eyes: EOM are normal. Pupils are equal, round, and reactive to light.   Neck: Neck supple. No thyromegaly present. No JVD present.   Normal range of motion.  Cardiovascular:  Normal rate, regular rhythm, normal heart sounds and intact distal pulses.           No murmur heard.  Pulmonary/Chest: Breath sounds normal. No stridor. No respiratory distress. He has no wheezes.   Abdominal: Abdomen is soft. Bowel sounds are normal. He exhibits no distension. There is no abdominal tenderness.   Musculoskeletal:         General: No edema. Normal range of motion.      Cervical back: Normal range of motion and neck supple.      Comments: No tenderness of the left hip.  Full range motion of left hip.  Tenderness ENT your lateral mid lower shin.  Tenderness left foot.  Capillary refill brisk.  Pulses present.  Antalgic range motion left ankle left knee     Lymphadenopathy:     He has no cervical adenopathy.   Neurological: He is alert and oriented to person, place, and time. He has normal strength. No cranial nerve deficit  or sensory deficit. GCS score is 15. GCS eye subscore is 4. GCS verbal subscore is 5. GCS motor subscore is 6.   Skin: Skin is warm and dry. Capillary refill takes less than 2 seconds. No rash noted.   Psychiatric: He has a normal mood and affect.         Medical Screening Exam   See Full Note    ED Course   Procedures  Labs Reviewed - No data to display       Imaging Results              X-Ray Foot Complete Left (Final result)  Result time 10/06/24 07:40:33      Final result by Alex Christian MD (10/06/24 07:40:33)                   Impression:      1. Mildly displaced comminuted fractures of the 2nd through 4th metatarsals as above.      Electronically signed by: Alex Christian  Date:    10/06/2024  Time:    07:40               Narrative:    EXAMINATION:  XR FOOT COMPLETE 3 VIEW LEFT    CLINICAL HISTORY:  .  Unspecified fall, initial encounter    TECHNIQUE:  AP, lateral and oblique views of the left foot were performed.    COMPARISON:  None    FINDINGS:  Multiple midfoot fractures including comminuted mildly displaced fractures of the distal shaft of the 4th metatarsal and 3rd metatarsal and proximal shaft of the 2nd metatarsal.  No discrete intra-articular extension of fracture fragments.  No dislocation.  Mild regional soft tissue swelling suggested.    There is a tiny 1 mm linear os ossific density marginating the head of the 1st proximal phalanx favored to be degenerative versus remote injury.  Superimposed degenerative changes including small plantar calcaneal enthesophyte.  No radiopaque foreign body.                                       X-Ray Tibia Fibula 2 View Left (Final result)  Result time 10/06/24 07:37:47      Final result by Alex Christian MD (10/06/24 07:37:47)                   Impression:      No acute fracture or dislocation.      Electronically signed by: Alex Christian  Date:    10/06/2024  Time:    07:37               Narrative:    EXAMINATION:  XR TIBIA FIBULA 2 VIEW LEFT    CLINICAL  HISTORY:  Unspecified fall, initial encounter    TECHNIQUE:  AP and lateral views of the left tibia and fibula were performed.    COMPARISON:  Radiographs 05/04/2021.    FINDINGS:  Radiographs of the left tibia/fibula demonstrate no fracture or dislocation.  No abnormal focal bony lucencies are radiopaque foreign body.  Vascular calcifications are noted within the left leg.                                       Medications   oxyCODONE-acetaminophen  mg per tablet 1 tablet (1 tablet Oral Given 10/6/24 0703)     Medical Decision Making  X-ray of the left foot and left tib-fib showed fractures of 2nd 3rd and 4th metatarsals.  Patent stent in a walking boot and given Percocet.  Much improved .  Will have ambulatory referral to orthopedics    Amount and/or Complexity of Data Reviewed  Radiology: ordered.    Risk  Prescription drug management.                                      Clinical Impression:   Final diagnoses:  [W19.XXXA] Fall  [S92.325A] Closed nondisplaced fracture of second metatarsal bone of left foot, initial encounter (Primary)  [S92.335A] Closed nondisplaced fracture of third metatarsal bone of left foot, initial encounter  [S92.345A] Closed nondisplaced fracture of fourth metatarsal bone of left foot, initial encounter               Curtis Carrasco MD  10/06/24 1001

## 2024-10-07 ENCOUNTER — TELEPHONE (OUTPATIENT)
Dept: ORTHOPEDICS | Facility: CLINIC | Age: 54
End: 2024-10-07
Payer: MEDICARE

## 2024-10-07 NOTE — TELEPHONE ENCOUNTER
Attempted to call pt. No answer, no voicemail option.  Was attempting to schedule patient for l foot fx

## 2024-11-01 ENCOUNTER — TELEPHONE (OUTPATIENT)
Dept: ORTHOPEDICS | Facility: CLINIC | Age: 54
End: 2024-11-01
Payer: MEDICARE

## 2024-11-05 DIAGNOSIS — M79.672 LEFT FOOT PAIN: Primary | ICD-10-CM

## 2024-11-06 ENCOUNTER — OFFICE VISIT (OUTPATIENT)
Dept: ORTHOPEDICS | Facility: CLINIC | Age: 54
End: 2024-11-06
Payer: MEDICARE

## 2024-11-06 ENCOUNTER — HOSPITAL ENCOUNTER (OUTPATIENT)
Dept: RADIOLOGY | Facility: HOSPITAL | Age: 54
Discharge: HOME | End: 2024-11-06
Attending: ORTHOPAEDIC SURGERY
Payer: MEDICARE

## 2024-11-06 DIAGNOSIS — M79.672 LEFT FOOT PAIN: ICD-10-CM

## 2024-11-06 DIAGNOSIS — S92.325A CLOSED NONDISPLACED FRACTURE OF SECOND METATARSAL BONE OF LEFT FOOT, INITIAL ENCOUNTER: ICD-10-CM

## 2024-11-06 DIAGNOSIS — S92.335A CLOSED NONDISPLACED FRACTURE OF THIRD METATARSAL BONE OF LEFT FOOT, INITIAL ENCOUNTER: ICD-10-CM

## 2024-11-06 DIAGNOSIS — S92.345A CLOSED NONDISPLACED FRACTURE OF FOURTH METATARSAL BONE OF LEFT FOOT, INITIAL ENCOUNTER: ICD-10-CM

## 2024-11-06 PROCEDURE — 73630 X-RAY EXAM OF FOOT: CPT | Mod: TC,LT

## 2024-11-06 PROCEDURE — 28470 CLTX METATARSAL FX WO MNP EA: CPT | Mod: PBBFAC | Performed by: ORTHOPAEDIC SURGERY

## 2024-11-06 PROCEDURE — 99215 OFFICE O/P EST HI 40 MIN: CPT | Mod: PBBFAC,25 | Performed by: ORTHOPAEDIC SURGERY

## 2024-11-06 PROCEDURE — 99999 PR PBB SHADOW E&M-EST. PATIENT-LVL V: CPT | Mod: PBBFAC,,, | Performed by: ORTHOPAEDIC SURGERY

## 2024-11-06 PROCEDURE — 73630 X-RAY EXAM OF FOOT: CPT | Mod: 26,LT,, | Performed by: ORTHOPAEDIC SURGERY

## 2024-11-06 NOTE — PROGRESS NOTES
CLINIC NOTE       Chief Complaint   Patient presents with    Right Foot - Injury        Saúl Butt is a 54 y.o. male seen today for evaluation of left foot injury.  He was reportedly injured on 10/06/2024.  He was walking in the [place] and struck a post.  He had pain involving the 3rd and 4th toe regions.  He was seen in the emergency room where x-rays revealed minimally displaced oblique fractures of the neck region of the 3rd and 4th metatarsals.  He was given a cam walker boot and he was been ambulating with a cane.  Pain is diminished.  Has a history of severe lifelong flatfoot.    Past Medical History:   Diagnosis Date    Anemia in chronic kidney disease 12/06/2014    Chronic diastolic congestive heart failure     Deep vein thrombosis     ESRD on hemodialysis 06/17/2021    Hyperlipidemia     Hypertension     Malignant neoplasm of kidney     Moderate aortic stenosis 04/22/2023    Paroxysmal atrial fibrillation     Polycystic kidney disease     Primary osteoarthritis involving multiple joints 07/16/2024     No family history on file.  Current Outpatient Medications on File Prior to Visit   Medication Sig Dispense Refill    ALPRAZolam (XANAX) 0.25 MG tablet Take 0.5 tablets (0.125 mg total) by mouth 2 (two) times daily as needed for Anxiety. 7 tablet 0    amLODIPine (NORVASC) 10 MG tablet Take 1 tablet by mouth once daily.      apixaban (ELIQUIS) 2.5 mg Tab Take 2.5 mg by mouth 2 (two) times daily.      benzonatate (TESSALON) 100 MG capsule Take 1 capsule (100 mg total) by mouth 3 (three) times daily as needed for Cough. 20 capsule 0    calcium carbonate (TUMS E-X) 300 mg (750 mg) Chew Take 2 tablets by mouth.      cloNIDine (CATAPRES) 0.3 MG tablet Take 1 tablet (0.3 mg total) by mouth 2 (two) times a day. 60 tablet 3    cloNIDine (CATAPRES) 0.3 MG tablet take 1 tablet by mouth twice a day 180 tablet 3    furosemide (LASIX) 40 MG tablet Take 1 tablet (40 mg total) by mouth once daily. 30 tablet 0     gabapentin (NEURONTIN) 600 MG tablet Take 600 mg by mouth 2 (two) times daily.      gabapentin (NEURONTIN) 600 MG tablet take 1 tablet by mouth twice a day 180 tablet 3    hydrALAZINE (APRESOLINE) 100 MG tablet Take 100 mg by mouth 3 (three) times daily.      hydrALAZINE (APRESOLINE) 100 MG tablet Take 1 tablet (100 mg total) by mouth 3 (three) times daily as needed for blood pressure 270 tablet 2    HYDROcodone-acetaminophen (NORCO)  mg per tablet Take 1 tablet by mouth every 6 (six) hours as needed for pain... May cause drowsiness 28 tablet 0    HYDROcodone-acetaminophen (NORCO)  mg per tablet Take 1 tablet every 6 hours by oral route as needed for 7 days. 28 tablet 0    HYDROcodone-acetaminophen (NORCO)  mg per tablet Take 1 tablet by mouth every 6 hours as needed for pain.. May cause drowsiness 28 tablet 0    isosorbide mononitrate (IMDUR) 60 MG 24 hr tablet Take 60 mg by mouth every morning.      lanthanum (FOSRENOL) 1000 MG chewable tablet Take 1,000 mg by mouth.      losartan (COZAAR) 50 MG tablet Take 50 mg by mouth 2 (two) times daily.      losartan (COZAAR) 50 MG tablet Take 1 tablet (50 mg total) by mouth 2 (two) times a day. 180 tablet 0    meloxicam (MOBIC) 15 MG tablet Take 15 mg by mouth once daily.      metoprolol succinate (TOPROL-XL) 50 MG 24 hr tablet Take 1 tablet by mouth once daily.      metoprolol tartrate (LOPRESSOR) 100 MG tablet Take 1 tablet (100 mg total) by mouth 2 (two) times a day. 180 tablet 3    NIFEdipine (ADALAT CC) 60 MG TbSR Take 1 tablet by mouth once daily.      NIFEdipine (PROCARDIA-XL) 60 MG (OSM) 24 hr tablet Take 1 tablet by mouth once daily.      NIFEdipine (PROCARDIA-XL) 60 MG (OSM) 24 hr tablet Take 1 tablet by mouth daily 90 tablet 0    NIFEdipine (PROCARDIA-XL) 60 MG (OSM) 24 hr tablet Take 1 tablet by mouth daily 90 tablet 3    nystatin-triamcinolone (MYCOLOG II) cream Apply topically.      oxyCODONE-acetaminophen (PERCOCET) 5-325 mg per tablet Take  1 tablet by mouth every 4 (four) hours as needed for Pain. 12 tablet 0    pantoprazole (PROTONIX) 40 MG tablet Take 40 mg by mouth once daily.      prazosin (MINIPRESS) 1 MG Cap Take 1 mg by mouth 2 (two) times daily.      prazosin (MINIPRESS) 1 MG Cap Take 1 capsule (1 mg total) by mouth 2 (two) times a day. 180 capsule 1    sevelamer carbonate (RENVELA) 800 mg Tab       sucroferric oxyhydroxide (VELPHORO) 500 mg Chew        No current facility-administered medications on file prior to visit.       ROS     There were no vitals filed for this visit.    Past Surgical History:   Procedure Laterality Date    AV FISTULA PLACEMENT      EYE SURGERY      HD CATHETER      SELECTIVE INJECTION OF ANESTHETIC AGENT AROUND LUMBAR SPINAL NERVE ROOT BY TRANSFORAMINAL APPROACH Left 6/13/2024    Procedure: Left L5-S1 TFESi;  Surgeon: Lily Cuellar MD;  Location: [facility];  Service: Pain Management;  Laterality: Left;    SURGICAL REMOVAL OF ULCER      clamped ulcer in stomach    TUNNELED VENOUS CATHETER PLACEMENT          Review of patient's allergies indicates:   Allergen Reactions    Fish containing products Swelling    Iodinated contrast media Swelling    Iodine Shortness Of Breath, Swelling and Anaphylaxis     Sob and swelling/itching/throat closes up per pt  Other reaction(s): Swelling, Hives, DifficultyBreat  Sob and swelling/itching/throat closes up per pt      Shellfish containing products Swelling    Iodine and iodide containing products         Ortho Exam :  Well-developed well-nourished black male no acute distress.  Alert oriented cooperative.  Neck is supple without JVD.  Breathing is regular nonlabored.  Skin is warm and dry no lesions seen.  Exam left foot he was severe loss of longitudinal arch with a pes planovalgus configuration.  Skin is intact.  He was mild tenderness palpation along the neck region of the 3rd and 4th metacarpals.    Radiographic Examination:  Left foot 11/06/2024    Technique:   Three views AP lateral oblique    Findings:  Bones well mineralized.  There is she was severe loss of longitudinal arch.  Degenerative changes noted involving the talonavicular joint with dorsal spur formation there are oblique fractures of the neck region of the 3rd and 4th metatarsals with early callus formation present    Impression:   See Above    Assessment and Plan  Patient Active Problem List    Diagnosis Date Noted    ESRD on hemodialysis 06/17/2021    Anemia in chronic kidney disease 12/06/2014    Impression:  Continue with cam walker boot and cane as needed.  Recheck anticipated final x-ray 4 weeks or sooner for interval problems.      Omar Reza M.D.

## 2024-11-11 ENCOUNTER — HOSPITAL ENCOUNTER (INPATIENT)
Facility: HOSPITAL | Age: 54
LOS: 21 days | Discharge: HOME HEALTH | DRG: 870 | End: 2024-12-02
Attending: EMERGENCY MEDICINE | Admitting: STUDENT IN AN ORGANIZED HEALTH CARE EDUCATION/TRAINING PROGRAM
Payer: MEDICARE

## 2024-11-11 DIAGNOSIS — J96.01 ACUTE RESPIRATORY FAILURE WITH HYPOXEMIA: ICD-10-CM

## 2024-11-11 DIAGNOSIS — E87.5 HYPERKALEMIA: ICD-10-CM

## 2024-11-11 DIAGNOSIS — J81.0 ACUTE PULMONARY EDEMA: ICD-10-CM

## 2024-11-11 DIAGNOSIS — A41.9 SEPTIC SHOCK: ICD-10-CM

## 2024-11-11 DIAGNOSIS — G93.41 ACUTE METABOLIC ENCEPHALOPATHY: ICD-10-CM

## 2024-11-11 DIAGNOSIS — R65.21 SEPTIC SHOCK: ICD-10-CM

## 2024-11-11 DIAGNOSIS — I26.99 ACUTE PULMONARY EMBOLISM WITHOUT ACUTE COR PULMONALE, UNSPECIFIED PULMONARY EMBOLISM TYPE: ICD-10-CM

## 2024-11-11 DIAGNOSIS — R65.20 SEVERE SEPSIS: ICD-10-CM

## 2024-11-11 DIAGNOSIS — Z99.2 ESRD ON HEMODIALYSIS: Primary | ICD-10-CM

## 2024-11-11 DIAGNOSIS — J15.9 BACTERIAL PNEUMONIA: ICD-10-CM

## 2024-11-11 DIAGNOSIS — E87.70 HYPERVOLEMIA ASSOCIATED WITH RENAL INSUFFICIENCY: ICD-10-CM

## 2024-11-11 DIAGNOSIS — A41.9 SEPSIS, DUE TO UNSPECIFIED ORGANISM, UNSPECIFIED WHETHER ACUTE ORGAN DYSFUNCTION PRESENT: ICD-10-CM

## 2024-11-11 DIAGNOSIS — I48.91 ATRIAL FIBRILLATION WITH RVR: ICD-10-CM

## 2024-11-11 DIAGNOSIS — A41.9 SEVERE SEPSIS: ICD-10-CM

## 2024-11-11 DIAGNOSIS — I16.0 HYPERTENSIVE URGENCY: ICD-10-CM

## 2024-11-11 DIAGNOSIS — R00.1 BRADYCARDIA: ICD-10-CM

## 2024-11-11 DIAGNOSIS — J80 ACUTE RESPIRATORY DISTRESS SYNDROME: ICD-10-CM

## 2024-11-11 DIAGNOSIS — N18.6 ESRD ON HEMODIALYSIS: Primary | ICD-10-CM

## 2024-11-11 DIAGNOSIS — R94.31 QT PROLONGATION: ICD-10-CM

## 2024-11-11 DIAGNOSIS — R79.89 ELEVATED BRAIN NATRIURETIC PEPTIDE (BNP) LEVEL: ICD-10-CM

## 2024-11-11 DIAGNOSIS — J69.0 ASPIRATION PNEUMONITIS: ICD-10-CM

## 2024-11-11 DIAGNOSIS — R06.02 SHORTNESS OF BREATH: ICD-10-CM

## 2024-11-11 DIAGNOSIS — J10.1 INFLUENZA A: ICD-10-CM

## 2024-11-11 DIAGNOSIS — J11.81: ICD-10-CM

## 2024-11-11 DIAGNOSIS — I10 HYPERTENSION, UNSPECIFIED TYPE: ICD-10-CM

## 2024-11-11 DIAGNOSIS — N28.9 HYPERVOLEMIA ASSOCIATED WITH RENAL INSUFFICIENCY: ICD-10-CM

## 2024-11-11 DIAGNOSIS — E87.20 LACTIC ACIDOSIS: ICD-10-CM

## 2024-11-11 DIAGNOSIS — I46.9 CARDIAC ARREST: ICD-10-CM

## 2024-11-11 PROBLEM — R25.1 SHAKES: Status: ACTIVE | Noted: 2024-11-11

## 2024-11-11 LAB
ALBUMIN SERPL BCP-MCNC: 3.3 G/DL (ref 3.5–5)
ALBUMIN/GLOB SERPL: 0.7 {RATIO}
ALP SERPL-CCNC: 101 U/L (ref 45–115)
ALT SERPL W P-5'-P-CCNC: 11 U/L (ref 16–61)
ANION GAP SERPL CALCULATED.3IONS-SCNC: 16 MMOL/L (ref 7–16)
ANION GAP SERPL CALCULATED.3IONS-SCNC: 17 MMOL/L (ref 7–16)
AST SERPL W P-5'-P-CCNC: 27 U/L (ref 15–37)
BASOPHILS # BLD AUTO: 0.06 K/UL (ref 0–0.2)
BASOPHILS NFR BLD AUTO: 0.6 % (ref 0–1)
BILIRUB SERPL-MCNC: 0.6 MG/DL (ref ?–1.2)
BUN SERPL-MCNC: 38 MG/DL (ref 7–18)
BUN SERPL-MCNC: 60 MG/DL (ref 7–18)
BUN/CREAT SERPL: 4 (ref 6–20)
BUN/CREAT SERPL: 4 (ref 6–20)
CALCIUM SERPL-MCNC: 7.7 MG/DL (ref 8.5–10.1)
CALCIUM SERPL-MCNC: 8 MG/DL (ref 8.5–10.1)
CHLORIDE SERPL-SCNC: 97 MMOL/L (ref 98–107)
CHLORIDE SERPL-SCNC: 98 MMOL/L (ref 98–107)
CO2 SERPL-SCNC: 24 MMOL/L (ref 21–32)
CO2 SERPL-SCNC: 27 MMOL/L (ref 21–32)
CREAT SERPL-MCNC: 13.4 MG/DL (ref 0.7–1.3)
CREAT SERPL-MCNC: 9.36 MG/DL (ref 0.7–1.3)
DIFFERENTIAL METHOD BLD: ABNORMAL
EGFR (NO RACE VARIABLE) (RUSH/TITUS): 4 ML/MIN/1.73M2
EGFR (NO RACE VARIABLE) (RUSH/TITUS): 6 ML/MIN/1.73M2
EOSINOPHIL # BLD AUTO: 0.33 K/UL (ref 0–0.5)
EOSINOPHIL NFR BLD AUTO: 3.2 % (ref 1–4)
ERYTHROCYTE [DISTWIDTH] IN BLOOD BY AUTOMATED COUNT: 18.4 % (ref 11.5–14.5)
GLOBULIN SER-MCNC: 4.8 G/DL (ref 2–4)
GLUCOSE SERPL-MCNC: 119 MG/DL (ref 74–106)
GLUCOSE SERPL-MCNC: 122 MG/DL (ref 70–105)
GLUCOSE SERPL-MCNC: 126 MG/DL (ref 74–106)
GLUCOSE SERPL-MCNC: 209 MG/DL (ref 70–105)
HADV DNA NPH QL NAA+NON-PROBE: NOT DETECTED
HAV IGM SER QL: NORMAL
HBV CORE IGM SER QL: NORMAL
HBV SURFACE AG SERPL QL IA: NORMAL
HCO3 UR-SCNC: 28.5 MMOL/L (ref 24–28)
HCT VFR BLD AUTO: 30.7 % (ref 40–54)
HCT VFR BLD CALC: 33 % (ref 35–51)
HCV AB SER QL: NORMAL
HGB BLD-MCNC: 9.5 G/DL (ref 13.5–18)
HMPV RNA NPH QL NAA+NON-PROBE: NOT DETECTED
IMM GRANULOCYTES # BLD AUTO: 0.06 K/UL (ref 0–0.04)
IMM GRANULOCYTES NFR BLD: 0.6 % (ref 0–0.4)
INFLUENZA A (SUBTYPE H1): DETECTED
INFLUENZA A (SUBTYPE H3): NOT DETECTED
INFLUENZA A (VERIGENE): DETECTED
INFLUENZA B (VERIGENE): NOT DETECTED
LDH SERPL L TO P-CCNC: 1.5 MMOL/L (ref 0.3–1.2)
LYMPHOCYTES # BLD AUTO: 0.45 K/UL (ref 1–4.8)
LYMPHOCYTES NFR BLD AUTO: 4.4 % (ref 27–41)
MCH RBC QN AUTO: 30.6 PG (ref 27–31)
MCHC RBC AUTO-ENTMCNC: 30.9 G/DL (ref 32–36)
MCV RBC AUTO: 99 FL (ref 80–96)
MONOCYTES # BLD AUTO: 0.99 K/UL (ref 0–0.8)
MONOCYTES NFR BLD AUTO: 9.6 % (ref 2–6)
MPC BLD CALC-MCNC: 11.9 FL (ref 9.4–12.4)
NEUTROPHILS # BLD AUTO: 8.38 K/UL (ref 1.8–7.7)
NEUTROPHILS NFR BLD AUTO: 81.6 % (ref 53–65)
NRBC # BLD AUTO: 0 X10E3/UL
NRBC, AUTO (.00): 0 %
NT-PROBNP SERPL-MCNC: ABNORMAL PG/ML (ref 1–125)
OHS QRS DURATION: 118 MS
OHS QTC CALCULATION: 468 MS
PARAINFLUENZA 1: NOT DETECTED
PARAINFLUENZA 2: NOT DETECTED
PARAINFLUENZA 3: NOT DETECTED
PARAINFLUENZA 4: NOT DETECTED
PCO2 BLDA: 44 MMHG (ref 41–51)
PH SMN: 7.42 [PH] (ref 7.32–7.42)
PLATELET # BLD AUTO: 231 K/UL (ref 150–400)
PO2 BLDA: 33 MMHG (ref 25–40)
POC BASE EXCESS: 3.6 MMOL/L (ref -2–3)
POC CO2: 29.9 MMOL/L
POC IONIZED CALCIUM: 0.89 MMOL/L (ref 1.15–1.35)
POC SATURATED O2: 65 % (ref 40–70)
POCT GLUCOSE: 130 MG/DL (ref 60–95)
POTASSIUM BLD-SCNC: 8.2 MMOL/L (ref 3.4–4.5)
POTASSIUM SERPL-SCNC: 4.8 MMOL/L (ref 3.5–5.1)
POTASSIUM SERPL-SCNC: 8.4 MMOL/L (ref 3.5–5.1)
PROT SERPL-MCNC: 8.1 G/DL (ref 6.4–8.2)
RBC # BLD AUTO: 3.1 M/UL (ref 4.6–6.2)
RESPIRATORY SYNCYTIAL VIRUS A: NOT DETECTED
RESPIRATORY SYNCYTIAL VIRUS B: NOT DETECTED
RHINOVIRUS: NOT DETECTED
SODIUM BLD-SCNC: 131 MMOL/L (ref 136–145)
SODIUM SERPL-SCNC: 131 MMOL/L (ref 136–145)
SODIUM SERPL-SCNC: 135 MMOL/L (ref 136–145)
TROPONIN I SERPL DL<=0.01 NG/ML-MCNC: 47.8 PG/ML
WBC # BLD AUTO: 10.27 K/UL (ref 4.5–11)

## 2024-11-11 PROCEDURE — 96365 THER/PROPH/DIAG IV INF INIT: CPT

## 2024-11-11 PROCEDURE — 25000003 PHARM REV CODE 250: Performed by: INTERNAL MEDICINE

## 2024-11-11 PROCEDURE — 80074 ACUTE HEPATITIS PANEL: CPT | Performed by: INTERNAL MEDICINE

## 2024-11-11 PROCEDURE — 99900035 HC TECH TIME PER 15 MIN (STAT)

## 2024-11-11 PROCEDURE — 87040 BLOOD CULTURE FOR BACTERIA: CPT

## 2024-11-11 PROCEDURE — 93010 ELECTROCARDIOGRAM REPORT: CPT | Mod: ,,, | Performed by: INTERNAL MEDICINE

## 2024-11-11 PROCEDURE — 25000003 PHARM REV CODE 250: Performed by: EMERGENCY MEDICINE

## 2024-11-11 PROCEDURE — 99223 1ST HOSP IP/OBS HIGH 75: CPT | Mod: AI,,,

## 2024-11-11 PROCEDURE — 36415 COLL VENOUS BLD VENIPUNCTURE: CPT | Performed by: EMERGENCY MEDICINE

## 2024-11-11 PROCEDURE — 36415 COLL VENOUS BLD VENIPUNCTURE: CPT

## 2024-11-11 PROCEDURE — 93005 ELECTROCARDIOGRAM TRACING: CPT

## 2024-11-11 PROCEDURE — 27000221 HC OXYGEN, UP TO 24 HOURS

## 2024-11-11 PROCEDURE — 80053 COMPREHEN METABOLIC PANEL: CPT | Performed by: EMERGENCY MEDICINE

## 2024-11-11 PROCEDURE — 82803 BLOOD GASES ANY COMBINATION: CPT

## 2024-11-11 PROCEDURE — 80100014 HC HEMODIALYSIS 1:1

## 2024-11-11 PROCEDURE — 25000003 PHARM REV CODE 250

## 2024-11-11 PROCEDURE — 63600175 PHARM REV CODE 636 W HCPCS: Performed by: STUDENT IN AN ORGANIZED HEALTH CARE EDUCATION/TRAINING PROGRAM

## 2024-11-11 PROCEDURE — 94799 UNLISTED PULMONARY SVC/PX: CPT

## 2024-11-11 PROCEDURE — 96375 TX/PRO/DX INJ NEW DRUG ADDON: CPT

## 2024-11-11 PROCEDURE — 36415 COLL VENOUS BLD VENIPUNCTURE: CPT | Performed by: INTERNAL MEDICINE

## 2024-11-11 PROCEDURE — 85014 HEMATOCRIT: CPT

## 2024-11-11 PROCEDURE — 20000000 HC ICU ROOM

## 2024-11-11 PROCEDURE — 83605 ASSAY OF LACTIC ACID: CPT

## 2024-11-11 PROCEDURE — 82962 GLUCOSE BLOOD TEST: CPT

## 2024-11-11 PROCEDURE — 82947 ASSAY GLUCOSE BLOOD QUANT: CPT

## 2024-11-11 PROCEDURE — 99291 CRITICAL CARE FIRST HOUR: CPT

## 2024-11-11 PROCEDURE — 63600175 PHARM REV CODE 636 W HCPCS

## 2024-11-11 PROCEDURE — 96368 THER/DIAG CONCURRENT INF: CPT

## 2024-11-11 PROCEDURE — 5A1D70Z PERFORMANCE OF URINARY FILTRATION, INTERMITTENT, LESS THAN 6 HOURS PER DAY: ICD-10-PCS | Performed by: STUDENT IN AN ORGANIZED HEALTH CARE EDUCATION/TRAINING PROGRAM

## 2024-11-11 PROCEDURE — 84295 ASSAY OF SERUM SODIUM: CPT

## 2024-11-11 PROCEDURE — 87633 RESP VIRUS 12-25 TARGETS: CPT | Performed by: STUDENT IN AN ORGANIZED HEALTH CARE EDUCATION/TRAINING PROGRAM

## 2024-11-11 PROCEDURE — 63600175 PHARM REV CODE 636 W HCPCS: Performed by: INTERNAL MEDICINE

## 2024-11-11 PROCEDURE — 83880 ASSAY OF NATRIURETIC PEPTIDE: CPT | Performed by: EMERGENCY MEDICINE

## 2024-11-11 PROCEDURE — 25000242 PHARM REV CODE 250 ALT 637 W/ HCPCS: Performed by: EMERGENCY MEDICINE

## 2024-11-11 PROCEDURE — 85025 COMPLETE CBC W/AUTO DIFF WBC: CPT | Performed by: EMERGENCY MEDICINE

## 2024-11-11 PROCEDURE — 63600175 PHARM REV CODE 636 W HCPCS: Performed by: EMERGENCY MEDICINE

## 2024-11-11 PROCEDURE — 25000003 PHARM REV CODE 250: Performed by: STUDENT IN AN ORGANIZED HEALTH CARE EDUCATION/TRAINING PROGRAM

## 2024-11-11 PROCEDURE — 84132 ASSAY OF SERUM POTASSIUM: CPT

## 2024-11-11 PROCEDURE — 94640 AIRWAY INHALATION TREATMENT: CPT

## 2024-11-11 PROCEDURE — 94761 N-INVAS EAR/PLS OXIMETRY MLT: CPT

## 2024-11-11 PROCEDURE — 27000207 HC ISOLATION

## 2024-11-11 PROCEDURE — 84484 ASSAY OF TROPONIN QUANT: CPT | Performed by: EMERGENCY MEDICINE

## 2024-11-11 PROCEDURE — 82330 ASSAY OF CALCIUM: CPT

## 2024-11-11 RX ORDER — ONDANSETRON HYDROCHLORIDE 2 MG/ML
4 INJECTION, SOLUTION INTRAVENOUS EVERY 8 HOURS PRN
Status: DISCONTINUED | OUTPATIENT
Start: 2024-11-11 | End: 2024-12-02 | Stop reason: HOSPADM

## 2024-11-11 RX ORDER — OXYCODONE HYDROCHLORIDE 5 MG/1
5 TABLET ORAL EVERY 4 HOURS PRN
Status: DISCONTINUED | OUTPATIENT
Start: 2024-11-11 | End: 2024-11-13

## 2024-11-11 RX ORDER — ALBUTEROL SULFATE 0.83 MG/ML
10 SOLUTION RESPIRATORY (INHALATION)
Status: COMPLETED | OUTPATIENT
Start: 2024-11-11 | End: 2024-11-11

## 2024-11-11 RX ORDER — HEPARIN SODIUM 1000 [USP'U]/ML
4000 INJECTION, SOLUTION INTRAVENOUS; SUBCUTANEOUS
Status: DISCONTINUED | OUTPATIENT
Start: 2024-11-11 | End: 2024-11-13

## 2024-11-11 RX ORDER — HEPARIN SODIUM 5000 [USP'U]/ML
5000 INJECTION, SOLUTION INTRAVENOUS; SUBCUTANEOUS EVERY 8 HOURS
Status: DISCONTINUED | OUTPATIENT
Start: 2024-11-11 | End: 2024-11-13

## 2024-11-11 RX ORDER — CEFTRIAXONE 1 G/1
1 INJECTION, POWDER, FOR SOLUTION INTRAMUSCULAR; INTRAVENOUS
Status: DISCONTINUED | OUTPATIENT
Start: 2024-11-11 | End: 2024-11-13

## 2024-11-11 RX ORDER — ACETAMINOPHEN 325 MG/1
650 TABLET ORAL EVERY 4 HOURS PRN
Status: DISCONTINUED | OUTPATIENT
Start: 2024-11-11 | End: 2024-12-02 | Stop reason: HOSPADM

## 2024-11-11 RX ORDER — SODIUM CHLORIDE 0.9 % (FLUSH) 0.9 %
10 SYRINGE (ML) INJECTION
Status: DISCONTINUED | OUTPATIENT
Start: 2024-11-11 | End: 2024-12-02 | Stop reason: HOSPADM

## 2024-11-11 RX ORDER — SODIUM CHLORIDE 9 MG/ML
INJECTION, SOLUTION INTRAVENOUS
Status: DISCONTINUED | OUTPATIENT
Start: 2024-11-11 | End: 2024-12-02 | Stop reason: HOSPADM

## 2024-11-11 RX ORDER — OSELTAMIVIR PHOSPHATE 30 MG/1
30 CAPSULE ORAL
Status: DISCONTINUED | OUTPATIENT
Start: 2024-11-13 | End: 2024-11-12

## 2024-11-11 RX ORDER — OSELTAMIVIR PHOSPHATE 30 MG/1
30 CAPSULE ORAL ONCE
Status: COMPLETED | OUTPATIENT
Start: 2024-11-11 | End: 2024-11-11

## 2024-11-11 RX ORDER — CALCIUM GLUCONATE 20 MG/ML
1 INJECTION, SOLUTION INTRAVENOUS EVERY 10 MIN PRN
Status: DISCONTINUED | OUTPATIENT
Start: 2024-11-11 | End: 2024-11-12

## 2024-11-11 RX ORDER — ALBUTEROL SULFATE 0.83 MG/ML
2.5 SOLUTION RESPIRATORY (INHALATION) EVERY 4 HOURS PRN
Status: DISCONTINUED | OUTPATIENT
Start: 2024-11-11 | End: 2024-12-02 | Stop reason: HOSPADM

## 2024-11-11 RX ORDER — MUPIROCIN 20 MG/G
OINTMENT TOPICAL 2 TIMES DAILY
Status: COMPLETED | OUTPATIENT
Start: 2024-11-11 | End: 2024-11-15

## 2024-11-11 RX ORDER — LABETALOL HYDROCHLORIDE 5 MG/ML
20 INJECTION, SOLUTION INTRAVENOUS ONCE
Status: COMPLETED | OUTPATIENT
Start: 2024-11-11 | End: 2024-11-11

## 2024-11-11 RX ORDER — SODIUM CHLORIDE 9 MG/ML
INJECTION, SOLUTION INTRAVENOUS
Status: COMPLETED
Start: 2024-11-11 | End: 2024-11-11

## 2024-11-11 RX ORDER — CALCIUM GLUCONATE 20 MG/ML
1 INJECTION, SOLUTION INTRAVENOUS
Status: COMPLETED | OUTPATIENT
Start: 2024-11-11 | End: 2024-11-11

## 2024-11-11 RX ORDER — HYDRALAZINE HYDROCHLORIDE 20 MG/ML
20 INJECTION INTRAMUSCULAR; INTRAVENOUS
Status: COMPLETED | OUTPATIENT
Start: 2024-11-11 | End: 2024-11-11

## 2024-11-11 RX ADMIN — OSELTAMAVIR PHOSPHATE 30 MG: 30 CAPSULE ORAL at 09:11

## 2024-11-11 RX ADMIN — MUPIROCIN: 20 OINTMENT TOPICAL at 08:11

## 2024-11-11 RX ADMIN — ONDANSETRON 4 MG: 2 INJECTION INTRAMUSCULAR; INTRAVENOUS at 06:11

## 2024-11-11 RX ADMIN — ACETAMINOPHEN 650 MG: 325 TABLET ORAL at 03:11

## 2024-11-11 RX ADMIN — ALBUTEROL SULFATE 10 MG: 2.5 SOLUTION RESPIRATORY (INHALATION) at 10:11

## 2024-11-11 RX ADMIN — HYDRALAZINE HYDROCHLORIDE 20 MG: 20 INJECTION INTRAMUSCULAR; INTRAVENOUS at 08:11

## 2024-11-11 RX ADMIN — SODIUM CHLORIDE 1000 ML: 9 INJECTION, SOLUTION INTRAVENOUS at 05:11

## 2024-11-11 RX ADMIN — LABETALOL HYDROCHLORIDE 20 MG: 5 INJECTION, SOLUTION INTRAVENOUS at 09:11

## 2024-11-11 RX ADMIN — ACETAMINOPHEN 650 MG: 325 TABLET ORAL at 08:11

## 2024-11-11 RX ADMIN — CEFTRIAXONE SODIUM 1 G: 1 INJECTION, POWDER, FOR SOLUTION INTRAMUSCULAR; INTRAVENOUS at 05:11

## 2024-11-11 RX ADMIN — HEPARIN SODIUM 5000 UNITS: 5000 INJECTION, SOLUTION INTRAVENOUS; SUBCUTANEOUS at 09:11

## 2024-11-11 RX ADMIN — HEPARIN SODIUM 4000 UNITS: 1000 INJECTION, SOLUTION INTRAVENOUS; SUBCUTANEOUS at 05:11

## 2024-11-11 RX ADMIN — MUPIROCIN: 20 OINTMENT TOPICAL at 01:11

## 2024-11-11 RX ADMIN — DEXTROSE MONOHYDRATE 500 ML: 100 INJECTION, SOLUTION INTRAVENOUS at 10:11

## 2024-11-11 RX ADMIN — HUMAN INSULIN 10 UNITS: 100 INJECTION, SOLUTION SUBCUTANEOUS at 10:11

## 2024-11-11 RX ADMIN — SODIUM BICARBONATE: 84 INJECTION, SOLUTION INTRAVENOUS at 10:11

## 2024-11-11 RX ADMIN — CALCIUM GLUCONATE 1 G: 20 INJECTION, SOLUTION INTRAVENOUS at 10:11

## 2024-11-11 RX ADMIN — HEPARIN SODIUM 5000 UNITS: 5000 INJECTION, SOLUTION INTRAVENOUS; SUBCUTANEOUS at 01:11

## 2024-11-11 NOTE — SUBJECTIVE & OBJECTIVE
Past Medical History:   Diagnosis Date    Anemia in chronic kidney disease 12/06/2014    Chronic diastolic congestive heart failure     Deep vein thrombosis     ESRD on hemodialysis 06/17/2021    Hyperlipidemia     Hypertension     Malignant neoplasm of kidney     Moderate aortic stenosis 04/22/2023    Paroxysmal atrial fibrillation     Polycystic kidney disease     Primary osteoarthritis involving multiple joints 07/16/2024       Past Surgical History:   Procedure Laterality Date    AV FISTULA PLACEMENT      EYE SURGERY      HD CATHETER      SELECTIVE INJECTION OF ANESTHETIC AGENT AROUND LUMBAR SPINAL NERVE ROOT BY TRANSFORAMINAL APPROACH Left 6/13/2024    Procedure: Left L5-S1 TFESi;  Surgeon: Lily Cuellar MD;  Location: [facility];  Service: Pain Management;  Laterality: Left;    SURGICAL REMOVAL OF ULCER      clamped ulcer in stomach    TUNNELED VENOUS CATHETER PLACEMENT         Review of patient's allergies indicates:   Allergen Reactions    Fish containing products Swelling    Iodinated contrast media Swelling    Iodine Shortness Of Breath, Swelling and Anaphylaxis     Sob and swelling/itching/throat closes up per pt  Other reaction(s): Swelling, Hives, DifficultyBreat  Sob and swelling/itching/throat closes up per pt      Shellfish containing products Swelling    Iodine and iodide containing products        Family History    None       Tobacco Use    Smoking status: Never    Smokeless tobacco: Never   Substance and Sexual Activity    Alcohol use: Never    Drug use: Never    Sexual activity: Not Currently      Review of Systems  Objective:     Vital Signs (Most Recent):  Temp: (!) 102 °F (38.9 °C) (11/11/24 1535)  Pulse: 107 (11/11/24 1600)  Resp: (!) 22 (11/11/24 1600)  BP: (!) 166/81 (11/11/24 1615)  SpO2: (!) 93 % (11/11/24 1600) Vital Signs (24h Range):  Temp:  [98.6 °F (37 °C)-102 °F (38.9 °C)] 102 °F (38.9 °C)  Pulse:  [] 107  Resp:  [10-29] 22  SpO2:  [90 %-100 %] 93 %  BP:  (142-219)/() 166/81   Weight: 108.3 kg (238 lb 12.1 oz)  Body mass index is 32.38 kg/m².      Intake/Output Summary (Last 24 hours) at 11/11/2024 1628  Last data filed at 11/11/2024 1600  Gross per 24 hour   Intake 1327.39 ml   Output --   Net 1327.39 ml          Physical Exam       Vents:     Lines/Drains/Airways       Central Venous Catheter Line  Duration                  Hemodialysis Catheter right subclavian -- days              Peripheral Intravenous Line  Duration                  Peripheral IV - Single Lumen 11/11/24 0805 20 G Left;Posterior Hand <1 day                  Significant Labs:    CBC/Anemia Profile:  Recent Labs   Lab 11/11/24  0818 11/11/24  0941   WBC 10.27  --    HGB 9.5*  --    HCT 30.7* 33*     --    MCV 99.0*  --    RDW 18.4*  --         Chemistries:  Recent Labs   Lab 11/11/24  0941   *   K 8.4*   CL 98   CO2 24   BUN 60*   CREATININE 13.40*   CALCIUM 7.7*   ALBUMIN 3.3*   PROT 8.1   BILITOT 0.6   ALKPHOS 101   ALT 11*   AST 27       Recent Lab Results  (Last 5 results in the past 24 hours)        11/11/24  1445   11/11/24  1151   11/11/24  0941   11/11/24  0941   11/11/24  0818        POC CO2     29.9           Albumin/Globulin Ratio       0.7         Albumin       3.3         ALP       101         ALT       11         Anion Gap       17         AST       27         Baso #         0.06       Basophil %         0.6       BILIRUBIN TOTAL       0.6         BUN       60         BUN/CREAT RATIO       4         Calcium       7.7         Chloride       98         CO2       24         Creatinine       13.40         Differential Method         Auto       eGFR       4         Eos #         0.33       Eos %         3.2       Globulin, Total       4.8         Glucose       126         Hematocrit         30.7       Hemoglobin         9.5       Hep A IgM               Hep B C IgM               Hepatitis B Surface Ag Non-Reactive  [P]               Hepatitis C Ab               Immature  Grans (Abs)         0.06       Immature Granulocytes         0.6       Lymph #         0.45       Lymph %         4.4       MCH         30.6       MCHC         30.9       MCV         99.0       Mono #         0.99       Mono %         9.6       MPV         11.9       Neutrophils, Abs         8.38       Neutrophils Relative         81.6       nRBC         0.0       NT-proBNP       55,331         NUCLEATED RBC ABSOLUTE         0.00       Platelet Count         231       POC Base Excess     3.6           POC Glucose   209             POC HCO3     28.5           POC Hematocrit     33           POC Ionized Calcium     0.89           POC Lactate     1.5           POC PCO2     44           POC PH     7.42           POC PO2     33           POC Potassium     8.2           POC SATURATED O2     65           POC Sodium     131           POCT Glucose     130           Potassium       8.4         PROTEIN TOTAL       8.1         RBC         3.10       RDW         18.4       Sodium       131         Troponin I High Sensitivity         47.8       WBC         10.27                               [P] - Preliminary Result               Significant Imaging: I have reviewed all pertinent imaging results/findings within the past 24 hours.  I have reviewed and interpreted all pertinent imaging results/findings within the past 24 hours.

## 2024-11-11 NOTE — H&P
[facility]  Critical Care Medicine  History & Physical    Patient Name: Saúl Butt  MRN: [id number]  Admission Date: 11/11/2024  Hospital Length of Stay: 0 days  Code Status: Full Code  Attending Physician: Jade Medley MD   Primary Care Provider: Ruth, Primary Doctor   Principal Problem: Hyperkalemia    Subjective:     HPI:  54-year-old  male presented to the ED via EMS.  Past medical history of end-stage renal disease, hypertension, Chronic anemia, Chronic diastolic CHF, moderate aortic stenosis, DVT, Hyperlipidemia, Paroxysmal atrial fibrillation, and malignant neoplasm of kidney.  Patient states he goes to dialysis Monday, Wednesday, and Friday.  States last day to dialyze was this past Friday, 11/8/24.  Patient states he has not missed any dialysis days. Patient was due to dialyze today, however he presented to the ED with 3 day history of shortness of breath which is worse with lying down, and he also reports having a cough.  He also reports 3 day history of nausea, vomiting, diarrhea. Patient reports having some fever on and off.  Denies any pain at present.    ED workup revealed sodium 131, potassium 8.4, anion gap 17, BUN and creatinine 60/13.4.  Venous blood gas revealed pH 7.42, pCO2 44, sodium 131, potassium 8.2, bicarb 28.5, lactate 1.5, and glucose 130.  Chest x-ray showed cardiomegaly with pulmonary edema.  BNP 55,331. In the ED patient received calcium gluconate 1 g IV, D10 500 mL IV, and Humulin R 10 units.  Critical care service was asked to admit patient to the ICU, where he will be dialyzed emergently to treat hyperkalemia.    Hospital/ICU Course:  No notes on file     No new subjective & objective note has been filed under this hospital service since the last note was generated.    Assessment/Plan:     Cardiac/Vascular  Elevated brain natriuretic peptide (BNP) level  11/11/24:  -BNP 55,331  -hypervolemia secondary to renal failure  -Dialyze as  ordered  -maintain dialysis schedule  -Monitor BNP daily    Hypertension  11/11/24:  -received hydralazine 20mg IV and Labetalol IV 20mg in the ED  -continue antihypertensives as tolerated  -monitor vital signs hourly  -treat hypertension as needed    Renal/  * Hyperkalemia  11/11/24:  -received calcium gluconate 1gm IV, D10 500ml IV, and Humulin R 10U in ED  -dialyze today  -12 lead EKG  -daily BMP  -avoid potassium containing foods    Lactic acidosis  11/11/24:  -Received Sodium Bicarbonate 150meq in the ED  -monitor vital signs and cardiac rhythm  -Oxygen supplementation as needed to keep O2 Saturation >92%  -monitor fluid volume status  -cultures pending  -antibiotic therapy    ESRD on hemodialysis  11/11/24:  -avoid nephrotoxic agents   -dialyze today  -maintain hemodialysis schedule  -monitor daily BMP  -monitor fluid status  -Strict I&O  -daily weights    ID  Sepsis  This patient does have evidence of infective focus  My overall impression is sepsis.  Source: Respiratory  Antibiotics given-   Antibiotics (72h ago, onward)      Start     Stop Route Frequency Ordered    11/11/24 1300  mupirocin 2 % ointment         11/16/24 0859 Nasl 2 times daily 11/11/24 1148          Latest lactate reviewed-  Recent Labs   Lab 11/11/24  0941   POCLAC 1.5*     Organ dysfunction indicated by  abnormal CXR    Fluid challenge Not needed - patient is not hypotensive      Post- resuscitation assessment No - Post resuscitation assessment not needed       Will Not start Pressors- Levophed for MAP of 65  Source control achieved by: B/P stable  -Rocephin 1gm IV daily  -Blood cultures X 2  -RVP    Oncology  Anemia in chronic kidney disease  11/11/24:  -monitoring  -daily CBC        Critical Care Daily Checklist:    A: Awake: RASS Goal/Actual Goal:    Actual:     B: Spontaneous Breathing Trial Performed?     C: SAT & SBT Coordinated?  N/A                      D: Delirium: CAM-ICU Overall CAM-ICU: Negative   E: Early Mobility  Performed? No   F: Feeding Goal:    Status:     Current Diet Order   Procedures    Diet NPO      AS: Analgesia/Sedation N/A   T: Thromboembolic Prophylaxis Heparin   H: HOB > 300 Yes   U: Stress Ulcer Prophylaxis (if needed) N/A   G: Glucose Control Yes   B: Bowel Function     I: Indwelling Catheter (Lines & Marley) Necessity No   D: De-escalation of Antimicrobials/Pharmacotherapies N/A    Plan for the day/ETD Dialysis    Code Status:  Family/Goals of Care: Full Code  Return home to baseline     Critical Care Time: 40 minutes  Critical secondary to Patient has a condition that poses threat to life and bodily function: Hyperkalemia requiring emergent dialysis.    Critical care was time spent personally by me on the following activities: development of treatment plan with patient or surrogate and bedside caregivers, discussions with consultants, evaluation of patient's response to treatment, examination of patient, ordering and performing treatments and interventions, ordering and review of laboratory studies, ordering and review of radiographic studies, pulse oximetry, re-evaluation of patient's condition. This critical care time did not overlap with that of any other provider or involve time for any procedures.     OPAL PIMENTEL, NP  Critical Care Medicine  [facility]

## 2024-11-11 NOTE — ASSESSMENT & PLAN NOTE
11/11/24:  -continue antihypertensives as tolerated  -monitor vital signs hourly  -treat hypertension as needed

## 2024-11-11 NOTE — ASSESSMENT & PLAN NOTE
-avoid nephrotoxic agents   -emergent dialysis 11/11 and again today   -maintain routine hemodialysis schedule  -monitor daily BMP  -monitor fluid status  -Strict I&O  -daily weights

## 2024-11-11 NOTE — H&P
[facility]  Critical Care Medicine  History & Physical    Patient Name: Saúl Butt  MRN: [id number]  Admission Date: 11/11/2024  Hospital Length of Stay: 0 days  Code Status: Full Code  Attending Physician: Jade Medley MD   Primary Care Provider: Ruth, Primary Doctor   Principal Problem: Hyperkalemia    Subjective:     HPI:  54-year-old  male presented to the ED via EMS.  Past medical history of end-stage renal disease, hypertension, Chronic anemia, Chronic diastolic CHF, moderate aortic stenosis, DVT, Hyperlipidemia, Paroxysmal atrial fibrillation, and malignant neoplasm of kidney.  Patient states he goes to dialysis Monday, Wednesday, and Friday.  States last day to dialyze was this past Friday, 11/8/24.  Patient states he has not missed any dialysis days. Patient was due to dialyze today, however he presented to the ED with 3 day history of shortness of breath which is worse with lying down, and he also reports having a cough.  He also reports 3 day history of nausea, vomiting, diarrhea. Patient reports having some fever on and off.  Denies any pain at present.    ED workup revealed sodium 131, potassium 8.4, anion gap 17, BUN and creatinine 60/13.4.  Venous blood gas revealed pH 7.42, pCO2 44, sodium 131, potassium 8.2, bicarb 28.5, lactate 1.5, and glucose 130.  Chest x-ray showed cardiomegaly with pulmonary edema.  BNP 55,331. In the ED patient received calcium gluconate 1 g IV, D10 500 mL IV, and Humulin R 10 units.  Critical care service was asked to admit patient to the ICU, where he will be dialyzed emergently to treat hyperkalemia.    Hospital/ICU Course:  No notes on file     Last Subjective & Objective Note Edited: Christiane Raines NP 11/11/2024 4:29 PM         Past Medical History:   Diagnosis Date    Anemia in chronic kidney disease 12/06/2014    Chronic diastolic congestive heart failure      Deep vein thrombosis      ESRD on hemodialysis 06/17/2021     Hyperlipidemia      Hypertension      Malignant neoplasm of kidney      Moderate aortic stenosis 04/22/2023    Paroxysmal atrial fibrillation      Polycystic kidney disease      Primary osteoarthritis involving multiple joints 07/16/2024               Past Surgical History:   Procedure Laterality Date    AV FISTULA PLACEMENT        EYE SURGERY        HD CATHETER        SELECTIVE INJECTION OF ANESTHETIC AGENT AROUND LUMBAR SPINAL NERVE ROOT BY TRANSFORAMINAL APPROACH Left 6/13/2024     Procedure: Left L5-S1 TFESi;  Surgeon: Lily Cuellar MD;  Location: [facility];  Service: Pain Management;  Laterality: Left;    SURGICAL REMOVAL OF ULCER         clamped ulcer in stomach    TUNNELED VENOUS CATHETER PLACEMENT                   Review of patient's allergies indicates:   Allergen Reactions    Fish containing products Swelling    Iodinated contrast media Swelling    Iodine Shortness Of Breath, Swelling and Anaphylaxis       Sob and swelling/itching/throat closes up per pt  Other reaction(s): Swelling, Hives, DifficultyBreat  Sob and swelling/itching/throat closes up per pt       Shellfish containing products Swelling    Iodine and iodide containing products           Family History    None              Tobacco Use    Smoking status: Never    Smokeless tobacco: Never   Substance and Sexual Activity    Alcohol use: Never    Drug use: Never    Sexual activity: Not Currently      Review of Systems  Objective:      Vital Signs (Most Recent):  Temp: (!) 102 °F (38.9 °C) (11/11/24 1535)  Pulse: 107 (11/11/24 1600)  Resp: (!) 22 (11/11/24 1600)  BP: (!) 166/81 (11/11/24 1615)  SpO2: (!) 93 % (11/11/24 1600) Vital Signs (24h Range):  Temp:  [98.6 °F (37 °C)-102 °F (38.9 °C)] 102 °F (38.9 °C)  Pulse:  [] 107  Resp:  [10-29] 22  SpO2:  [90 %-100 %] 93 %  BP: (142-219)/() 166/81   Weight: 108.3 kg (238 lb 12.1 oz)  Body mass index is 32.38 kg/m².        Intake/Output Summary (Last 24 hours) at 11/11/2024  1628  Last data filed at 11/11/2024 1600      Gross per 24 hour   Intake 1327.39 ml   Output --   Net 1327.39 ml            Physical Exam        Vents:  Lines/Drains/Airways         Central Venous Catheter Line  Duration                     Hemodialysis Catheter right subclavian -- days                  Peripheral Intravenous Line  Duration                     Peripheral IV - Single Lumen 11/11/24 0805 20 G Left;Posterior Hand <1 day                       Significant Labs:     CBC/Anemia Profile:       Recent Labs   Lab 11/11/24  0818 11/11/24  0941   WBC 10.27  --    HGB 9.5*  --    HCT 30.7* 33*     --    MCV 99.0*  --    RDW 18.4*  --          Chemistries:      Recent Labs   Lab 11/11/24  0941   *   K 8.4*   CL 98   CO2 24   BUN 60*   CREATININE 13.40*   CALCIUM 7.7*   ALBUMIN 3.3*   PROT 8.1   BILITOT 0.6   ALKPHOS 101   ALT 11*   AST 27         Recent Lab Results  (Last 5 results in the past 24 hours)          11/11/24  1445   11/11/24  1151   11/11/24  0941   11/11/24  0941   11/11/24  0818         POC CO2         29.9                  Albumin/Globulin Ratio             0.7              Albumin             3.3              ALP             101              ALT             11              Anion Gap             17              AST             27              Baso #                 0.06          Basophil %                 0.6          BILIRUBIN TOTAL             0.6              BUN             60              BUN/CREAT RATIO             4              Calcium             7.7              Chloride             98              CO2             24              Creatinine             13.40              Differential Method                 Auto          eGFR             4              Eos #                 0.33          Eos %                 3.2          Globulin, Total             4.8              Glucose             126              Hematocrit                 30.7          Hemoglobin                 9.5           Hep A IgM                           Hep B C IgM                           Hepatitis B Surface Ag Non-Reactive  [P]                          Hepatitis C Ab                           Immature Grans (Abs)                 0.06          Immature Granulocytes                 0.6          Lymph #                 0.45          Lymph %                 4.4          MCH                 30.6          MCHC                 30.9          MCV                 99.0          Mono #                 0.99          Mono %                 9.6          MPV                 11.9          Neutrophils, Abs                 8.38          Neutrophils Relative                 81.6          nRBC                 0.0          NT-proBNP             55,331              NUCLEATED RBC ABSOLUTE                 0.00          Platelet Count                 231          POC Base Excess         3.6                  POC Glucose     209                      POC HCO3         28.5                  POC Hematocrit         33                  POC Ionized Calcium         0.89                  POC Lactate         1.5                  POC PCO2         44                  POC PH         7.42                  POC PO2         33                  POC Potassium         8.2                  POC SATURATED O2         65                  POC Sodium         131                  POCT Glucose         130                  Potassium             8.4              PROTEIN TOTAL             8.1              RBC                 3.10          RDW                 18.4          Sodium             131              Troponin I High Sensitivity                 47.8          WBC                 10.27                                                     [P] - Preliminary Result                      Significant Imaging: I have reviewed all pertinent imaging results/findings within the past 24 hours.  I have reviewed and interpreted all pertinent imaging results/findings within the past 24  hours.          Assessment/Plan:     Cardiac/Vascular    Hypervolemia associated with renal insufficiency     11/11/24:  -BNP 55,331  -hypervolemia secondary to renal failure  -Emergent dialysis today as ordered  -maintain dialysis schedule  -Monitor BNP daily    Hypertension  11/11/24:  -received hydralazine 20mg IV and Labetalol IV 20mg in the ED  -continue home antihypertensives as tolerated  -monitor vital signs hourly  -treat hypertension as needed    Renal/  * Hyperkalemia  Undifferentiated etiology. Would favor acute illness. Bicarb gtt and also received calcium gluconate 1gm IV, D10 500ml IV, and Humulin R 10U in ED  -s/p dialysis today  -maintain routine dialysis schedule  -12 lead EKG  -daily BMP  -avoid potassium containing foods      ESRD on hemodialysis  11/11/24:  -avoid nephrotoxic agents   -emergent dialysis today  -maintain routine hemodialysis schedule  -monitor daily BMP  -monitor fluid status  -Strict I&O  -daily weights    ID  Sepsis  This patient does have evidence of infective focus  My overall impression is sepsis.  Source: Respiratory  Antibiotics given-   Antibiotics (72h ago, onward)      Start     Stop Route Frequency Ordered    11/11/24 1300  mupirocin 2 % ointment         11/16/24 0859 Nasl 2 times daily 11/11/24 1148          Latest lactate reviewed-  Recent Labs   Lab 11/11/24  0941   POCLAC 1.5*     Organ dysfunction indicated by  abnormal CXR    Fluid challenge Not needed - patient is not hypotensive      Post- resuscitation assessment No - Post resuscitation assessment not needed       Will Not start Pressors- Levophed for MAP of 65  Source control achieved by: B/P stable  -Rocephin 1gm IV daily  -Blood cultures X 2  -RVP  -treat fever as needed    Oncology  Anemia in chronic kidney disease  11/11/24:  -monitoring  -daily CBC        Critical Care Daily Checklist:    A: Awake: RASS Goal/Actual Goal:    Actual:     B: Spontaneous Breathing Trial Performed?     C: SAT & SBT  Coordinated?  N/A                      D: Delirium: CAM-ICU Overall CAM-ICU: Negative   E: Early Mobility Performed? No   F: Feeding Goal:    Status:     Current Diet Order   Procedures    Diet NPO      AS: Analgesia/Sedation N/A   T: Thromboembolic Prophylaxis Heparin   H: HOB > 300 Yes   U: Stress Ulcer Prophylaxis (if needed) No   G: Glucose Control Yes   B: Bowel Function     I: Indwelling Catheter (Lines & Marley) Necessity No   D: De-escalation of Antimicrobials/Pharmacotherapies N/A    Plan for the day/ETD Dialyze, treat hyperkalemia    Code Status:  Family/Goals of Care: Full Code  Yes     Critical Care Time: 40 minutes  Critical secondary to Patient has a condition that poses threat to life and bodily function: Hyperkalemia    Critical care was time spent personally by me on the following activities: development of treatment plan with patient or surrogate and bedside caregivers, discussions with consultants, evaluation of patient's response to treatment, examination of patient, ordering and performing treatments and interventions, ordering and review of laboratory studies, ordering and review of radiographic studies, pulse oximetry, re-evaluation of patient's condition. This critical care time did not overlap with that of any other provider or involve time for any procedures.     OPAL PIMENTEL NP  Critical Care Medicine  [facility]

## 2024-11-11 NOTE — ASSESSMENT & PLAN NOTE
Patient has some total body shakes during the interview, his white counts in the normal range.  He complained of some sore throat and feels like he has a viral syndrome.  Continue to monitor.

## 2024-11-11 NOTE — HPI
54-year-old  male presented to the ED via EMS.  Past medical history of end-stage renal disease, hypertension, Chronic anemia, Chronic diastolic CHF, moderate aortic stenosis, DVT, Hyperlipidemia, Paroxysmal atrial fibrillation, and malignant neoplasm of kidney.  Patient states he goes to dialysis Monday, Wednesday, and Friday.  States last day to dialyze was this past Friday, 11/8/24.  Patient states he has not missed any dialysis days. Patient was due to dialyze today, however he presented to the ED with 3 day history of shortness of breath which is worse with lying down, and he also reports having a cough.  He also reports 3 day history of nausea, vomiting, diarrhea. Patient reports having some fever on and off.  Denies any pain at present.    ED workup revealed sodium 131, potassium 8.4, anion gap 17, BUN and creatinine 60/13.4.  Venous blood gas revealed pH 7.42, pCO2 44, sodium 131, potassium 8.2, bicarb 28.5, lactate 1.5, and glucose 130.  Chest x-ray showed cardiomegaly with pulmonary edema.  BNP 55,331. In the ED patient received calcium gluconate 1 g IV, D10 500 mL IV, and Humulin R 10 units.  Critical care service was asked to admit patient to the ICU, where he will be dialyzed emergently to treat hyperkalemia.

## 2024-11-11 NOTE — HPI
Chief complaint:  Shortness of breath    History of present illness-Mr. Butt is a 54-year-old  gentleman with end-stage renal disease who dialyzes on a Monday Wednesday Friday basis in [facility].  The patient's last dialysis was this past Friday.  The patient states he started to feel bad about 2 days prior to admission.  He has been having some nausea vomiting and diarrhea.  He has also developed shortness of breath associated with a cough productive yellowish sputum.  The patient feels like he has a virus.  He has had some sore throat as well.  The patient also complains of having shakes and chills.  We were asked see the patient for dialysis.  The patient's potassium was noted to be 8.4 on admission.  The patient states he has been eating couple oranges a day as well as a couple of containers of yogurt daily.

## 2024-11-11 NOTE — ED PROVIDER NOTES
Encounter Date: 11/11/2024       History     Chief Complaint   Patient presents with    Shortness of Breath     Patient presents to the ED with c/o SOB x3 days. Patient does Dialysis MWF and states he is supposed to dialyze today     Patient is a 54-year-old male with history of end-stage renal disease on dialysis Monday Wednesday Friday.  Patient is due to go to dialysis this morning.  Patient states he has not missed any dialysis.  Patient is here today complaining of 3 day history of shortness of breath which is worse when lying down.  Patient reports some cough with yellowish sputum.  Also reports 3 day history of nausea, vomiting, diarrhea.  Patient reports some subjective fever.  No other acute problems or complaints noted at this time.  Patient states he has not had his blood pressure medication this morning.        Review of patient's allergies indicates:   Allergen Reactions    Fish containing products Swelling    Iodinated contrast media Swelling    Iodine Shortness Of Breath, Swelling and Anaphylaxis     Sob and swelling/itching/throat closes up per pt  Other reaction(s): Swelling, Hives, DifficultyBreat  Sob and swelling/itching/throat closes up per pt      Shellfish containing products Swelling    Iodine and iodide containing products      Past Medical History:   Diagnosis Date    Anemia in chronic kidney disease 12/06/2014    Chronic diastolic congestive heart failure     Deep vein thrombosis     ESRD on hemodialysis 06/17/2021    Hyperlipidemia     Hypertension     Malignant neoplasm of kidney     Moderate aortic stenosis 04/22/2023    Paroxysmal atrial fibrillation     Polycystic kidney disease     Primary osteoarthritis involving multiple joints 07/16/2024     Past Surgical History:   Procedure Laterality Date    AV FISTULA PLACEMENT      EYE SURGERY      HD CATHETER      SELECTIVE INJECTION OF ANESTHETIC AGENT AROUND LUMBAR SPINAL NERVE ROOT BY TRANSFORAMINAL APPROACH Left 6/13/2024    Procedure:  Left L5-S1 TFESi;  Surgeon: Lily Cuellar MD;  Location: [facility];  Service: Pain Management;  Laterality: Left;    SURGICAL REMOVAL OF ULCER      clamped ulcer in stomach    TUNNELED VENOUS CATHETER PLACEMENT       No family history on file.  Social History     Tobacco Use    Smoking status: Never    Smokeless tobacco: Never   Substance Use Topics    Alcohol use: Never    Drug use: Never     Review of Systems   Respiratory:  Positive for cough and shortness of breath.    Gastrointestinal:  Positive for diarrhea, nausea and vomiting.   All other systems reviewed and are negative.      Physical Exam     Initial Vitals [11/11/24 0726]   BP Pulse Resp Temp SpO2   (!) 219/118 81 10 98.6 °F (37 °C) 97 %      MAP       --         Physical Exam    Nursing note and vitals reviewed.  Constitutional: He appears well-developed and well-nourished.   HENT:   Head: Normocephalic and atraumatic. Mouth/Throat: Oropharynx is clear and moist.   Eyes: Pupils are equal, round, and reactive to light.   Neck: Neck supple.   Normal range of motion.  Cardiovascular:  Normal rate and regular rhythm.           Pulmonary/Chest: Effort normal and breath sounds normal.   Abdominal: Abdomen is soft. He exhibits no distension.   Musculoskeletal:         General: Edema present. Normal range of motion.      Cervical back: Normal range of motion and neck supple.      Comments: Mild pitting edema of bilateral lower extremities.     Neurological: He is alert.   Skin: Skin is warm. Capillary refill takes less than 2 seconds.   Psychiatric: He has a normal mood and affect.         Medical Screening Exam   See Full Note    ED Course   Procedures  Labs Reviewed   COMPREHENSIVE METABOLIC PANEL - Abnormal       Result Value    Sodium 131 (*)     Potassium 8.4 (*)     Chloride 98      CO2 24      Anion Gap 17 (*)     Glucose 126 (*)     BUN 60 (*)     Creatinine 13.40 (*)     BUN/Creatinine Ratio 4 (*)     Calcium 7.7 (*)     Total Protein 8.1       Albumin 3.3 (*)     Globulin 4.8 (*)     A/G Ratio 0.7      Bilirubin, Total 0.6      Alk Phos 101      ALT 11 (*)     AST 27      eGFR 4 (*)    NT-PRO NATRIURETIC PEPTIDE - Abnormal    ProBNP 55,331 (*)    CBC WITH DIFFERENTIAL - Abnormal    WBC 10.27      RBC 3.10 (*)     Hemoglobin 9.5 (*)     Hematocrit 30.7 (*)     MCV 99.0 (*)     MCH 30.6      MCHC 30.9 (*)     RDW 18.4 (*)     Platelet Count 231      MPV 11.9      Neutrophils % 81.6 (*)     Lymphocytes % 4.4 (*)     Monocytes % 9.6 (*)     Eosinophils % 3.2      Basophils % 0.6      Immature Granulocytes % 0.6 (*)     nRBC, Auto 0.0      Neutrophils, Abs 8.38 (*)     Lymphocytes, Absolute 0.45 (*)     Monocytes, Absolute 0.99 (*)     Eosinophils, Absolute 0.33      Basophils, Absolute 0.06      Immature Granulocytes, Absolute 0.06 (*)     nRBC, Absolute 0.00      Diff Type Auto     TROPONIN I - Normal    Troponin I High Sensitivity 47.8     CBC W/ AUTO DIFFERENTIAL    Narrative:     The following orders were created for panel order CBC auto differential.  Procedure                               Abnormality         Status                     ---------                               -----------         ------                     CBC with Differential[[id number]]       Abnormal            Final result                 Please view results for these tests on the individual orders.   POCT GLUCOSE MONITORING CONTINUOUS          Imaging Results              X-Ray Chest AP Portable (Final result)  Result time 11/11/24 09:49:51      Final result by Doreen Lorenz MD (11/11/24 09:49:51)                   Impression:      Cardiomegaly with pulmonary edema type pattern.  Lower lobe airspace opacities possibly layering alveolar edema or superimposed infection.      Electronically signed by: Doreen Lorenz  Date:    11/11/2024  Time:    09:49               Narrative:    EXAMINATION:  XR CHEST AP PORTABLE    CLINICAL HISTORY:  CHF;    TECHNIQUE:  Single view of the chest  was obtained.    COMPARISON:  Multiple priors, most recent 06/18/2024    FINDINGS:  Stable positioning of a right IJ approach central venous catheter.    .  Unchanged cardiomegaly.  Atherosclerotic calcification of the aortic arch.  Diffuse interstitial opacities.  New right greater than left lower lobe more confluent airspace opacities.  No pneumothorax or large pleural effusion.                                       Medications   calcium gluconate 1 g in NS IVPB (premixed) (0 g Intravenous Stopped 11/11/24 1019)     And   calcium gluconate 1 g in NS IVPB (premixed) (has no administration in time range)   sodium bicarbonate 150 mEq in D5W 1,000 mL infusion ( Intravenous New Bag 11/11/24 1033)   dextrose 10% bolus 500 mL 500 mL (0 mLs Intravenous Stopped 11/11/24 1112)     And   dextrose 10% bolus 250 mL 250 mL (has no administration in time range)     And   insulin regular injection 10 Units 0.1 mL (10 Units Intravenous Given 11/11/24 1008)   hydrALAZINE injection 20 mg (20 mg Intravenous Given 11/11/24 0809)   labetaloL injection 20 mg (20 mg Intravenous Given 11/11/24 0924)   albuterol nebulizer solution 10 mg (10 mg Nebulization Given 11/11/24 1005)     Medical Decision Making            Attending Attestation:         Attending Critical Care:   Critical Care Times:   Direct Patient Care (initial evaluation, reassessments, and time considering the case)................................................................30 minutes.   Additional History from reviewing old medical records or taking additional history from the family, EMS, PCP, etc.......................5 minutes.   Ordering, Reviewing, and Interpreting Diagnostic Studies...............................................................................................................5 minutes.    Documentation..................................................................................................................................................................................5 minutes.   Consultation with other Physicians. .................................................................................................................................................5 minutes.   ==============================================================  Total Critical Care Time - exclusive of procedural time: 50 minutes.  ==============================================================  Critical care was necessary to treat or prevent imminent or life-threatening deterioration of the following conditions: congestive heart failure and hypertensive urgency.   Critical care was time spent personally by me on the following activities: obtaining history from patient or relative, examination of patient, ordering lab, x-rays, and/or EKG, development of treatment plan with patient or relative, ordering and performing treatments and interventions, evaluation of patient's response to treatment, discussion with consultants and re-evaluation of patient's conition.   Critical Care Condition: potentially life-threatening           ED Course as of 11/11/24 1145   Mon Nov 11, 2024   0742 EKG by my interpretation shows sinus rhythm, rate of 83, no acute ST segment changes. [BB]   0752 Medical decision-making:  Differential diagnosis includes pulmonary edema, CHF, bronchitis, pneumonia, hypertension, hypertensive urgency.  All testing ordered and interpreted by me. [BB]   0945 Chest x-ray by my interpretation shows cardiomegaly, CHF. [BB]   0945 EKG by my interpretation shows sinus rhythm, rate of 83, no acute ST segment changes.  There are tall peaked T-waves consistent with hyperkalemia. [BB]   1003 Venous blood gas shows significant hyperkalemia with potassium of 8.2. [BB]   1053 [place] [address] which is around  baseline for patient. [BB]   1054 Awaiting CMP. [BB]   1100 CMP shows hyperkalemia, hyponatremia, elevated creatinine of 13.4. [BB]   1101 Because of hyperkalemia, pulmonary edema I made the decision to admit patient and discussed case with internal medicine hospitalist on-call who wanted patient admitted to ICU.  ICU team was notified who agrees.  I discussed case with Nephrology on-call, Dr. Martinez who agrees with dialysis in ICU. [BB]      ED Course User Index  [BB] Kyrie Acevedo MD                           Clinical Impression:   Final diagnoses:  [R06.02] Shortness of breath  [E87.5] Hyperkalemia (Primary)  [I16.0] Hypertensive urgency  [J81.0] Acute pulmonary edema        ED Disposition Condition    Admit                 Kyrie Acevedo MD  11/11/24 1105       Kyrie Acevedo MD  11/11/24 1145

## 2024-11-11 NOTE — SUBJECTIVE & OBJECTIVE
Past Medical History:   Diagnosis Date    Anemia in chronic kidney disease 12/06/2014    Chronic diastolic congestive heart failure     Deep vein thrombosis     ESRD on hemodialysis 06/17/2021    Hyperlipidemia     Hypertension     Malignant neoplasm of kidney     Moderate aortic stenosis 04/22/2023    Paroxysmal atrial fibrillation     Polycystic kidney disease     Primary osteoarthritis involving multiple joints 07/16/2024       Past Surgical History:   Procedure Laterality Date    AV FISTULA PLACEMENT      EYE SURGERY      HD CATHETER      SELECTIVE INJECTION OF ANESTHETIC AGENT AROUND LUMBAR SPINAL NERVE ROOT BY TRANSFORAMINAL APPROACH Left 6/13/2024    Procedure: Left L5-S1 TFESi;  Surgeon: Lily Cuellar MD;  Location: [facility];  Service: Pain Management;  Laterality: Left;    SURGICAL REMOVAL OF ULCER      clamped ulcer in stomach    TUNNELED VENOUS CATHETER PLACEMENT         Review of patient's allergies indicates:   Allergen Reactions    Fish containing products Swelling    Iodinated contrast media Swelling    Iodine Shortness Of Breath, Swelling and Anaphylaxis     Sob and swelling/itching/throat closes up per pt  Other reaction(s): Swelling, Hives, DifficultyBreat  Sob and swelling/itching/throat closes up per pt      Shellfish containing products Swelling    Iodine and iodide containing products      Current Facility-Administered Medications   Medication Frequency    acetaminophen tablet 650 mg Q4H PRN    albuterol nebulizer solution 2.5 mg Q4H PRN    calcium gluconate 1 g in NS IVPB (premixed) Q10 Min PRN    dextrose 10% bolus 250 mL 250 mL PRN    heparin (porcine) injection 5,000 Units Q8H    mupirocin 2 % ointment BID    ondansetron injection 4 mg Q8H PRN    oxyCODONE immediate release tablet 5 mg Q4H PRN    sodium bicarbonate 150 mEq in D5W 1,000 mL infusion Continuous    sodium chloride 0.9% flush 10 mL PRN     Family History    None       Tobacco Use    Smoking status: Never     Smokeless tobacco: Never   Substance and Sexual Activity    Alcohol use: Never    Drug use: Never    Sexual activity: Not Currently     Review of Systems   Constitutional:  Negative for fever.   HENT:  Positive for sore throat.    Respiratory:  Positive for shortness of breath.    Cardiovascular:  Positive for chest pain.   Gastrointestinal:  Negative for vomiting.   Genitourinary:  Negative for dysuria.   Musculoskeletal:  Negative for arthralgias.   Skin:  Positive for rash (Patient gets an occasional rash).   Neurological:  Negative for seizures.   Hematological:  Negative for adenopathy.     Objective:     Vital Signs (Most Recent):  Temp: 98.8 °F (37.1 °C) (11/11/24 1220)  Pulse: 109 (11/11/24 1330)  Resp: (!) 25 (11/11/24 1330)  BP: (!) 173/88 (11/11/24 1330)  SpO2: (!) 93 % (11/11/24 1330) Vital Signs (24h Range):  Temp:  [98.6 °F (37 °C)-98.8 °F (37.1 °C)] 98.8 °F (37.1 °C)  Pulse:  [] 109  Resp:  [10-29] 25  SpO2:  [90 %-100 %] 93 %  BP: (142-219)/() 173/88     Weight: 108.3 kg (238 lb 12.1 oz) (11/11/24 1220)  Body mass index is 32.38 kg/m².  Body surface area is 2.35 meters squared.    No intake/output data recorded.     Physical Exam  Vitals reviewed.   Constitutional:       General: He is in acute distress.      Appearance: He is ill-appearing and toxic-appearing (Patient has a continuous whole-body shake during the exam.).   HENT:      Head: Normocephalic and atraumatic.      Nose: Nose normal.      Mouth/Throat:      Mouth: Mucous membranes are moist.      Pharynx: Oropharynx is clear.   Eyes:      General: No scleral icterus.     Conjunctiva/sclera: Conjunctivae normal.      Comments: Patient has some opacity about his right eye, his pupil is about 1 mm on this side.   Cardiovascular:      Rate and Rhythm: Normal rate and regular rhythm.   Pulmonary:      Effort: No respiratory distress.      Breath sounds: Normal breath sounds.   Abdominal:      General: Bowel sounds are normal.       Palpations: Abdomen is soft. There is no mass.   Musculoskeletal:         General: Swelling (1 to 2+ pretibial edema) present. No tenderness.      Cervical back: Normal range of motion and neck supple.   Lymphadenopathy:      Cervical: No cervical adenopathy.   Skin:     General: Skin is warm and dry.      Findings: No erythema.   Neurological:      General: No focal deficit present.      Mental Status: Mental status is at baseline.   Psychiatric:         Mood and Affect: Mood normal.         Behavior: Behavior normal.          Significant Labs:  All labs within the past 24 hours have been reviewed.    Significant Imaging:

## 2024-11-11 NOTE — ASSESSMENT & PLAN NOTE
11/11/24:  -avoid nephrotoxic agents   -dialyze today  -monitor daily BMP  -monitor fluid status  -Strict I&O  -daily weights

## 2024-11-11 NOTE — SUBJECTIVE & OBJECTIVE
Past Medical History:   Diagnosis Date    Anemia in chronic kidney disease 12/06/2014    Chronic diastolic congestive heart failure     Deep vein thrombosis     ESRD on hemodialysis 06/17/2021    Hyperlipidemia     Hypertension     Malignant neoplasm of kidney     Moderate aortic stenosis 04/22/2023    Paroxysmal atrial fibrillation     Polycystic kidney disease     Primary osteoarthritis involving multiple joints 07/16/2024       Past Surgical History:   Procedure Laterality Date    AV FISTULA PLACEMENT      EYE SURGERY      HD CATHETER      SELECTIVE INJECTION OF ANESTHETIC AGENT AROUND LUMBAR SPINAL NERVE ROOT BY TRANSFORAMINAL APPROACH Left 6/13/2024    Procedure: Left L5-S1 TFESi;  Surgeon: Lily Cuellar MD;  Location: [facility];  Service: Pain Management;  Laterality: Left;    SURGICAL REMOVAL OF ULCER      clamped ulcer in stomach    TUNNELED VENOUS CATHETER PLACEMENT         Review of patient's allergies indicates:   Allergen Reactions    Fish containing products Swelling    Iodinated contrast media Swelling    Iodine Shortness Of Breath, Swelling and Anaphylaxis     Sob and swelling/itching/throat closes up per pt  Other reaction(s): Swelling, Hives, DifficultyBreat  Sob and swelling/itching/throat closes up per pt      Shellfish containing products Swelling    Iodine and iodide containing products        Family History    None       Tobacco Use    Smoking status: Never    Smokeless tobacco: Never   Substance and Sexual Activity    Alcohol use: Never    Drug use: Never    Sexual activity: Not Currently      Review of Systems   All other systems reviewed and are negative.    Objective:     Vital Signs (Most Recent):  Temp: 98.8 °F (37.1 °C) (11/11/24 1220)  Pulse: 109 (11/11/24 1400)  Resp: (!) 29 (11/11/24 1400)  BP: (!) 173/84 (11/11/24 1400)  SpO2: (!) 92 % (11/11/24 1400) Vital Signs (24h Range):  Temp:  [98.6 °F (37 °C)-98.8 °F (37.1 °C)] 98.8 °F (37.1 °C)  Pulse:  [] 109  Resp:   [10-29] 29  SpO2:  [90 %-100 %] 92 %  BP: (142-219)/() 173/84   Weight: 108.3 kg (238 lb 12.1 oz)  Body mass index is 32.38 kg/m².      Intake/Output Summary (Last 24 hours) at 11/11/2024 1434  Last data filed at 11/11/2024 1301  Gross per 24 hour   Intake 880.09 ml   Output --   Net 880.09 ml          Physical Exam  Vitals and nursing note reviewed.   Constitutional:       General: He is awake.      Appearance: Normal appearance.   HENT:      Head: Normocephalic.   Cardiovascular:      Rate and Rhythm: Regular rhythm. Tachycardia present.      Pulses: Normal pulses.      Heart sounds: Normal heart sounds.   Pulmonary:      Effort: Pulmonary effort is normal.      Breath sounds: Normal breath sounds.   Abdominal:      General: Bowel sounds are normal.      Palpations: Abdomen is soft.   Musculoskeletal:      Right lower leg: No edema.      Left lower leg: No edema.   Skin:     General: Skin is warm and dry.      Capillary Refill: Capillary refill takes 2 to 3 seconds.   Neurological:      General: No focal deficit present.      Mental Status: He is alert and oriented to person, place, and time. Mental status is at baseline.      GCS: GCS eye subscore is 4. GCS verbal subscore is 5. GCS motor subscore is 6.   Psychiatric:         Attention and Perception: Attention normal.         Mood and Affect: Mood normal.         Speech: Speech normal.         Behavior: Behavior normal. Behavior is cooperative.            Vents:     Lines/Drains/Airways       Central Venous Catheter Line  Duration                  Hemodialysis Catheter right subclavian -- days              Peripheral Intravenous Line  Duration                  Peripheral IV - Single Lumen 11/11/24 0805 20 G Left;Posterior Hand <1 day                  Significant Labs:    CBC/Anemia Profile:  Recent Labs   Lab 11/11/24  0818 11/11/24  0941   WBC 10.27  --    HGB 9.5*  --    HCT 30.7* 33*     --    MCV 99.0*  --    RDW 18.4*  --          Chemistries:  Recent Labs   Lab 11/11/24  0941   *   K 8.4*   CL 98   CO2 24   BUN 60*   CREATININE 13.40*   CALCIUM 7.7*   ALBUMIN 3.3*   PROT 8.1   BILITOT 0.6   ALKPHOS 101   ALT 11*   AST 27       Recent Lab Results         11/11/24  1151   11/11/24  0941   11/11/24  0941   11/11/24  0818        POC CO2   29.9           Albumin/Globulin Ratio     0.7         Albumin     3.3         ALP     101         ALT     11         Anion Gap     17         AST     27         Baso #       0.06       Basophil %       0.6       BILIRUBIN TOTAL     0.6         BUN     60         BUN/CREAT RATIO     4         Calcium     7.7         Chloride     98         CO2     24         Creatinine     13.40         Differential Method       Auto       eGFR     4         Eos #       0.33       Eos %       3.2       Globulin, Total     4.8         Glucose     126         Hematocrit       30.7       Hemoglobin       9.5       Immature Grans (Abs)       0.06       Immature Granulocytes       0.6       Lymph #       0.45       Lymph %       4.4       MCH       30.6       MCHC       30.9       MCV       99.0       Mono #       0.99       Mono %       9.6       MPV       11.9       Neutrophils, Abs       8.38       Neutrophils Relative       81.6       nRBC       0.0       NT-proBNP     55,331         NUCLEATED RBC ABSOLUTE       0.00       Platelet Count       231       POC Base Excess   3.6           POC Glucose 209             POC HCO3   28.5           POC Hematocrit   33           POC Ionized Calcium   0.89           POC Lactate   1.5           POC PCO2   44           POC PH   7.42           POC PO2   33           POC Potassium   8.2           POC SATURATED O2   65           POC Sodium   131           POCT Glucose   130           Potassium     8.4         PROTEIN TOTAL     8.1         RBC       3.10       RDW       18.4       Sodium     131         Troponin I High Sensitivity       47.8       WBC       10.27               Significant  Imaging: I have reviewed all pertinent imaging results/findings within the past 24 hours.  I have reviewed and interpreted all pertinent imaging results/findings within the past 24 hours.

## 2024-11-12 PROBLEM — J10.1 INFLUENZA A: Status: ACTIVE | Noted: 2024-11-12

## 2024-11-12 PROBLEM — E87.70 HYPERVOLEMIA ASSOCIATED WITH RENAL INSUFFICIENCY: Status: ACTIVE | Noted: 2024-11-12

## 2024-11-12 PROBLEM — I48.91 ATRIAL FIBRILLATION WITH RVR: Status: ACTIVE | Noted: 2024-11-12

## 2024-11-12 PROBLEM — N28.9 HYPERVOLEMIA ASSOCIATED WITH RENAL INSUFFICIENCY: Status: ACTIVE | Noted: 2024-11-12

## 2024-11-12 LAB
ALBUMIN SERPL BCP-MCNC: 3.3 G/DL (ref 3.5–5)
ALP SERPL-CCNC: 89 U/L (ref 45–115)
ALT SERPL W P-5'-P-CCNC: 11 U/L (ref 16–61)
ANION GAP SERPL CALCULATED.3IONS-SCNC: 15 MMOL/L (ref 7–16)
ANION GAP SERPL CALCULATED.3IONS-SCNC: 17 MMOL/L (ref 7–16)
AST SERPL W P-5'-P-CCNC: 18 U/L (ref 15–37)
BASOPHILS # BLD AUTO: 0.07 K/UL (ref 0–0.2)
BASOPHILS # BLD AUTO: 0.07 K/UL (ref 0–0.2)
BASOPHILS NFR BLD AUTO: 0.6 % (ref 0–1)
BASOPHILS NFR BLD AUTO: 0.8 % (ref 0–1)
BILIRUB DIRECT SERPL-MCNC: 0.1 MG/DL (ref 0–0.2)
BILIRUB SERPL-MCNC: 0.6 MG/DL (ref ?–1.2)
BUN SERPL-MCNC: 35 MG/DL (ref 7–18)
BUN SERPL-MCNC: 48 MG/DL (ref 7–18)
BUN/CREAT SERPL: 4 (ref 6–20)
BUN/CREAT SERPL: 4 (ref 6–20)
CALCIUM SERPL-MCNC: 7.1 MG/DL (ref 8.5–10.1)
CALCIUM SERPL-MCNC: 8 MG/DL (ref 8.5–10.1)
CHLORIDE SERPL-SCNC: 100 MMOL/L (ref 98–107)
CHLORIDE SERPL-SCNC: 98 MMOL/L (ref 98–107)
CK SERPL-CCNC: 200 U/L (ref 39–308)
CO2 SERPL-SCNC: 24 MMOL/L (ref 21–32)
CO2 SERPL-SCNC: 28 MMOL/L (ref 21–32)
CREAT SERPL-MCNC: 12 MG/DL (ref 0.7–1.3)
CREAT SERPL-MCNC: 9.89 MG/DL (ref 0.7–1.3)
DIFFERENTIAL METHOD BLD: ABNORMAL
DIFFERENTIAL METHOD BLD: ABNORMAL
EGFR (NO RACE VARIABLE) (RUSH/TITUS): 5 ML/MIN/1.73M2
EGFR (NO RACE VARIABLE) (RUSH/TITUS): 6 ML/MIN/1.73M2
EOSINOPHIL # BLD AUTO: 0.13 K/UL (ref 0–0.5)
EOSINOPHIL # BLD AUTO: 0.2 K/UL (ref 0–0.5)
EOSINOPHIL NFR BLD AUTO: 1.5 % (ref 1–4)
EOSINOPHIL NFR BLD AUTO: 1.7 % (ref 1–4)
ERYTHROCYTE [DISTWIDTH] IN BLOOD BY AUTOMATED COUNT: 18.2 % (ref 11.5–14.5)
ERYTHROCYTE [DISTWIDTH] IN BLOOD BY AUTOMATED COUNT: 18.4 % (ref 11.5–14.5)
GLUCOSE SERPL-MCNC: 117 MG/DL (ref 74–106)
GLUCOSE SERPL-MCNC: 88 MG/DL (ref 74–106)
HCT VFR BLD AUTO: 29.9 % (ref 40–54)
HCT VFR BLD AUTO: 35.3 % (ref 40–54)
HGB BLD-MCNC: 10.6 G/DL (ref 13.5–18)
HGB BLD-MCNC: 9.3 G/DL (ref 13.5–18)
IMM GRANULOCYTES # BLD AUTO: 0.06 K/UL (ref 0–0.04)
IMM GRANULOCYTES # BLD AUTO: 0.08 K/UL (ref 0–0.04)
IMM GRANULOCYTES NFR BLD: 0.7 % (ref 0–0.4)
IMM GRANULOCYTES NFR BLD: 0.7 % (ref 0–0.4)
LYMPHOCYTES # BLD AUTO: 0.39 K/UL (ref 1–4.8)
LYMPHOCYTES # BLD AUTO: 0.58 K/UL (ref 1–4.8)
LYMPHOCYTES NFR BLD AUTO: 4.5 % (ref 27–41)
LYMPHOCYTES NFR BLD AUTO: 4.9 % (ref 27–41)
MAGNESIUM SERPL-MCNC: 1.8 MG/DL (ref 1.7–2.3)
MCH RBC QN AUTO: 30.6 PG (ref 27–31)
MCH RBC QN AUTO: 30.7 PG (ref 27–31)
MCHC RBC AUTO-ENTMCNC: 30 G/DL (ref 32–36)
MCHC RBC AUTO-ENTMCNC: 31.1 G/DL (ref 32–36)
MCV RBC AUTO: 102 FL (ref 80–96)
MCV RBC AUTO: 98.7 FL (ref 80–96)
MONOCYTES # BLD AUTO: 0.79 K/UL (ref 0–0.8)
MONOCYTES # BLD AUTO: 0.92 K/UL (ref 0–0.8)
MONOCYTES NFR BLD AUTO: 10.6 % (ref 2–6)
MONOCYTES NFR BLD AUTO: 6.7 % (ref 2–6)
MPC BLD CALC-MCNC: 11.3 FL (ref 9.4–12.4)
MPC BLD CALC-MCNC: 12.1 FL (ref 9.4–12.4)
NEUTROPHILS # BLD AUTO: 10.14 K/UL (ref 1.8–7.7)
NEUTROPHILS # BLD AUTO: 7.11 K/UL (ref 1.8–7.7)
NEUTROPHILS NFR BLD AUTO: 81.9 % (ref 53–65)
NEUTROPHILS NFR BLD AUTO: 85.4 % (ref 53–65)
NRBC # BLD AUTO: 0 X10E3/UL
NRBC # BLD AUTO: 0 X10E3/UL
NRBC, AUTO (.00): 0 %
NRBC, AUTO (.00): 0 %
NT-PROBNP SERPL-MCNC: ABNORMAL PG/ML (ref 1–125)
PHOSPHATE SERPL-MCNC: 6.9 MG/DL (ref 2.5–4.5)
PLATELET # BLD AUTO: 212 K/UL (ref 150–400)
PLATELET # BLD AUTO: 215 K/UL (ref 150–400)
POTASSIUM SERPL-SCNC: 6.3 MMOL/L (ref 3.5–5.1)
POTASSIUM SERPL-SCNC: 6.8 MMOL/L (ref 3.5–5.1)
PROT SERPL-MCNC: 7 G/DL (ref 6.4–8.2)
RBC # BLD AUTO: 3.03 M/UL (ref 4.6–6.2)
RBC # BLD AUTO: 3.46 M/UL (ref 4.6–6.2)
SODIUM SERPL-SCNC: 134 MMOL/L (ref 136–145)
SODIUM SERPL-SCNC: 135 MMOL/L (ref 136–145)
WBC # BLD AUTO: 11.86 K/UL (ref 4.5–11)
WBC # BLD AUTO: 8.68 K/UL (ref 4.5–11)

## 2024-11-12 PROCEDURE — 25000242 PHARM REV CODE 250 ALT 637 W/ HCPCS: Performed by: STUDENT IN AN ORGANIZED HEALTH CARE EDUCATION/TRAINING PROGRAM

## 2024-11-12 PROCEDURE — 80076 HEPATIC FUNCTION PANEL: CPT | Performed by: STUDENT IN AN ORGANIZED HEALTH CARE EDUCATION/TRAINING PROGRAM

## 2024-11-12 PROCEDURE — 83880 ASSAY OF NATRIURETIC PEPTIDE: CPT

## 2024-11-12 PROCEDURE — 80048 BASIC METABOLIC PNL TOTAL CA: CPT | Performed by: STUDENT IN AN ORGANIZED HEALTH CARE EDUCATION/TRAINING PROGRAM

## 2024-11-12 PROCEDURE — 80100014 HC HEMODIALYSIS 1:1

## 2024-11-12 PROCEDURE — 25000003 PHARM REV CODE 250

## 2024-11-12 PROCEDURE — 99291 CRITICAL CARE FIRST HOUR: CPT | Mod: ,,, | Performed by: NURSE PRACTITIONER

## 2024-11-12 PROCEDURE — 99900035 HC TECH TIME PER 15 MIN (STAT)

## 2024-11-12 PROCEDURE — 25000003 PHARM REV CODE 250: Performed by: STUDENT IN AN ORGANIZED HEALTH CARE EDUCATION/TRAINING PROGRAM

## 2024-11-12 PROCEDURE — 63600175 PHARM REV CODE 636 W HCPCS: Performed by: INTERNAL MEDICINE

## 2024-11-12 PROCEDURE — 94640 AIRWAY INHALATION TREATMENT: CPT

## 2024-11-12 PROCEDURE — 27000221 HC OXYGEN, UP TO 24 HOURS

## 2024-11-12 PROCEDURE — 25000003 PHARM REV CODE 250: Performed by: INTERNAL MEDICINE

## 2024-11-12 PROCEDURE — 85025 COMPLETE CBC W/AUTO DIFF WBC: CPT | Performed by: STUDENT IN AN ORGANIZED HEALTH CARE EDUCATION/TRAINING PROGRAM

## 2024-11-12 PROCEDURE — 83735 ASSAY OF MAGNESIUM: CPT | Performed by: STUDENT IN AN ORGANIZED HEALTH CARE EDUCATION/TRAINING PROGRAM

## 2024-11-12 PROCEDURE — 25000003 PHARM REV CODE 250: Mod: JZ,JG | Performed by: INTERNAL MEDICINE

## 2024-11-12 PROCEDURE — 36415 COLL VENOUS BLD VENIPUNCTURE: CPT | Performed by: STUDENT IN AN ORGANIZED HEALTH CARE EDUCATION/TRAINING PROGRAM

## 2024-11-12 PROCEDURE — 63600175 PHARM REV CODE 636 W HCPCS

## 2024-11-12 PROCEDURE — 82550 ASSAY OF CK (CPK): CPT | Performed by: STUDENT IN AN ORGANIZED HEALTH CARE EDUCATION/TRAINING PROGRAM

## 2024-11-12 PROCEDURE — 27000207 HC ISOLATION

## 2024-11-12 PROCEDURE — 25000003 PHARM REV CODE 250: Performed by: NURSE PRACTITIONER

## 2024-11-12 PROCEDURE — 20000000 HC ICU ROOM

## 2024-11-12 PROCEDURE — 94761 N-INVAS EAR/PLS OXIMETRY MLT: CPT

## 2024-11-12 PROCEDURE — 84100 ASSAY OF PHOSPHORUS: CPT | Performed by: STUDENT IN AN ORGANIZED HEALTH CARE EDUCATION/TRAINING PROGRAM

## 2024-11-12 PROCEDURE — 63600175 PHARM REV CODE 636 W HCPCS: Performed by: STUDENT IN AN ORGANIZED HEALTH CARE EDUCATION/TRAINING PROGRAM

## 2024-11-12 RX ORDER — ACETAMINOPHEN 325 MG/1
650 TABLET ORAL EVERY 6 HOURS
Status: COMPLETED | OUTPATIENT
Start: 2024-11-12 | End: 2024-11-12

## 2024-11-12 RX ORDER — METOPROLOL TARTRATE 100 MG/1
100 TABLET ORAL 2 TIMES DAILY
Status: DISCONTINUED | OUTPATIENT
Start: 2024-11-12 | End: 2024-11-14

## 2024-11-12 RX ORDER — METOPROLOL TARTRATE 1 MG/ML
5 INJECTION, SOLUTION INTRAVENOUS EVERY 5 MIN PRN
Status: DISCONTINUED | OUTPATIENT
Start: 2024-11-12 | End: 2024-11-15

## 2024-11-12 RX ORDER — OSELTAMIVIR PHOSPHATE 30 MG/1
30 CAPSULE ORAL
Status: DISCONTINUED | OUTPATIENT
Start: 2024-11-12 | End: 2024-11-13

## 2024-11-12 RX ORDER — ESMOLOL HYDROCHLORIDE 20 MG/ML
0-300 INJECTION INTRAVENOUS CONTINUOUS
Status: DISCONTINUED | OUTPATIENT
Start: 2024-11-12 | End: 2024-11-13

## 2024-11-12 RX ORDER — PANTOPRAZOLE SODIUM 40 MG/1
40 TABLET, DELAYED RELEASE ORAL DAILY
Status: DISCONTINUED | OUTPATIENT
Start: 2024-11-12 | End: 2024-11-13

## 2024-11-12 RX ORDER — LABETALOL HYDROCHLORIDE 5 MG/ML
10 INJECTION, SOLUTION INTRAVENOUS EVERY 4 HOURS PRN
Status: DISCONTINUED | OUTPATIENT
Start: 2024-11-12 | End: 2024-11-15

## 2024-11-12 RX ORDER — CALCIUM GLUCONATE 20 MG/ML
1 INJECTION, SOLUTION INTRAVENOUS ONCE
Status: COMPLETED | OUTPATIENT
Start: 2024-11-12 | End: 2024-11-12

## 2024-11-12 RX ORDER — ISOSORBIDE MONONITRATE 30 MG/1
60 TABLET, EXTENDED RELEASE ORAL EVERY MORNING
Status: DISCONTINUED | OUTPATIENT
Start: 2024-11-12 | End: 2024-11-16

## 2024-11-12 RX ORDER — MELOXICAM 7.5 MG/1
15 TABLET ORAL DAILY
Status: DISCONTINUED | OUTPATIENT
Start: 2024-11-12 | End: 2024-11-13

## 2024-11-12 RX ORDER — ESMOLOL HYDROCHLORIDE 20 MG/ML
0-300 INJECTION, SOLUTION INTRAVENOUS CONTINUOUS
Status: DISCONTINUED | OUTPATIENT
Start: 2024-11-12 | End: 2024-11-12

## 2024-11-12 RX ORDER — LABETALOL HYDROCHLORIDE 5 MG/ML
10 INJECTION, SOLUTION INTRAVENOUS EVERY 4 HOURS PRN
Status: DISCONTINUED | OUTPATIENT
Start: 2024-11-12 | End: 2024-11-12

## 2024-11-12 RX ORDER — GABAPENTIN 300 MG/1
600 CAPSULE ORAL 2 TIMES DAILY
Status: DISCONTINUED | OUTPATIENT
Start: 2024-11-12 | End: 2024-11-13

## 2024-11-12 RX ORDER — METOPROLOL TARTRATE 1 MG/ML
INJECTION, SOLUTION INTRAVENOUS
Status: COMPLETED
Start: 2024-11-12 | End: 2024-11-12

## 2024-11-12 RX ORDER — HYDRALAZINE HYDROCHLORIDE 100 MG/1
100 TABLET, FILM COATED ORAL 3 TIMES DAILY
Status: DISCONTINUED | OUTPATIENT
Start: 2024-11-12 | End: 2024-12-02 | Stop reason: HOSPADM

## 2024-11-12 RX ORDER — NIFEDIPINE 60 MG/1
60 TABLET, EXTENDED RELEASE ORAL NIGHTLY
Status: DISCONTINUED | OUTPATIENT
Start: 2024-11-12 | End: 2024-12-02 | Stop reason: HOSPADM

## 2024-11-12 RX ADMIN — CEFTRIAXONE SODIUM 1 G: 1 INJECTION, POWDER, FOR SOLUTION INTRAMUSCULAR; INTRAVENOUS at 06:11

## 2024-11-12 RX ADMIN — SODIUM CHLORIDE 1000 ML: 9 INJECTION, SOLUTION INTRAVENOUS at 12:11

## 2024-11-12 RX ADMIN — ACETAMINOPHEN 650 MG: 325 TABLET ORAL at 11:11

## 2024-11-12 RX ADMIN — HEPARIN SODIUM 5000 UNITS: 5000 INJECTION, SOLUTION INTRAVENOUS; SUBCUTANEOUS at 05:11

## 2024-11-12 RX ADMIN — ACETAMINOPHEN 650 MG: 325 TABLET ORAL at 06:11

## 2024-11-12 RX ADMIN — LABETALOL HYDROCHLORIDE 10 MG: 5 INJECTION, SOLUTION INTRAVENOUS at 01:11

## 2024-11-12 RX ADMIN — NIFEDIPINE 60 MG: 60 TABLET, FILM COATED, EXTENDED RELEASE ORAL at 10:11

## 2024-11-12 RX ADMIN — ESMOLOL HYDROCHLORIDE 50 MCG/KG/MIN: 20 INJECTION INTRAVENOUS at 03:11

## 2024-11-12 RX ADMIN — ISOSORBIDE MONONITRATE 60 MG: 30 TABLET, EXTENDED RELEASE ORAL at 01:11

## 2024-11-12 RX ADMIN — METOPROLOL TARTRATE 5 MG: 1 INJECTION, SOLUTION INTRAVENOUS at 12:11

## 2024-11-12 RX ADMIN — LABETALOL HYDROCHLORIDE 10 MG: 5 INJECTION, SOLUTION INTRAVENOUS at 09:11

## 2024-11-12 RX ADMIN — ACETAMINOPHEN 650 MG: 325 TABLET ORAL at 10:11

## 2024-11-12 RX ADMIN — MUPIROCIN: 20 OINTMENT TOPICAL at 10:11

## 2024-11-12 RX ADMIN — HEPARIN SODIUM 5000 UNITS: 5000 INJECTION, SOLUTION INTRAVENOUS; SUBCUTANEOUS at 01:11

## 2024-11-12 RX ADMIN — MUPIROCIN: 20 OINTMENT TOPICAL at 09:11

## 2024-11-12 RX ADMIN — ACETAMINOPHEN 650 MG: 325 TABLET ORAL at 03:11

## 2024-11-12 RX ADMIN — MELOXICAM 15 MG: 7.5 TABLET ORAL at 01:11

## 2024-11-12 RX ADMIN — ALBUTEROL SULFATE 2.5 MG: 2.5 SOLUTION RESPIRATORY (INHALATION) at 11:11

## 2024-11-12 RX ADMIN — CALCIUM GLUCONATE 1 G: 20 INJECTION, SOLUTION INTRAVENOUS at 09:11

## 2024-11-12 RX ADMIN — HEPARIN SODIUM 4000 UNITS: 1000 INJECTION, SOLUTION INTRAVENOUS; SUBCUTANEOUS at 12:11

## 2024-11-12 RX ADMIN — GABAPENTIN 600 MG: 300 CAPSULE ORAL at 10:11

## 2024-11-12 RX ADMIN — LABETALOL HYDROCHLORIDE 10 MG: 5 INJECTION, SOLUTION INTRAVENOUS at 11:11

## 2024-11-12 RX ADMIN — HYDRALAZINE HYDROCHLORIDE 100 MG: 100 TABLET ORAL at 02:11

## 2024-11-12 RX ADMIN — PANTOPRAZOLE SODIUM 40 MG: 40 TABLET, DELAYED RELEASE ORAL at 01:11

## 2024-11-12 RX ADMIN — OSELTAMAVIR PHOSPHATE 30 MG: 30 CAPSULE ORAL at 06:11

## 2024-11-12 RX ADMIN — HEPARIN SODIUM 5000 UNITS: 5000 INJECTION, SOLUTION INTRAVENOUS; SUBCUTANEOUS at 10:11

## 2024-11-12 RX ADMIN — METOPROLOL TARTRATE 100 MG: 100 TABLET, FILM COATED ORAL at 10:11

## 2024-11-12 RX ADMIN — METOPROLOL TARTRATE 5 MG: 1 INJECTION, SOLUTION INTRAVENOUS at 02:11

## 2024-11-12 RX ADMIN — HYDRALAZINE HYDROCHLORIDE 100 MG: 100 TABLET ORAL at 10:11

## 2024-11-12 RX ADMIN — METOPROLOL TARTRATE 100 MG: 100 TABLET, FILM COATED ORAL at 01:11

## 2024-11-12 RX ADMIN — METOPROLOL TARTRATE 5 MG: 1 INJECTION, SOLUTION INTRAVENOUS at 01:11

## 2024-11-12 NOTE — PROGRESS NOTES
[facility]  Wound Care    Patient Name:  Saúl Butt   MRN:  [id number]  Date: 11/12/2024  Diagnosis: Hyperkalemia    History:     Past Medical History:   Diagnosis Date    Anemia in chronic kidney disease 12/06/2014    Chronic diastolic congestive heart failure     Deep vein thrombosis     ESRD on hemodialysis 06/17/2021    Hyperlipidemia     Hypertension     Malignant neoplasm of kidney     Moderate aortic stenosis 04/22/2023    Paroxysmal atrial fibrillation     Polycystic kidney disease     Primary osteoarthritis involving multiple joints 07/16/2024       Social History     Socioeconomic History    Marital status: Single   Tobacco Use    Smoking status: Never    Smokeless tobacco: Never   Substance and Sexual Activity    Alcohol use: Never    Drug use: Never    Sexual activity: Not Currently   Social History Narrative    Family Member     Emily Mcfarland (Mother)    Paula Baltazar     Social Drivers of Health     Financial Resource Strain: Low Risk  (11/11/2024)    Overall Financial Resource Strain (CARDIA)     Difficulty of Paying Living Expenses: Not hard at all   Food Insecurity: No Food Insecurity (11/11/2024)    Hunger Vital Sign     Worried About Running Out of Food in the Last Year: Never true     Ran Out of Food in the Last Year: Never true   Transportation Needs: No Transportation Needs (11/11/2024)    TRANSPORTATION NEEDS     Transportation : No   Physical Activity: Inactive (6/19/2024)    Exercise Vital Sign     Days of Exercise per Week: 0 days     Minutes of Exercise per Session: 0 min   Stress: No Stress Concern Present (11/11/2024)    [employer] - Occupational Stress Questionnaire     Feeling of Stress : Not at all   Housing Stability: Low Risk  (11/11/2024)    Housing Stability Vital Sign     Unable to Pay for Housing in the Last Year: No     Homeless in the Last Year: No       Precautions:     Allergies as of 11/11/2024 -  Reviewed 11/11/2024   Allergen Reaction Noted    Fish containing products Swelling 06/17/2021    Iodinated contrast media Swelling 06/09/2022    Iodine Shortness Of Breath, Swelling, and Anaphylaxis 11/09/2017    Shellfish containing products Swelling 06/09/2022    Iodine and iodide containing products  05/04/2021       WOC Assessment Details/Treatment       Narrative: Seen patient for initiation of preventative skin care measures    Patient in bed, Alert. Receiving dialysis at present. Oxygen in use, no redness noted over ears. Has Mepliex foam borders to sacral and heels. On low air loss mattress  Gucci score 22    Consult wound care for any skin issues       11/12/2024

## 2024-11-12 NOTE — SUBJECTIVE & OBJECTIVE
Interval History/Significant Events:  went into Afib RVR on HD. Associated with SOB.      Review of Systems   Respiratory:  Positive for shortness of breath.      Objective:     Vital Signs (Most Recent):  Temp: (!) 102.7 °F (39.3 °C) (11/12/24 1028)  Pulse: 96 (11/12/24 0905)  Resp: (!) 25 (11/12/24 0905)  BP: (!) 147/110 (11/12/24 1200)  SpO2: 98 % (11/12/24 0905) Vital Signs (24h Range):  Temp:  [99.5 °F (37.5 °C)-102.7 °F (39.3 °C)] 102.7 °F (39.3 °C)  Pulse:  [] 96  Resp:  [17-46] 25  SpO2:  [84 %-98 %] 98 %  BP: (107-207)/() 147/110   Weight: 108.3 kg (238 lb 12.1 oz)  Body mass index is 32.38 kg/m².      Intake/Output Summary (Last 24 hours) at 11/12/2024 1224  Last data filed at 11/11/2024 1600  Gross per 24 hour   Intake 777.39 ml   Output --   Net 777.39 ml          Physical Exam  Vitals reviewed.   Constitutional:       Appearance: He is ill-appearing.   HENT:      Mouth/Throat:      Mouth: Mucous membranes are moist.   Eyes:      Conjunctiva/sclera: Conjunctivae normal.   Cardiovascular:      Rate and Rhythm: Tachycardia present. Rhythm irregular.   Pulmonary:      Effort: Pulmonary effort is normal.      Breath sounds: Normal breath sounds.   Abdominal:      Palpations: Abdomen is soft.   Musculoskeletal:         General: Normal range of motion.      Cervical back: Normal range of motion.   Skin:     General: Skin is warm and dry.      Capillary Refill: Capillary refill takes less than 2 seconds.   Neurological:      General: No focal deficit present.      Mental Status: He is alert. Mental status is at baseline.   Psychiatric:         Mood and Affect: Mood normal.            Vents:  Oxygen Concentration (%): 32 (11/11/24 1925)  Lines/Drains/Airways       Central Venous Catheter Line  Duration                  Hemodialysis Catheter right subclavian -- days              Peripheral Intravenous Line  Duration                  Peripheral IV - Single Lumen 11/11/24 0805 20 G Left;Posterior Hand 1  day                  Significant Labs:    CBC/Anemia Profile:  Recent Labs   Lab 11/11/24  0818 11/11/24  0941 11/12/24  0453   WBC 10.27  --  8.68   HGB 9.5*  --  9.3*   HCT 30.7* 33* 29.9*     --  212   MCV 99.0*  --  98.7*   RDW 18.4*  --  18.2*        Chemistries:  Recent Labs   Lab 11/11/24  0941 11/11/24  1724 11/12/24  0453   * 135* 134*   K 8.4* 4.8 6.8*   CL 98 97* 98   CO2 24 27 28   BUN 60* 38* 48*   CREATININE 13.40* 9.36* 12.00*   CALCIUM 7.7* 8.0* 7.1*   ALBUMIN 3.3*  --  3.3*   PROT 8.1  --  7.0   BILITOT 0.6  --  0.6   ALKPHOS 101  --  89   ALT 11*  --  11*   AST 27  --  18   MG  --   --  1.8   PHOS  --   --  6.9*       All pertinent labs within the past 24 hours have been reviewed.    Significant Imaging:  I have reviewed all pertinent imaging results/findings within the past 24 hours.

## 2024-11-12 NOTE — DIALYSIS ROUNDING
[facility]  DIALYSIS TREATMENT SUMMARY      Note: Consult with the attending physician for patient treatment orders, this document is not a physician order.      Patient Information   Patient Saúl Butt   Date of Birth June 26, 1970   Chart Number [id number]   Location [facility]   Location MRN [id number]   Gender Male   SSN (last 4) [id number]     Treatment Information   Treatment Type Hemodialysis   Treatment Id [id number]   Start Time November 11, 2024 14:15   End Time November 11, 2024 17:15   Acutal Duration 03:00     Treatment Balances   Total Saline Administered 500   Net Fluid Balance 3000    Hemodialysis Orders   Therapy Standard   Orders Verified Time 11/11/2024 14:00    Date Verified 11/11/2024   Duration 3:00   Isolated UF/Bypass No   BFR (mL) 300   DFR X2 BFR   DFR (mL) 600   Dialyzer Type OPTIFLUX 160NR   UF Order UF Goal Ordered   UF Goal Ordered (mL) 3000   With BP Parameters Yes   As Tolerated Yes   Crit-Line used No   Heparin Initial Units Bolus No   Heparin IV Maintenance Bolus No   Heparin IV Infusion No   Potassium (mEq/L) 2.0   Calcium (mEq/L) 2.5   Bicarb (mg/dL) 33   Sodium (mEq/L) 137   Additional Orders if sbp drops 20 points and patient becomes symptomatic turn uf off, heparin 2,000 units each port of HD catheter   Clinician Oscar Elmore, RON    Dialysis Access   Access Type Central Venous Catheter   Central Venous Catheter   Access Type Catheter - Tunneled   Access Location Chest Wall - R   Current/New [facility] Patient Yes   Catheter Care Completed per Policy Yes   Dressing Dry and Intact on Arrival Yes   Dressing Changed Yes   Type of Dressing Film Biopatch/CHG   Dressing Changed By Hospital Staff   Type of HD Caps Curos   HD Caps Changed Yes   CVC Line Education Provided Yes - Infection Prevention      Vitals   Pre-Treatment Vitals   Time Is BP being recorded? Pre BP Map BP Method Pulse RR Temp How was Weight Obtained? Pre Weight Previous Dry Weight Previous Post  Weight Metric Target Fluid Removal (mL) Dialysate Confirmed Clinician    11/11/2024 14:10 BP/Map 173/84 (114) Noninvasive 109 22 102.7 How Obtained: Bed Scale 108.4   Kgs 3500 Yes Oscar Elmore RN    Comments: patient lying in bed, lethargic, answering questions appropriately, catheter care      Post Treatment Vitals   Time Is BP being recorded? BP Map Pulse RR Temp How was Weight Obtained? Post Weight Metric BVP UF Goal Ordered NSS Given Intra-Procedure Total Machine UF Removed (mL) Crit-Line Ending Profile Crit-Line Refill Crit-Line Ending HCT Crit-Line Max BV% Clinician   11/11/2024 17:20 BP/Map 155/81 (106) 101 23 101.1 How Obtained: Bed scale 105.3 Kgs 48.1 3000 0 3500     Oscar Elmore RN   Comments: blood rinsed back      Safety checks include: access uncovered and secured, Hemaclip secured for all central line access, machine checks performed, and alarm limits confirmed.     Labs   Hepatitis   HBsAG Lab Result HBsAG Lab Result HBsAG Draw Date Transient Antigenemia(MD Diagnosis Only) Anti-HBs Lab Result Anti-HBs Lab Value Anti-HBs Draw Date Documented By Documented Date Hepatitis Status Hepatitis Status   Negative  11/11/2024  Negative  11/11/2024 Oscar Elmore 11/11/2024  Susceptible   Notes:       Pre-Treatment Hepatitis Precautions Hard copy verified by nurse and placed in patients hospital chart Yes Signing   Patient tx at bedside 1:1 Yes Copy of hepatitis results verified in hospital EMR Yes Signed By Oscar Elmore RN   Patient tx with immune pts only Yes Labs Drawn Yes    Patient tx outside buffer zone Yes Hepatitis Information Entered By Oscar Elmore RN      Pre-Treatment Handoff   Staff Report Received Yes   Report Given by Primary Nurse ramo villatoro   Time 14:00     Patient Arrival   Patient ID Verified Date of Birth    Full Name   Patient Consent to treatment verified Yes   Blood Transfusion Consent Verified N/A     Treatment Comments   Treatment Notes tolerated treatment well      Post-Treatment Handoff   Report Given to Primary Nurse ramo villatoro   Time Report Given 17:40   Report Given By Chasity De La O RN    Machine Validation   Time 14:10   Date 11/11/2024   Auto Alarm Test Passed Yes   Machine Serial # [id number]   Portable RO Yes   RO Serial# [id number]   Residual Bleach Negative Yes   Was a manufactured mix used? No   Machine Log Completed Yes   Total Chlorine (less than 0.1)? Yes   Total Chlorine Log Completed Yes   Bicarb BiBag   Bibag Size 650   Machine Temp 36.5   Machine Conductivity 13.8   Meter Type N/A   Meter Conductivity    Independent Conductivity 13.8   pH Status Pass Pass   pH    TCD Value    TCD Alarm with +/- 0.5 Yes   NVL enabled validated 100 asymmetric Yes   Safety check complete Chasity De La O RN   Second Verification Performed? Yes   Second Verification Performed By Carla Mack RN   Reason Not Verified       Serum Lab Values   Time BUN Creatinine Na K (mEq/L) Cl CO2 Ca (mEq/L) Phos Mg (mg/dL) Alb (g/dL) Glucose Hgb Hct WBC Plt PT aPTT INR Other Clinician   11/11/2024 09:41 60 13.4 131 8.4  24      9.5 30.7 10.2      Chasity De La O RN   Comments:         Administered Medications   Time Medication Dose Route Reason for Admin Clinician    11/11/2024 17:20 heparin 4 ml IVP cath lock Chasity De La O RN    Comments:         Facility Information Location ICU Diagnosis Hyperkalemia   Facility Information Room # ICS 02 Isolation Information   Admission Date 11/11/2024 Patient Type Chronic dialysis patient with diagnosis of ESRD Isolation Required? N   Ordering MD Kiet Martinez [facility] Completed by   Account/Finance Number [id number] Code Status Full Code General Tx information Entered by Cahsity De La O RN   Admission Status InPatient Diagnosis      Start Treatment Time Out Confirmed by chasity de la o rn Correct access site verified Yes   Treatment Initiation Connections Secured Time Out Completed 14:13 Treatment Start Date 11/11/2024    Saline line  double clamped Correct patient verified Yes Treatment Start Time 14:15    Hemaclip Applied Correct procedure verified Yes Patient/Family questions and concerns addressed Yes     Pre Focused Assessment Edema  Oriented to Place   Access Location Generalized GI / Bowels   Signs and Symptoms of Infection? No Edema Grade 1+ GI Soft   Pain Screening Cardiac    Does the patient have pain? No Heart Rhythm Regular  Anuric   Respiratory Telemetry Yes Completed by   Lung Sounds Coarse Skin Pre Treatment Focused Assessment Completed By Oscar Elmore RN   Location Bases Skin Warm Time 14:10    Bilateral  Dry Signing   Position Bases LOC Signed By Oscar Elmore RN    Anterior LOC Lethargic    Respiratory Efforts Unlabored  Oriented to Person      Education Reason Altered LOC Patient Education N/A   Patient Education Family Education Provided? N/A    Patient Educated? No Caregiver Education Provided? N/A      Post-Treatment HD machine external disinfection completed per policy Yes Completed by   Post Treatment Delay PRO external disinfection completed per policy Yes Post Treatment Form Completed By Oscar Elmore RN   Delay N Post Treatment General Information    Machine Disinfection Requirement Notes stable    Post Focused Assessment Position Bases LOC Oriented to Person   Changes from Pre Focused Assessment  Anterior  Oriented to Place   Changes from Pre Treatment Focused Assessment? No Respiratory Efforts Unlabored  Lethargic   Access Edema GI / Bowels   Catheter clamped and capped Yes Location Generalized GI Soft   Access Flow Good Edema Grade 1+    Dialyzer Clearance Streaked Cardiac  Anuric   Pain Screening Heart Rhythm Regular Completed by   Does the patient have pain? No Telemetry Yes Post Treatment Focused Assessment Completed by Oscar Elmore RN   Respiratory Skin Date 11/11/2024   Lung Sounds Coarse Skin Warm Time 17:25   Location Bilateral  Dry Signing    Bases LOC Signed By Oscar Elmore RN

## 2024-11-12 NOTE — DIALYSIS ROUNDING
[facility]  DIALYSIS TREATMENT SUMMARY      Note: Consult with the attending physician for patient treatment orders, this document is not a physician order.      Patient Information   Patient Saúl Butt   Date of Birth June 26, 1970   Chart Number [id number]   Location [facility]   Location MRN [id number]   Gender Male   SSN (last 4) 216     Treatment Information   Treatment Type Hemodialysis   Treatment Id [id number]   Start Time November 12, 2024 10:00   End Time November 12, 2024 13:00   Acutal Duration 03:00     Treatment Balances   Total Saline Administered 673   Net Fluid Balance 0    Hemodialysis Orders   Therapy Standard   Orders Verified Time 11/12/2024 09:30    Date Verified 11/12/2024   Duration 3:00   Isolated UF/Bypass No   BFR (mL) 300   DFR X2 BFR   DFR (mL) 600   Dialyzer Type OPTIFLUX 160NR   UF Order UF Goal Ordered   UF Goal Ordered (mL) 0   With BP Parameters Yes   As Tolerated Yes   Crit-Line used No   Heparin Initial Units Bolus No   Heparin IV Maintenance Bolus No   Heparin IV Infusion No   Potassium (mEq/L) 2   Calcium (mEq/L) 2.5   Bicarb (mg/dL) 33   Sodium (mEq/L) 137   Additional Orders if sbp drops 20 points and patient becomes symptomatic turn uf off, heparin 2,000 units each port of HD catheter   Clinician Oscar Elmore, RON    Dialysis Access   Access Type Central Venous Catheter   Central Venous Catheter   Access Type Catheter - Tunneled   Access Location Chest Wall - R   Current/New [facility] Patient Yes   Catheter Care Completed per Policy Yes   Dressing Dry and Intact on Arrival Yes   Dressing Changed Yes   Type of Dressing Film Biopatch/CHG   Dressing Changed By [facility] Staff   Type of HD Caps Curos   HD Caps Changed Yes   CVC Line Education Provided Yes - Infection Prevention      Vitals   Pre-Treatment Vitals   Time Is BP being recorded? Pre BP Map BP Method Pulse RR Temp How was Weight Obtained? Pre Weight Previous Dry Weight Previous Post Weight  Metric Target Fluid Removal (mL) Dialysate Confirmed Clinician    11/12/2024 09:55 BP/Map 196/98 (131) Noninvasive 103 24 102.7 How Obtained: Bed Scale 103.5   Kgs 673 Yes Oscar Elmore RN    Comments: catheter care      Post Treatment Vitals   Time Is BP being recorded? BP Map Pulse RR Temp How was Weight Obtained? Post Weight Metric BVP UF Goal Ordered NSS Given Intra-Procedure Total Machine UF Removed (mL) Crit-Line Ending Profile Crit-Line Refill Crit-Line Ending HCT Crit-Line Max BV% Clinician    11/12/2024 13:05 BP/Map 147/104 (118) 171 22 99.4 How Obtained: Bed scale 103.9 Kgs 49.1 0 0 673     Oscar Elmore RN    Comments: blood rinsed back, treatment complicated by tachycardia      Safety checks include: access uncovered and secured, Hemaclip secured for all central line access, machine checks performed, and alarm limits confirmed.     Labs   Hepatitis   HBsAG Lab Result HBsAG Lab Result HBsAG Draw Date Transient Antigenemia(MD Diagnosis Only) Anti-HBs Lab Result Anti-HBs Lab Value Anti-HBs Draw Date Documented By Documented Date Hepatitis Status Hepatitis Status   Negative  11/11/2024  Negative  11/11/2024 Oscar Elmore 11/12/2024  Susceptible   Notes:       Pre-Treatment Hepatitis Precautions Hard copy verified by nurse and placed in patients hospital chart Yes Signing   Patient tx at bedside 1:1 Yes Copy of hepatitis results verified in hospital EMR Yes Signed By Oscar Elmore RN   Patient tx with immune pts only Yes Labs Drawn Yes    Patient tx outside buffer zone Yes Hepatitis Information Entered By Oscar Elmore RN      Pre-Treatment Handoff   Staff Report Received Yes   Report Given by Primary Nurse vidhya sandy   Time 09:45     Patient Arrival   Patient ID Verified Date of Birth    Full Name   Patient Consent to treatment verified Yes   Blood Transfusion Consent Verified N/A     Treatment Comments   Treatment Notes tolerated treatment fairly well     Post-Treatment Handoff   Report Given to  Primary Nurse vidhya garciaoley   Time Report Given 13:20   Report Given By Chasity De La O RN    Machine Validation   Time 09:55   Date 11/12/2024   Auto Alarm Test Passed Yes   Machine Serial # [id number]   Portable RO Yes   RO Serial# [id number]   Residual Bleach Negative Yes   Was a manufactured mix used? No   Machine Log Completed Yes   Total Chlorine (less than 0.1)? Yes   Total Chlorine Log Completed Yes   Bicarb BiBag   Bibag Size 650   Machine Temp 37   Machine Conductivity 13.9   Meter Type N/A   Meter Conductivity    Independent Conductivity 13.8   pH Status Pass Pass   pH    TCD Value    TCD Alarm with +/- 0.5 Yes   NVL enabled validated 100 asymmetric Yes   Safety check complete Chasity De La O RN   Second Verification Performed? Yes   Second Verification Performed By Patricia Mack RN   Reason Not Verified       Serum Lab Values   Time BUN Creatinine Na K (mEq/L) Cl CO2 Ca (mEq/L) Phos Mg (mg/dL) Alb (g/dL) Glucose Hgb Hct WBC Plt PT aPTT INR Other Clinician    11/12/2024 04:53 48 12 134 6.8  28      9.3 29.9 8.6      Chasity De La O RN    Comments:         Administered Medications   Time Medication Dose Route Reason for Admin Clinician    11/12/2024 13:05 heparin 4 ml IVP cath lock Chasity De La O RN    Comments:         Facility Information Room # ICS 2 Isolation Required? Y   Facility Information Patient Type Chronic dialysis patient with diagnosis of ESRD If yes, Explain Other   Admission Date 11/11/2024 Patient Chronic Unit [facility] Isolation Type Droplet   Ordering MD Kiet Martinez Code Status Full Code Other Flu   Account/Finance Number [id number] Diagnosis Completed by   Admission Status InPatient Diagnosis Hyperkalemia, Flu General Tx information Entered by Chasity De La O RN   Location ICU Isolation Information      Start Treatment Time Out Confirmed by chasity de la o rn Correct access site verified Yes   Treatment Initiation Connections Secured Time Out Completed 09:58 Treatment Start Date 11/12/2024     Saline line double clamped Correct patient verified Yes Treatment Start Time 10:00    Hemaclip Applied Correct procedure verified Yes Patient/Family questions and concerns addressed Yes     Pre Focused Assessment Notes on oxygen at 3L/min via nasal cannula  Oriented to Place   Access Edema GI / Bowels   Signs and Symptoms of Infection? No Location None GI Soft   Pain Screening Cardiac    Does the patient have pain? No Heart Rhythm Regular  Anuric   Respiratory Telemetry Yes Completed by   Lung Sounds Coarse Skin Pre Treatment Focused Assessment Completed By Oscar Elmore RN   Location Bilateral Skin Warm Time 09:55    Bases  Dry Signing   Position Bases LOC Signed By Oscar Elmore RN    Anterior LOC Lethargic    Respiratory Efforts Unlabored  Oriented to Person      Education Focus or Topic Hemodialysis treatment review Caregiver Education Provided? N/A   Patient Education Method Knowledge / Understanding Assessed Teach back Patient Education Reinforced By   Patient Educated? Yes Family Education Provided? N/A Patient Education Reinforced by Oscar Elmore RN     Post-Treatment HD machine external disinfection completed per policy Yes Completed by   Post Treatment Delay PRO external disinfection completed per policy Yes Post Treatment Form Completed By Oscar Elmore RN   Delay N Post Treatment General Information    Machine Disinfection Requirement Notes stable    Post Focused Assessment Position Bases LOC Oriented to Person   Changes from Pre Focused Assessment  Anterior  Oriented to Place   Changes from Pre Treatment Focused Assessment? No Respiratory Efforts Unlabored  Lethargic   Access Notes on oxygen at 3L/min via nasal cannula GI / Bowels   Catheter clamped and capped Yes Edema GI Soft   Access Flow Good Location None    Dialyzer Clearance Streaked Cardiac  Anuric   Pain Screening Heart Rhythm Regular Completed by   Does the patient have pain? No Telemetry Yes Post Treatment Focused Assessment  Completed by Oscar Elmore RN   Respiratory Skin Date 11/12/2024   Lung Sounds Coarse Skin Warm Time 13:10   Location Bilateral  Dry Signing    Bases LOC Signed By Oscar Elmore RN

## 2024-11-12 NOTE — PROGRESS NOTES
[facility]  Critical Care Medicine  Progress Note    Patient Name: Saúl Butt  MRN: [id number]  Admission Date: 11/11/2024  Hospital Length of Stay: 1 days  Code Status: Full Code  Attending Provider: Jade Medley MD  Primary Care Provider: Ruth, Primary Doctor   Principal Problem: Hyperkalemia    Subjective:     HPI:  54-year-old  male presented to the ED via EMS.  Past medical history of end-stage renal disease, hypertension, Chronic anemia, Chronic diastolic CHF, moderate aortic stenosis, DVT, Hyperlipidemia, Paroxysmal atrial fibrillation, and malignant neoplasm of kidney.  Patient states he goes to dialysis Monday, Wednesday, and Friday.  States last day to dialyze was this past Friday, 11/8/24.  Patient states he has not missed any dialysis days. Patient was due to dialyze today, however he presented to the ED with 3 day history of shortness of breath which is worse with lying down, and he also reports having a cough.  He also reports 3 day history of nausea, vomiting, diarrhea. Patient reports having some fever on and off.  Denies any pain at present.    ED workup revealed sodium 131, potassium 8.4, anion gap 17, BUN and creatinine 60/13.4.  Venous blood gas revealed pH 7.42, pCO2 44, sodium 131, potassium 8.2, bicarb 28.5, lactate 1.5, and glucose 130.  Chest x-ray showed cardiomegaly with pulmonary edema.  BNP 55,331. In the ED patient received calcium gluconate 1 g IV, D10 500 mL IV, and Humulin R 10 units.  Critical care service was asked to admit patient to the ICU, where he will be dialyzed emergently to treat hyperkalemia.    Hospital/ICU Course:  11/12-- potassium 6.8 - HD gain today - went into Afib rvr on HD      Interval History/Significant Events:  went into Afib RVR on HD. Associated with SOB.      Review of Systems   Respiratory:  Positive for shortness of breath.      Objective:     Vital Signs (Most Recent):  Temp: (!) 102.7 °F (39.3 °C) (11/12/24  1028)  Pulse: 96 (11/12/24 0905)  Resp: (!) 25 (11/12/24 0905)  BP: (!) 147/110 (11/12/24 1200)  SpO2: 98 % (11/12/24 0905) Vital Signs (24h Range):  Temp:  [99.5 °F (37.5 °C)-102.7 °F (39.3 °C)] 102.7 °F (39.3 °C)  Pulse:  [] 96  Resp:  [17-46] 25  SpO2:  [84 %-98 %] 98 %  BP: (107-207)/() 147/110   Weight: 108.3 kg (238 lb 12.1 oz)  Body mass index is 32.38 kg/m².      Intake/Output Summary (Last 24 hours) at 11/12/2024 1224  Last data filed at 11/11/2024 1600  Gross per 24 hour   Intake 777.39 ml   Output --   Net 777.39 ml          Physical Exam  Vitals reviewed.   Constitutional:       Appearance: He is ill-appearing.   HENT:      Mouth/Throat:      Mouth: Mucous membranes are moist.   Eyes:      Conjunctiva/sclera: Conjunctivae normal.   Cardiovascular:      Rate and Rhythm: Tachycardia present. Rhythm irregular.   Pulmonary:      Effort: Pulmonary effort is normal.      Breath sounds: Normal breath sounds.   Abdominal:      Palpations: Abdomen is soft.   Musculoskeletal:         General: Normal range of motion.      Cervical back: Normal range of motion.   Skin:     General: Skin is warm and dry.      Capillary Refill: Capillary refill takes less than 2 seconds.   Neurological:      General: No focal deficit present.      Mental Status: He is alert. Mental status is at baseline.   Psychiatric:         Mood and Affect: Mood normal.            Vents:  Oxygen Concentration (%): 32 (11/11/24 1925)  Lines/Drains/Airways       Central Venous Catheter Line  Duration                  Hemodialysis Catheter right subclavian -- days              Peripheral Intravenous Line  Duration                  Peripheral IV - Single Lumen 11/11/24 0805 20 G Left;Posterior Hand 1 day                  Significant Labs:    CBC/Anemia Profile:  Recent Labs   Lab 11/11/24  0818 11/11/24  0941 11/12/24  0453   WBC 10.27  --  8.68   HGB 9.5*  --  9.3*   HCT 30.7* 33* 29.9*     --  212   MCV 99.0*  --  98.7*   RDW 18.4*   --  18.2*        Chemistries:  Recent Labs   Lab 11/11/24  0941 11/11/24  1724 11/12/24  0453   * 135* 134*   K 8.4* 4.8 6.8*   CL 98 97* 98   CO2 24 27 28   BUN 60* 38* 48*   CREATININE 13.40* 9.36* 12.00*   CALCIUM 7.7* 8.0* 7.1*   ALBUMIN 3.3*  --  3.3*   PROT 8.1  --  7.0   BILITOT 0.6  --  0.6   ALKPHOS 101  --  89   ALT 11*  --  11*   AST 27  --  18   MG  --   --  1.8   PHOS  --   --  6.9*       All pertinent labs within the past 24 hours have been reviewed.    Significant Imaging:  I have reviewed all pertinent imaging results/findings within the past 24 hours.    ABG  Recent Labs   Lab 11/11/24  0941   PH 7.42   PO2 33   PCO2 44   HCO3 28.5*     Assessment/Plan:     Cardiac/Vascular  Atrial fibrillation with RVR  Hx of prox afib   -- on rate control medications at home   - will treat with IV lopressor  and resume home medications   - on Eliquis at home     Elevated brain natriuretic peptide (BNP) level  -hypervolemia secondary to renal failure  -Monitor BNP daily    Hypertension  -monitor vital signs hourly  -will continue home medications     Renal/  * Hyperkalemia  Emergent HD on 11/11  K 6.8 today - HD this morning   Nephrology following     Lactic acidosis    -cultures pending  -antibiotic therapy    ESRD on hemodialysis  -avoid nephrotoxic agents   -emergent dialysis 11/11 and again today   -maintain routine hemodialysis schedule  -monitor daily BMP  -monitor fluid status  -Strict I&O  -daily weights    ID  Sepsis  -Rocephin 1gm IV daily  -Blood cultures X 2        Oncology  Anemia in chronic kidney disease  9.3/29     Endocrine  Hypervolemia associated with renal insufficiency  HD per nephrology recommendations        Critical Care Daily Checklist:    A: Awake: RASS Goal/Actual Goal:    Actual:     B: Spontaneous Breathing Trial Performed?     C: SAT & SBT Coordinated?  na                   D: Delirium: CAM-ICU Overall CAM-ICU: Negative   E: Early Mobility Performed? No   F: Feeding Goal:     Status:     Current Diet Order   Procedures    Diet Renal      AS: Analgesia/Sedation na   T: Thromboembolic Prophylaxis Heparin SQ - resume Eliquis when needed    H: HOB > 300 Yes   U: Stress Ulcer Prophylaxis (if needed) Protonix    G: Glucose Control Controlled    B: Bowel Function Stool Occurrence: 1   I: Indwelling Catheter (Lines & Marley) Necessity NA   D: De-escalation of Antimicrobials/Pharmacotherapies Rocephin     Plan for the day/ETD HD, treat Afib RVR, resume home medications     Code Status:  Family/Goals of Care: Full Code       Critical Care Time:  40 minutes  Critical secondary to Patient has a condition that poses threat to life and bodily function: Afib RVR, hyperkalemia       Critical care was time spent personally by me on the following activities: development of treatment plan with patient or surrogate and bedside caregivers, discussions with consultants, evaluation of patient's response to treatment, examination of patient, ordering and performing treatments and interventions, ordering and review of laboratory studies, ordering and review of radiographic studies, pulse oximetry, re-evaluation of patient's condition. This critical care time did not overlap with that of any other provider or involve time for any procedures.     Emily Earl, AG-ACNP  Critical Care Medicine  [facility]

## 2024-11-12 NOTE — ASSESSMENT & PLAN NOTE
Hx of prox afib   -- on rate control medications at home   - will treat with IV lopressor  and resume home medications   - on Eliquis at home

## 2024-11-12 NOTE — PLAN OF CARE
Problem: Adult Inpatient Plan of Care  Goal: Plan of Care Review  Outcome: Progressing  Goal: Patient-Specific Goal (Individualized)  Outcome: Progressing  Goal: Absence of Hospital-Acquired Illness or Injury  Outcome: Progressing  Goal: Optimal Comfort and Wellbeing  Outcome: Progressing  Goal: Readiness for Transition of Care  Outcome: Progressing     Problem: Infection  Goal: Absence of Infection Signs and Symptoms  Outcome: Progressing     Problem: Hemodialysis  Goal: Safe, Effective Therapy Delivery  Outcome: Progressing  Goal: Effective Tissue Perfusion  Outcome: Progressing  Goal: Absence of Infection Signs and Symptoms  Outcome: Progressing     Problem: Sepsis/Septic Shock  Goal: Optimal Coping  Outcome: Progressing  Goal: Absence of Bleeding  Outcome: Progressing  Goal: Blood Glucose Level Within Targeted Range  Outcome: Progressing  Goal: Absence of Infection Signs and Symptoms  Outcome: Progressing  Goal: Optimal Nutrition Intake  Outcome: Progressing

## 2024-11-12 NOTE — HOSPITAL COURSE
11/12-- potassium 6.8 - HD gain today - went into Afib rvr on HD   11/13: continued pyrexia, resolved RVR, early am with increased work of breathing with hypoxia placed on NRB, hyperkalemia recurrence, intubated for progressive respiratory distress, severe hypoxia, aspirated during ETT s/p successful therapeutic aspiration with bronchoscopy, am course with PEA arrest with ROSC, agitated post resuscitation warranting deep sedation  11/14-15: weaning sedation with intermittent agitation, recurrent hyperkalemia prompting HD, episodes of RVR managed with lopressor, off pressor support  11/20/2024 patient extubated yesterday doing well mental status improving

## 2024-11-12 NOTE — PLAN OF CARE
[facility] ICU  Initial Discharge Assessment       Primary Care Provider: No, Primary Doctor    Admission Diagnosis: Shortness of breath [R06.02]  Hyperkalemia [E87.5]  Acute pulmonary edema [J81.0]  Hypertensive urgency [I16.0]    Admission Date: 11/11/2024  Expected Discharge Date:     Transition of Care Barriers: None    Payor: HUMA[employer] MANAGED MEDICARE / Plan: HU[employer]NA SNP HMO PPO SPECIAL NEEDS / Product Type: Medicare Advantage /     Extended Emergency Contact Information  Primary Emergency Contact: TESSIE Mcfarland  Address: [facility] [place] [address] [facility]  Home Phone: [phone]  Mobile Phone: [phone]  Relation: Mother  Preferred language: English   needed? No    Discharge Plan A: Home  Discharge Plan B: Home Health      [facility]  [address] [place] [address]  Phone: [phone] Fax: [phone]    [facility] [place] - [address]  [address] [place] [address]  Phone: [phone] Fax: [phone]    [id number], [place] - [address] AT [facility] [address]  & [facility]  [address]  [place] [address]  Phone: [phone] Fax: [phone]    [id number] [place], [place] - [address] AT [facility] [address]  [address]  [facility] [address]  Phone: [phone] Fax: [phone]      Initial Assessment (most recent)       Adult Discharge Assessment - 11/12/24 1323          Discharge Assessment    Assessment Type Discharge Planning Assessment     Confirmed/corrected address, phone number and insurance Yes     Confirmed Demographics Correct on Facesheet     Source of Information patient     Communicated TORY with patient/caregiver Date not available/Unable to determine     People in Home alone     Do you expect to return to your current living situation? Yes     Do you have help at home or someone  to help you manage your care at home? No     Prior to hospitilization cognitive status: Unable to Assess     Current cognitive status: Unable to Assess     Equipment Currently Used at Home cane, straight     Readmission within 30 days? No     Patient currently being followed by outpatient case management? No     Do you currently have service(s) that help you manage your care at home? No     Do you take prescription medications? Yes     Do you have prescription coverage? Yes     Coverage humana     Do you have any problems affording any of your prescribed medications? No     Is the patient taking medications as prescribed? yes     How do you get to doctors appointments? family or friend will provide     Are you on dialysis? Yes     Dialysis Name and Scheduled days MWF [facility] [address]     Do you take coumadin? No     Discharge Plan A Home     Discharge Plan B Home Health     DME Needed Upon Discharge  none     Discharge Plan discussed with: Patient     Transition of Care Barriers None                   Spoke with pt in room. Pt lives home alone, plans to return at [facility]. Per pt he is current with , choice obtained to stay with company at [place]. Attempting to verify company name. Pt is current HD [address] [facility], MWF. Pt is on isolation for influenza now, IM reviewed and left copy at bedside. Will re address if need or isolation DC.  Reviewed chart, 0 dc needs noted. Will follow consults.

## 2024-11-13 PROBLEM — R65.20 SEVERE SEPSIS: Status: ACTIVE | Noted: 2024-11-11

## 2024-11-13 PROBLEM — J11.81: Status: ACTIVE | Noted: 2024-11-13

## 2024-11-13 PROBLEM — R25.1 SHAKES: Status: RESOLVED | Noted: 2024-11-11 | Resolved: 2024-11-13

## 2024-11-13 PROBLEM — J15.9 BACTERIAL PNEUMONIA: Status: ACTIVE | Noted: 2024-11-13

## 2024-11-13 PROBLEM — I26.99 ACUTE PULMONARY EMBOLISM WITHOUT ACUTE COR PULMONALE: Status: ACTIVE | Noted: 2024-11-13

## 2024-11-13 PROBLEM — N28.9 HYPERVOLEMIA ASSOCIATED WITH RENAL INSUFFICIENCY: Status: RESOLVED | Noted: 2024-11-12 | Resolved: 2024-11-13

## 2024-11-13 PROBLEM — G93.41 ACUTE METABOLIC ENCEPHALOPATHY: Status: ACTIVE | Noted: 2024-11-13

## 2024-11-13 PROBLEM — I10 HYPERTENSION: Status: RESOLVED | Noted: 2024-11-11 | Resolved: 2024-11-13

## 2024-11-13 PROBLEM — E87.20 LACTIC ACIDOSIS: Status: RESOLVED | Noted: 2024-11-11 | Resolved: 2024-11-13

## 2024-11-13 PROBLEM — J69.0 ASPIRATION PNEUMONITIS: Status: ACTIVE | Noted: 2024-11-13

## 2024-11-13 PROBLEM — E87.70 HYPERVOLEMIA ASSOCIATED WITH RENAL INSUFFICIENCY: Status: RESOLVED | Noted: 2024-11-12 | Resolved: 2024-11-13

## 2024-11-13 PROBLEM — I46.9 CARDIAC ARREST: Status: ACTIVE | Noted: 2024-11-13

## 2024-11-13 PROBLEM — R79.89 ELEVATED BRAIN NATRIURETIC PEPTIDE (BNP) LEVEL: Status: RESOLVED | Noted: 2024-11-11 | Resolved: 2024-11-13

## 2024-11-13 PROBLEM — J80 ACUTE RESPIRATORY DISTRESS SYNDROME: Status: ACTIVE | Noted: 2024-11-13

## 2024-11-13 PROBLEM — J96.01 ACUTE RESPIRATORY FAILURE WITH HYPOXEMIA: Status: ACTIVE | Noted: 2024-11-13

## 2024-11-13 LAB
ALBUMIN SERPL BCP-MCNC: 3.3 G/DL (ref 3.5–5)
ALBUMIN SERPL BCP-MCNC: 3.4 G/DL (ref 3.5–5)
ALBUMIN/GLOB SERPL: 0.9 {RATIO}
ALP SERPL-CCNC: 89 U/L (ref 40–150)
ALP SERPL-CCNC: 90 U/L (ref 45–115)
ALT SERPL W P-5'-P-CCNC: 20 U/L (ref 16–61)
ALT SERPL W P-5'-P-CCNC: 28 U/L (ref 0–55)
ANION GAP SERPL CALCULATED.3IONS-SCNC: 19 MMOL/L (ref 7–16)
ANION GAP SERPL CALCULATED.3IONS-SCNC: 23 MMOL/L (ref 7–16)
ANISOCYTOSIS BLD QL SMEAR: ABNORMAL
AORTIC ROOT ANNULUS: 2.6 CM
AORTIC VALVE CUSP SEPERATION: 1.68 CM
APICAL FOUR CHAMBER EJECTION FRACTION: 28 %
APTT PPP: 41.4 SECONDS (ref 25.2–37.3)
AST SERPL W P-5'-P-CCNC: 39 U/L (ref 15–37)
AST SERPL W P-5'-P-CCNC: 89 U/L (ref 5–34)
AV INDEX (PROSTH): 0.43
AV MEAN GRADIENT: 16.8 MMHG
AV PEAK GRADIENT: 27 MMHG
AV VALVE AREA BY VELOCITY RATIO: 1.3 CM²
AV VALVE AREA: 1.3 CM²
AV VELOCITY RATIO: 0.42
BASOPHILS # BLD AUTO: 0.09 K/UL (ref 0–0.2)
BASOPHILS # BLD AUTO: 0.15 K/UL (ref 0–0.2)
BASOPHILS NFR BLD AUTO: 0.6 % (ref 0–1)
BASOPHILS NFR BLD AUTO: 0.7 % (ref 0–1)
BILIRUB DIRECT SERPL-MCNC: 0.1 MG/DL (ref 0–0.2)
BILIRUB SERPL-MCNC: 0.6 MG/DL (ref ?–1.2)
BILIRUB SERPL-MCNC: 0.9 MG/DL
BSA FOR ECHO PROCEDURE: 2.35 M2
BUN SERPL-MCNC: 30 MG/DL (ref 8–26)
BUN SERPL-MCNC: 42 MG/DL (ref 7–18)
BUN/CREAT SERPL: 4 (ref 6–20)
BUN/CREAT SERPL: 4 (ref 6–20)
CALCIUM SERPL-MCNC: 7.5 MG/DL (ref 8.5–10.1)
CALCIUM SERPL-MCNC: 8.2 MG/DL (ref 8.4–10.2)
CHLORIDE SERPL-SCNC: 97 MMOL/L (ref 98–107)
CHLORIDE SERPL-SCNC: 98 MMOL/L (ref 98–107)
CK SERPL-CCNC: 626 U/L (ref 39–308)
CK SERPL-CCNC: 706 U/L (ref 30–200)
CO2 SERPL-SCNC: 21 MMOL/L (ref 21–32)
CO2 SERPL-SCNC: 23 MMOL/L (ref 22–29)
CREAT SERPL-MCNC: 10.6 MG/DL (ref 0.7–1.3)
CREAT SERPL-MCNC: 7.86 MG/DL (ref 0.72–1.25)
CV ECHO LV RWT: 1.43 CM
DIFFERENTIAL METHOD BLD: ABNORMAL
DIFFERENTIAL METHOD BLD: ABNORMAL
DOP CALC AO PEAK VEL: 2.6 M/S
DOP CALC AO VTI: 38.9 CM
DOP CALC LVOT AREA: 3.1 CM2
DOP CALC LVOT DIAMETER: 2 CM
DOP CALC LVOT PEAK VEL: 1.1 M/S
DOP CALC LVOT STROKE VOLUME: 52.4 CM3
DOP CALCLVOT PEAK VEL VTI: 16.7 CM
ECHO LV POSTERIOR WALL: 2.5 CM (ref 0.6–1.1)
EGFR (NO RACE VARIABLE) (RUSH/TITUS): 5 ML/MIN/1.73M2
EGFR (NO RACE VARIABLE) (RUSH/TITUS): 8 ML/MIN/1.73M2
EJECTION FRACTION: 40 %
EOSINOPHIL # BLD AUTO: 0.1 K/UL (ref 0–0.5)
EOSINOPHIL # BLD AUTO: 0.1 K/UL (ref 0–0.5)
EOSINOPHIL NFR BLD AUTO: 0.5 % (ref 1–4)
EOSINOPHIL NFR BLD AUTO: 0.7 % (ref 1–4)
EOSINOPHIL NFR BLD MANUAL: 1 % (ref 1–4)
ERYTHROCYTE [DISTWIDTH] IN BLOOD BY AUTOMATED COUNT: 18.1 % (ref 11.5–14.5)
ERYTHROCYTE [DISTWIDTH] IN BLOOD BY AUTOMATED COUNT: 18.2 % (ref 11.5–14.5)
FRACTIONAL SHORTENING: 17.1 % (ref 28–44)
GLOBULIN SER-MCNC: 4 G/DL (ref 2–4)
GLUCOSE SERPL-MCNC: 86 MG/DL (ref 74–100)
GLUCOSE SERPL-MCNC: 90 MG/DL (ref 70–105)
GLUCOSE SERPL-MCNC: 97 MG/DL (ref 74–106)
HCO3 UR-SCNC: 22.3 MMOL/L (ref 21–28)
HCO3 UR-SCNC: 24.3 MMOL/L (ref 21–28)
HCT VFR BLD AUTO: 30.5 % (ref 40–54)
HCT VFR BLD AUTO: 33.4 % (ref 40–54)
HGB BLD-MCNC: 10.2 G/DL (ref 13.5–18)
HGB BLD-MCNC: 9.5 G/DL (ref 13.5–18)
IMM GRANULOCYTES # BLD AUTO: 0.1 K/UL (ref 0–0.04)
IMM GRANULOCYTES # BLD AUTO: 0.53 K/UL (ref 0–0.04)
IMM GRANULOCYTES NFR BLD: 0.7 % (ref 0–0.4)
IMM GRANULOCYTES NFR BLD: 2.5 % (ref 0–0.4)
INR BLD: 1.34
INTERVENTRICULAR SEPTUM: 2.2 CM (ref 0.6–1.1)
IVC DIAMETER: 2.63 CM
LACTATE SERPL-SCNC: 2.2 MMOL/L (ref 0.5–2.2)
LACTATE SERPL-SCNC: 2.7 MMOL/L (ref 0.5–2.2)
LACTATE SERPL-SCNC: 2.7 MMOL/L (ref 0.5–2.2)
LACTATE SERPL-SCNC: 2.8 MMOL/L (ref 0.5–2.2)
LACTATE SERPL-SCNC: 3.4 MMOL/L (ref 0.5–2.2)
LACTATE SERPL-SCNC: 3.5 MMOL/L (ref 0.5–2.2)
LEFT ATRIUM AREA SYSTOLIC (APICAL 4 CHAMBER): 25.61 CM2
LEFT ATRIUM SIZE: 4.76 CM
LEFT INTERNAL DIMENSION IN SYSTOLE: 2.9 CM (ref 2.1–4)
LEFT VENTRICLE DIASTOLIC VOLUME INDEX: 22.05 ML/M2
LEFT VENTRICLE DIASTOLIC VOLUME: 50.72 ML
LEFT VENTRICLE END DIASTOLIC VOLUME APICAL 4 CHAMBER: 38.68 ML
LEFT VENTRICLE END SYSTOLIC VOLUME APICAL 4 CHAMBER: 93 ML
LEFT VENTRICLE MASS INDEX: 184.2 G/M2
LEFT VENTRICLE SYSTOLIC VOLUME INDEX: 13.7 ML/M2
LEFT VENTRICLE SYSTOLIC VOLUME: 31.45 ML
LEFT VENTRICULAR INTERNAL DIMENSION IN DIASTOLE: 3.5 CM (ref 3.5–6)
LEFT VENTRICULAR MASS: 423.7 G
LIPASE SERPL-CCNC: 40 U/L (ref 16–77)
LVED V (TEICH): 50.72 ML
LVES V (TEICH): 31.45 ML
LVOT MG: 3.63 MMHG
LVOT MV: 0.93 CM/S
LYMPHOCYTES # BLD AUTO: 0.78 K/UL (ref 1–4.8)
LYMPHOCYTES # BLD AUTO: 1.21 K/UL (ref 1–4.8)
LYMPHOCYTES NFR BLD AUTO: 5.2 % (ref 27–41)
LYMPHOCYTES NFR BLD AUTO: 5.6 % (ref 27–41)
LYMPHOCYTES NFR BLD MANUAL: 4 % (ref 27–41)
MAGNESIUM SERPL-MCNC: 2.1 MG/DL (ref 1.7–2.3)
MAGNESIUM SERPL-MCNC: 2.3 MG/DL (ref 1.6–2.6)
MCH RBC QN AUTO: 30.5 PG (ref 27–31)
MCH RBC QN AUTO: 31 PG (ref 27–31)
MCHC RBC AUTO-ENTMCNC: 30.5 G/DL (ref 32–36)
MCHC RBC AUTO-ENTMCNC: 31.1 G/DL (ref 32–36)
MCV RBC AUTO: 100 FL (ref 80–96)
MCV RBC AUTO: 99.7 FL (ref 80–96)
MONOCYTES # BLD AUTO: 0.78 K/UL (ref 0–0.8)
MONOCYTES # BLD AUTO: 0.97 K/UL (ref 0–0.8)
MONOCYTES NFR BLD AUTO: 4.5 % (ref 2–6)
MONOCYTES NFR BLD AUTO: 5.2 % (ref 2–6)
MONOCYTES NFR BLD MANUAL: 3 % (ref 2–6)
MPC BLD CALC-MCNC: 11.9 FL (ref 9.4–12.4)
MPC BLD CALC-MCNC: 12 FL (ref 9.4–12.4)
NEUTROPHILS # BLD AUTO: 13.08 K/UL (ref 1.8–7.7)
NEUTROPHILS # BLD AUTO: 18.46 K/UL (ref 1.8–7.7)
NEUTROPHILS NFR BLD AUTO: 86.2 % (ref 53–65)
NEUTROPHILS NFR BLD AUTO: 87.6 % (ref 53–65)
NEUTS BAND NFR BLD MANUAL: 11 % (ref 1–5)
NEUTS SEG NFR BLD MANUAL: 81 % (ref 50–62)
NRBC # BLD AUTO: 0 X10E3/UL
NRBC # BLD AUTO: 0.03 X10E3/UL
NRBC, AUTO (.00): 0 %
NRBC, AUTO (.00): 0.1 %
OHS CV RV/LV RATIO: 1.11 CM
PCO2 BLDA: 36 MMHG (ref 35–48)
PCO2 BLDA: 53 MMHG (ref 35–48)
PH SMN: 7.27 [PH] (ref 7.35–7.45)
PH SMN: 7.4 [PH] (ref 7.35–7.45)
PHOSPHATE SERPL-MCNC: 6.2 MG/DL (ref 2.5–4.5)
PISA TR MAX VEL: 3.21 M/S
PLATELET # BLD AUTO: 213 K/UL (ref 150–400)
PLATELET # BLD AUTO: 288 K/UL (ref 150–400)
PLATELET MORPHOLOGY: NORMAL
PO2 BLDA: 41 MMHG (ref 83–108)
PO2 BLDA: 65 MMHG (ref 83–108)
POC BASE EXCESS: -2.1 MMOL/L (ref -2–3)
POC BASE EXCESS: -3.1 MMOL/L (ref -2–3)
POC SATURATED O2: 76 % (ref 95–98)
POC SATURATED O2: 89 % (ref 95–98)
POLYCHROMASIA BLD QL SMEAR: ABNORMAL
POTASSIUM SERPL-SCNC: 4.9 MMOL/L (ref 3.5–5.1)
POTASSIUM SERPL-SCNC: 7.4 MMOL/L (ref 3.5–5.1)
PROT SERPL-MCNC: 7.4 G/DL (ref 6.4–8.2)
PROT SERPL-MCNC: 7.4 G/DL (ref 6.4–8.3)
PROTHROMBIN TIME: 16.4 SECONDS (ref 11.7–14.7)
PV PEAK GRADIENT: 3 MMHG
PV PEAK VELOCITY: 0.88 M/S
RA MAJOR: 4.43 CM
RBC # BLD AUTO: 3.06 M/UL (ref 4.6–6.2)
RBC # BLD AUTO: 3.34 M/UL (ref 4.6–6.2)
RIGHT VENTRICLE DIASTOLIC BASEL DIMENSION: 3.9 CM
RIGHT VENTRICLE DIASTOLIC LENGTH: 5.4 CM
RIGHT VENTRICLE DIASTOLIC MID DIMENSION: 3.2 CM
RIGHT VENTRICULAR LENGTH IN DIASTOLE (APICAL 4-CHAMBER VIEW): 5.42 CM
RV MID DIAMA: 3.15 CM
SODIUM SERPL-SCNC: 131 MMOL/L (ref 136–145)
SODIUM SERPL-SCNC: 138 MMOL/L (ref 136–145)
TDI LATERAL: 0.07 M/S
TDI SEPTAL: 0.05 M/S
TDI: 0.06 M/S
TR MAX PG: 41 MMHG
TRICUSPID ANNULAR PLANE SYSTOLIC EXCURSION: 2 CM
TROPONIN I SERPL HS-MCNC: 282.3 NG/L
TROPONIN I SERPL HS-MCNC: 510 NG/L
WBC # BLD AUTO: 14.93 K/UL (ref 4.5–11)
WBC # BLD AUTO: 21.42 K/UL (ref 4.5–11)
Z-SCORE OF LEFT VENTRICULAR DIMENSION IN END DIASTOLE: -9.35
Z-SCORE OF LEFT VENTRICULAR DIMENSION IN END SYSTOLE: -4.88

## 2024-11-13 PROCEDURE — 85025 COMPLETE CBC W/AUTO DIFF WBC: CPT | Performed by: STUDENT IN AN ORGANIZED HEALTH CARE EDUCATION/TRAINING PROGRAM

## 2024-11-13 PROCEDURE — 25000003 PHARM REV CODE 250: Performed by: STUDENT IN AN ORGANIZED HEALTH CARE EDUCATION/TRAINING PROGRAM

## 2024-11-13 PROCEDURE — 80048 BASIC METABOLIC PNL TOTAL CA: CPT | Performed by: STUDENT IN AN ORGANIZED HEALTH CARE EDUCATION/TRAINING PROGRAM

## 2024-11-13 PROCEDURE — 99291 CRITICAL CARE FIRST HOUR: CPT | Mod: 25,,, | Performed by: STUDENT IN AN ORGANIZED HEALTH CARE EDUCATION/TRAINING PROGRAM

## 2024-11-13 PROCEDURE — 83735 ASSAY OF MAGNESIUM: CPT | Performed by: STUDENT IN AN ORGANIZED HEALTH CARE EDUCATION/TRAINING PROGRAM

## 2024-11-13 PROCEDURE — 80100014 HC HEMODIALYSIS 1:1

## 2024-11-13 PROCEDURE — 94761 N-INVAS EAR/PLS OXIMETRY MLT: CPT

## 2024-11-13 PROCEDURE — 36415 COLL VENOUS BLD VENIPUNCTURE: CPT | Performed by: NURSE PRACTITIONER

## 2024-11-13 PROCEDURE — 92950 HEART/LUNG RESUSCITATION CPR: CPT

## 2024-11-13 PROCEDURE — 82550 ASSAY OF CK (CPK): CPT | Performed by: NURSE PRACTITIONER

## 2024-11-13 PROCEDURE — 02HV33Z INSERTION OF INFUSION DEVICE INTO SUPERIOR VENA CAVA, PERCUTANEOUS APPROACH: ICD-10-PCS | Performed by: STUDENT IN AN ORGANIZED HEALTH CARE EDUCATION/TRAINING PROGRAM

## 2024-11-13 PROCEDURE — 31500 INSERT EMERGENCY AIRWAY: CPT | Mod: ,,, | Performed by: STUDENT IN AN ORGANIZED HEALTH CARE EDUCATION/TRAINING PROGRAM

## 2024-11-13 PROCEDURE — 93005 ELECTROCARDIOGRAM TRACING: CPT

## 2024-11-13 PROCEDURE — 84100 ASSAY OF PHOSPHORUS: CPT | Performed by: STUDENT IN AN ORGANIZED HEALTH CARE EDUCATION/TRAINING PROGRAM

## 2024-11-13 PROCEDURE — 83735 ASSAY OF MAGNESIUM: CPT | Performed by: NURSE PRACTITIONER

## 2024-11-13 PROCEDURE — 0BH17EZ INSERTION OF ENDOTRACHEAL AIRWAY INTO TRACHEA, VIA NATURAL OR ARTIFICIAL OPENING: ICD-10-PCS | Performed by: STUDENT IN AN ORGANIZED HEALTH CARE EDUCATION/TRAINING PROGRAM

## 2024-11-13 PROCEDURE — 31645 BRNCHSC W/THER ASPIR 1ST: CPT | Mod: ,,, | Performed by: STUDENT IN AN ORGANIZED HEALTH CARE EDUCATION/TRAINING PROGRAM

## 2024-11-13 PROCEDURE — 94640 AIRWAY INHALATION TREATMENT: CPT

## 2024-11-13 PROCEDURE — 92950 HEART/LUNG RESUSCITATION CPR: CPT | Mod: ,,, | Performed by: STUDENT IN AN ORGANIZED HEALTH CARE EDUCATION/TRAINING PROGRAM

## 2024-11-13 PROCEDURE — 31500 INSERT EMERGENCY AIRWAY: CPT

## 2024-11-13 PROCEDURE — 0B918ZX DRAINAGE OF TRACHEA, VIA NATURAL OR ARTIFICIAL OPENING ENDOSCOPIC, DIAGNOSTIC: ICD-10-PCS | Performed by: STUDENT IN AN ORGANIZED HEALTH CARE EDUCATION/TRAINING PROGRAM

## 2024-11-13 PROCEDURE — 84484 ASSAY OF TROPONIN QUANT: CPT | Performed by: NURSE PRACTITIONER

## 2024-11-13 PROCEDURE — 99900026 HC AIRWAY MAINTENANCE (STAT)

## 2024-11-13 PROCEDURE — 25500020 PHARM REV CODE 255: Performed by: STUDENT IN AN ORGANIZED HEALTH CARE EDUCATION/TRAINING PROGRAM

## 2024-11-13 PROCEDURE — 36600 WITHDRAWAL OF ARTERIAL BLOOD: CPT

## 2024-11-13 PROCEDURE — 20000000 HC ICU ROOM

## 2024-11-13 PROCEDURE — 82962 GLUCOSE BLOOD TEST: CPT

## 2024-11-13 PROCEDURE — 83605 ASSAY OF LACTIC ACID: CPT | Performed by: NURSE PRACTITIONER

## 2024-11-13 PROCEDURE — 80053 COMPREHEN METABOLIC PANEL: CPT | Performed by: NURSE PRACTITIONER

## 2024-11-13 PROCEDURE — 99900025 HC BRONCHOSCOPY-ASST (STAT)

## 2024-11-13 PROCEDURE — 5A12012 PERFORMANCE OF CARDIAC OUTPUT, SINGLE, MANUAL: ICD-10-PCS | Performed by: STUDENT IN AN ORGANIZED HEALTH CARE EDUCATION/TRAINING PROGRAM

## 2024-11-13 PROCEDURE — 85025 COMPLETE CBC W/AUTO DIFF WBC: CPT | Performed by: NURSE PRACTITIONER

## 2024-11-13 PROCEDURE — 31622 DX BRONCHOSCOPE/WASH: CPT

## 2024-11-13 PROCEDURE — 82803 BLOOD GASES ANY COMBINATION: CPT

## 2024-11-13 PROCEDURE — 5A1955Z RESPIRATORY VENTILATION, GREATER THAN 96 CONSECUTIVE HOURS: ICD-10-PCS | Performed by: STUDENT IN AN ORGANIZED HEALTH CARE EDUCATION/TRAINING PROGRAM

## 2024-11-13 PROCEDURE — 63600175 PHARM REV CODE 636 W HCPCS

## 2024-11-13 PROCEDURE — 99900035 HC TECH TIME PER 15 MIN (STAT)

## 2024-11-13 PROCEDURE — 83690 ASSAY OF LIPASE: CPT | Performed by: STUDENT IN AN ORGANIZED HEALTH CARE EDUCATION/TRAINING PROGRAM

## 2024-11-13 PROCEDURE — 84484 ASSAY OF TROPONIN QUANT: CPT | Performed by: STUDENT IN AN ORGANIZED HEALTH CARE EDUCATION/TRAINING PROGRAM

## 2024-11-13 PROCEDURE — 94002 VENT MGMT INPAT INIT DAY: CPT

## 2024-11-13 PROCEDURE — 99292 CRITICAL CARE ADDL 30 MIN: CPT | Mod: 25,,, | Performed by: STUDENT IN AN ORGANIZED HEALTH CARE EDUCATION/TRAINING PROGRAM

## 2024-11-13 PROCEDURE — 36415 COLL VENOUS BLD VENIPUNCTURE: CPT | Performed by: STUDENT IN AN ORGANIZED HEALTH CARE EDUCATION/TRAINING PROGRAM

## 2024-11-13 PROCEDURE — 25000003 PHARM REV CODE 250: Performed by: HOSPITALIST

## 2024-11-13 PROCEDURE — 25000003 PHARM REV CODE 250: Performed by: NURSE PRACTITIONER

## 2024-11-13 PROCEDURE — 82550 ASSAY OF CK (CPK): CPT | Performed by: STUDENT IN AN ORGANIZED HEALTH CARE EDUCATION/TRAINING PROGRAM

## 2024-11-13 PROCEDURE — 27000207 HC ISOLATION

## 2024-11-13 PROCEDURE — 85610 PROTHROMBIN TIME: CPT | Performed by: STUDENT IN AN ORGANIZED HEALTH CARE EDUCATION/TRAINING PROGRAM

## 2024-11-13 PROCEDURE — 87040 BLOOD CULTURE FOR BACTERIA: CPT | Performed by: STUDENT IN AN ORGANIZED HEALTH CARE EDUCATION/TRAINING PROGRAM

## 2024-11-13 PROCEDURE — 80076 HEPATIC FUNCTION PANEL: CPT | Performed by: STUDENT IN AN ORGANIZED HEALTH CARE EDUCATION/TRAINING PROGRAM

## 2024-11-13 PROCEDURE — 25000242 PHARM REV CODE 250 ALT 637 W/ HCPCS: Performed by: STUDENT IN AN ORGANIZED HEALTH CARE EDUCATION/TRAINING PROGRAM

## 2024-11-13 PROCEDURE — 36556 INSERT NON-TUNNEL CV CATH: CPT | Mod: 59,,, | Performed by: STUDENT IN AN ORGANIZED HEALTH CARE EDUCATION/TRAINING PROGRAM

## 2024-11-13 PROCEDURE — 93010 ELECTROCARDIOGRAM REPORT: CPT | Mod: ,,, | Performed by: STUDENT IN AN ORGANIZED HEALTH CARE EDUCATION/TRAINING PROGRAM

## 2024-11-13 PROCEDURE — 63600175 PHARM REV CODE 636 W HCPCS: Performed by: STUDENT IN AN ORGANIZED HEALTH CARE EDUCATION/TRAINING PROGRAM

## 2024-11-13 PROCEDURE — 25000003 PHARM REV CODE 250

## 2024-11-13 PROCEDURE — 27000221 HC OXYGEN, UP TO 24 HOURS

## 2024-11-13 PROCEDURE — 85730 THROMBOPLASTIN TIME PARTIAL: CPT | Performed by: STUDENT IN AN ORGANIZED HEALTH CARE EDUCATION/TRAINING PROGRAM

## 2024-11-13 RX ORDER — FENTANYL CITRATE 50 UG/ML
50 INJECTION, SOLUTION INTRAMUSCULAR; INTRAVENOUS
Status: DISCONTINUED | OUTPATIENT
Start: 2024-11-13 | End: 2024-11-13

## 2024-11-13 RX ORDER — FENTANYL CITRAT/DEXTROSE 5%/PF 100 MCG/10
0-200 PATIENT CONTROLLED ANALGESIA SYRINGE INTRAVENOUS CONTINUOUS
Status: DISCONTINUED | OUTPATIENT
Start: 2024-11-13 | End: 2024-11-20

## 2024-11-13 RX ORDER — MIDAZOLAM HYDROCHLORIDE 2 MG/2ML
1 INJECTION, SOLUTION INTRAMUSCULAR; INTRAVENOUS ONCE
Status: DISCONTINUED | OUTPATIENT
Start: 2024-11-13 | End: 2024-11-14

## 2024-11-13 RX ORDER — CHLORHEXIDINE GLUCONATE ORAL RINSE 1.2 MG/ML
15 SOLUTION DENTAL 2 TIMES DAILY
Status: DISCONTINUED | OUTPATIENT
Start: 2024-11-13 | End: 2024-11-29

## 2024-11-13 RX ORDER — OSELTAMIVIR PHOSPHATE 30 MG/1
30 CAPSULE ORAL DAILY
Status: DISCONTINUED | OUTPATIENT
Start: 2024-11-13 | End: 2024-11-17

## 2024-11-13 RX ORDER — ACETAMINOPHEN 10 MG/ML
1000 INJECTION, SOLUTION INTRAVENOUS ONCE
Status: COMPLETED | OUTPATIENT
Start: 2024-11-13 | End: 2024-11-13

## 2024-11-13 RX ORDER — HEPARIN SODIUM,PORCINE/D5W 25000/250
0-40 INTRAVENOUS SOLUTION INTRAVENOUS CONTINUOUS
Status: DISCONTINUED | OUTPATIENT
Start: 2024-11-13 | End: 2024-11-16

## 2024-11-13 RX ORDER — CLORAZEPATE DIPOTASSIUM 3.75 MG/1
7.5 TABLET ORAL ONCE
Status: COMPLETED | OUTPATIENT
Start: 2024-11-13 | End: 2024-11-13

## 2024-11-13 RX ORDER — ALBUTEROL SULFATE 0.83 MG/ML
10 SOLUTION RESPIRATORY (INHALATION) ONCE
Status: COMPLETED | OUTPATIENT
Start: 2024-11-13 | End: 2024-11-13

## 2024-11-13 RX ORDER — ROCURONIUM BROMIDE 10 MG/ML
100 INJECTION, SOLUTION INTRAVENOUS ONCE
Status: COMPLETED | OUTPATIENT
Start: 2024-11-13 | End: 2024-11-13

## 2024-11-13 RX ORDER — PROPOFOL 10 MG/ML
INJECTION, EMULSION INTRAVENOUS
Status: COMPLETED
Start: 2024-11-13 | End: 2024-11-13

## 2024-11-13 RX ORDER — ETOMIDATE 2 MG/ML
INJECTION INTRAVENOUS
Status: COMPLETED
Start: 2024-11-13 | End: 2024-11-13

## 2024-11-13 RX ORDER — ETOMIDATE 2 MG/ML
40 INJECTION INTRAVENOUS ONCE
Status: COMPLETED | OUTPATIENT
Start: 2024-11-13 | End: 2024-11-13

## 2024-11-13 RX ORDER — DIPHENHYDRAMINE HYDROCHLORIDE 50 MG/ML
50 INJECTION INTRAMUSCULAR; INTRAVENOUS ONCE
Status: COMPLETED | OUTPATIENT
Start: 2024-11-13 | End: 2024-11-13

## 2024-11-13 RX ORDER — IOPAMIDOL 755 MG/ML
125 INJECTION, SOLUTION INTRAVASCULAR
Status: COMPLETED | OUTPATIENT
Start: 2024-11-13 | End: 2024-11-13

## 2024-11-13 RX ORDER — ROCURONIUM BROMIDE 10 MG/ML
INJECTION, SOLUTION INTRAVENOUS
Status: COMPLETED
Start: 2024-11-13 | End: 2024-11-13

## 2024-11-13 RX ORDER — PANTOPRAZOLE SODIUM 40 MG/10ML
40 INJECTION, POWDER, LYOPHILIZED, FOR SOLUTION INTRAVENOUS DAILY
Status: DISCONTINUED | OUTPATIENT
Start: 2024-11-13 | End: 2024-11-29

## 2024-11-13 RX ORDER — MIDAZOLAM HYDROCHLORIDE 2 MG/2ML
1 INJECTION, SOLUTION INTRAMUSCULAR; INTRAVENOUS ONCE
Status: DISCONTINUED | OUTPATIENT
Start: 2024-11-13 | End: 2024-11-13

## 2024-11-13 RX ORDER — EPINEPHRINE 0.1 MG/ML
INJECTION INTRAVENOUS CODE/TRAUMA/SEDATION MEDICATION
Status: COMPLETED | OUTPATIENT
Start: 2024-11-13 | End: 2024-11-13

## 2024-11-13 RX ORDER — PROPOFOL 10 MG/ML
0-50 INJECTION, EMULSION INTRAVENOUS CONTINUOUS
Status: DISCONTINUED | OUTPATIENT
Start: 2024-11-13 | End: 2024-11-20

## 2024-11-13 RX ORDER — MIDAZOLAM HYDROCHLORIDE 2 MG/2ML
INJECTION, SOLUTION INTRAMUSCULAR; INTRAVENOUS
Status: COMPLETED
Start: 2024-11-13 | End: 2024-11-13

## 2024-11-13 RX ADMIN — MIDAZOLAM HYDROCHLORIDE 1 MG: 1 INJECTION, SOLUTION INTRAMUSCULAR; INTRAVENOUS at 07:11

## 2024-11-13 RX ADMIN — ROCURONIUM BROMIDE 100 MG: 10 INJECTION, SOLUTION INTRAVENOUS at 07:11

## 2024-11-13 RX ADMIN — VANCOMYCIN HYDROCHLORIDE 2250 MG: 500 INJECTION, POWDER, LYOPHILIZED, FOR SOLUTION INTRAVENOUS at 03:11

## 2024-11-13 RX ADMIN — PIPERACILLIN SODIUM AND TAZOBACTAM SODIUM 4.5 G: 4; .5 INJECTION, POWDER, LYOPHILIZED, FOR SOLUTION INTRAVENOUS at 08:11

## 2024-11-13 RX ADMIN — CHLORHEXIDINE GLUCONATE 15 ML: 1.2 RINSE ORAL at 08:11

## 2024-11-13 RX ADMIN — HEPARIN SODIUM 5000 UNITS: 5000 INJECTION, SOLUTION INTRAVENOUS; SUBCUTANEOUS at 06:11

## 2024-11-13 RX ADMIN — ISOSORBIDE MONONITRATE 60 MG: 30 TABLET, EXTENDED RELEASE ORAL at 08:11

## 2024-11-13 RX ADMIN — MELOXICAM 15 MG: 7.5 TABLET ORAL at 08:11

## 2024-11-13 RX ADMIN — HYDROCORTISONE SODIUM SUCCINATE 200 MG: 100 INJECTION, POWDER, FOR SOLUTION INTRAMUSCULAR; INTRAVENOUS at 10:11

## 2024-11-13 RX ADMIN — HEPARIN SODIUM 18 UNITS/KG/HR: 10000 INJECTION, SOLUTION INTRAVENOUS at 07:11

## 2024-11-13 RX ADMIN — PROPOFOL 40 MCG/KG/MIN: 10 INJECTION, EMULSION INTRAVENOUS at 09:11

## 2024-11-13 RX ADMIN — PIPERACILLIN AND TAZOBACTAM 4.5 G: 4; .5 INJECTION, POWDER, LYOPHILIZED, FOR SOLUTION INTRAVENOUS; PARENTERAL at 03:11

## 2024-11-13 RX ADMIN — HEPARIN SODIUM 5000 UNITS: 5000 INJECTION, SOLUTION INTRAVENOUS; SUBCUTANEOUS at 03:11

## 2024-11-13 RX ADMIN — ETOMIDATE 40 MG: 2 INJECTION INTRAVENOUS at 07:11

## 2024-11-13 RX ADMIN — ACETAMINOPHEN 1000 MG: 10 INJECTION, SOLUTION INTRAVENOUS at 09:11

## 2024-11-13 RX ADMIN — PROPOFOL 50 MCG/KG/MIN: 10 INJECTION, EMULSION INTRAVENOUS at 09:11

## 2024-11-13 RX ADMIN — AZITHROMYCIN MONOHYDRATE 500 MG: 500 INJECTION, POWDER, LYOPHILIZED, FOR SOLUTION INTRAVENOUS at 03:11

## 2024-11-13 RX ADMIN — DIPHENHYDRAMINE HYDROCHLORIDE 50 MG: 50 INJECTION INTRAMUSCULAR; INTRAVENOUS at 10:11

## 2024-11-13 RX ADMIN — HYDROCORTISONE SODIUM SUCCINATE 200 MG: 100 INJECTION, POWDER, FOR SOLUTION INTRAMUSCULAR; INTRAVENOUS at 05:11

## 2024-11-13 RX ADMIN — METOPROLOL TARTRATE 100 MG: 100 TABLET, FILM COATED ORAL at 08:11

## 2024-11-13 RX ADMIN — PROPOFOL 20 MCG/KG/MIN: 10 INJECTION, EMULSION INTRAVENOUS at 12:11

## 2024-11-13 RX ADMIN — ALBUTEROL SULFATE 10 MG: 2.5 SOLUTION RESPIRATORY (INHALATION) at 03:11

## 2024-11-13 RX ADMIN — ALBUTEROL SULFATE 2.5 MG: 2.5 SOLUTION RESPIRATORY (INHALATION) at 04:11

## 2024-11-13 RX ADMIN — MUPIROCIN: 20 OINTMENT TOPICAL at 08:11

## 2024-11-13 RX ADMIN — NOREPINEPHRINE BITARTRATE 0.02 MCG/KG/MIN: 1 INJECTION, SOLUTION, CONCENTRATE INTRAVENOUS at 01:11

## 2024-11-13 RX ADMIN — FENTANYL CITRATE 25 MCG/HR: 50 INJECTION, SOLUTION INTRAMUSCULAR; INTRAVENOUS at 12:11

## 2024-11-13 RX ADMIN — ACETAMINOPHEN 1000 MG: 10 INJECTION, SOLUTION INTRAVENOUS at 12:11

## 2024-11-13 RX ADMIN — PROPOFOL 40 MCG/KG/MIN: 10 INJECTION, EMULSION INTRAVENOUS at 05:11

## 2024-11-13 RX ADMIN — HYDROCORTISONE SODIUM SUCCINATE 200 MG: 100 INJECTION, POWDER, FOR SOLUTION INTRAMUSCULAR; INTRAVENOUS at 03:11

## 2024-11-13 RX ADMIN — PROPOFOL 10 MCG/KG/MIN: 10 INJECTION, EMULSION INTRAVENOUS at 07:11

## 2024-11-13 RX ADMIN — CLORAZEPATE DIPOTASSIUM 7.5 MG: 3.75 TABLET ORAL at 12:11

## 2024-11-13 RX ADMIN — IOPAMIDOL 125 ML: 755 INJECTION, SOLUTION INTRAVENOUS at 11:11

## 2024-11-13 RX ADMIN — EPINEPHRINE 1 MG: 0.1 INJECTION, SOLUTION INTRAVENOUS at 10:11

## 2024-11-13 RX ADMIN — OSELTAMAVIR PHOSPHATE 30 MG: 30 CAPSULE ORAL at 03:11

## 2024-11-13 RX ADMIN — PANTOPRAZOLE SODIUM 40 MG: 40 INJECTION, POWDER, LYOPHILIZED, FOR SOLUTION INTRAVENOUS at 08:11

## 2024-11-13 RX ADMIN — HYDRALAZINE HYDROCHLORIDE 100 MG: 100 TABLET ORAL at 08:11

## 2024-11-13 NOTE — PROGRESS NOTES
Pharmacokinetic Initial Assessment: IV Vancomycin    Assessment/Plan:    Initiate intravenous vancomycin as 2250 mg IV x 1  Desired empiric serum trough concentration is 15 to 20 mcg/mL  Draw vancomycin random level on 11/15 at 0400.  Pharmacy will continue to follow and monitor vancomycin.      Please contact pharmacy at extension [phone] with any questions regarding this assessment.     Patient brief summary:  Saúl Butt is a 54 y.o. male initiated on antimicrobial therapy with IV Vancomycin for treatment of suspected  pneumonia    Drug Allergies:   Review of patient's allergies indicates:   Allergen Reactions    Fish containing products Swelling    Iodinated contrast media Swelling    Iodine Shortness Of Breath, Swelling and Anaphylaxis     Sob and swelling/itching/throat closes up per pt  Other reaction(s): Swelling, Hives, DifficultyBreat  Sob and swelling/itching/throat closes up per pt      Shellfish containing products Swelling    Iodine and iodide containing products        Actual Body Weight:   108.3 kg    Renal Function:   Estimated Creatinine Clearance: 10.4 mL/min (A) (based on SCr of 10.6 mg/dL (H)).,     Dialysis Method (if applicable):  intermittent HD    CBC (last 72 hours):  Recent Labs   Lab Result Units 11/11/24  0818 11/12/24  0453 11/12/24  2232 11/13/24  0545 11/13/24  1014   WBC K/uL 10.27 8.68 11.86* 14.93* 21.42*   Hemoglobin g/dL 9.5* 9.3* 10.6* 9.5* 10.2*   Hematocrit % 30.7* 29.9* 35.3* 30.5* 33.4*   Platelet Count K/uL 231 212 215 213 288   Lymphocytes % % 4.4* 4.5* 4.9* 5.2* 5.6*   Lymphocytes, Man % %  --   --   --   --  4*   Monocytes % % 9.6* 10.6* 6.7* 5.2 4.5   Monocytes, Man % %  --   --   --   --  3   Eosinophils % % 3.2 1.5 1.7 0.7* 0.5*   Eosinophils, Man % %  --   --   --   --  1   Basophils % % 0.6 0.8 0.6 0.6 0.7   Diff Type  Auto Auto Auto Auto Manual       Metabolic Panel (last 72 hours):  Recent Labs   Lab Result Units 11/11/24  0941 11/11/24  1724 11/12/24  0452  "11/12/24  2232 11/13/24  0545 11/13/24  1014   Sodium mmol/L 131* 135* 134* 135* 131*  --    Potassium mmol/L 8.4* 4.8 6.8* 6.3* 7.4*  --    Chloride mmol/L 98 97* 98 100 98  --    CO2 mmol/L 24 27 28 24 21  --    Glucose mg/dL 126* 119* 117* 88 97  --    BUN mg/dL 60* 38* 48* 35* 42*  --    Creatinine mg/dL 13.40* 9.36* 12.00* 9.89* 10.60*  --    Albumin g/dL 3.3*  --  3.3*  --  3.3*  --    Bilirubin, Total mg/dL 0.6  --  0.6  --  0.6  --    Alk Phos U/L 101  --  89  --  90  --    AST U/L 27  --  18  --  39*  --    ALT U/L 11*  --  11*  --  20  --    Magnesium mg/dL  --   --  1.8  --  2.1 2.3   Phosphorus mg/dL  --   --  6.9*  --  6.2*  --        Drug levels (last 3 results):  No results for input(s): "VANCOMYCINRA", "VANCORANDOM", "VANCOMYCINPE", "VANCOPEAK", "VANCOMYCINTR", "VANCOTROUGH" in the last 72 hours.    Microbiologic Results:  Microbiology Results (last 7 days)       Procedure Component Value Units Date/Time    Culture, Lower Respiratory [[id number]]     Order Status: Sent Specimen: Respiratory from ET Tube Secretions     Blood culture (site 1) [[id number]]     Order Status: Sent Specimen: Blood     Blood culture (site 2) [[id number]]     Order Status: Sent Specimen: Blood     Blood culture (site 1) [[id number]] Collected: 11/11/24 1807    Order Status: Completed Specimen: Blood Updated: 11/13/24 0724     Culture, Blood No Growth At 24 Hours    Blood culture (site 2) [[id number]] Collected: 11/11/24 1807    Order Status: Completed Specimen: Blood Updated: 11/13/24 0724     Culture, Blood No Growth At 24 Hours            "

## 2024-11-13 NOTE — PROCEDURES
Saúl Butt is a 54 y.o. male patient.    Temp: 99.5 °F (37.5 °C) (11/13/24 0308)  Pulse: 108 (11/13/24 0615)  Resp: (!) 31 (11/13/24 0615)  BP: (!) 188/113 (11/13/24 0615)  SpO2: (!) 91 % (11/13/24 0615)  Weight: 108.3 kg (238 lb 12.1 oz) (11/11/24 1220)  Height: 6' (182.9 cm) (11/11/24 1220)       Procedures    11/13/2024    BRONCHOSCOPY  INDICATION: Therapeutic aspiration, acute hypoxic respiratory failure, large volume aspiration of gastric contents, emergent procedure    Preoperative Diagnoses: Acute hypoxic respiratory failure, Aspiration of gastric contents    Postoperative Diagnoses: Acute hypoxic respiratory failure, Aspiration of gastric contents      MEDICATION: Procedure was performed after RSI. See MAR for details    PROCEDURE:  A time-out was completed prior to start of procedure. This was an emergent procedure. Family was updated following completion of the procedure.     The patient was placed in a supine position, a swivel adapter secured on ETT.  A disposable flexible bronchoscope was inserted into ETT via a swivel adapter.  Large volume gastric contents tan colored with frothy consistency was present within ETT. Therapeutic aspiration was completed with successful clearance of endotracheal tube. The tip of the endotracheal tube was above the quentin and appropriately positioned. An airway inspection demonstrated gastric contents in proximal left and right mainstem. Therapeutic aspiration was undertaken with successful clearance of the airway to the subsegmental levels. The airway was monitored for 5 breath cycles with no fluid and maintenance of clear airways. Bronchoscope was withdrawn and the procedure was concluded.  Patient tolerated the procedure well.  Postprocedure chest x-ray demonstrated appropriate ETT positioning as well as bilateral and R basilar infiltrate.     Procedure Performed:  Bronchoscopy and Therapeutic aspiration     Recommendations:  - continue management with ventilator  optimization for aspiration of gastric contents  - continue care as per ICU  - planned CT Chest

## 2024-11-13 NOTE — NURSING
0710: Dr Medley here, spoke with pt, who initally refused intubation. Dialysis nurse notified of need for emergent HD. Dr Medley also spoke with mother on phone who agreed to intubation.  0715 pt finally agreed to intubation.  0722 versed 1 mg ivp given for anxiety  0728 Etomidate 20 mg given ivp. Began bagging pt with 100% VBM  0729: Rocuronium 100mg ivip given. Continue bagging.  0730 attempted to intubate, pt still coughing and gagging. Etomidate 10mg ivp given as ordered. Oral area suctioned.  0732 Intubated with #8 ETT, 24 at lips. (+) color change on CO2 monitor. (+) refugio breath sounds noted. Cxr ordered.   0738 dr Medley did bedside bronch to clear out secretions.  Was emergent procedure and time out was done. Pt tolerated well  0746 og tube placed.

## 2024-11-13 NOTE — PROCEDURES
Saúl Butt is a 54 y.o. male patient.    Temp: 99.5 °F (37.5 °C) (11/13/24 0308)  Pulse: 108 (11/13/24 0615)  Resp: (!) 31 (11/13/24 0615)  BP: (!) 188/113 (11/13/24 0615)  SpO2: (!) 91 % (11/13/24 0615)  Weight: 108.3 kg (238 lb 12.1 oz) (11/11/24 1220)  Height: 6' (182.9 cm) (11/11/24 1220)       Intubation    Date/Time: 11/13/2024 8:02 AM  Location procedure was performed: [facility]    Performed by: Jade Medley MD  Authorized by: Jade Medley MD  Pre-operative diagnosis: ACUTE RESPIRATORY FAILURE WITH HYPOXIA  Post-operative diagnosis: ACUTE RESPIRATORY FAILURE WITH HYPOXIA  Consent Done: Yes  Consent: Verbal consent obtained.  Risks and benefits: risks, benefits and alternatives were discussed  Consent given by: patient  Indications: respiratory distress and hypoxemia  Intubation method: fiberoptic oral  Patient status: paralyzed (RSI)  Preoxygenation: nonrebreather mask  Pretreatment medications: midazolam  Sedatives: etomidate  Paralytic: rocuronium  Laryngoscope size: Glide 4  Tube size: 8.0 mm  Tube type: cuffed  Number of attempts: 2  Ventilation between attempts: BVM  Cricoid pressure: no  Cords visualized: yes  Post-procedure assessment: chest rise and ETCO2 monitor  Cuff inflated: yes  ETT to lip: 24 cm  Tube secured with: ETT pires  Chest x-ray interpreted by: PATIENT UNDERWENT EMERGENT THERAPEUTIC BRONCHOSCOPY FOR THERAPEUTIC ASPIRATION WITH ETT ABOVE MALATHI APPROX 2 CM; CXR PENDING AT TIME OF THIS DOCUMENTATION.  Chest x-ray findings: endotracheal tube in appropriate position  Comments: Patient had aspiration during intubation attempt 1. Attempt was stopped for aspiration of gastric contents. Second attempt successful, required hyperangulation of ETT tube.           Jade Medley MD  Pulmonary and Critical Care  [facility]      11/13/2024

## 2024-11-13 NOTE — PLAN OF CARE
Problem: Adult Inpatient Plan of Care  Goal: Plan of Care Review  Outcome: Progressing  Goal: Patient-Specific Goal (Individualized)  Outcome: Progressing  Goal: Absence of Hospital-Acquired Illness or Injury  Outcome: Progressing  Goal: Optimal Comfort and Wellbeing  Outcome: Progressing  Goal: Readiness for Transition of Care  Outcome: Progressing     Problem: Infection  Goal: Absence of Infection Signs and Symptoms  Outcome: Progressing     Problem: Hemodialysis  Goal: Safe, Effective Therapy Delivery  Outcome: Progressing  Goal: Effective Tissue Perfusion  Outcome: Progressing  Goal: Absence of Infection Signs and Symptoms  Outcome: Progressing     Problem: Sepsis/Septic Shock  Goal: Optimal Coping  Outcome: Progressing  Goal: Absence of Bleeding  Outcome: Progressing  Goal: Blood Glucose Level Within Targeted Range  Outcome: Progressing  Goal: Absence of Infection Signs and Symptoms  Outcome: Progressing  Goal: Optimal Nutrition Intake  Outcome: Progressing     Problem: Gas Exchange Impaired  Goal: Optimal Gas Exchange  Outcome: Progressing     Problem: Fall Injury Risk  Goal: Absence of Fall and Fall-Related Injury  Outcome: Progressing     Problem: Restraint, Nonviolent  Goal: Absence of Harm or Injury  Outcome: Progressing     Problem: Mechanical Ventilation Invasive  Goal: Effective Communication  Outcome: Progressing  Goal: Optimal Device Function  Outcome: Progressing  Goal: Mechanical Ventilation Liberation  Outcome: Progressing  Goal: Optimal Nutrition Delivery  Outcome: Progressing  Goal: Absence of Device-Related Skin and Tissue Injury  Outcome: Progressing  Goal: Absence of Ventilator-Induced Lung Injury  Outcome: Progressing

## 2024-11-13 NOTE — SIGNIFICANT EVENT
[facility] Medicine Acute Decompensation    Admit Date: 11/11/2024   LOS: 2  Code Status: Full Code  CC: Hyperkalemia  Date of Consult: 11/13/2024  RRT Indication(s): respiratory failure and distress   Attending Physician: Jade Medley MD  Primary Service: Networked reference to record PCT     SUBJECTIVE:     Interval history: Mr. Butt is a 53yo man history of end-stage renal disease on hemodialysis, diastolic heart failure, aortic stenosis, atrial fibrillation, pulmonary hypertension who presented to the hospital on 11/11 with shortness of breath and influenza a subtype H1..  He was admitted to ICU with potassium of 8.2.  He received dialysis but dialysis was limited due to AFib with RVR.  RRT called for respiratory distress this morning.  Of note, patient had hypoxia to low 80s throughout the night and very rapid respiratory rate with elevated blood pressure.        OBJECTIVE:     Vital Signs(Most Recent):  Temp: 99.5 °F (37.5 °C) (11/13/24 0308)  Pulse: 108 (11/13/24 0615)  Resp: (!) 31 (11/13/24 0615)  BP: (!) 188/113 (11/13/24 0615)  SpO2: (!) 91 % (11/13/24 0615) Vital Signs(24h Range):  Temp:  [99.3 °F (37.4 °C)-102.7 °F (39.3 °C)] 99.5 °F (37.5 °C)  Pulse:  [] 108  Resp:  [13-45] 31  SpO2:  [78 %-100 %] 91 %  BP: ()/() 188/113     I & O(24h)L    Intake/Output Summary (Last 24 hours) at 11/13/2024 0748  Last data filed at 11/12/2024 1800  Gross per 24 hour   Intake 313.61 ml   Output --   Net 313.61 ml        Physical Exam: Physical Exam    Lines/Drains/Airway:       Hemodialysis Catheter right subclavian (Active)   Line Necessity Review CRRT/HD 11/12/24 1915   Verification by X-ray Yes 11/12/24 1915   Site Assessment No drainage;No redness;No swelling;No warmth 11/13/24 0315   Line Securement Device Antimicrobial Adhesive 11/12/24 1915   Dressing Type Central line dressing 11/13/24 0315   Dressing Status Clean;Dry;Intact 11/13/24 0315   Dressing Intervention Integrity maintained 11/13/24 0315    Date on Dressing 11/12/24 11/12/24 1915   Dressing Due to be Changed 11/14/24 11/13/24 0315   Venous Patency/Care heparin locked 11/13/24 0315   Arterial Patency/Care heparin locked 11/13/24 0315   Waveform Not being transduced 11/13/24 0315          Nutrition:  Current Diet Order   Procedures    Diet Renal        Labs/Imaging- All pertinent labs within the past 24 hours have been reviewed.    Diagnostics/Interventions during Rapid response     100% non-rebreather was placed on patient.  There was a relatively recent ABG.    ASSESSMENT/PLAN:     Active Hospital Problems    Diagnosis    *Hyperkalemia    Hypervolemia associated with renal insufficiency    Atrial fibrillation with RVR    Influenza A    Hypertension    Shakes    Sepsis    Elevated brain natriuretic peptide (BNP) level    Lactic acidosis    ESRD on hemodialysis    Anemia in chronic kidney disease        Patient in mild respiratory distress but is able to speak and follows command and has good upper extremity strength.    Respiratory failure associated with influenza A subtype H1, decompensated end-stage renal disease, diastolic heart failure.    Called nephrologist Dr. Martinez and patient may be dialyzed within the next hour.  Dialysis required for potassium over 7 and respiratory distress.  Communicated with ICU team.      Critical care time spent on the evaluation and treatment of severe organ dysfunction, review of pertinent labs and imaging studies, discussions with consulting providers and discussions with patient/family 30 minutes

## 2024-11-13 NOTE — DIALYSIS ROUNDING
Patient is seen on hemodialysis, they are tolerating this well, we will continue their treatment unchanged.    A/P 1. ESRD cont hd support  2. Volume overload- UF goal 4 liters today  3. Hyperkalemia-cont. Hd with 2 k bath.

## 2024-11-13 NOTE — PROCEDURES
Saúl Butt is a 54 y.o. male patient.    Temp: (!) 101.3 °F (38.5 °C) (11/13/24 1245)  Pulse: (!) 118 (11/13/24 1300)  Resp: 18 (11/13/24 1300)  BP: (!) 75/28 (11/13/24 1300)  SpO2: 99 % (11/13/24 1300)  Weight: 108.3 kg (238 lb 12.1 oz) (11/11/24 1220)  Height: 6' (182.9 cm) (11/11/24 1220)       Central Line    Date/Time: 11/13/2024 2:12 PM    Performed by: Jade Medley MD  Authorized by: Jade Medley MD    Location procedure was performed:  [facility]  Pre-operative diagnosis:  HYPOTENSION DUE TO SEDATION  Post-operative diagnosis:  HYPOTENSION DUE TO SEDATION  Consent Done ?:  Yes  Indications:  Vascular access  Anesthesia:  Local infiltration  Local anesthetic:  Lidocaine 1% without epinephrine  Anesthetic total (ml):  3  Preparation:  Skin prepped with ChloraPrep  Skin prep agent dried: Skin prep agent completely dried prior to procedure    Sterile barriers: All five maximal sterile barriers used - gloves, gown, cap, mask and large sterile sheet    Hand hygiene: Hand hygiene performed immediately prior to central venous catheter insertion    Location:  Left femoral  Site selection rationale:  PATIENT WITH OCCLUDED RIGHT AND LEFT INTERNAL JUGULAR VEINS  Catheter type:  Triple lumen  Catheter size:  12 Fr  Inserted Catheter Length (cm):  20  Ultrasound guidance: Yes    Vessel Caliber:  Large   patent  Comprressibility:  Normal  Needle advanced into vessel with real time ultrasound guidance.    Guidewire confirmed in vessel.    Steril sheath on probe.    Sterile gel used.  Manometry: No    Number of attempts:  1  Securement:  Line sutured, chlorhexidine patch, sterile dressing applied and blood return through all ports  Estimated blood loss (mL):  3  Specimens: No    Guidewire: guidewire removed intact, verified with nurse    Adverse Events:  None  Other Complications:  Right internal jugular vein with occlusion and small caliber size. Left internal jugular vein narrows down distally.   Right  internal jugular vein with occlusion and small caliber size. Left internal jugular vein narrows down distally.      Jade Medley MD  Pulmonary and Critical Care  [facility]      11/13/2024

## 2024-11-13 NOTE — SIGNIFICANT EVENT
[facility] ICU  CODE DOCUMENTATION NOTE  Critical Care Medicine  Saúl Butt  11/11/2024  PATIENT LOCATION: ICU    Patient with bradycardia, SpO2 greater than 90% while intubated and noted to have no pulse during attempt for ABG. This was during walking rounds, no overhead code required.  Chest compressions were immediately started.  Differential included acidemia, hyperkalemia and acute PE.  Blood was returned by dialysis technician at bedside.  Patient received bicarb for concern for hyperkalemia and epinephrine 1 mg with plan for initiation administration every 3 minutes.  FSBG 90.  TPA was requested with pharmacy providing medication due to concern for acute PE.  Patient with dilated RV at baseline.  Patient with PEA arrest with ROSC on 2nd pulse check.  Post resuscitation EKG with tachycardia, peaked T-waves which appear to be at his baseline in setting of hyperkalemia.  Unremarkable for STEMI.  Patient labs drawn to include troponin.  Stat TTE requested.  CT PE and CT abdomen and pelvis planned; tPA was held in setting of ROSC with plan for definitive diagnosis.    Patient's neurologic status was notable for an agitated patient with a attempts at self extubation requiring restraints.  He is febrile prior to this cardiac arrest event in setting of influenza A infection.  There is a component of encephalopathy that is limiting patient's ability to follow commands, however, he is having intentional movements and spontaneous bilateral upper and lower extremity movements.  We will plan to treat to avoid fever; cooling blanket and IV Tylenol to be administered.    Good Outcome Following Attempted Resuscitation (GO-FAR) Score:  The Good Outcome Following Attempted Resuscitation (GO-FAR) score provides validated risk stratification for a patients chance of neurologically-intact survival to discharge should he/she be successfully resuscitated following in-hospital cardiopulmonary arrest. The  clinical significance of the GO-FAR score may be discussed with the patient and/or family while coming to a decision about code status.     Age: <70  Chronic neurologic deficits: No  Acute stroke during the current admission: No  Medical non-cardiac diagnosis: Yes  Major trauma: No  Metastatic or hematologic cancer: No  Pneumonia: Yes  Renal insufficiency or dialysis: Yes  Respiratory insufficiency: Yes      Interpretation Key:    GO-FAR Score       Likelihood of NISD       -15 to - 6         Above average > 15 %         - 5 to 13        Average         >3 to 15 %         14 to 23          Low                 1 to 3 %               > 23        Very low                         < 1 %       Regarding the details of the discussion about code status:   Has the patient/proxy been made aware of his/her overall prognosis? Yes  Code status discussion included: Attending Physician  Code status was discussed with: Patient and Parent(s)  Code status preference was chosen by: Patient      Code Status: Full Code

## 2024-11-13 NOTE — CONSULTS
[facility] ICU  Adult Nutrition  Consult Note         Reason for Assessment  Reason For Assessment: consult (nutrition recs-intubated)   Nutrition Risk Screen: no indicators present    Assessment and Plan  Consult received and appreciated. Consult for nutrition recs-patient is intubated. Patient is a 55yo male admitted 11/11 for hyperkalemia.     Patient is 108.3kg with a BMI of 32.38 and is obese. He has a PMH of ESRD on HD. Recommend start Novasource Renal with a goal rate of 45mL/hour with 30mL/hour free water flush. Keep HOB at 30-45 degrees to reduce risk for aspiration. Start rate at 20mL/hour and advance by 10mL q8h until goal rate is reached. Monitor for tolerance: n/v/d/c. Hold feeding and notify RD if symptoms of intolerance develop.     Last BM 11/12 per flowsheet.    Medications/labs reviewed. RD following.           Learning Needs/Social Determinants of Health    Learning Assessment       11/11/2024 1310 [facility] ICU (11/11/2024 - Present)   Created by Berenice Casillas, RN - RN (Nurse) Status: Complete                 PRIMARY LEARNER     Primary Learner Name:  Criss Butt  - 11/11/2024 1310    Relationship:  Patient EL - 11/11/2024 1310    Does the primary learner have any barriers to learning?:  No Barriers EL - 11/11/2024 1310    What is the preferred language of the primary learner?:  English EL - 11/11/2024 1310    Is an  required?:  Yes  - 11/11/2024 1310    How does the primary learner prefer to learn new concepts?:  Listening EL - 11/11/2024 1310    How often do you need to have someone help you read instructions, pamphlets, or written material from your doctor or pharmacy?:  Never EL - 11/11/2024 1310        CO-LEARNER #1     No question answered        CO-LEARNER #2     No question answered        SPECIAL TOPICS     No question answered        ANSWERED BY:     No question answered        Comments         Edit History       Berenice Casillas,  RN - RN (Nurse)   11/11/2024 1310                           Social Drivers of Health     Tobacco Use: Low Risk  (11/11/2024)    Patient History     Smoking Tobacco Use: Never     Smokeless Tobacco Use: Never     Passive Exposure: Not on file   Alcohol Use: Not At Risk (6/19/2024)    AUDIT-C     Frequency of Alcohol Consumption: Never     Average Number of Drinks: Patient does not drink     Frequency of Binge Drinking: Never   Financial Resource Strain: Low Risk  (11/11/2024)    Overall Financial Resource Strain (CARDIA)     Difficulty of Paying Living Expenses: Not hard at all   Food Insecurity: No Food Insecurity (11/11/2024)    Hunger Vital Sign     Worried About Running Out of Food in the Last Year: Never true     Ran Out of Food in the Last Year: Never true   Transportation Needs: No Transportation Needs (11/11/2024)    TRANSPORTATION NEEDS     Transportation : No   Physical Activity: Inactive (6/19/2024)    Exercise Vital Sign     Days of Exercise per Week: 0 days     Minutes of Exercise per Session: 0 min   Stress: No Stress Concern Present (11/11/2024)    [place] citizen [employer] - Occupational Stress Questionnaire     Feeling of Stress : Not at all   Housing Stability: Low Risk  (11/11/2024)    Housing Stability Vital Sign     Unable to Pay for Housing in the Last Year: No     Homeless in the Last Year: No   Depression: Low Risk  (6/17/2024)    Depression     Last PHQ-4: Flowsheet Data: 0   Utilities: Not At Risk (11/11/2024)    [facility] Utilities     Threatened with loss of utilities: No   Health Literacy: Adequate Health Literacy (11/11/2024)     Health Literacy     Frequency of need for help with medical instructions: Rarely   Social Isolation: Socially Integrated (6/19/2024)    Social Isolation     Social Isolation: 1          Malnutrition  Is Patient Malnourished: No    Nutrition Diagnosis  Inadequate energy intake related to Decreased ability to consume sufficient energy as evidenced by  intubated  Comments: Initiate enteral feeding    Recent Labs   Lab 11/11/24  1805 11/12/24  0453 11/13/24  0545   GLU  --    < > 97   POCGLU 122*  --   --     < > = values in this interval not displayed.         Nutrition Prescription / Recommendations  Recommendation/Intervention: Recommend start Novasource Renal with a goal rate of 45mL/hour with 30mL/hour free water flush. Keep HOB at 30-45 degrees to reduce risk of aspiration. Start rate at 20mL/hour and advance by 10mL q8h until goal rate is reached. monitor for tolerance: n/v/d/c. hold feeding and notify RD if symptoms of intolerance develop.  Goals: Weight maintenance during admission, tube feeding tolerance at goal  Nutrition Goal Status: new  Current Diet Order: NPO  Chewing or Swallowing Difficulty?: No Chewing or swallowing difficulty  Recommended Diet: Enteral Nutrition  Recommended Oral Supplement: No Oral Supplements  Is Nutrition Support Recommended:   Needs Calculated    Energy Calorie Requirements (kcal): 2166-2708kcal (20-25kcal/kg)  Protein Requirements: 87-108g (0.8-1.0g/kg)  Enteral Nutrition   Enteral Nutrition Formula Provides:  2160 kcals Propofol Rate: Yes - 343kcal  98 g Protein  198 g Carbohydrates  108 g Fat Propofol Rate: Yes - 31g  774 ml Fluid without Flush    720 ml Fluid by flush   1494 ml Total Fluid  Enteral Nutrition Recommended Order:  Tube feeding via NG/ Corinna Sump  Tube feeding formula: Novasource Renal NG/ Corinna Sump at 45mL/hour  Free Water Flush: 30 ml hourly  Modular Supplements:No Modular Supplements needed  Enteral Nutrition meets needs?: yes  Enteral Nutrition Status: New Order  Is Nutrition Education Recommended: No    Monitor and Evaluation  % current Intake: NPO  % intake to meet estimated needs: Enteral Nutrition   Food and Nutrient Intake: enteral nutrition intake  Food and Nutrient Adminstration: enteral and parenteral nutrition administration  Anthropometric Measurements: weight, weight change  Biochemical Data,  Medical Tests and Procedures: electrolyte and renal panel, gastrointestinal profile, glucose/endocrine profile, inflammatory profile, lipid profile       Current Medical Diagnosis and Past Medical History  Diagnosis: other (see comments)  Past Medical History:   Diagnosis Date    Anemia in chronic kidney disease 12/06/2014    Chronic diastolic congestive heart failure     Deep vein thrombosis     ESRD on hemodialysis 06/17/2021    Hyperlipidemia     Hypertension     Malignant neoplasm of kidney     Moderate aortic stenosis 04/22/2023    Paroxysmal atrial fibrillation     Polycystic kidney disease     Primary osteoarthritis involving multiple joints 07/16/2024       Nutrition/Diet History       Lab/Procedures/Meds  Recent Labs   Lab 11/12/24  0453 11/12/24  2232 11/13/24  0545   *   < > 131*   K 6.8*   < > 7.4*   BUN 48*   < > 42*   CREATININE 12.00*   < > 10.60*   CALCIUM 7.1*   < > 7.5*   ALBUMIN 3.3*  --   --    CL 98   < > 98   ALT 11*  --   --    AST 18  --   --    PHOS 6.9*  --  6.2*    < > = values in this interval not displayed.   Note: Na+, Ca++ low. K+, Phos, BUN, Cr elevated. PMH ESRD on HD  Last A1c:   Lab Results   Component Value Date    HGBA1C 6.0 07/11/2023     Lab Results   Component Value Date    RBC 3.06 (L) 11/13/2024    HGB 9.5 (L) 11/13/2024    HCT 30.5 (L) 11/13/2024    MCV 99.7 (H) 11/13/2024    MCH 31.0 11/13/2024    MCHC 31.1 (L) 11/13/2024   Note: H&H low. On HD.     Pertinent Labs Reviewed: reviewed  Pertinent Medications Reviewed: reviewed  Scheduled Meds:   cefTRIAXone (Rocephin) IV (PEDS and ADULTS)  1 g Intravenous Q24H    chlorhexidine  15 mL Mouth/Throat BID    gabapentin  600 mg Oral BID    heparin (porcine)  5,000 Units Subcutaneous Q8H    hydrALAZINE  100 mg Oral TID    isosorbide mononitrate  60 mg Oral QAM    meloxicam  15 mg Oral Daily    metoprolol tartrate  100 mg Oral BID    midazolam  1 mg Intravenous Once    mupirocin   Nasal BID    NIFEdipine  60 mg Oral QHS     oseltamivir  30 mg Oral Every Mon, Wed, Fri    pantoprazole  40 mg Intravenous Daily     Continuous Infusions:   esmoloL in sterile water  0-300 mcg/kg/min Intravenous Continuous   Stopped at 11/12/24 1743    propofoL  0-50 mcg/kg/min Intravenous Continuous 13 mL/hr at 11/13/24 0825 20 mcg/kg/min at 11/13/24 0825     PRN Meds:.  Current Facility-Administered Medications:     0.9% NaCl, , Intravenous, PRN    acetaminophen, 650 mg, Oral, Q4H PRN    albuterol sulfate, 2.5 mg, Nebulization, Q4H PRN    fentaNYL, 50 mcg, Intravenous, Q15 Min PRN **FOLLOWED BY** fentaNYL, 50 mcg, Intravenous, Q1H PRN    heparin (porcine), 4,000 Units, Intravenous, PRN    labetalol, 10 mg, Intravenous, Q4H PRN    metoprolol, 5 mg, Intravenous, Q5 Min PRN    ondansetron, 4 mg, Intravenous, Q8H PRN    sodium chloride 0.9%, 10 mL, Intravenous, PRN    Anthropometrics  Temp: 99.5 °F (37.5 °C)  Height Method: Stated  Height: 6' (182.9 cm)  Height (inches): 72 in  Weight Method: Bed Scale  Weight: 108.3 kg (238 lb 12.1 oz)  Weight (lb): 238.76 lb  Ideal Body Weight (IBW), Male: 178 lb  % Ideal Body Weight, Male (lb): 134.13 %  BMI (Calculated): 32.4       Estimated/Assessed Needs  RMR ([place]-St. Jeor Equation): 1971     Temp: 99.5 °F (37.5 °C)Oral  Weight Used For Calorie Calculations: 108.3 kg (238 lb 12.1 oz)     Energy Calorie Requirements (kcal): 2166-2708kcal (20-25kcal/kg)  Weight Used For Protein Calculations: 108.3 kg (238 lb 12.1 oz)  Protein Requirements: 87-108g (0.8-1.0g/kg)       RDA Method (mL): 2166       Nutrition by Nursing  Diet/Nutrition Received: other (see comments) (renal diet)           Last Bowel Movement: 11/12/24                Nutrition Follow-Up  RD Follow-up?: Yes      Nutrition Discharge Planning: RD following for discharge needs          Jessica Monsour, MS, RD, LD  Available via Secure Chat

## 2024-11-13 NOTE — PLAN OF CARE
Problem: Adult Inpatient Plan of Care  Goal: Plan of Care Review  Outcome: Progressing  Goal: Patient-Specific Goal (Individualized)  Outcome: Progressing  Goal: Absence of Hospital-Acquired Illness or Injury  Outcome: Progressing  Goal: Optimal Comfort and Wellbeing  Outcome: Progressing  Goal: Readiness for Transition of Care  Outcome: Progressing     Problem: Infection  Goal: Absence of Infection Signs and Symptoms  Outcome: Progressing     Problem: Hemodialysis  Goal: Safe, Effective Therapy Delivery  Outcome: Progressing  Goal: Effective Tissue Perfusion  Outcome: Progressing  Goal: Absence of Infection Signs and Symptoms  Outcome: Progressing     Problem: Sepsis/Septic Shock  Goal: Optimal Coping  Outcome: Progressing  Goal: Absence of Bleeding  Outcome: Progressing  Goal: Blood Glucose Level Within Targeted Range  Outcome: Progressing  Goal: Absence of Infection Signs and Symptoms  Outcome: Progressing  Goal: Optimal Nutrition Intake  Outcome: Progressing     Problem: Gas Exchange Impaired  Goal: Optimal Gas Exchange  Outcome: Progressing

## 2024-11-13 NOTE — SIGNIFICANT EVENT
RRT called overhead due to patient with reported respiratory distress. Patient was on a nasal canula with SpO2 in the low 90's and tachypneic, he was switched to a non-rebreather mask and SpO2 was 100%. Patient with current influenza infection. BMP from this morning showed potassium at 7.4. Patient has ESRD on dialysis and reportedly is scheduled for dialysis within the hour.

## 2024-11-14 PROBLEM — J69.0 ASPIRATION PNEUMONIA OF BOTH LUNGS DUE TO GASTRIC SECRETIONS: Status: ACTIVE | Noted: 2024-11-13

## 2024-11-14 PROBLEM — R65.21 SEPTIC SHOCK: Status: ACTIVE | Noted: 2024-11-11

## 2024-11-14 PROBLEM — J15.9 BACTERIAL PNEUMONIA: Status: ACTIVE | Noted: 2024-11-14

## 2024-11-14 LAB
ANION GAP SERPL CALCULATED.3IONS-SCNC: 17 MMOL/L (ref 7–16)
ANISOCYTOSIS BLD QL SMEAR: ABNORMAL
APTT PPP: 105.2 SECONDS (ref 25.2–37.3)
APTT PPP: 66.1 SECONDS (ref 25.2–37.3)
APTT PPP: >200 SECONDS (ref 25.2–37.3)
BASOPHILS # BLD AUTO: 0.07 K/UL (ref 0–0.2)
BASOPHILS NFR BLD AUTO: 0.3 % (ref 0–1)
BUN SERPL-MCNC: 47 MG/DL (ref 8–26)
BUN/CREAT SERPL: 4 (ref 6–20)
CALCIUM SERPL-MCNC: 6.5 MG/DL (ref 8.4–10.2)
CHLORIDE SERPL-SCNC: 93 MMOL/L (ref 98–107)
CK SERPL-CCNC: 952 U/L (ref 30–200)
CO2 SERPL-SCNC: 24 MMOL/L (ref 22–29)
CREAT SERPL-MCNC: 11.1 MG/DL (ref 0.72–1.25)
DIFFERENTIAL METHOD BLD: ABNORMAL
EGFR (NO RACE VARIABLE) (RUSH/TITUS): 5 ML/MIN/1.73M2
EOSINOPHIL # BLD AUTO: 0.02 K/UL (ref 0–0.5)
EOSINOPHIL NFR BLD AUTO: 0.1 % (ref 1–4)
ERYTHROCYTE [DISTWIDTH] IN BLOOD BY AUTOMATED COUNT: 17.9 % (ref 11.5–14.5)
GLUCOSE SERPL-MCNC: 307 MG/DL (ref 74–100)
HCT VFR BLD AUTO: 28.6 % (ref 40–54)
HGB BLD-MCNC: 8.7 G/DL (ref 13.5–18)
IMM GRANULOCYTES # BLD AUTO: 0.3 K/UL (ref 0–0.04)
IMM GRANULOCYTES NFR BLD: 1.3 % (ref 0–0.4)
LACTATE SERPL-SCNC: 1.5 MMOL/L (ref 0.5–2.2)
LACTATE SERPL-SCNC: 2.4 MMOL/L (ref 0.5–2.2)
LYMPHOCYTES # BLD AUTO: 0.49 K/UL (ref 1–4.8)
LYMPHOCYTES NFR BLD AUTO: 2.1 % (ref 27–41)
LYMPHOCYTES NFR BLD MANUAL: 3 % (ref 27–41)
MACROCYTES BLD QL SMEAR: ABNORMAL
MAGNESIUM SERPL-MCNC: 2.2 MG/DL (ref 1.6–2.6)
MCH RBC QN AUTO: 30.2 PG (ref 27–31)
MCHC RBC AUTO-ENTMCNC: 30.4 G/DL (ref 32–36)
MCV RBC AUTO: 99.3 FL (ref 80–96)
MONOCYTES # BLD AUTO: 0.43 K/UL (ref 0–0.8)
MONOCYTES NFR BLD AUTO: 1.8 % (ref 2–6)
MONOCYTES NFR BLD MANUAL: 1 % (ref 2–6)
MPC BLD CALC-MCNC: 12.3 FL (ref 9.4–12.4)
NEUTROPHILS # BLD AUTO: 22.58 K/UL (ref 1.8–7.7)
NEUTROPHILS NFR BLD AUTO: 94.4 % (ref 53–65)
NEUTS BAND NFR BLD MANUAL: 6 % (ref 1–5)
NEUTS SEG NFR BLD MANUAL: 90 % (ref 50–62)
NRBC # BLD AUTO: 0 X10E3/UL
NRBC, AUTO (.00): 0 %
PHOSPHATE SERPL-MCNC: 8.7 MG/DL (ref 2.3–4.7)
PLATELET # BLD AUTO: 207 K/UL (ref 150–400)
PLATELET MORPHOLOGY: ABNORMAL
POLYCHROMASIA BLD QL SMEAR: ABNORMAL
POTASSIUM SERPL-SCNC: 6.2 MMOL/L (ref 3.5–5.1)
RBC # BLD AUTO: 2.88 M/UL (ref 4.6–6.2)
SODIUM SERPL-SCNC: 128 MMOL/L (ref 136–145)
TARGETS BLD QL SMEAR: ABNORMAL
TRIGL SERPL-MCNC: 165 MG/DL (ref 34–140)
TROPONIN I SERPL HS-MCNC: 375.4 NG/L
TROPONIN I SERPL HS-MCNC: 423.8 NG/L
WBC # BLD AUTO: 23.89 K/UL (ref 4.5–11)

## 2024-11-14 PROCEDURE — 84100 ASSAY OF PHOSPHORUS: CPT | Performed by: STUDENT IN AN ORGANIZED HEALTH CARE EDUCATION/TRAINING PROGRAM

## 2024-11-14 PROCEDURE — 84478 ASSAY OF TRIGLYCERIDES: CPT | Performed by: HOSPITALIST

## 2024-11-14 PROCEDURE — 20000000 HC ICU ROOM

## 2024-11-14 PROCEDURE — 82550 ASSAY OF CK (CPK): CPT | Performed by: STUDENT IN AN ORGANIZED HEALTH CARE EDUCATION/TRAINING PROGRAM

## 2024-11-14 PROCEDURE — 83605 ASSAY OF LACTIC ACID: CPT | Performed by: HOSPITALIST

## 2024-11-14 PROCEDURE — 63600175 PHARM REV CODE 636 W HCPCS: Performed by: STUDENT IN AN ORGANIZED HEALTH CARE EDUCATION/TRAINING PROGRAM

## 2024-11-14 PROCEDURE — 25000003 PHARM REV CODE 250: Performed by: INTERNAL MEDICINE

## 2024-11-14 PROCEDURE — 36415 COLL VENOUS BLD VENIPUNCTURE: CPT | Performed by: HOSPITALIST

## 2024-11-14 PROCEDURE — 80100014 HC HEMODIALYSIS 1:1

## 2024-11-14 PROCEDURE — 27000207 HC ISOLATION

## 2024-11-14 PROCEDURE — 85730 THROMBOPLASTIN TIME PARTIAL: CPT | Performed by: STUDENT IN AN ORGANIZED HEALTH CARE EDUCATION/TRAINING PROGRAM

## 2024-11-14 PROCEDURE — 80048 BASIC METABOLIC PNL TOTAL CA: CPT | Performed by: STUDENT IN AN ORGANIZED HEALTH CARE EDUCATION/TRAINING PROGRAM

## 2024-11-14 PROCEDURE — 25000003 PHARM REV CODE 250: Performed by: STUDENT IN AN ORGANIZED HEALTH CARE EDUCATION/TRAINING PROGRAM

## 2024-11-14 PROCEDURE — 99900026 HC AIRWAY MAINTENANCE (STAT)

## 2024-11-14 PROCEDURE — 36415 COLL VENOUS BLD VENIPUNCTURE: CPT | Performed by: STUDENT IN AN ORGANIZED HEALTH CARE EDUCATION/TRAINING PROGRAM

## 2024-11-14 PROCEDURE — 83735 ASSAY OF MAGNESIUM: CPT | Performed by: STUDENT IN AN ORGANIZED HEALTH CARE EDUCATION/TRAINING PROGRAM

## 2024-11-14 PROCEDURE — 93010 ELECTROCARDIOGRAM REPORT: CPT | Mod: ,,, | Performed by: STUDENT IN AN ORGANIZED HEALTH CARE EDUCATION/TRAINING PROGRAM

## 2024-11-14 PROCEDURE — 85025 COMPLETE CBC W/AUTO DIFF WBC: CPT | Performed by: STUDENT IN AN ORGANIZED HEALTH CARE EDUCATION/TRAINING PROGRAM

## 2024-11-14 PROCEDURE — 99291 CRITICAL CARE FIRST HOUR: CPT | Mod: ,,, | Performed by: STUDENT IN AN ORGANIZED HEALTH CARE EDUCATION/TRAINING PROGRAM

## 2024-11-14 PROCEDURE — 94003 VENT MGMT INPAT SUBQ DAY: CPT

## 2024-11-14 PROCEDURE — 93005 ELECTROCARDIOGRAM TRACING: CPT

## 2024-11-14 PROCEDURE — 27000221 HC OXYGEN, UP TO 24 HOURS

## 2024-11-14 PROCEDURE — 25000003 PHARM REV CODE 250: Performed by: NURSE PRACTITIONER

## 2024-11-14 PROCEDURE — 99900035 HC TECH TIME PER 15 MIN (STAT)

## 2024-11-14 PROCEDURE — 94761 N-INVAS EAR/PLS OXIMETRY MLT: CPT

## 2024-11-14 PROCEDURE — 84484 ASSAY OF TROPONIN QUANT: CPT | Performed by: STUDENT IN AN ORGANIZED HEALTH CARE EDUCATION/TRAINING PROGRAM

## 2024-11-14 RX ORDER — SODIUM CHLORIDE 9 MG/ML
INJECTION, SOLUTION INTRAVENOUS
Status: DISCONTINUED | OUTPATIENT
Start: 2024-11-14 | End: 2024-12-02 | Stop reason: HOSPADM

## 2024-11-14 RX ORDER — HEPARIN SODIUM 1000 [USP'U]/ML
4000 INJECTION, SOLUTION INTRAVENOUS; SUBCUTANEOUS
Status: DISCONTINUED | OUTPATIENT
Start: 2024-11-14 | End: 2024-12-02 | Stop reason: HOSPADM

## 2024-11-14 RX ORDER — SODIUM CHLORIDE 9 MG/ML
INJECTION, SOLUTION INTRAVENOUS ONCE
Status: DISCONTINUED | OUTPATIENT
Start: 2024-11-14 | End: 2024-11-15

## 2024-11-14 RX ADMIN — CHLORHEXIDINE GLUCONATE 15 ML: 1.2 RINSE ORAL at 08:11

## 2024-11-14 RX ADMIN — SODIUM CHLORIDE: 9 INJECTION, SOLUTION INTRAVENOUS at 02:11

## 2024-11-14 RX ADMIN — PROPOFOL 40 MCG/KG/MIN: 10 INJECTION, EMULSION INTRAVENOUS at 07:11

## 2024-11-14 RX ADMIN — PROPOFOL 50 MCG/KG/MIN: 10 INJECTION, EMULSION INTRAVENOUS at 10:11

## 2024-11-14 RX ADMIN — HYDROCORTISONE SODIUM SUCCINATE 50 MG: 100 INJECTION, POWDER, FOR SOLUTION INTRAMUSCULAR; INTRAVENOUS at 12:11

## 2024-11-14 RX ADMIN — OSELTAMAVIR PHOSPHATE 30 MG: 30 CAPSULE ORAL at 08:11

## 2024-11-14 RX ADMIN — MUPIROCIN: 20 OINTMENT TOPICAL at 08:11

## 2024-11-14 RX ADMIN — HYDROCORTISONE SODIUM SUCCINATE 50 MG: 100 INJECTION, POWDER, FOR SOLUTION INTRAMUSCULAR; INTRAVENOUS at 11:11

## 2024-11-14 RX ADMIN — PROPOFOL 35 MCG/KG/MIN: 10 INJECTION, EMULSION INTRAVENOUS at 02:11

## 2024-11-14 RX ADMIN — PROPOFOL 25 MCG/KG/MIN: 10 INJECTION, EMULSION INTRAVENOUS at 08:11

## 2024-11-14 RX ADMIN — PIPERACILLIN SODIUM AND TAZOBACTAM SODIUM 4.5 G: 4; .5 INJECTION, POWDER, LYOPHILIZED, FOR SOLUTION INTRAVENOUS at 08:11

## 2024-11-14 RX ADMIN — AZITHROMYCIN MONOHYDRATE 500 MG: 500 INJECTION, POWDER, LYOPHILIZED, FOR SOLUTION INTRAVENOUS at 02:11

## 2024-11-14 RX ADMIN — FENTANYL CITRATE 100 MCG/HR: 50 INJECTION, SOLUTION INTRAMUSCULAR; INTRAVENOUS at 10:11

## 2024-11-14 RX ADMIN — PANTOPRAZOLE SODIUM 40 MG: 40 INJECTION, POWDER, LYOPHILIZED, FOR SOLUTION INTRAVENOUS at 08:11

## 2024-11-14 RX ADMIN — PROPOFOL 35 MCG/KG/MIN: 10 INJECTION, EMULSION INTRAVENOUS at 01:11

## 2024-11-14 RX ADMIN — METOPROLOL TARTRATE 100 MG: 100 TABLET, FILM COATED ORAL at 08:11

## 2024-11-14 RX ADMIN — HYDROCORTISONE SODIUM SUCCINATE 50 MG: 100 INJECTION, POWDER, FOR SOLUTION INTRAMUSCULAR; INTRAVENOUS at 06:11

## 2024-11-14 RX ADMIN — HYDROCORTISONE SODIUM SUCCINATE 50 MG: 100 INJECTION, POWDER, FOR SOLUTION INTRAMUSCULAR; INTRAVENOUS at 05:11

## 2024-11-14 RX ADMIN — HEPARIN SODIUM 14 UNITS/KG/HR: 10000 INJECTION, SOLUTION INTRAVENOUS at 09:11

## 2024-11-14 RX ADMIN — HEPARIN SODIUM 14 UNITS/KG/HR: 10000 INJECTION, SOLUTION INTRAVENOUS at 05:11

## 2024-11-14 NOTE — PLAN OF CARE
Per IDT pt remains on vent, intubated and sedated. HD following and monitoring Blood CX and CT for ABX regimen. Following DC needs as arise.

## 2024-11-14 NOTE — ASSESSMENT & PLAN NOTE
Provoked in setting of severe viral infection and critical illness. Acute PE and POCUS with R and L IJ occlusive opacities consistent with thrombus  - heparin gtt, high intensity for PE protocol

## 2024-11-14 NOTE — ASSESSMENT & PLAN NOTE
Encephalopathy: Metabolic Encephalopathy; multifactorial including very likely influenza encephalitis  Sedated for Mechanical Ventilation:  - analgesia: fentanyl gtt for goal pain score 0/10  - sedation: propofol for goal RASS -3  - TG QOD, acceptable  - post arrest evalaution with agitation and attempts at self extubation; will defer post-arrest normothermia protocol after neuro assessment, goal avoid fever, IV tylenol and cooling blankets to goal

## 2024-11-14 NOTE — ASSESSMENT & PLAN NOTE
Concern for overnight aspiration event causing acute respiratory distress with increased work of breathing and hypoxia refractory to NRB. Intubation with aspiration of gastric contents; s/p bronchoscopy with therapeutic aspiration of gastric contents. CT Chest with multifocal pneumonia and necrotic/cavitary component in R mid lung zone. Abx broadened with decompensation; day 1 11/13.  - cont Vancomycin (pharmacy to dose) and Zosyn; given concern for atypical infection will cover with Azithromycin as well  - obtain MRCA PCR; may be confounded by intranasal mupirocin administration  - f/u BCxs and Lower respiratory Cxs

## 2024-11-14 NOTE — ASSESSMENT & PLAN NOTE
Multifactorial with viral pneumonia and aspirationpneumonia. Intially with severe sepsis devolving to shock. Myositis and encephalitis suspected. Broadening coverage and will maintain Vancomycin.   - broaden Abx to Vancomycin (pharmacy to dose) and Zosyn; given concern for atypical infection will cover with Azithromycin as well  - obtain MRCA PCR; may be confounded by intranasal mupirocin administration  - f/u BCxs and Lower respiratory Cxs  - cont stress dose steroids for likely adrenalectomy and severe infection  - cont Levophed for goal MAP >65  TTE 11/13/2024: LVEF 45-50%, indeterminate diastolic function, normal RV size, no more systolic function, TAPSE 2, mildly dilated LA, mildly dilated RA, mild-to-moderate aortic stenosis, trace MR, mild-to-moderate TR

## 2024-11-14 NOTE — PROGRESS NOTES
Pt. is intubated and sedate, he did open his eyes to his name being called this morning.    PE chronically ill-appearing, lungs are clear to auscultation, abdomen is soft with decreased bowel sounds, heart is regular rate and rhythm, he has no pitting edema    Vitals:    11/14/24 0730 11/14/24 0745 11/14/24 0800 11/14/24 0815   BP: (!) 153/101 119/86 124/84    Pulse: 95 101 101 99   Resp: 15 15 12 15   Temp: 96.8 °F (36 °C) 97.2 °F (36.2 °C) 97.2 °F (36.2 °C) 97 °F (36.1 °C)   TempSrc:       SpO2: 100% 97% 97% 98%   Weight:       Height:            A/P 1. ESRD-will plan on dialysis today for his hyperkalemia, may take off about a L so as tolerated.    2. Hyperkalemia-patient's potassium 6.2 this morning, see 1. Above   3. Respiratory failure-continue vent support  4. Anemia- hct 29%  5.  Hypocalcemia-corrects to the 7 range when albumin is accounted for.  I suspect this is being driven down by his hyperphosphatemia.  6. Flu illness-continue antiviral

## 2024-11-14 NOTE — SUBJECTIVE & OBJECTIVE
Interval History/Significant Events: continued pyrexia, resolved RVR, early am with increased work of breathing with hypoxia placed on NRB, hyperkalemia recurrence, intubated for progressive respiratory distress, severe hypoxia, aspirated during ETT s/p successful therapeutic aspiration with bronchoscopy, am course with PEA arrest with ROSC, agitated post resuscitation warranting deep sedation    Review of Systems  Objective:     Vital Signs (Most Recent):  Temp: 98.2 °F (36.8 °C) (11/13/24 1845)  Pulse: 104 (11/13/24 1845)  Resp: 15 (11/13/24 1845)  BP: (!) 143/89 (11/13/24 1845)  SpO2: 99 % (11/13/24 1955) Vital Signs (24h Range):  Temp:  [98.2 °F (36.8 °C)-102.6 °F (39.2 °C)] 98.2 °F (36.8 °C)  Pulse:  [] 104  Resp:  [14-40] 15  SpO2:  [82 %-100 %] 99 %  BP: ()/() 143/89   Weight: 108.3 kg (238 lb 12.1 oz)  Body mass index is 32.38 kg/m².      Intake/Output Summary (Last 24 hours) at 11/13/2024 2039  Last data filed at 11/13/2024 1956  Gross per 24 hour   Intake 1509.05 ml   Output 2050 ml   Net -540.95 ml          Physical Exam  Constitutional:       General: He is in acute distress.      Appearance: He is ill-appearing and diaphoretic.   HENT:      Head: Normocephalic and atraumatic.      Mouth/Throat:      Mouth: Mucous membranes are moist.      Pharynx: No oropharyngeal exudate.   Eyes:      General: No scleral icterus.  Cardiovascular:      Rate and Rhythm: Normal rate. Rhythm irregular.      Heart sounds: Murmur heard.   Pulmonary:      Effort: Respiratory distress present.      Breath sounds: No wheezing.      Comments: Increased work of breathing, deep sedation for vent synchrony  Abdominal:      General: There is distension.      Palpations: Abdomen is soft.      Tenderness: There is no abdominal tenderness. There is no guarding.   Musculoskeletal:      Right lower leg: No edema.      Left lower leg: No edema.   Skin:     General: Skin is warm.      Coloration: Skin is not jaundiced.    Neurological:      Mental Status: He is alert.   Psychiatric:      Comments: Anxious, agitated post intubation            Vents:  Vent Mode: A/CMV-VC (11/13/24 1955)  Ventilator Initiated: Yes (11/13/24 0735)  Set Rate: 20 BPM (11/13/24 1955)  Vt Set: 500 mL (11/13/24 1955)  PEEP/CPAP: 12 cmH20 (11/13/24 1955)  Oxygen Concentration (%): 60 (11/13/24 1955)  Peak Airway Pressure: 34.4 cmH20 (11/13/24 1955)  Plateau Pressure: 18.1 cmH20 (11/13/24 1955)  Total Ve: 10.6 L/m (11/13/24 1955)  F/VT Ratio<105 (RSBI): (!) 41.49 (11/13/24 1501)  Lines/Drains/Airways       Central Venous Catheter Line  Duration                  Hemodialysis Catheter right subclavian -- days    Percutaneous Central Line - Triple Lumen  11/13/24 1400 Femoral Vein Left <1 day              Drain  Duration                  NG/OG Tube 11/13/24 0816 Gentry sump 16 Fr. Right nostril <1 day              Airway  Duration                  Airway - Non-Surgical 11/13/24 0732 Endotracheal Tube <1 day                  Significant Labs:    CBC/Anemia Profile:  Recent Labs   Lab 11/12/24 2232 11/13/24  0545 11/13/24  1014   WBC 11.86* 14.93* 21.42*   HGB 10.6* 9.5* 10.2*   HCT 35.3* 30.5* 33.4*    213 288   .0* 99.7* 100.0*   RDW 18.4* 18.2* 18.1*        Chemistries:  Recent Labs   Lab 11/12/24  0453 11/12/24 2232 11/13/24  0545 11/13/24  1014   * 135* 131* 138   K 6.8* 6.3* 7.4* 4.9   CL 98 100 98 97*   CO2 28 24 21 23   BUN 48* 35* 42* 30*   CREATININE 12.00* 9.89* 10.60* 7.86*   CALCIUM 7.1* 8.0* 7.5* 8.2*   ALBUMIN 3.3*  --  3.3* 3.4*   PROT 7.0  --  7.4 7.4   BILITOT 0.6  --  0.6 0.9   ALKPHOS 89  --  90 89   ALT 11*  --  20 28   AST 18  --  39* 89*   MG 1.8  --  2.1 2.3   PHOS 6.9*  --  6.2*  --        ABGs:   Recent Labs   Lab 11/13/24  1500   PH 7.27*   PCO2 53*   HCO3 24.3   POCSATURATED 89*     All pertinent labs within the past 24 hours have been reviewed.    Significant Imaging:  I have reviewed all pertinent imaging  results/findings within the past 24 hours.

## 2024-11-14 NOTE — ASSESSMENT & PLAN NOTE
S/p bronchoscopy and therapeutic aspiration, successful for ETT and proximal airway clearance to subsegmental level on 11/13. Further management as per pneumonia.

## 2024-11-14 NOTE — NURSING
[facility] [place] [facility]PATIENT [facility]  DIALYSIS TREATMENT SUMMARY      Note: Consult with the attending physician for patient treatment orders, this document is not a physician order.      Patient Information   Patient Saúl Butt   Date of Birth June 26, 1970   Chart Number [id number]   Location [facility]   Location MRN [id number]   Gender Male   SSN (last 4) 216     Treatment Information   Treatment Type Hemodialysis   Treatment Id [id number]   Start Time November 14, 2024 14:00   End Time November 14, 2024 17:00   Acutal Duration 03:00     Treatment Balances   Total Saline Administered 300   Net Fluid Balance 1200    Hemodialysis Orders   Therapy Standard   Orders Verified Time 11/14/2024 13:59    Date Verified 11/14/2024   Duration 3:00   Isolated UF/Bypass No   BFR (mL) 400   DFR X2 BFR   Dialyzer Type OPTIFLUX 160NR   UF Order UF Goal Ordered   UF Goal Ordered (mL) 1500   As Tolerated Yes   Crit-Line used No   Heparin Initial Units Bolus No   Heparin IV Maintenance Bolus No   Heparin IV Infusion No   Potassium (mEq/L) 2.0   Calcium (mEq/L) 2.5   Bicarb (mg/dL) 33   Sodium (mEq/L) 137   Additional Orders Turn UF off if patient becomes symptomatic associated with drop in SBP >20 mmHg.   Clinician Georgie Choi RN    Dialysis Access   Access Type Central Venous Catheter   Central Venous Catheter   Access Type Catheter - Tunneled   Access Location Chest Wall - R   Catheter Care Completed per Policy Yes   Dressing Dry and Intact on Arrival Yes   Dressing Changed Yes   Type of Dressing Film Biopatch/CHG   Dressing Changed By [facility] Staff   Type of HD Caps Clear Guard   HD Caps Changed Yes   CVC Line Education Provided Yes - Infection Prevention      Vitals   Pre-Treatment Vitals   Time Is BP being recorded? Pre BP Map BP Method Pulse RR Temp How was Weight Obtained? Pre Weight Previous Dry Weight Previous Post Weight Metric Target Fluid Removal (mL) Dialysate Confirmed Clinician    11/14/2024 13:50 BP/Map 94/59  (71) Noninvasive 75 16 96.1 How Obtained: Bed Scale 104.7   Kgs 1500 Yes Georgie Choi RN    Comments: received pt lying in bed, NAD noted, pt stable. resp even/unlabored, pt on ventilator. plan of care reviewed.      Post Treatment Vitals   Time Is BP being recorded? BP Map Pulse RR Temp How was Weight Obtained? Post Weight Metric BVP UF Goal Ordered NSS Given Intra-Procedure Total Machine UF Removed (mL) Crit-Line Ending Profile Crit-Line Refill Crit-Line Ending HCT Crit-Line Max BV% Clinician   11/14/2024 17:02 BP/Map 154/102 (119) 108 16 96.1 How Obtained: Bed scale 103.7 Kgs 60 1500 0 1500     Georgie Choi RN   Comments: tolerated tx well, VSS, resp even/unlabored. blood returned, report given.      Safety checks include: access uncovered and secured, Hemaclip secured for all central line access, machine checks performed, and alarm limits confirmed.     Labs   Hepatitis   HBsAG Lab Result HBsAG Lab Result HBsAG Draw Date Transient Antigenemia(MD Diagnosis Only) Anti-HBs Lab Result Anti-HBs Lab Value Anti-HBs Draw Date Documented By Documented Date Hepatitis Status Hepatitis Status   Negative  11/11/2024  Unknown   Georgie Choi 11/14/2024  Susceptible   Notes:       Pre-Treatment Hepatitis Precautions Patient tx outside buffer zone Yes Signing   Patient tx at bedside 1:1 Yes Copy of hepatitis results verified in hospital EMR Yes Signed By Georgie Choi RN   Patient tx with immune pts only Yes Hepatitis Information Entered By Georgie Choi RN      Pre-Treatment Handoff   Staff Report Received Yes   Report Given by Primary Nurse Ayaan Dwyer RN   Time 13:30     Patient Arrival   Patient ID Verified Date of Birth    MRN    Full Name   Patient Consent to treatment verified Yes   Blood Transfusion Consent Verified N/A     Treatment Comments   Treatment Notes tolerated tx well     Post-Treatment Handoff   Report Given to Primary Nurse Ayaan Dwyer RN   Time Report Given 17:15   Report Given By Georgie Choi  RN    Machine Validation   Time 13:00   Date 11/14/2024   Auto Alarm Test Passed Yes   Machine Serial # [id number]   Portable RO Yes   RO Serial# [id number]   Residual Bleach Negative Yes   Was a manufactured mix used? Yes   Machine Log Completed Yes   Total Chlorine (less than 0.1)? Yes   Total Chlorine Log Completed Yes   Bicarb BiBag   Bibag Size 650   Machine Temp 37   Machine Conductivity 13.5   Meter Type N/A   Meter Conductivity    Independent Conductivity 13.8   pH Status Pass Pass   pH    TCD Value    TCD Alarm with +/- 0.5 Yes   NVL enabled validated 100 asymmetric Yes   Safety check complete Georgie Choi RN   Second Verification Performed? No   Second Verification Performed By    Reason Not Verified No other staff members located at the hospital      Serum Lab Values   Time BUN Creatinine Na K (mEq/L) Cl CO2 Ca (mEq/L) Phos Mg (mg/dL) Alb (g/dL) Glucose Hgb Hct WBC Plt PT aPTT INR Other Clinician   11/14/2024 02:27 47 11.10 128 6.2 93 24 6.5 8.7 2.2  307 8.7 28.6 23.89 207     Georgie Choi RN   Comments:         Facility Information Room # ICS Isolation Information   Facility Information Bed # 2 Isolation Required? Y   Admission Date 11/11/2024 Patient Type Chronic dialysis patient with diagnosis of ESRD If yes, Explain Other   Ordering MD Dr. Martinez Patient Chronic Unit [facility] Isolation Type Droplet   Account/Finance Number [id number] Code Status Full Code Completed by   Admission Status InPatient Diagnosis General Tx information Entered by Georgie Choi RN   Location Dialysis Suite 1:1 Diagnosis Acute Respiratory failure with hypoxemia      Start Treatment Time Out Confirmed by Mariah Correct access site verified Yes   Treatment Initiation Connections Secured Time Out Completed 13:59 Treatment Start Date 11/14/2024    Saline line double clamped Correct patient verified Yes Treatment Start Time 14:00    Hemaclip Applied Correct procedure verified Yes Patient/Family questions and concerns addressed  Yes     Pre Focused Assessment Notes ventilator LOC Sedated   Access Edema GI / Bowels   Signs and Symptoms of Infection? No Location Generalized GI Bowel sounds present   Pain Screening Edema Grade 2+    Does the patient have pain? No Cardiac  Voids   Respiratory Heart Rhythm Regular Completed by   Lung Sounds Diminished Telemetry Yes Pre Treatment Focused Assessment Completed By Georgie Choi RN   Location Bilateral Skin Time 13:51   Position Posterior Skin Warm Signing    Anterior  Dry Signed By Georgie Choi RN   Respiratory Efforts Unlabored LOC      Education Reason Medically sedated Patient Education N/A   Patient Education Family Education Provided? N/A    Patient Educated? No Caregiver Education Provided? N/A      Post-Treatment Delay N Post Treatment General Information   Post Treatment Machine Disinfection Requirement Notes tolerated tx well   Patient Transferred to Higher Level of Care? No HD machine external disinfection completed per policy Yes Completed by   Post Treatment Delay PRO external disinfection completed per policy Yes Post Treatment Form Completed By Georgie Choi RN     Post Focused Assessment Location Bilateral LOC   Changes from Pre Focused Assessment Position Posterior LOC Sedated   Changes from Pre Treatment Focused Assessment? No  Anterior GI / Bowels   Access Respiratory Efforts Unlabored GI Bowel sounds present   Cath Packed with Heparin Notes ventilator    Access Port(ml) 2 Edema  Voids   Return Port(ml) 2 Location Generalized Completed by   Catheter clamped and capped Yes Edema Grade 2+ Post Treatment Focused Assessment Completed by Georgie Choi RN   Access Flow Good Cardiac Date 11/14/2024   Dialyzer Clearance Streaked Heart Rhythm Regular Time 17:01   Pain Screening Telemetry Yes Signing   Does the patient have pain? No Skin Signed By Georgie Choi RN   Respiratory Skin Warm    Lung Sounds Diminished  Dry

## 2024-11-14 NOTE — ASSESSMENT & PLAN NOTE
2/2 Aspiration, severe Influenza A infection and multifocal pneumonia  - severe ARDS w/ P/F 108 on intubation 11/13; ventilator assessed, dyssynchrony with lighter sedation  - deeper sedation goal as per Neuro plan, has not required paralytic  - prone positioning contraindicated Day 1 for hemodynamic instability with PEA arrest  - cont VC/AC 20/500/8/0.6   -- PEEP downstep with acceptable driving pressure 12   -- wean FiO2, will maintain at PEEP 8  - ABG in PM for P/F evaluation

## 2024-11-14 NOTE — PROGRESS NOTES
[facility]  Critical Care Medicine  Progress Note    Patient Name: Saúl Butt  MRN: [id number]  Admission Date: 11/11/2024  Hospital Length of Stay: 3 days  Code Status: Full Code  Attending Provider: Jade Medley MD  Primary Care Provider: Ruth, Primary Doctor   Principal Problem: Acute respiratory failure with hypoxemia    Subjective:     HPI:  54-year-old  male presented to the ED via EMS.  Past medical history of end-stage renal disease, hypertension, Chronic anemia, Chronic diastolic CHF, moderate aortic stenosis, DVT, Hyperlipidemia, Paroxysmal atrial fibrillation, and malignant neoplasm of kidney.  Patient states he goes to dialysis Monday, Wednesday, and Friday.  States last day to dialyze was this past Friday, 11/8/24.  Patient states he has not missed any dialysis days. Patient was due to dialyze today, however he presented to the ED with 3 day history of shortness of breath which is worse with lying down, and he also reports having a cough.  He also reports 3 day history of nausea, vomiting, diarrhea. Patient reports having some fever on and off.  Denies any pain at present.    ED workup revealed sodium 131, potassium 8.4, anion gap 17, BUN and creatinine 60/13.4.  Venous blood gas revealed pH 7.42, pCO2 44, sodium 131, potassium 8.2, bicarb 28.5, lactate 1.5, and glucose 130.  Chest x-ray showed cardiomegaly with pulmonary edema.  BNP 55,331. In the ED patient received calcium gluconate 1 g IV, D10 500 mL IV, and Humulin R 10 units.  Critical care service was asked to admit patient to the ICU, where he will be dialyzed emergently to treat hyperkalemia.    Hospital/ICU Course:  11/12-- potassium 6.8 - HD gain today - went into Afib rvr on HD   11/13: continued pyrexia, resolved RVR, early am with increased work of breathing with hypoxia placed on NRB, hyperkalemia recurrence, intubated for progressive respiratory distress, severe hypoxia, aspirated during ETT s/p  successful therapeutic aspiration with bronchoscopy, am course with PEA arrest with ROSC, agitated post resuscitation warranting deep sedation      Interval History/Significant Events: defervescence overnight, resolved agitation, improved sedation, weaning PEEP and FiO2, planned dialysis    Review of Systems  Objective:     Vital Signs (Most Recent):  Temp: 97 °F (36.1 °C) (11/14/24 0815)  Pulse: 95 (11/14/24 0835)  Resp: 15 (11/14/24 0815)  BP: 121/80 (11/14/24 0835)  SpO2: 98 % (11/14/24 0815) Vital Signs (24h Range):  Temp:  [96.8 °F (36 °C)-102 °F (38.9 °C)] 97 °F (36.1 °C)  Pulse:  [] 95  Resp:  [12-35] 15  SpO2:  [88 %-100 %] 98 %  BP: ()/() 121/80   Weight: 106.4 kg (234 lb 9.1 oz)  Body mass index is 31.81 kg/m².      Intake/Output Summary (Last 24 hours) at 11/14/2024 0929  Last data filed at 11/14/2024 0738  Gross per 24 hour   Intake 2178.91 ml   Output 2050 ml   Net 128.91 ml          Physical Exam  Constitutional:       Appearance: He is ill-appearing. He is not diaphoretic.   HENT:      Head: Normocephalic and atraumatic.      Mouth/Throat:      Mouth: Mucous membranes are moist.      Pharynx: No oropharyngeal exudate.      Comments: ETT secured  Eyes:      General: No scleral icterus.  Cardiovascular:      Rate and Rhythm: Normal rate. Rhythm irregular.      Heart sounds: Murmur heard.   Pulmonary:      Effort: No respiratory distress.      Breath sounds: Rhonchi present. No wheezing.      Comments: Synchronous with ventilator, intermittently agitated  Abdominal:      General: There is no distension.      Palpations: Abdomen is soft.      Tenderness: There is no abdominal tenderness. There is no guarding.   Musculoskeletal:      Right lower leg: No edema.      Left lower leg: No edema.   Skin:     General: Skin is warm.      Coloration: Skin is not jaundiced.   Neurological:      Mental Status: He is alert.      Comments: Sedated, gag present, cough present, awakens with agitation  with noxious stimulus, moving all extremities spontaneously   Psychiatric:      Comments:              Vents:  Vent Mode: A/CMV-VC (11/14/24 0721)  Ventilator Initiated: Yes (11/13/24 0735)  Set Rate: 16 BPM (11/14/24 0721)  Vt Set: 500 mL (11/14/24 0721)  PEEP/CPAP: 10 cmH20 (11/14/24 0721)  Oxygen Concentration (%): 50 (11/14/24 0721)  Peak Airway Pressure: 24.8 cmH20 (11/14/24 0721)  Plateau Pressure: 19.8 cmH20 (11/14/24 0721)  Total Ve: 7.84 L/m (11/14/24 0721)  F/VT Ratio<105 (RSBI): (!) 33.86 (11/13/24 2319)  Lines/Drains/Airways       Central Venous Catheter Line  Duration                  Hemodialysis Catheter right subclavian -- days    Percutaneous Central Line - Triple Lumen  11/13/24 1400 Femoral Vein Left <1 day              Drain  Duration                  NG/OG Tube 11/13/24 0816 [place] sump 16 Fr. Right nostril 1 day              Airway  Duration                  Airway - Non-Surgical 11/13/24 0732 Endotracheal Tube 1 day                  Significant Labs:    CBC/Anemia Profile:  Recent Labs   Lab 11/13/24  0545 11/13/24  1014 11/14/24 0227   WBC 14.93* 21.42* 23.89*   HGB 9.5* 10.2* 8.7*   HCT 30.5* 33.4* 28.6*    288 207   MCV 99.7* 100.0* 99.3*   RDW 18.2* 18.1* 17.9*        Chemistries:  Recent Labs   Lab 11/13/24  0545 11/13/24  1014 11/14/24 0227   * 138 128*   K 7.4* 4.9 6.2*   CL 98 97* 93*   CO2 21 23 24   BUN 42* 30* 47*   CREATININE 10.60* 7.86* 11.10*   CALCIUM 7.5* 8.2* 6.5*   ALBUMIN 3.3* 3.4*  --    PROT 7.4 7.4  --    BILITOT 0.6 0.9  --    ALKPHOS 90 89  --    ALT 20 28  --    AST 39* 89*  --    MG 2.1 2.3 2.2   PHOS 6.2*  --  8.7*       ABGs:   Recent Labs   Lab 11/13/24  1500   PH 7.27*   PCO2 53*   HCO3 24.3   POCSATURATED 89*     All pertinent labs within the past 24 hours have been reviewed.    Significant Imaging:  I have reviewed all pertinent imaging results/findings within the past 24 hours.    ABG  Recent Labs   Lab 11/13/24  1500   PH 7.27*   PO2 65*   PCO2  53*   HCO3 24.3     Assessment/Plan:     Neuro  Acute metabolic encephalopathy  Encephalopathy: Metabolic Encephalopathy; multifactorial including very likely influenza encephalitis  Sedated for Mechanical Ventilation:  - analgesia: fentanyl gtt for goal pain score 0/10  - sedation: propofol for goal RASS -3  - TG QOD, acceptable  - post arrest evalaution with agitation and attempts at self extubation; will defer post-arrest normothermia protocol after neuro assessment, goal avoid fever, IV tylenol and cooling blankets to goal    Pulmonary  * Acute respiratory failure with hypoxemia  2/2 Aspiration, severe Influenza A infection and multifocal pneumonia  - severe ARDS w/ P/F 108 on intubation 11/13; ventilator assessed, dyssynchrony with lighter sedation  - deeper sedation goal as per Neuro plan, has not required paralytic  - prone positioning contraindicated Day 1 for hemodynamic instability with PEA arrest  - cont VC/AC 20/500/8/0.6   -- PEEP downstep with acceptable driving pressure 12   -- wean FiO2, will maintain at PEEP 8  - ABG in PM for P/F evaluation    Acute respiratory distress syndrome  2/2 viral pneumonia. Improving with high PEEP     Aspiration pneumonia of both lungs due to gastric secretions  Concern for overnight aspiration event causing acute respiratory distress with increased work of breathing and hypoxia refractory to NRB. Intubation with aspiration of gastric contents; s/p bronchoscopy with therapeutic aspiration of gastric contents. CT Chest with multifocal pneumonia and necrotic/cavitary component in R mid lung zone. Abx broadened with decompensation; day 1 11/13.  - cont Vancomycin (pharmacy to dose) and Zosyn; given concern for atypical infection will cover with Azithromycin as well  - obtain MRCA PCR; may be confounded by intranasal mupirocin administration  - f/u BCxs and Lower respiratory Cxs    Aspiration pneumonitis  S/p bronchoscopy and therapeutic aspiration, successful for ETT and  proximal airway clearance to subsegmental level on 11/13. Further management as per pneumonia.    Cardiac/Vascular  Cardiac arrest  PEA arrest with minimal down time. At this time etiology is unclear and will be attributed to electrolyte and acid/base derangements. PEA arrest occurred hours post intubation with no hypoxia. No central PE to contribute to hemodynamic burden and burden non convincing for distal propagation with compressions. Post ROSC EKG and TTE completed 11/13 not convincing for acute coronary syndrome; Tn peaked on second draw.     Atrial fibrillation with RVR  RESOLVED. Afib with RVR 2/2 sepsis with severe viral infection. Managed with esmolol gtt.   - given critical illness, if recurrence will plan on amiodarone gtt  - holding home Eliquis; will reinstate for PE, currently covered with heparin gtt  - will stop home lopressor during managaement of shock      Renal/  Hyperkalemia  : Recurrent Hyperkalemia, attributed to Influenza; CK up-trending and remains <1k; combativeness likely contributing in upternend. Recurrence of hyperK between HD sessions warranting daily requirements; medications reviewed with no culprits  - cont HD  - appreciate Nephrology recs    ESRD on hemodialysis  HD as per hyperkalemia plan. Of note, patient is s/p b/l nephrectomy on CT A&P.    ID  Influenza A  Severe Influenza A infection.   - pyrexia resolved with IV tylenol  - cont Tamiflu to be dosed post-HD; at this time daily requirement; will plan for extended course for severe infection    Septic shock  Multifactorial with viral pneumonia and aspirationpneumonia. Intially with severe sepsis devolving to shock. Myositis and encephalitis suspected. Broadening coverage and will maintain Vancomycin.   - broaden Abx to Vancomycin (pharmacy to dose) and Zosyn; given concern for atypical infection will cover with Azithromycin as well  - obtain MRCA PCR; may be confounded by intranasal mupirocin administration  - f/u BCxs and  Lower respiratory Cxs  - cont stress dose steroids for likely adrenalectomy and severe infection  - cont Levophed for goal MAP >65  TTE 11/13/2024: LVEF 45-50%, indeterminate diastolic function, normal RV size, no more systolic function, TAPSE 2, mildly dilated LA, mildly dilated RA, mild-to-moderate aortic stenosis, trace MR, mild-to-moderate TR    Oncology  Anemia in chronic kidney disease  CBC stable.     Other  Acute pulmonary embolism without acute cor pulmonale  Provoked in setting of severe viral infection and critical illness. Acute PE and POCUS with R and L IJ occlusive opacities consistent with thrombus  - heparin gtt, high intensity for PE protocol        Critical Care Medicine Daily Checklist:11/14/2024   F: Feeding Starting tube feeds   A: Analgesia Infusion Fentanyl   S: Sedation CAM-ICU positive RASS goal -2 to -3  Propofol           T: Thromboprophylaxis Lower extremity SCD and On systemic anticoagulation with heparin gtt   H: HOB > 300 Yes.   U: Stress Ulcer Prophylaxis PPI   G: Glucose Control Within goal (upper limit 140-180 mg/dL).   S: SAT & SBT Coordinated?  Contraindicated.   B: Bowel Regimen No BM in last 24hrs.   I: Indwelling Catheter Reviewed Maintain: Triple/Quad Lumen and Dialysis catheter  Remove: N/A   D: De-escalation of Antimicrobials No    Disposition ICU       Critical Care Time: 46 minutes  Critical secondary to Patient has a condition that poses threat to life and bodily function: Severe Respiratory Distress  Patient has an abrupt change in neurologic status: Encephalopathy      Critical care was time spent personally by me on the following activities: development of treatment plan with patient or surrogate and bedside caregivers, discussions with consultants, evaluation of patient's response to treatment, examination of patient, ordering and performing treatments and interventions, ordering and review of laboratory studies, ordering and review of radiographic studies, pulse  oximetry, re-evaluation of patient's condition. This critical care time did not overlap with that of any other provider or involve time for any procedures.     Jade Medley MD  Critical Care Medicine  [facility]

## 2024-11-14 NOTE — SUBJECTIVE & OBJECTIVE
Interval History/Significant Events: defervescence overnight, resolved agitation, improved sedation, weaning PEEP and FiO2, planned dialysis    Review of Systems  Objective:     Vital Signs (Most Recent):  Temp: 97 °F (36.1 °C) (11/14/24 0815)  Pulse: 95 (11/14/24 0835)  Resp: 15 (11/14/24 0815)  BP: 121/80 (11/14/24 0835)  SpO2: 98 % (11/14/24 0815) Vital Signs (24h Range):  Temp:  [96.8 °F (36 °C)-102 °F (38.9 °C)] 97 °F (36.1 °C)  Pulse:  [] 95  Resp:  [12-35] 15  SpO2:  [88 %-100 %] 98 %  BP: ()/() 121/80   Weight: 106.4 kg (234 lb 9.1 oz)  Body mass index is 31.81 kg/m².      Intake/Output Summary (Last 24 hours) at 11/14/2024 0929  Last data filed at 11/14/2024 0738  Gross per 24 hour   Intake 2178.91 ml   Output 2050 ml   Net 128.91 ml          Physical Exam  Constitutional:       Appearance: He is ill-appearing. He is not diaphoretic.   HENT:      Head: Normocephalic and atraumatic.      Mouth/Throat:      Mouth: Mucous membranes are moist.      Pharynx: No oropharyngeal exudate.      Comments: ETT secured  Eyes:      General: No scleral icterus.  Cardiovascular:      Rate and Rhythm: Normal rate. Rhythm irregular.      Heart sounds: Murmur heard.   Pulmonary:      Effort: No respiratory distress.      Breath sounds: Rhonchi present. No wheezing.      Comments: Synchronous with ventilator, intermittently agitated  Abdominal:      General: There is no distension.      Palpations: Abdomen is soft.      Tenderness: There is no abdominal tenderness. There is no guarding.   Musculoskeletal:      Right lower leg: No edema.      Left lower leg: No edema.   Skin:     General: Skin is warm.      Coloration: Skin is not jaundiced.   Neurological:      Mental Status: He is alert.      Comments: Sedated, gag present, cough present, awakens with agitation with noxious stimulus, moving all extremities spontaneously   Psychiatric:      Comments:              Vents:  Vent Mode: A/CMV-VC (11/14/24  0721)  Ventilator Initiated: Yes (11/13/24 0735)  Set Rate: 16 BPM (11/14/24 0721)  Vt Set: 500 mL (11/14/24 0721)  PEEP/CPAP: 10 cmH20 (11/14/24 0721)  Oxygen Concentration (%): 50 (11/14/24 0721)  Peak Airway Pressure: 24.8 cmH20 (11/14/24 0721)  Plateau Pressure: 19.8 cmH20 (11/14/24 0721)  Total Ve: 7.84 L/m (11/14/24 0721)  F/VT Ratio<105 (RSBI): (!) 33.86 (11/13/24 2319)  Lines/Drains/Airways       Central Venous Catheter Line  Duration                  Hemodialysis Catheter right subclavian -- days    Percutaneous Central Line - Triple Lumen  11/13/24 1400 Femoral Vein Left <1 day              Drain  Duration                  NG/OG Tube 11/13/24 0816 [place] sump 16 Fr. Right nostril 1 day              Airway  Duration                  Airway - Non-Surgical 11/13/24 0732 Endotracheal Tube 1 day                  Significant Labs:    CBC/Anemia Profile:  Recent Labs   Lab 11/13/24 0545 11/13/24  1014 11/14/24 0227   WBC 14.93* 21.42* 23.89*   HGB 9.5* 10.2* 8.7*   HCT 30.5* 33.4* 28.6*    288 207   MCV 99.7* 100.0* 99.3*   RDW 18.2* 18.1* 17.9*        Chemistries:  Recent Labs   Lab 11/13/24 0545 11/13/24  1014 11/14/24 0227   * 138 128*   K 7.4* 4.9 6.2*   CL 98 97* 93*   CO2 21 23 24   BUN 42* 30* 47*   CREATININE 10.60* 7.86* 11.10*   CALCIUM 7.5* 8.2* 6.5*   ALBUMIN 3.3* 3.4*  --    PROT 7.4 7.4  --    BILITOT 0.6 0.9  --    ALKPHOS 90 89  --    ALT 20 28  --    AST 39* 89*  --    MG 2.1 2.3 2.2   PHOS 6.2*  --  8.7*       ABGs:   Recent Labs   Lab 11/13/24  1500   PH 7.27*   PCO2 53*   HCO3 24.3   POCSATURATED 89*     All pertinent labs within the past 24 hours have been reviewed.    Significant Imaging:  I have reviewed all pertinent imaging results/findings within the past 24 hours.

## 2024-11-14 NOTE — ASSESSMENT & PLAN NOTE
PEA arrest with minimal down time. At this time etiology is unclear and will be attributed to electrolyte and acid/base derangements. PEA arrest occurred hours post intubation with no hypoxia. No central PE to contribute to hemodynamic burden and burden non convincing for distal propagation with compressions. Post ROSC EKG and TTE completed 11/13 not convincing for acute coronary syndrome; Tn peaked on second draw.

## 2024-11-14 NOTE — PLAN OF CARE
Problem: Adult Inpatient Plan of Care  Goal: Plan of Care Review  Outcome: Progressing  Goal: Patient-Specific Goal (Individualized)  Outcome: Progressing  Goal: Absence of Hospital-Acquired Illness or Injury  Outcome: Progressing  Goal: Optimal Comfort and Wellbeing  Outcome: Progressing  Goal: Readiness for Transition of Care  Outcome: Progressing     Problem: Infection  Goal: Absence of Infection Signs and Symptoms  Outcome: Progressing     Problem: Hemodialysis  Goal: Safe, Effective Therapy Delivery  Outcome: Progressing  Goal: Effective Tissue Perfusion  Outcome: Progressing  Goal: Absence of Infection Signs and Symptoms  Outcome: Progressing     Problem: Sepsis/Septic Shock  Goal: Optimal Coping  Outcome: Progressing  Goal: Absence of Bleeding  Outcome: Progressing  Goal: Blood Glucose Level Within Targeted Range  Outcome: Progressing  Goal: Absence of Infection Signs and Symptoms  Outcome: Progressing  Goal: Optimal Nutrition Intake  Outcome: Progressing     Problem: Gas Exchange Impaired  Goal: Optimal Gas Exchange  Outcome: Progressing     Problem: Fall Injury Risk  Goal: Absence of Fall and Fall-Related Injury  Outcome: Progressing     Problem: Restraint, Nonviolent  Goal: Absence of Harm or Injury  Outcome: Progressing     Problem: Mechanical Ventilation Invasive  Goal: Effective Communication  Outcome: Progressing  Goal: Optimal Device Function  Outcome: Progressing  Goal: Mechanical Ventilation Liberation  Outcome: Progressing  Goal: Optimal Nutrition Delivery  Outcome: Progressing  Goal: Absence of Device-Related Skin and Tissue Injury  Outcome: Progressing  Goal: Absence of Ventilator-Induced Lung Injury  Outcome: Progressing     Problem: Skin Injury Risk Increased  Goal: Skin Health and Integrity  Outcome: Progressing     Problem: ARDS (Acute Respiratory Distress Syndrome)  Goal: Effective Oxygenation  Outcome: Progressing

## 2024-11-14 NOTE — ASSESSMENT & PLAN NOTE
: Recurrent Hyperkalemia, attributed to Influenza; CK up-trending and remains <1k; combativeness likely contributing in upternend. Recurrence of hyperK between HD sessions warranting daily requirements; medications reviewed with no culprits  - cont HD  - appreciate Nephrology recs

## 2024-11-14 NOTE — PLAN OF CARE
Problem: Gas Exchange Impaired  Goal: Optimal Gas Exchange  Outcome: Progressing     Problem: Mechanical Ventilation Invasive  Goal: Effective Communication  Outcome: Progressing  Goal: Optimal Device Function  Outcome: Progressing  Goal: Mechanical Ventilation Liberation  Outcome: Progressing  Goal: Optimal Nutrition Delivery  Outcome: Progressing  Goal: Absence of Device-Related Skin and Tissue Injury  Outcome: Progressing  Goal: Absence of Ventilator-Induced Lung Injury  Outcome: Progressing

## 2024-11-14 NOTE — ASSESSMENT & PLAN NOTE
Severe Influenza A infection.   - pyrexia resolved with IV tylenol  - cont Tamiflu to be dosed post-HD; at this time daily requirement; will plan for extended course for severe infection

## 2024-11-14 NOTE — PLAN OF CARE
Problem: Adult Inpatient Plan of Care  Goal: Plan of Care Review  Outcome: Progressing  Goal: Patient-Specific Goal (Individualized)  Outcome: Progressing  Goal: Absence of Hospital-Acquired Illness or Injury  Outcome: Progressing  Goal: Optimal Comfort and Wellbeing  Outcome: Progressing  Goal: Readiness for Transition of Care  Outcome: Progressing     Problem: Infection  Goal: Absence of Infection Signs and Symptoms  Outcome: Progressing     Problem: Hemodialysis  Goal: Safe, Effective Therapy Delivery  Outcome: Progressing  Goal: Effective Tissue Perfusion  Outcome: Progressing  Goal: Absence of Infection Signs and Symptoms  Outcome: Progressing     Problem: Sepsis/Septic Shock  Goal: Optimal Coping  Outcome: Progressing  Goal: Absence of Bleeding  Outcome: Progressing  Goal: Blood Glucose Level Within Targeted Range  Outcome: Progressing  Goal: Absence of Infection Signs and Symptoms  Outcome: Progressing  Goal: Optimal Nutrition Intake  Outcome: Progressing     Problem: Gas Exchange Impaired  Goal: Optimal Gas Exchange  Outcome: Progressing     Problem: Fall Injury Risk  Goal: Absence of Fall and Fall-Related Injury  Outcome: Progressing     Problem: Mechanical Ventilation Invasive  Goal: Effective Communication  Outcome: Progressing  Goal: Optimal Device Function  Outcome: Progressing  Goal: Mechanical Ventilation Liberation  Outcome: Progressing  Goal: Optimal Nutrition Delivery  Outcome: Progressing  Goal: Absence of Device-Related Skin and Tissue Injury  Outcome: Progressing  Goal: Absence of Ventilator-Induced Lung Injury  Outcome: Progressing     Problem: Skin Injury Risk Increased  Goal: Skin Health and Integrity  Outcome: Progressing     Problem: ARDS (Acute Respiratory Distress Syndrome)  Goal: Effective Oxygenation  Outcome: Progressing

## 2024-11-14 NOTE — ASSESSMENT & PLAN NOTE
RESOLVED. Afib with RVR 2/2 sepsis with severe viral infection. Managed with esmolol gtt.   - given critical illness, if recurrence will plan on amiodarone gtt  - holding home Eliquis; will reinstate for PE, currently covered with heparin gtt  - will stop home lopressor during managaement of shock

## 2024-11-14 NOTE — PROGRESS NOTES
[facility]  Critical Care Medicine  Progress Note    Patient Name: Saúl Butt  MRN: [id number]  Admission Date: 11/11/2024  Hospital Length of Stay: 2 days  Code Status: Full Code  Attending Provider: Jade Medley MD  Primary Care Provider: Ruth, Primary Doctor   Principal Problem: Acute respiratory failure with hypoxemia    Subjective:     HPI:  54-year-old  male presented to the ED via EMS.  Past medical history of end-stage renal disease, hypertension, Chronic anemia, Chronic diastolic CHF, moderate aortic stenosis, DVT, Hyperlipidemia, Paroxysmal atrial fibrillation, and malignant neoplasm of kidney.  Patient states he goes to dialysis Monday, Wednesday, and Friday.  States last day to dialyze was this past Friday, 11/8/24.  Patient states he has not missed any dialysis days. Patient was due to dialyze today, however he presented to the ED with 3 day history of shortness of breath which is worse with lying down, and he also reports having a cough.  He also reports 3 day history of nausea, vomiting, diarrhea. Patient reports having some fever on and off.  Denies any pain at present.    ED workup revealed sodium 131, potassium 8.4, anion gap 17, BUN and creatinine 60/13.4.  Venous blood gas revealed pH 7.42, pCO2 44, sodium 131, potassium 8.2, bicarb 28.5, lactate 1.5, and glucose 130.  Chest x-ray showed cardiomegaly with pulmonary edema.  BNP 55,331. In the ED patient received calcium gluconate 1 g IV, D10 500 mL IV, and Humulin R 10 units.  Critical care service was asked to admit patient to the ICU, where he will be dialyzed emergently to treat hyperkalemia.    Hospital/ICU Course:  11/12-- potassium 6.8 - HD gain today - went into Afib rvr on HD       Interval History/Significant Events: continued pyrexia, resolved RVR, early am with increased work of breathing with hypoxia placed on NRB, hyperkalemia recurrence, intubated for progressive respiratory distress, severe  hypoxia, aspirated during ETT s/p successful therapeutic aspiration with bronchoscopy, am course with PEA arrest with ROSC, agitated post resuscitation warranting deep sedation    Review of Systems  Objective:     Vital Signs (Most Recent):  Temp: 98.2 °F (36.8 °C) (11/13/24 1845)  Pulse: 104 (11/13/24 1845)  Resp: 15 (11/13/24 1845)  BP: (!) 143/89 (11/13/24 1845)  SpO2: 99 % (11/13/24 1955) Vital Signs (24h Range):  Temp:  [98.2 °F (36.8 °C)-102.6 °F (39.2 °C)] 98.2 °F (36.8 °C)  Pulse:  [] 104  Resp:  [14-40] 15  SpO2:  [82 %-100 %] 99 %  BP: ()/() 143/89   Weight: 108.3 kg (238 lb 12.1 oz)  Body mass index is 32.38 kg/m².      Intake/Output Summary (Last 24 hours) at 11/13/2024 2039  Last data filed at 11/13/2024 1956  Gross per 24 hour   Intake 1509.05 ml   Output 2050 ml   Net -540.95 ml          Physical Exam  Constitutional:       General: He is in acute distress.      Appearance: He is ill-appearing and diaphoretic.   HENT:      Head: Normocephalic and atraumatic.      Mouth/Throat:      Mouth: Mucous membranes are moist.      Pharynx: No oropharyngeal exudate.   Eyes:      General: No scleral icterus.  Cardiovascular:      Rate and Rhythm: Normal rate. Rhythm irregular.      Heart sounds: Murmur heard.   Pulmonary:      Effort: Respiratory distress present.      Breath sounds: No wheezing.      Comments: Increased work of breathing, deep sedation for vent synchrony  Abdominal:      General: There is distension.      Palpations: Abdomen is soft.      Tenderness: There is no abdominal tenderness. There is no guarding.   Musculoskeletal:      Right lower leg: No edema.      Left lower leg: No edema.   Skin:     General: Skin is warm.      Coloration: Skin is not jaundiced.   Neurological:      Mental Status: He is alert.   Psychiatric:      Comments: Anxious, agitated post intubation            Vents:  Vent Mode: A/CMV-VC (11/13/24 1955)  Ventilator Initiated: Yes (11/13/24 0735)  Set Rate:  20 BPM (11/13/24 1955)  Vt Set: 500 mL (11/13/24 1955)  PEEP/CPAP: 12 cmH20 (11/13/24 1955)  Oxygen Concentration (%): 60 (11/13/24 1955)  Peak Airway Pressure: 34.4 cmH20 (11/13/24 1955)  Plateau Pressure: 18.1 cmH20 (11/13/24 1955)  Total Ve: 10.6 L/m (11/13/24 1955)  F/VT Ratio<105 (RSBI): (!) 41.49 (11/13/24 1501)  Lines/Drains/Airways       Central Venous Catheter Line  Duration                  Hemodialysis Catheter right subclavian -- days    Percutaneous Central Line - Triple Lumen  11/13/24 1400 Femoral Vein Left <1 day              Drain  Duration                  NG/OG Tube 11/13/24 0816 [place] sump 16 Fr. Right nostril <1 day              Airway  Duration                  Airway - Non-Surgical 11/13/24 0732 Endotracheal Tube <1 day                  Significant Labs:    CBC/Anemia Profile:  Recent Labs   Lab 11/12/24 2232 11/13/24  0545 11/13/24  1014   WBC 11.86* 14.93* 21.42*   HGB 10.6* 9.5* 10.2*   HCT 35.3* 30.5* 33.4*    213 288   .0* 99.7* 100.0*   RDW 18.4* 18.2* 18.1*        Chemistries:  Recent Labs   Lab 11/12/24  0453 11/12/24 2232 11/13/24  0545 11/13/24  1014   * 135* 131* 138   K 6.8* 6.3* 7.4* 4.9   CL 98 100 98 97*   CO2 28 24 21 23   BUN 48* 35* 42* 30*   CREATININE 12.00* 9.89* 10.60* 7.86*   CALCIUM 7.1* 8.0* 7.5* 8.2*   ALBUMIN 3.3*  --  3.3* 3.4*   PROT 7.0  --  7.4 7.4   BILITOT 0.6  --  0.6 0.9   ALKPHOS 89  --  90 89   ALT 11*  --  20 28   AST 18  --  39* 89*   MG 1.8  --  2.1 2.3   PHOS 6.9*  --  6.2*  --        ABGs:   Recent Labs   Lab 11/13/24  1500   PH 7.27*   PCO2 53*   HCO3 24.3   POCSATURATED 89*     All pertinent labs within the past 24 hours have been reviewed.    Significant Imaging:  I have reviewed all pertinent imaging results/findings within the past 24 hours.    ABG  Recent Labs   Lab 11/13/24  1500   PH 7.27*   PO2 65*   PCO2 53*   HCO3 24.3     ASSESSMENT/PLAN:     Active Hospital Problems    Diagnosis    *Acute respiratory failure with  hypoxemia    Acute metabolic encephalopathy    Aspiration pneumonitis    Bacterial pneumonia    Influenzal encephalitis    Acute respiratory distress syndrome    Acute pulmonary embolism without acute cor pulmonale    Atrial fibrillation with RVR    Influenza A    Hyperkalemia    Severe sepsis    ESRD on hemodialysis    Anemia in chronic kidney disease       Neuro:   Encephalopathy: Metabolic Encephalopathy; multifactorial including very likely influenza encephalitis  - management as per Influenza plan  Sedated for Mechanical Ventilation:  - analgesia: fentanyl gtt for goal pain score 0/10  - sedation: propofol for goal RASS -3  - TG QOD  - post arrest evalaution with agitation and attempts at self extubation; will defer post-arrest normothermia protocol after neuro assessment, goal avoid fever, IV tylenol and cooling blankets to goal      Pulmonary:   Acute respiratory failure with hypoxia  Acute respiratory distress syndrome  - 2/2 Influenza A  - severe ARDS w/ P/F 108; ventilator assessed, dyssynchrony with lighter sedation  - deeper sedation goal as per Neuro plan, has not required paralytic  - prone positioning contraindicated Day 1 for hemodynamic instability with PEA arrest  - start heparin gtt with bolus for PE; not likely to have contributed to PEA arrest given distal distribution and TTE post arrest with no RV strain  - vent settings adjusted: cont VC/AC 20/500/12/0.6   -- successful wean of FiO2 from 100%    Cardiac:   Hypotension from sedatives; hypotensive post sedation, likely septic shock overlap  Septic Shock  Arrhythmia: Afib with RVR; resolved and off esmolol gtt; given critical illness, if recurrence will plan on amiodarone gtt  - will start stress dose steroids for likely adrenalectomy and severe infection  - cont Levophed for goal MAP >65  TTE 11/13/2024: LVEF 45-50%, indeterminate diastolic function, normal RV size, no more systolic function, TAPSE 2, mildly dilated LA, mildly dilated RA,  mild-to-moderate aortic stenosis, trace MR, mild-to-moderate TR    Renal:    ESRD on HD via tunneled catheter  Electrolyte disturbance: Recurrent Hyperkalemia, attributed to Influenza; CK <500 and recurrence between HD sessions; medications reviewed with no culprits  PLAN: cont HD per nephrology, at this time has daily requirements    GI:   Gastric distension s/p NGT decompression following intubation  PLAN: Follow up CT A&P for post arrest evaluation; will hold tube feeds for now    ID:   Site specific infection: Aspiration Pneumonia 2/2 aspiration of gastric contents; Severe Influenza A infection  - persistent pyrexia; will treat with Ofirmev IV x2 grams particularly post arrest for goal Temp <37  - broaden Abx to Vancomycin (pharmacy to dose) and Zosyn; given concern for atypical infection will cover with Azithromycin as well  - obtain MRCA PCR; may be confounded by intranasal mupirocin administration  - cont Tamiflu to be dosed post-HD; at this time daily requirement; will plan for extended course for severe infection  - f/u BCxs and Lower respiratory Cxs    Hem/Onc:   Acute PE  Coagulopathy: Acute PE and POCUS with R and L IJ occlusive opacities consistent with thrombus  - heparin gtt, high intensity for PE protocol          Critical Care Medicine Daily Checklist:11/13/2024   F: Feeding NPO, SLP evaluation/re-evaluation pending   A: Analgesia Infusion Fentanyl   S: Sedation RASS goal -3to-5 Propofol           T: Thromboprophylaxis Lower extremity SCD and On systemic anticoagulation with Heparin   H: HOB > 300 Yes.   U: Stress Ulcer Prophylaxis PPI   G: Glucose Control Within goal (upper limit 140-180 mg/dL). On ISS.   S: SAT & SBT Coordinated?  N/A   B: Bowel Regimen No BM in last 24hrs.   I: Indwelling Catheter Reviewed Maintain: Triple/Quad Lumen and Dialysis catheter  Remove: N/A   D: De-escalation of Antimicrobials No    Disposition ICU         Critical Care Time: 90 minutes  Critical secondary to Patient  has a condition that poses threat to life and bodily function: Pulmonary Embolism and Severe Respiratory Distress, Cardiorespiratory arrest  Patient has an abrupt change in neurologic status: Metabolic encephalopathy  Patient is currently on drug therapy requiring intensive monitoring for toxicity: Vancomycin      Critical care was time spent personally by me on the following activities: development of treatment plan with patient or surrogate and bedside caregivers, discussions with consultants, evaluation of patient's response to treatment, examination of patient, ordering and performing treatments and interventions, ordering and review of laboratory studies, ordering and review of radiographic studies, pulse oximetry, re-evaluation of patient's condition. This critical care time did not overlap with that of any other provider or involve time for any procedures.     Jade Medley MD  Critical Care Medicine  [facility]

## 2024-11-15 PROBLEM — S22.49XA CLOSED FRACTURE OF MULTIPLE RIBS: Status: ACTIVE | Noted: 2024-11-15

## 2024-11-15 PROBLEM — N18.6 ESRD (END STAGE RENAL DISEASE): Status: ACTIVE | Noted: 2024-11-15

## 2024-11-15 LAB
ANION GAP SERPL CALCULATED.3IONS-SCNC: 24 MMOL/L (ref 7–16)
APTT PPP: 76.7 SECONDS (ref 25.2–37.3)
BASOPHILS # BLD AUTO: 0.03 K/UL (ref 0–0.2)
BASOPHILS NFR BLD AUTO: 0.2 % (ref 0–1)
BUN SERPL-MCNC: 35 MG/DL (ref 8–26)
BUN/CREAT SERPL: 4 (ref 6–20)
CALCIUM SERPL-MCNC: 6.9 MG/DL (ref 8.4–10.2)
CHLORIDE SERPL-SCNC: 91 MMOL/L (ref 98–107)
CK SERPL-CCNC: 629 U/L (ref 30–200)
CO2 SERPL-SCNC: 22 MMOL/L (ref 22–29)
CREAT SERPL-MCNC: 8.2 MG/DL (ref 0.72–1.25)
DIFFERENTIAL METHOD BLD: ABNORMAL
EGFR (NO RACE VARIABLE) (RUSH/TITUS): 7 ML/MIN/1.73M2
EOSINOPHIL # BLD AUTO: 0.07 K/UL (ref 0–0.5)
EOSINOPHIL NFR BLD AUTO: 0.4 % (ref 1–4)
ERYTHROCYTE [DISTWIDTH] IN BLOOD BY AUTOMATED COUNT: 16.7 % (ref 11.5–14.5)
GLUCOSE SERPL-MCNC: 212 MG/DL (ref 74–100)
HCO3 UR-SCNC: 28.5 MMOL/L (ref 21–28)
HCT VFR BLD AUTO: 29.1 % (ref 40–54)
HGB BLD-MCNC: 9 G/DL (ref 13.5–18)
IMM GRANULOCYTES # BLD AUTO: 0.21 K/UL (ref 0–0.04)
IMM GRANULOCYTES NFR BLD: 1.2 % (ref 0–0.4)
LYMPHOCYTES # BLD AUTO: 0.78 K/UL (ref 1–4.8)
LYMPHOCYTES NFR BLD AUTO: 4.5 % (ref 27–41)
MAGNESIUM SERPL-MCNC: 2.4 MG/DL (ref 1.6–2.6)
MCH RBC QN AUTO: 30.6 PG (ref 27–31)
MCHC RBC AUTO-ENTMCNC: 30.9 G/DL (ref 32–36)
MCV RBC AUTO: 99 FL (ref 80–96)
MONOCYTES # BLD AUTO: 0.89 K/UL (ref 0–0.8)
MONOCYTES NFR BLD AUTO: 5.2 % (ref 2–6)
MPC BLD CALC-MCNC: 12.7 FL (ref 9.4–12.4)
NEUTROPHILS # BLD AUTO: 15.3 K/UL (ref 1.8–7.7)
NEUTROPHILS NFR BLD AUTO: 88.5 % (ref 53–65)
NRBC # BLD AUTO: 0.03 X10E3/UL
NRBC, AUTO (.00): 0.2 %
PCO2 BLDA: 62 MMHG (ref 35–48)
PH SMN: 7.27 [PH] (ref 7.35–7.45)
PHOSPHATE SERPL-MCNC: 8.9 MG/DL (ref 2.3–4.7)
PLATELET # BLD AUTO: 235 K/UL (ref 150–400)
PO2 BLDA: 81 MMHG (ref 83–108)
POC BASE EXCESS: 0.3 MMOL/L (ref -2–3)
POC SATURATED O2: 94 % (ref 95–98)
POTASSIUM SERPL-SCNC: 6.3 MMOL/L (ref 3.5–5.1)
RBC # BLD AUTO: 2.94 M/UL (ref 4.6–6.2)
SODIUM SERPL-SCNC: 131 MMOL/L (ref 136–145)
VANCOMYCIN SERPL-MCNC: 18.5 ΜG/ML (ref 15–20)
WBC # BLD AUTO: 17.28 K/UL (ref 4.5–11)

## 2024-11-15 PROCEDURE — 80100014 HC HEMODIALYSIS 1:1

## 2024-11-15 PROCEDURE — 27000207 HC ISOLATION

## 2024-11-15 PROCEDURE — 99291 CRITICAL CARE FIRST HOUR: CPT | Mod: ,,, | Performed by: STUDENT IN AN ORGANIZED HEALTH CARE EDUCATION/TRAINING PROGRAM

## 2024-11-15 PROCEDURE — 36415 COLL VENOUS BLD VENIPUNCTURE: CPT | Performed by: STUDENT IN AN ORGANIZED HEALTH CARE EDUCATION/TRAINING PROGRAM

## 2024-11-15 PROCEDURE — 20000000 HC ICU ROOM

## 2024-11-15 PROCEDURE — 63600175 PHARM REV CODE 636 W HCPCS: Performed by: INTERNAL MEDICINE

## 2024-11-15 PROCEDURE — 83735 ASSAY OF MAGNESIUM: CPT | Performed by: STUDENT IN AN ORGANIZED HEALTH CARE EDUCATION/TRAINING PROGRAM

## 2024-11-15 PROCEDURE — 84100 ASSAY OF PHOSPHORUS: CPT | Performed by: STUDENT IN AN ORGANIZED HEALTH CARE EDUCATION/TRAINING PROGRAM

## 2024-11-15 PROCEDURE — 94640 AIRWAY INHALATION TREATMENT: CPT

## 2024-11-15 PROCEDURE — 25000242 PHARM REV CODE 250 ALT 637 W/ HCPCS: Performed by: STUDENT IN AN ORGANIZED HEALTH CARE EDUCATION/TRAINING PROGRAM

## 2024-11-15 PROCEDURE — 80202 ASSAY OF VANCOMYCIN: CPT | Performed by: STUDENT IN AN ORGANIZED HEALTH CARE EDUCATION/TRAINING PROGRAM

## 2024-11-15 PROCEDURE — 94761 N-INVAS EAR/PLS OXIMETRY MLT: CPT

## 2024-11-15 PROCEDURE — 25000003 PHARM REV CODE 250: Performed by: STUDENT IN AN ORGANIZED HEALTH CARE EDUCATION/TRAINING PROGRAM

## 2024-11-15 PROCEDURE — 27000221 HC OXYGEN, UP TO 24 HOURS

## 2024-11-15 PROCEDURE — 99223 1ST HOSP IP/OBS HIGH 75: CPT | Mod: ,,, | Performed by: STUDENT IN AN ORGANIZED HEALTH CARE EDUCATION/TRAINING PROGRAM

## 2024-11-15 PROCEDURE — 99900026 HC AIRWAY MAINTENANCE (STAT)

## 2024-11-15 PROCEDURE — 82803 BLOOD GASES ANY COMBINATION: CPT

## 2024-11-15 PROCEDURE — 25000003 PHARM REV CODE 250: Performed by: INTERNAL MEDICINE

## 2024-11-15 PROCEDURE — 94003 VENT MGMT INPAT SUBQ DAY: CPT

## 2024-11-15 PROCEDURE — 82550 ASSAY OF CK (CPK): CPT | Performed by: STUDENT IN AN ORGANIZED HEALTH CARE EDUCATION/TRAINING PROGRAM

## 2024-11-15 PROCEDURE — 85025 COMPLETE CBC W/AUTO DIFF WBC: CPT | Performed by: STUDENT IN AN ORGANIZED HEALTH CARE EDUCATION/TRAINING PROGRAM

## 2024-11-15 PROCEDURE — 63600175 PHARM REV CODE 636 W HCPCS: Performed by: STUDENT IN AN ORGANIZED HEALTH CARE EDUCATION/TRAINING PROGRAM

## 2024-11-15 PROCEDURE — 99900035 HC TECH TIME PER 15 MIN (STAT)

## 2024-11-15 PROCEDURE — 80048 BASIC METABOLIC PNL TOTAL CA: CPT | Performed by: STUDENT IN AN ORGANIZED HEALTH CARE EDUCATION/TRAINING PROGRAM

## 2024-11-15 PROCEDURE — 36600 WITHDRAWAL OF ARTERIAL BLOOD: CPT

## 2024-11-15 PROCEDURE — 63600175 PHARM REV CODE 636 W HCPCS: Performed by: HOSPITALIST

## 2024-11-15 PROCEDURE — 85730 THROMBOPLASTIN TIME PARTIAL: CPT | Performed by: STUDENT IN AN ORGANIZED HEALTH CARE EDUCATION/TRAINING PROGRAM

## 2024-11-15 RX ORDER — CALCIUM GLUCONATE 20 MG/ML
1 INJECTION, SOLUTION INTRAVENOUS
Status: COMPLETED | OUTPATIENT
Start: 2024-11-15 | End: 2024-11-15

## 2024-11-15 RX ORDER — LIDOCAINE 50 MG/G
2 PATCH TOPICAL
Status: DISCONTINUED | OUTPATIENT
Start: 2024-11-15 | End: 2024-12-02 | Stop reason: HOSPADM

## 2024-11-15 RX ORDER — MIDAZOLAM HYDROCHLORIDE 2 MG/2ML
2 INJECTION, SOLUTION INTRAMUSCULAR; INTRAVENOUS ONCE
Status: COMPLETED | OUTPATIENT
Start: 2024-11-15 | End: 2024-11-15

## 2024-11-15 RX ORDER — SODIUM CHLORIDE 9 MG/ML
INJECTION, SOLUTION INTRAVENOUS
Status: CANCELLED | OUTPATIENT
Start: 2024-11-15 | End: 2024-11-16

## 2024-11-15 RX ORDER — IPRATROPIUM BROMIDE AND ALBUTEROL SULFATE 2.5; .5 MG/3ML; MG/3ML
3 SOLUTION RESPIRATORY (INHALATION) EVERY 4 HOURS
Status: DISCONTINUED | OUTPATIENT
Start: 2024-11-15 | End: 2024-11-16

## 2024-11-15 RX ORDER — METOPROLOL TARTRATE 1 MG/ML
5 INJECTION, SOLUTION INTRAVENOUS EVERY 6 HOURS PRN
Status: DISCONTINUED | OUTPATIENT
Start: 2024-11-15 | End: 2024-11-16

## 2024-11-15 RX ORDER — METOPROLOL TARTRATE 1 MG/ML
5 INJECTION, SOLUTION INTRAVENOUS EVERY 5 MIN PRN
Status: DISCONTINUED | OUTPATIENT
Start: 2024-11-15 | End: 2024-11-15

## 2024-11-15 RX ADMIN — IPRATROPIUM BROMIDE AND ALBUTEROL SULFATE 3 ML: .5; 3 SOLUTION RESPIRATORY (INHALATION) at 07:11

## 2024-11-15 RX ADMIN — HYDROCORTISONE SODIUM SUCCINATE 50 MG: 100 INJECTION, POWDER, FOR SOLUTION INTRAMUSCULAR; INTRAVENOUS at 05:11

## 2024-11-15 RX ADMIN — PROPOFOL 50 MCG/KG/MIN: 10 INJECTION, EMULSION INTRAVENOUS at 11:11

## 2024-11-15 RX ADMIN — HYDROCORTISONE SODIUM SUCCINATE 50 MG: 100 INJECTION, POWDER, FOR SOLUTION INTRAMUSCULAR; INTRAVENOUS at 11:11

## 2024-11-15 RX ADMIN — SODIUM ZIRCONIUM CYCLOSILICATE 10 G: 10 POWDER, FOR SUSPENSION ORAL at 09:11

## 2024-11-15 RX ADMIN — PROPOFOL 50 MCG/KG/MIN: 10 INJECTION, EMULSION INTRAVENOUS at 02:11

## 2024-11-15 RX ADMIN — LIDOCAINE 5% 2 PATCH: 700 PATCH TOPICAL at 08:11

## 2024-11-15 RX ADMIN — CALCIUM GLUCONATE 1 G: 20 INJECTION, SOLUTION INTRAVENOUS at 10:11

## 2024-11-15 RX ADMIN — AZITHROMYCIN MONOHYDRATE 500 MG: 500 INJECTION, POWDER, LYOPHILIZED, FOR SOLUTION INTRAVENOUS at 02:11

## 2024-11-15 RX ADMIN — METOPROLOL TARTRATE 5 MG: 1 INJECTION, SOLUTION INTRAVENOUS at 03:11

## 2024-11-15 RX ADMIN — PIPERACILLIN SODIUM AND TAZOBACTAM SODIUM 4.5 G: 4; .5 INJECTION, POWDER, LYOPHILIZED, FOR SOLUTION INTRAVENOUS at 08:11

## 2024-11-15 RX ADMIN — IPRATROPIUM BROMIDE AND ALBUTEROL SULFATE 3 ML: .5; 3 SOLUTION RESPIRATORY (INHALATION) at 08:11

## 2024-11-15 RX ADMIN — METOPROLOL TARTRATE 5 MG: 1 INJECTION, SOLUTION INTRAVENOUS at 06:11

## 2024-11-15 RX ADMIN — MIDAZOLAM HYDROCHLORIDE 2 MG: 1 INJECTION, SOLUTION INTRAMUSCULAR; INTRAVENOUS at 06:11

## 2024-11-15 RX ADMIN — SODIUM ZIRCONIUM CYCLOSILICATE 10 G: 10 POWDER, FOR SUSPENSION ORAL at 02:11

## 2024-11-15 RX ADMIN — PROPOFOL 50 MCG/KG/MIN: 10 INJECTION, EMULSION INTRAVENOUS at 04:11

## 2024-11-15 RX ADMIN — CHLORHEXIDINE GLUCONATE 15 ML: 1.2 RINSE ORAL at 08:11

## 2024-11-15 RX ADMIN — METOPROLOL TARTRATE 5 MG: 1 INJECTION, SOLUTION INTRAVENOUS at 01:11

## 2024-11-15 RX ADMIN — HEPARIN SODIUM 12 UNITS/KG/HR: 10000 INJECTION, SOLUTION INTRAVENOUS at 04:11

## 2024-11-15 RX ADMIN — IPRATROPIUM BROMIDE AND ALBUTEROL SULFATE 3 ML: .5; 3 SOLUTION RESPIRATORY (INHALATION) at 11:11

## 2024-11-15 RX ADMIN — CALCIUM GLUCONATE 1 G: 20 INJECTION, SOLUTION INTRAVENOUS at 09:11

## 2024-11-15 RX ADMIN — HYDROCORTISONE SODIUM SUCCINATE 50 MG: 100 INJECTION, POWDER, FOR SOLUTION INTRAMUSCULAR; INTRAVENOUS at 12:11

## 2024-11-15 RX ADMIN — PANTOPRAZOLE SODIUM 40 MG: 40 INJECTION, POWDER, LYOPHILIZED, FOR SOLUTION INTRAVENOUS at 08:11

## 2024-11-15 RX ADMIN — MUPIROCIN: 20 OINTMENT TOPICAL at 08:11

## 2024-11-15 RX ADMIN — SODIUM CHLORIDE: 9 INJECTION, SOLUTION INTRAVENOUS at 01:11

## 2024-11-15 RX ADMIN — FENTANYL CITRATE 100 MCG/HR: 50 INJECTION, SOLUTION INTRAMUSCULAR; INTRAVENOUS at 09:11

## 2024-11-15 RX ADMIN — PROPOFOL 50 MCG/KG/MIN: 10 INJECTION, EMULSION INTRAVENOUS at 08:11

## 2024-11-15 RX ADMIN — HYDROCORTISONE SODIUM SUCCINATE 50 MG: 100 INJECTION, POWDER, FOR SOLUTION INTRAMUSCULAR; INTRAVENOUS at 06:11

## 2024-11-15 RX ADMIN — SODIUM ZIRCONIUM CYCLOSILICATE 10 G: 10 POWDER, FOR SUSPENSION ORAL at 08:11

## 2024-11-15 RX ADMIN — IPRATROPIUM BROMIDE AND ALBUTEROL SULFATE 3 ML: .5; 3 SOLUTION RESPIRATORY (INHALATION) at 03:11

## 2024-11-15 RX ADMIN — PROPOFOL 50 MCG/KG/MIN: 10 INJECTION, EMULSION INTRAVENOUS at 07:11

## 2024-11-15 RX ADMIN — OSELTAMAVIR PHOSPHATE 30 MG: 30 CAPSULE ORAL at 08:11

## 2024-11-15 RX ADMIN — HEPARIN SODIUM 4000 UNITS: 1000 INJECTION, SOLUTION INTRAVENOUS; SUBCUTANEOUS at 03:11

## 2024-11-15 RX ADMIN — PROPOFOL 40 MCG/KG/MIN: 10 INJECTION, EMULSION INTRAVENOUS at 04:11

## 2024-11-15 NOTE — ASSESSMENT & PLAN NOTE
S/P Cardiac Arrest, CPR    11/15:  We will add 3D recons to previous CT angiogram of the chest to evaluate rib fractures.      With his age and minimally displaced fractures, I do not think rib fixation would really help him at this time.  Attempt conservative management for now; lidocaine patches over the chest on both sides were rib fractures are, pain control.  Continue to wean from vent per pulmonology.  Also consider rib block per anesthesia on bilateral sides

## 2024-11-15 NOTE — PROGRESS NOTES
Patient opens eyes to his name, being card and moves extremities occasionally.    Physical exam general the patient is chronically, ill appearing, heart is regular rate and rhythm, he has no pitting edema, lungs are clear to auscultation anteriorly, admin is soft, with possible sounds, feeding tube is in place and being used    Assessment/plan 1. ESRD  patient stylized daily For the past several days due to recurrent hyperkalemia, we will try to give him a rest off of dialysis today if his potassium allows for this.  Two. Respiratory failure-continue ventilator support.  Three. Sepsis Dash continue anabiotics.  Four. Influenza illness  continue antivirals.  Five. Anemia  patients Fadi, round 29%, will start erythropoietin Detail Level: Generalized General Sunscreen Counseling: I recommended a broad spectrum sunscreen with a SPF of 30 or higher.  I explained that SPF 30 sunscreens block approximately 97 percent of the sun's harmful rays.  Sunscreens should be applied at least 15 minutes prior to expected sun exposure and then every 2 hours after that as long as sun exposure continues. If swimming or exercising sunscreen should be reapplied every 45 minutes to an hour after getting wet or sweating.  One ounce, or the equivalent of a shot glass full of sunscreen, is adequate to protect the skin not covered by a bathing suit. I also recommended a lip balm with a sunscreen as well. Sun protective clothing can be used in lieu of sunscreen but must be worn the entire time you are exposed to the sun's rays.

## 2024-11-15 NOTE — ASSESSMENT & PLAN NOTE
Encephalopathy: Metabolic Encephalopathy; multifactorial including very likely influenza encephalitis  Sedated for Mechanical Ventilation  Post arrest evalaution with agitation and attempts at self extubation; will defer post-arrest normothermia protocol after neuro assessment, goal avoid fever, IV tylenol and cooling blankets to goal  - analgesia: fentanyl gtt for goal pain score 0/10  - sedation: propofol for goal RASS 0 to -1   -- ketamine considered and limited by recurrent RVR  - haldol 5 mg x1 with 0.5 mg IM available PRN for refractory agitation   -- depot unavailable  - start Zyprexa 50 mg BID; will consider up-titration to 100 mg BID   -- QTc assessed 11/17, acceptable for initiation  - stop stress dose steroid therapy for agitation; shock resolved  - TG QOD, acceptable

## 2024-11-15 NOTE — HOSPITAL COURSE
54-year-old  male presented to the ER with complaints of shortness of breath for several days.  History of end-stage renal disease on dialysis, hypertension, chronic diastolic CHF, moderate aortic stenosis, DVT, paroxysmal atrial fibrillation, malignant neoplasm of the kidney.  Patient was found to have potassium of 8.4 and chest x-ray showed cardiomegaly with pulmonary edema.  Patient was admitted to the ICU to be dialyzed emergently.  During his course of stay, he went P EEA and cardiac arrest and resuscitated with CPR and had return of spontaneous circulation.  General surgery has been consulted due to bilateral multiple rib fractures.  Patient was still intubated, sedated.  Nurse states that he does complain of pain on both sides.  They are currently doing weaning trials.  Patient had a CT scan the abdomen pelvis which showed no acute intra-abdominal process; CT angiogram of the chest showed multiple small segmental and subsegmental pulmonary thromboemboli; adjacent areas of focal airspace consolidation which could be multifocal pneumonia, aspiration, pulmonary infarcts or, but of the above entities; diffuse bilateral ground-glass and part solid nodule scattered throughout the chest.  Patient was bilateral minimally displaced rib fractures, but these were not read on the CT reads.       no

## 2024-11-15 NOTE — ASSESSMENT & PLAN NOTE
Acute respiratory failure secondary to influenza infection being treated also component of ARDS patient is tolerating long spells of spontaneous breathing were going to go ahead and try pressure support trial 8 5 this morning agitation the biggest issue we may extubate him if he was good arterial blood gases today

## 2024-11-15 NOTE — SUBJECTIVE & OBJECTIVE
Interval History/Significant Events: intermittent agitation, SBT with minimal vent requirements but requiring sedation    Review of Systems  Objective:     Vital Signs (Most Recent):  Temp: 98.6 °F (37 °C) (11/17/24 1515)  Pulse: 103 (11/17/24 1515)  Resp: 13 (11/17/24 1515)  BP: 135/76 (11/17/24 1515)  SpO2: 100 % (11/17/24 1515) Vital Signs (24h Range):  Temp:  [97.6 °F (36.4 °C)-98.6 °F (37 °C)] 98.6 °F (37 °C)  Pulse:  [] 103  Resp:  [7-44] 13  SpO2:  [84 %-100 %] 100 %  BP: ()/() 135/76   Weight: 107.5 kg (236 lb 15.9 oz)  Body mass index is 32.14 kg/m².      Intake/Output Summary (Last 24 hours) at 11/17/2024 1520  Last data filed at 11/17/2024 1112  Gross per 24 hour   Intake 3023 ml   Output --   Net 3023 ml          Physical Exam  Vitals reviewed.   Constitutional:       General: He is in acute distress (during SBT and SAT, responds to sedative).      Appearance: Normal appearance. He is ill-appearing. He is not diaphoretic.      Interventions: He is not intubated.     Comments: With sedation, patient is synchronous with vent   HENT:      Head: Normocephalic and atraumatic.      Nose: Nose normal.      Mouth/Throat:      Mouth: Mucous membranes are moist.      Pharynx: Oropharynx is clear. No oropharyngeal exudate.      Comments: ETT secured  Eyes:      General: No scleral icterus.     Extraocular Movements: Extraocular movements intact.      Conjunctiva/sclera: Conjunctivae normal.      Pupils: Pupils are equal, round, and reactive to light.   Cardiovascular:      Rate and Rhythm: Normal rate. Rhythm irregular.      Heart sounds: Murmur heard.   Pulmonary:      Effort: Pulmonary effort is normal. No respiratory distress. He is not intubated.      Breath sounds: Rhonchi present. No wheezing.      Comments: Synchronous with ventilator, intermittently agitated  Abdominal:      General: Abdomen is flat. Bowel sounds are normal. There is no distension.      Palpations: Abdomen is soft.       Tenderness: There is no abdominal tenderness. There is no guarding.   Musculoskeletal:         General: Normal range of motion.      Cervical back: Normal range of motion and neck supple.      Right lower leg: No edema.      Left lower leg: No edema.   Skin:     General: Skin is warm and dry.      Capillary Refill: Capillary refill takes less than 2 seconds.      Coloration: Skin is not jaundiced.   Neurological:      General: No focal deficit present.      Mental Status: He is alert and oriented to person, place, and time.      Comments: Communicating with head gestures, spontaneously moving all extremities, intermittent agitation with attempts at self extubation   Psychiatric:         Mood and Affect: Mood normal.         Behavior: Behavior normal.      Comments:              Vents:  Vent Mode: PSV (Patient remains on PSV trial. Tolerating well.) (11/17/24 1223)  Ventilator Initiated: Yes (11/13/24 0735)  Set Rate: 20 BPM (11/17/24 0351)  Vt Set: 480 mL (11/17/24 0351)  Pressure Support: 5 cmH20 (11/17/24 1223)  PEEP/CPAP: 6 cmH20 (11/17/24 1223)  Oxygen Concentration (%): 40 (11/17/24 1223)  Peak Airway Pressure: 15.1 cmH20 (11/17/24 1223)  Plateau Pressure: 16.4 cmH20 (11/17/24 1223)  Total Ve: 9.83 L/m (11/17/24 1223)  F/VT Ratio<105 (RSBI): (!) 17.7 (11/17/24 1223)  Lines/Drains/Airways       Central Venous Catheter Line  Duration                  Hemodialysis Catheter right subclavian -- days    Percutaneous Central Line - Triple Lumen  11/13/24 1400 Femoral Vein Left 4 days              Drain  Duration                  NG/OG Tube 11/13/24 0816 [place] sump 16 Fr. Right nostril 4 days              Airway  Duration                  Airway - Non-Surgical 11/13/24 0732 Endotracheal Tube 4 days                  Significant Labs:    CBC/Anemia Profile:  Recent Labs   Lab 11/16/24  0424 11/17/24  0402   WBC 13.33* 15.00*   HGB 8.2* 7.2*   HCT 25.6* 22.3*    199   MCV 94.5 93.3   RDW 17.4* 17.6*         Chemistries:  Recent Labs   Lab 11/16/24  0424 11/17/24  0401   * 127*   K 4.1 3.7   CL 90* 86*   CO2 25 27   BUN 26 38*   CREATININE 6.44* 7.76*   CALCIUM 6.6* 6.1*   MG 2.3 2.3   PHOS 7.5* 7.4*       ABGs:   Recent Labs   Lab 11/17/24  0454   PH 7.33*   PCO2 56*   HCO3 29.5*   POCSATURATED 97     All pertinent labs within the past 24 hours have been reviewed.    Significant Imaging:  I have reviewed all pertinent imaging results/findings within the past 24 hours.

## 2024-11-15 NOTE — SUBJECTIVE & OBJECTIVE
No current facility-administered medications on file prior to encounter.     Current Outpatient Medications on File Prior to Encounter   Medication Sig    cloNIDine (CATAPRES) 0.3 MG tablet take 1 tablet by mouth twice a day    gabapentin (NEURONTIN) 600 MG tablet take 1 tablet by mouth twice a day    hydrALAZINE (APRESOLINE) 100 MG tablet Take 1 tablet (100 mg total) by mouth 3 (three) times daily as needed for blood pressure    losartan (COZAAR) 50 MG tablet Take 1 tablet (50 mg total) by mouth 2 (two) times a day.    metoprolol tartrate (LOPRESSOR) 100 MG tablet Take 1 tablet (100 mg total) by mouth 2 (two) times a day.    NIFEdipine (PROCARDIA-XL) 60 MG (OSM) 24 hr tablet Take 1 tablet by mouth daily    prazosin (MINIPRESS) 1 MG Cap Take 1 capsule (1 mg total) by mouth 2 (two) times a day.    amLODIPine (NORVASC) 10 MG tablet Take 1 tablet by mouth once daily.    apixaban (ELIQUIS) 2.5 mg Tab Take 2.5 mg by mouth 2 (two) times daily.    isosorbide mononitrate (IMDUR) 60 MG 24 hr tablet Take 60 mg by mouth every morning.    lanthanum (FOSRENOL) 1000 MG chewable tablet Take 1,000 mg by mouth.    meloxicam (MOBIC) 15 MG tablet Take 15 mg by mouth once daily.    pantoprazole (PROTONIX) 40 MG tablet Take 40 mg by mouth once daily.    sevelamer carbonate (RENVELA) 800 mg Tab     sucroferric oxyhydroxide (VELPHORO) 500 mg Chew        Review of patient's allergies indicates:   Allergen Reactions    Fish containing products Swelling    Iodinated contrast media Swelling    Iodine Shortness Of Breath, Swelling and Anaphylaxis     Sob and swelling/itching/throat closes up per pt  Other reaction(s): Swelling, Hives, DifficultyBreat  Sob and swelling/itching/throat closes up per pt      Shellfish containing products Swelling    Iodine and iodide containing products        Past Medical History:   Diagnosis Date    Anemia in chronic kidney disease 12/06/2014    Chronic diastolic congestive heart failure     Deep vein thrombosis      ESRD on hemodialysis 06/17/2021    Hyperlipidemia     Hypertension     Malignant neoplasm of kidney     Moderate aortic stenosis 04/22/2023    Paroxysmal atrial fibrillation     Polycystic kidney disease     Primary osteoarthritis involving multiple joints 07/16/2024     Past Surgical History:   Procedure Laterality Date    AV FISTULA PLACEMENT      EYE SURGERY      HD CATHETER      SELECTIVE INJECTION OF ANESTHETIC AGENT AROUND LUMBAR SPINAL NERVE ROOT BY TRANSFORAMINAL APPROACH Left 6/13/2024    Procedure: Left L5-S1 TFESi;  Surgeon: Lily Cuellar MD;  Location: [facility];  Service: Pain Management;  Laterality: Left;    SURGICAL REMOVAL OF ULCER      clamped ulcer in stomach    TUNNELED VENOUS CATHETER PLACEMENT       Family History    None       Tobacco Use    Smoking status: Never    Smokeless tobacco: Never   Substance and Sexual Activity    Alcohol use: Never    Drug use: Never    Sexual activity: Not Currently     Review of Systems   Unable to perform ROS: Intubated     Objective:     Vital Signs (Most Recent):  Temp: 97.3 °F (36.3 °C) (11/15/24 0615)  Pulse: 89 (11/15/24 0615)  Resp: 16 (11/15/24 0615)  BP: 104/60 (11/15/24 0615)  SpO2: 100 % (11/15/24 0615) Vital Signs (24h Range):  Temp:  [96.1 °F (35.6 °C)-98.2 °F (36.8 °C)] 97.3 °F (36.3 °C)  Pulse:  [] 89  Resp:  [10-23] 16  SpO2:  [86 %-100 %] 100 %  BP: ()/() 104/60     Weight: 109.3 kg (240 lb 15.4 oz)  Body mass index is 32.68 kg/m².     Physical Exam  Constitutional:       Appearance: He is ill-appearing.      Interventions: He is intubated.   Cardiovascular:      Rate and Rhythm: Normal rate.   Pulmonary:      Effort: He is intubated.   Chest:          Comments: Right chest tunneled dialysis catheter           I have reviewed all pertinent lab results within the past 24 hours.  CBC:   Recent Labs   Lab 11/15/24  0416   WBC 17.28*   RBC 2.94*   HGB 9.0*   HCT 29.1*      MCV 99.0*   MCH 30.6   MCHC 30.9*      BMP:   Recent Labs   Lab 11/14/24 0227   *   *   K 6.2*   CL 93*   CO2 24   BUN 47*   CREATININE 11.10*   CALCIUM 6.5*   MG 2.2     Coagulation:   Recent Labs   Lab 11/13/24  1905 11/14/24  0227 11/15/24  0416   LABPROT 16.4*  --   --    INR 1.34  --   --    APTT 41.4*   < > 76.7*    < > = values in this interval not displayed.       Significant Diagnostics:  I have reviewed all pertinent imaging results/findings within the past 24 hours.

## 2024-11-15 NOTE — PLAN OF CARE
Problem: Adult Inpatient Plan of Care  Goal: Plan of Care Review  Outcome: Progressing     Problem: Gas Exchange Impaired  Goal: Optimal Gas Exchange  Outcome: Progressing     Problem: Mechanical Ventilation Invasive  Goal: Effective Communication  Outcome: Progressing     Problem: ARDS (Acute Respiratory Distress Syndrome)  Goal: Effective Oxygenation  Outcome: Progressing

## 2024-11-15 NOTE — ASSESSMENT & PLAN NOTE
Provoked in setting of severe viral infection and critical illness. Acute PE and POCUS with R and L IJ occlusive opacities consistent with thrombus  - u/s UE with no VTE  - cont Apixaban, PE dosing

## 2024-11-15 NOTE — PROGRESS NOTES
[facility]  Critical Care Medicine  Progress Note    Patient Name: Saúl Butt  MRN: [id number]  Admission Date: 11/11/2024  Hospital Length of Stay: 4 days  Code Status: Full Code  Attending Provider: Jade Medley MD  Primary Care Provider: Ruth, Primary Doctor   Principal Problem: Acute respiratory failure with hypoxemia    Subjective:     HPI:  54-year-old  male presented to the ED via EMS.  Past medical history of end-stage renal disease, hypertension, Chronic anemia, Chronic diastolic CHF, moderate aortic stenosis, DVT, Hyperlipidemia, Paroxysmal atrial fibrillation, and malignant neoplasm of kidney.  Patient states he goes to dialysis Monday, Wednesday, and Friday.  States last day to dialyze was this past Friday, 11/8/24.  Patient states he has not missed any dialysis days. Patient was due to dialyze today, however he presented to the ED with 3 day history of shortness of breath which is worse with lying down, and he also reports having a cough.  He also reports 3 day history of nausea, vomiting, diarrhea. Patient reports having some fever on and off.  Denies any pain at present.    ED workup revealed sodium 131, potassium 8.4, anion gap 17, BUN and creatinine 60/13.4.  Venous blood gas revealed pH 7.42, pCO2 44, sodium 131, potassium 8.2, bicarb 28.5, lactate 1.5, and glucose 130.  Chest x-ray showed cardiomegaly with pulmonary edema.  BNP 55,331. In the ED patient received calcium gluconate 1 g IV, D10 500 mL IV, and Humulin R 10 units.  Critical care service was asked to admit patient to the ICU, where he will be dialyzed emergently to treat hyperkalemia.    Hospital/ICU Course:  11/12-- potassium 6.8 - HD gain today - went into Afib rvr on HD   11/13: continued pyrexia, resolved RVR, early am with increased work of breathing with hypoxia placed on NRB, hyperkalemia recurrence, intubated for progressive respiratory distress, severe hypoxia, aspirated during ETT s/p  successful therapeutic aspiration with bronchoscopy, am course with PEA arrest with ROSC, agitated post resuscitation warranting deep sedation      Interval History/Significant Events: fentanyl increased overnight, no bolus dosing, intermittent agitation, vent wean successful with regards to oxygenation, weaning sedation, recurrent hyperkalemia     Review of Systems  Objective:     Vital Signs (Most Recent):  Temp: 97.2 °F (36.2 °C) (11/15/24 1215)  Pulse: (!) 118 (11/15/24 1215)  Resp: (!) 21 (11/15/24 1215)  BP: 124/69 (11/15/24 1215)  SpO2: 99 % (11/15/24 1215) Vital Signs (24h Range):  Temp:  [95.5 °F (35.3 °C)-98.2 °F (36.8 °C)] 97.2 °F (36.2 °C)  Pulse:  [] 118  Resp:  [13-23] 21  SpO2:  [86 %-100 %] 99 %  BP: ()/() 124/69   Weight: 109.3 kg (240 lb 15.4 oz)  Body mass index is 32.68 kg/m².      Intake/Output Summary (Last 24 hours) at 11/15/2024 1237  Last data filed at 11/15/2024 1000  Gross per 24 hour   Intake 2844.03 ml   Output 1500 ml   Net 1344.03 ml          Physical Exam  Constitutional:       Appearance: He is ill-appearing. He is not diaphoretic.   HENT:      Head: Normocephalic and atraumatic.      Mouth/Throat:      Mouth: Mucous membranes are moist.      Pharynx: No oropharyngeal exudate.      Comments: ETT secured  Eyes:      General: No scleral icterus.  Cardiovascular:      Rate and Rhythm: Normal rate. Rhythm irregular.      Heart sounds: Murmur heard.   Pulmonary:      Effort: No respiratory distress.      Breath sounds: Rhonchi present. No wheezing.      Comments: Synchronous with ventilator, intermittently agitated  Abdominal:      General: There is no distension.      Palpations: Abdomen is soft.      Tenderness: There is no abdominal tenderness. There is no guarding.   Musculoskeletal:      Right lower leg: No edema.      Left lower leg: No edema.   Skin:     General: Skin is warm.      Coloration: Skin is not jaundiced.   Neurological:      Mental Status: He is alert.       Comments: Sedated, gag present, cough present, awakens with agitation with noxious stimulus, moving all extremities spontaneously   Psychiatric:      Comments:              Vents:  Vent Mode: A/CMV-VC (11/15/24 1109)  Ventilator Initiated: Yes (11/13/24 0735)  Set Rate: 20 BPM (11/15/24 1109)  Vt Set: 480 mL (11/15/24 1109)  PEEP/CPAP: 6 cmH20 (11/15/24 1109)  Oxygen Concentration (%): 40 (11/15/24 1109)  Peak Airway Pressure: 25.3 cmH20 (11/15/24 1109)  Plateau Pressure: 16.7 cmH20 (11/15/24 1109)  Total Ve: 8.54 L/m (11/15/24 1109)  F/VT Ratio<105 (RSBI): (!) 46.95 (11/15/24 1109)  Lines/Drains/Airways       Central Venous Catheter Line  Duration                  Hemodialysis Catheter right subclavian -- days    Percutaneous Central Line - Triple Lumen  11/13/24 1400 Femoral Vein Left 1 day              Drain  Duration                  NG/OG Tube 11/13/24 0816 [place] sump 16 Fr. Right nostril 2 days              Airway  Duration                  Airway - Non-Surgical 11/13/24 0732 Endotracheal Tube 2 days                  Significant Labs:    CBC/Anemia Profile:  Recent Labs   Lab 11/14/24  0227 11/15/24  0416   WBC 23.89* 17.28*   HGB 8.7* 9.0*   HCT 28.6* 29.1*    235   MCV 99.3* 99.0*   RDW 17.9* 16.7*        Chemistries:  Recent Labs   Lab 11/14/24  0227 11/15/24  0416   * 131*   K 6.2* 6.3*   CL 93* 91*   CO2 24 22   BUN 47* 35*   CREATININE 11.10* 8.20*   CALCIUM 6.5* 6.9*   MG 2.2 2.4   PHOS 8.7* 8.9*       ABGs:   Recent Labs   Lab 11/15/24  0752   PH 7.27*   PCO2 62*   HCO3 28.5*   POCSATURATED 94*     All pertinent labs within the past 24 hours have been reviewed.    Significant Imaging:  I have reviewed all pertinent imaging results/findings within the past 24 hours.    ABG  Recent Labs   Lab 11/15/24  0752   PH 7.27*   PO2 81*   PCO2 62*   HCO3 28.5*     Assessment/Plan:     Neuro  Acute metabolic encephalopathy  Encephalopathy: Metabolic Encephalopathy; multifactorial including very  likely influenza encephalitis  Sedated for Mechanical Ventilation:  - analgesia: fentanyl gtt for goal pain score 0/10   -- half dose of fentanyl  - sedation: propofol for goal RASS 0  - TG QOD, acceptable  - post arrest evalaution with agitation and attempts at self extubation; will defer post-arrest normothermia protocol after neuro assessment, goal avoid fever, IV tylenol and cooling blankets to goal    Pulmonary  * Acute respiratory failure with hypoxemia  2/2 Aspiration, severe Influenza A infection and multifocal pneumonia  - severe ARDS w/ P/F 108 on intubation 11/13; ventilator assessed, dyssynchrony with lighter sedation  - deeper sedation goal as per Neuro plan, has not required paralytic  - prone positioning contraindicated Day 1 for hemodynamic instability with PEA arrest  - cont VC/AC 20/480/6/0.6   -- PEEP downstep with acceptable driving pressure 12   -- hypercapnia contributing to acidemia, vent changes made to optimize I:E   -- iPEEP 0, Pplat 19    Acute respiratory distress syndrome  2/2 viral pneumonia. Improving with high PEEP     Aspiration pneumonia of both lungs due to gastric secretions  Concern for overnight aspiration event causing acute respiratory distress with increased work of breathing and hypoxia refractory to NRB. Intubation with aspiration of gastric contents; s/p bronchoscopy with therapeutic aspiration of gastric contents. CT Chest with multifocal pneumonia and necrotic/cavitary component in R mid lung zone. Abx broadened with decompensation; day 1 11/13.  - cont Vancomycin (pharmacy to dose) and Zosyn  - given concern for atypical infection will cover with Azithromycin as well  - MRCA PCR confounded by intranasal mupirocin administration  - f/u BCxs and Lower respiratory Cxs    Aspiration pneumonitis  S/p bronchoscopy and therapeutic aspiration, successful for ETT and proximal airway clearance to subsegmental level on 11/13. Further management as per  pneumonia.    Cardiac/Vascular  Cardiac arrest  PEA arrest with minimal down time. At this time etiology is unclear and will be attributed to electrolyte and acid/base derangements. PEA arrest occurred hours post intubation with no hypoxia. No central PE to contribute to hemodynamic burden and burden non convincing for distal propagation with compressions. Post ROSC EKG and TTE completed 11/13 not convincing for acute coronary syndrome; Tn peaked on second draw.     Atrial fibrillation with RVR  RESOLVED. Afib with RVR 2/2 sepsis with severe viral infection. Managed with esmolol gtt.   - given critical illness, if recurrence will plan on amiodarone gtt  - holding home Eliquis; will reinstate for PE, currently covered with heparin gtt  - will stop home lopressor during managaement of shock      Renal/  Hyperkalemia  Recurrent Hyperkalemia, attributed to Influenza; CK up-trending and remains <1k; combativeness likely contributing in upternend. Recurrence of hyperK between HD sessions warranting daily requirements; medications reviewed with no culprits  - cont HD  - start lokelma TID  - with regards to concern for adrenal insufficiency, this should be addressed by ongoing stress dose steroids; rhabdo less likely with improving CK, work up to date with no identifiable tumor burden; will continue assessment of ddx  - appreciate Nephrology recs    ESRD on hemodialysis  HD as per hyperkalemia plan. Of note, patient is s/p b/l nephrectomy on CT A&P.    ID  Influenza A  Severe Influenza A infection.   - pyrexia resolved with IV tylenol  - cont Tamiflu to be dosed post-HD; at this time daily requirement; will plan for extended course for severe infection    Septic shock  Multifactorial with viral pneumonia and aspirationpneumonia. Intially with severe sepsis devolving to shock. Myositis and encephalitis suspected. Broadening coverage and will maintain Vancomycin.   - broaden Abx to Vancomycin (pharmacy to dose) and Zosyn;  given concern for atypical infection will cover with Azithromycin as well  - obtain MRCA PCR; may be confounded by intranasal mupirocin administration  - f/u BCxs and Lower respiratory Cxs  - cont stress dose steroids for likely adrenalectomy and severe infection  - cont Levophed for goal MAP >65  TTE 11/13/2024: LVEF 45-50%, indeterminate diastolic function, normal RV size, no more systolic function, TAPSE 2, mildly dilated LA, mildly dilated RA, mild-to-moderate aortic stenosis, trace MR, mild-to-moderate TR    Oncology  Anemia in chronic kidney disease  CBC stable.     Orthopedic  Closed fracture of multiple ribs  Sequale of chest compressions for CPR.  Appreciate surgery recommendations.   - topical lidocaine patches  - nerve block if available inpatient; ongoing discussion with anesthesia  - Surgery to eval CT; no indication for surgical intervention  - splinting will complicate vent weaning    Other  Acute pulmonary embolism without acute cor pulmonale  Provoked in setting of severe viral infection and critical illness. Acute PE and POCUS with R and L IJ occlusive opacities consistent with thrombus  - heparin gtt, high intensity for PE protocol  - tolerated heparin gtt x48 hours, will transition to Eliquis        Critical Care Medicine Daily Checklist:11/15/2024   F: Feeding Tube Feeds at goal.   A: Analgesia Infusion Fentanyl   S: Sedation CAM-ICU positive RASS goal -2 to -3  Propofol           T: Thromboprophylaxis Lower extremity SCD and On systemic anticoagulation with heparin gtt; transitioning to Eliquis   H: HOB > 300 Yes.   U: Stress Ulcer Prophylaxis PPI   G: Glucose Control Within goal (upper limit 140-180 mg/dL).   S: SAT & SBT Coordinated?  Contraindicated.   B: Bowel Regimen No BM in last 24hrs.   I: Indwelling Catheter Reviewed Maintain: Triple/Quad Lumen and Dialysis catheter  Remove: N/A   D: De-escalation of Antimicrobials No    Disposition ICU       Critical Care Time: 46 minutes  Critical  secondary to Patient has a condition that poses threat to life and bodily function: Severe Respiratory Distress  Patient has an abrupt change in neurologic status: Encephalopathy      Critical care was time spent personally by me on the following activities: development of treatment plan with patient or surrogate and bedside caregivers, discussions with consultants, evaluation of patient's response to treatment, examination of patient, ordering and performing treatments and interventions, ordering and review of laboratory studies, ordering and review of radiographic studies, pulse oximetry, re-evaluation of patient's condition. This critical care time did not overlap with that of any other provider or involve time for any procedures.     Jade Medley MD  Critical Care Medicine  [facility]

## 2024-11-15 NOTE — ASSESSMENT & PLAN NOTE
Sequale of chest compressions for CPR.  Appreciate surgery recommendations.   - topical lidocaine patches  - nerve block if available inpatient; unavailable at this time, readdress next week if analgesia insufficient  - Surgery to eval CT; no indication for surgical intervention  - splinting will complicate vent weaning

## 2024-11-15 NOTE — PROGRESS NOTES
[facility]  Adult Nutrition  Follow-up Note         Reason for Assessment  Reason For Assessment: RD follow-up   Nutrition Risk Screen: no indicators present    Assessment and Plan    11/15/2024: RD follow up. Patient remains intubated and on enteral feeding. Feeding currently at 30mL/hour with no tolerance issues noted. Recommend continue to advance towards goal rate of 45mL/hour as tolerated. RD following.     11/13/2024: Consult received and appreciated. Consult for nutrition recs-patient is intubated. Patient is a 55yo male admitted 11/11 for hyperkalemia.     Patient is 108.3kg with a BMI of 32.38 and is obese. He has a PMH of ESRD on HD. Recommend start Novasource Renal with a goal rate of 45mL/hour with 30mL/hour free water flush. Keep HOB at 30-45 degrees to reduce risk for aspiration. Start rate at 20mL/hour and advance by 10mL q8h until goal rate is reached. Monitor for tolerance: n/v/d/c. Hold feeding and notify RD if symptoms of intolerance develop.     Last BM 11/12 per flowsheet.    Medications/labs reviewed. RD following.           Learning Needs/Social Determinants of Health    Learning Assessment       11/11/2024 1310 [facility] (11/11/2024 - Present)   Created by Berenice Casillas, RN - RN (Nurse) Status: Complete                 PRIMARY LEARNER     Primary Learner Name:  Criss Butt EL - 11/11/2024 1310    Relationship:  Patient EL - 11/11/2024 1310    Does the primary learner have any barriers to learning?:  No Barriers  - 11/11/2024 1310    What is the preferred language of the primary learner?:  English EL - 11/11/2024 1310    Is an  required?:  Yes EL - 11/11/2024 1310    How does the primary learner prefer to learn new concepts?:  Listening EL - 11/11/2024 1310    How often do you need to have someone help you read instructions, pamphlets, or written material from your doctor or pharmacy?:  Never EL - 11/11/2024 1310        CO-LEARNER  #1     No question answered        CO-LEARNER #2     No question answered        SPECIAL TOPICS     No question answered        ANSWERED BY:     No question answered        Comments         Edit History       Berenice Casillas, RN - RN (Nurse)   11/11/2024 1310                           Social Drivers of Health     Tobacco Use: Low Risk  (11/11/2024)    Patient History     Smoking Tobacco Use: Never     Smokeless Tobacco Use: Never     Passive Exposure: Not on file   Alcohol Use: Not At Risk (6/19/2024)    AUDIT-C     Frequency of Alcohol Consumption: Never     Average Number of Drinks: Patient does not drink     Frequency of Binge Drinking: Never   Financial Resource Strain: Low Risk  (11/11/2024)    Overall Financial Resource Strain (CARDIA)     Difficulty of Paying Living Expenses: Not hard at all   Food Insecurity: No Food Insecurity (11/11/2024)    Hunger Vital Sign     Worried About Running Out of Food in the Last Year: Never true     Ran Out of Food in the Last Year: Never true   Transportation Needs: No Transportation Needs (11/11/2024)    TRANSPORTATION NEEDS     Transportation : No   Physical Activity: Inactive (6/19/2024)    Exercise Vital Sign     Days of Exercise per Week: 0 days     Minutes of Exercise per Session: 0 min   Stress: No Stress Concern Present (11/11/2024)    [employer] - Occupational Stress Questionnaire     Feeling of Stress : Not at all   Housing Stability: Low Risk  (11/11/2024)    Housing Stability Vital Sign     Unable to Pay for Housing in the Last Year: No     Homeless in the Last Year: No   Depression: Low Risk  (6/17/2024)    Depression     Last PHQ-4: Flowsheet Data: 0   Utilities: Not At Risk (11/11/2024)    [facility] Utilities     Threatened with loss of utilities: No   Health Literacy: Adequate Health Literacy (11/11/2024)     Health Literacy     Frequency of need for help with medical instructions: Rarely   Social Isolation: Socially Integrated  (6/19/2024)    Social Isolation     Social Isolation: 1          Malnutrition  Is Patient Malnourished: No    Nutrition Diagnosis  Inadequate energy intake related to Decreased ability to consume sufficient energy as evidenced by intubated  Comments: continue enteral feeding    Recent Labs   Lab 11/13/24  1005 11/13/24  1014 11/15/24  0416   GLU  --    < > 212*   POCGLU 90  --   --     < > = values in this interval not displayed.         Nutrition Prescription / Recommendations  Recommendation/Intervention: Recommen continue current POC as tolerated.  Goals: Weight maintenance during admission, tube feeding tolerance at goal  Nutrition Goal Status: progressing towards goal  Current Diet Order: NPO  Chewing or Swallowing Difficulty?: No Chewing or swallowing difficulty  Recommended Diet: Enteral Nutrition  Recommended Oral Supplement: No Oral Supplements  Is Nutrition Support Recommended:   Needs Calculated    Energy Calorie Requirements (kcal): 2166-2708kcal (20-25kcal/kg)  Protein Requirements: 87-108g (0.8-1.0g/kg)  Enteral Nutrition   Enteral Nutrition Formula Provides:  2160 kcals Propofol Rate: Yes - 343kcal  98 g Protein  198 g Carbohydrates  108 g Fat Propofol Rate: Yes - 31g  774 ml Fluid without Flush    720 ml Fluid by flush   1494 ml Total Fluid  Enteral Nutrition Recommended Order:  Tube feeding via NG/ [place] Sump  Tube feeding formula: Novasource Renal NG/ [place] Sump at 45mL/hour  Free Water Flush: 30 ml hourly  Modular Supplements:No Modular Supplements needed  Enteral Nutrition meets needs?: yes  Enteral Nutrition Status: New Order  Is Nutrition Education Recommended: No    Monitor and Evaluation  % current Intake: Enteral Nutrition progressing to goal  % intake to meet estimated needs: Enteral Nutrition   Food and Nutrient Intake: enteral nutrition intake  Food and Nutrient Adminstration: enteral and parenteral nutrition administration  Anthropometric Measurements: weight, weight change  Biochemical  Data, Medical Tests and Procedures: electrolyte and renal panel, gastrointestinal profile, glucose/endocrine profile, inflammatory profile, lipid profile       Current Medical Diagnosis and Past Medical History  Diagnosis: other (see comments)  Past Medical History:   Diagnosis Date    Anemia in chronic kidney disease 12/06/2014    Chronic diastolic congestive heart failure     Deep vein thrombosis     ESRD on hemodialysis 06/17/2021    Hyperlipidemia     Hypertension     Malignant neoplasm of kidney     Moderate aortic stenosis 04/22/2023    Paroxysmal atrial fibrillation     Polycystic kidney disease     Primary osteoarthritis involving multiple joints 07/16/2024       Nutrition/Diet History       Lab/Procedures/Meds  Recent Labs   Lab 11/13/24  1014 11/14/24  0227 11/15/24  0416      < > 131*   K 4.9   < > 6.3*   BUN 30*   < > 35*   CREATININE 7.86*   < > 8.20*   CALCIUM 8.2*   < > 6.9*   ALBUMIN 3.4*  --   --    CL 97*   < > 91*   ALT 28  --   --    AST 89*  --   --    PHOS  --    < > 8.9*    < > = values in this interval not displayed.   Note: Na+, Cl-, Ca++ low. K+, Phos, BUN, Cr elevated. PMH ESRD on HD  Last A1c:   Lab Results   Component Value Date    HGBA1C 6.0 07/11/2023     Lab Results   Component Value Date    RBC 2.94 (L) 11/15/2024    HGB 9.0 (L) 11/15/2024    HCT 29.1 (L) 11/15/2024    MCV 99.0 (H) 11/15/2024    MCH 30.6 11/15/2024    MCHC 30.9 (L) 11/15/2024   Note: H&H low. On HD.     Pertinent Labs Reviewed: reviewed  Pertinent Medications Reviewed: reviewed  Scheduled Meds:   albuterol-ipratropium  3 mL Nebulization Q4H    azithromycin  500 mg Intravenous Q24H    calcium gluconate IVPB  1 g Intravenous Q1H    chlorhexidine  15 mL Mouth/Throat BID    [START ON 11/16/2024] epoetin milla-epbx  50 Units/kg Intravenous Every Tues, Thurs, Sat    hydrALAZINE  100 mg Oral TID    hydrocortisone sodium succinate  50 mg Intravenous Q6H    isosorbide mononitrate  60 mg Oral QAM    LIDOcaine  2 patch  Transdermal Q24H    mupirocin   Nasal BID    NIFEdipine  60 mg Oral QHS    oseltamivir  30 mg Oral Daily    pantoprazole  40 mg Intravenous Daily    piperacillin-tazobactam (Zosyn) IV (PEDS and ADULTS) (extended infusion is not appropriate)  4.5 g Intravenous Q12H    sodium zirconium cyclosilicate  10 g Oral TID     Continuous Infusions:   fentanyl  0-100 mcg/hr Intravenous Continuous 7.5 mL/hr at 11/15/24 1000 75 mcg/hr at 11/15/24 1000    heparin (porcine) in D5W  0-40 Units/kg/hr (Adjusted) Intravenous Continuous 10.8 mL/hr at 11/15/24 1000 12 Units/kg/hr at 11/15/24 1000    NORepinephrine bitartrate-D5W  0-3 mcg/kg/min Intravenous Continuous   Stopped at 11/15/24 0819    propofoL  0-50 mcg/kg/min Intravenous Continuous 32.4 mL/hr at 11/15/24 1000 50 mcg/kg/min at 11/15/24 1000     PRN Meds:.  Current Facility-Administered Medications:     0.9% NaCl, , Intravenous, PRN    0.9% NaCl, , Intravenous, PRN    acetaminophen, 650 mg, Oral, Q4H PRN    albuterol sulfate, 2.5 mg, Nebulization, Q4H PRN    fentanyl, 50 mcg, Intravenous, Q30 Min PRN    heparin (porcine), 4,000 Units, Intra-Catheter, PRN    heparin (PORCINE), 60 Units/kg (Adjusted), Intravenous, PRN    heparin (PORCINE), 30 Units/kg (Adjusted), Intravenous, PRN    labetalol, 10 mg, Intravenous, Q4H PRN    metoprolol, 5 mg, Intravenous, Q5 Min PRN    ondansetron, 4 mg, Intravenous, Q8H PRN    sodium chloride 0.9%, 250 mL, Intravenous, PRN    sodium chloride 0.9%, 10 mL, Intravenous, PRN    Pharmacy to dose Vancomycin consult, , , Once **AND** vancomycin - pharmacy to dose, , Intravenous, pharmacy to manage frequency    Anthropometrics  Temp: 97.2 °F (36.2 °C)  Height Method: Stated  Height: 6' (182.9 cm)  Height (inches): 72 in  Weight Method: Bed Scale  Weight: 109.3 kg (240 lb 15.4 oz)  Weight (lb): 240.97 lb  Ideal Body Weight (IBW), Male: 178 lb  % Ideal Body Weight, Male (lb): 134.13 %  BMI (Calculated): 32.7       Estimated/Assessed Needs  RMR  (Brantley-St. Jeor Equation): 1981   Total Ve: 9.75 L/m Temp: 97.2 °F (36.2 °C)Oral  Weight Used For Calorie Calculations: 108.3 kg (238 lb 12.1 oz)     Energy Calorie Requirements (kcal): 2166-2708kcal (20-25kcal/kg)  Weight Used For Protein Calculations: 108.3 kg (238 lb 12.1 oz)  Protein Requirements: 87-108g (0.8-1.0g/kg)       RDA Method (mL): 2166       Nutrition by Nursing  Diet/Nutrition Received: tube feeding           Last Bowel Movement: 11/14/24       NG/OG Tube 11/13/24 0816 [place] sump 16 Fr. Right nostril-Feeding Type: continuous, by pump       NG/OG Tube 11/13/24 0816 [place] sump 16 Fr. Right nostril-Current Rate (mL/hr): 30 mL/hr       NG/OG Tube 11/13/24 0816 [place] sump 16 Fr. Right nostril-Goal Rate (mL/hr): 45 mL/hr       NG/OG Tube 11/13/24 0816 [place] sump 16 Fr. Right nostril-Formula Name: novasource renal    Nutrition Follow-Up  RD Follow-up?: Yes      Nutrition Discharge Planning: RD following for discharge needs          Jessica Vora MS, RD, LD  Available via Secure Chat

## 2024-11-15 NOTE — NURSING
[facility]  DIALYSIS TREATMENT SUMMARY      Note: Consult with the attending physician for patient treatment orders, this document is not a physician order.      Patient Information   Patient Saúl Butt   Date of Birth June 26, 1970   Chart Number [id number]   Location [facility] [place]   Location MRN [id number]   Gender Male   SSN (last 4) [id number]     Treatment Information   Treatment Type Hemodialysis   Treatment Id [id number]   Start Time November 15, 2024 13:10   End Time November 15, 2024 15:40   Acutal Duration 02:30     Treatment Balances   Total Saline Administered 500   Net Fluid Balance 804    Hemodialysis Orders   Therapy Standard   Orders Verified Time 11/15/2024 12:30    Date Verified 11/15/2024   Duration 2:30   Isolated UF/Bypass No   BFR (mL) 400   DFR X2 BFR   DFR (mL) 800   Dialyzer Type OPTIFLUX 160NR   UF Order UF Range   UF Range (mL) 0 - 1000   Crit-Line used No   Heparin Initial Units Bolus No   Heparin IV Maintenance Bolus No   Heparin IV Infusion No   Potassium (mEq/L) 2.0   Calcium (mEq/L) 2.5   Bicarb (mg/dL) 33   Sodium (mEq/L) 137   Additional Orders 1. Turn UF off if pt becomes symptomatic associated with drop in SBP >20mmHg. 2. Hep-lock HD ports x 2 with 2,000 units of Heparin post HD tx.   Clinician Patricia Mack RN    Dialysis Access   Access Type Central Venous Catheter   Central Venous Catheter   Access Type Catheter - Tunneled   Access Location Chest Wall - R   Catheter Care Completed per Policy Yes   Dressing Dry and Intact on Arrival Yes   Dressing Changed Yes   Type of Dressing Film Biopatch/CHG   Dressing Changed By [facility] Staff   Type of HD Caps Clear Guard   HD Caps Changed Yes   CVC Line Education Provided No - Intubated      Vitals   Pre-Treatment Vitals   Time Is BP being recorded? Pre BP Map BP Method Pulse RR Temp How was Weight Obtained? Pre Weight Previous Dry Weight Previous Post Weight Metric Target Fluid Removal (mL) Dialysate Confirmed Clinician     11/15/2024 13:01 BP/Map 130/70 (90) Noninvasive 105 20 97.2 How Obtained: Bed Scale 110.3   Kgs 1500 Yes Patricia Mack, RN    Comments: Rec'd pt to ICS room 2, sedated on ventilator. Able to open eyes when spoken to. Rt chest wall CVC present with dsg D/I, no s/s of infection noted, removed old dsg, cleaned, and redressed per P/P.      Post Treatment Vitals   Time Is BP being recorded? BP Map Pulse RR Temp How was Weight Obtained? Post Weight Metric BVP UF Goal Ordered NSS Given Intra-Procedure Total Machine UF Removed (mL) Crit-Line Ending Profile Crit-Line Refill Crit-Line Ending HCT Crit-Line Max BV% Clinician    11/15/2024 15:45 BP/Map 136/95 (109) 121 17 97.3 How Obtained: Bed scale 110 Kgs 54.7 1500 0 1304     Patricia Mack, RN    Comments: Post cath care performed per dialysis RN.      Safety checks include: access uncovered and secured, Hemaclip secured for all central line access, machine checks performed, and alarm limits confirmed.     Labs   Hepatitis   HBsAG Lab Result HBsAG Lab Result HBsAG Draw Date Transient Antigenemia(MD Diagnosis Only) Anti-HBs Lab Result Anti-HBs Lab Value Anti-HBs Draw Date Documented By Documented Date Hepatitis Status Hepatitis Status   Negative  11/11/2024  Unknown  11/11/2024 Damien Valdivia 11/15/2024  Susceptible   Notes:       Pre-Treatment Hepatitis Precautions Copy of hepatitis results verified in hospital EMR Yes Signing   Patient tx at bedside 1:1 Yes Hepatitis Information Entered By Damien Valdivia RN Signed By Damien Valdivia RN     Pre-Treatment Handoff   Staff Report Received Yes   Report Given by Primary Nurse Viktoria Rico RN   Time 12:45     Patient Arrival   Patient ID Verified Date of Birth    MRN    Full Name   Patient Consent to treatment verified Yes   Blood Transfusion Consent Verified N/A     Treatment Comments   Treatment Notes Pt cayden HD tx well.     Post-Treatment Handoff   Report Given to Primary Nurse Viktoria Rico RN   Time Report Given 16:00    Report Given By Patricia Mack RN    Machine Validation   Time 12:40   Date 11/15/2024   Auto Alarm Test Passed Yes   Machine Serial # [id number]   Portable RO Yes   RO Serial# [id number]   Residual Bleach Negative Yes   Was a manufactured mix used? Yes   Machine Log Completed Yes   Total Chlorine (less than 0.1)? Yes   Total Chlorine Log Completed Yes   Bicarb BiBag   Bibag Size 650   Machine Temp 37   Machine Conductivity 13.9   Meter Type N/A   Meter Conductivity    Independent Conductivity 13.9   pH Status Pass Pass   pH    TCD Value 13.6   TCD Alarm with +/- 0.5 Yes   NVL enabled validated 100 asymmetric Yes   Safety check complete Patricia Mack RN   Second Verification Performed? Yes   Second Verification Performed By Patricia Mack RN   Reason Not Verified       Serum Lab Values   Time BUN Creatinine Na K (mEq/L) Cl CO2 Ca (mEq/L) Phos Mg (mg/dL) Alb (g/dL) Glucose Hgb Hct WBC Plt PT aPTT INR Other Clinician    11/14/2024 22:07 47 11.10 128 6.2 93 24 6.5 8.7 2.2  307 9 29.1 17.28 235     Damien Valdivia RN    Comments:         Facility Information Room # ICS-02 Isolation Required? Y   Facility Information Patient Type Chronic dialysis patient with diagnosis of ESRD If yes, Explain Other   Admission Date 11/11/2024 Patient Chronic Unit [facility] Isolation Type Contact   Ordering MD Dr. Kiet Martinez Code Status Full Code  Droplet   Account/Finance Number [id number] Diagnosis Completed by   Admission Status InPatient Diagnosis ACUTE RESPIRATORY FAILURE WITH HYPOXEMIA General Tx information Entered by Patricia Mack RN   Location ICU Isolation Information      Start Treatment Time Out Confirmed by ANTHONY Doyle RN Correct access site verified Yes   Treatment Initiation Connections Secured Time Out Completed 13:09 Treatment Start Date 11/15/2024    Saline line double clamped Correct patient verified Yes Treatment Start Time 13:10    Hemaclip Applied Correct procedure verified Yes Patient/Family questions and  concerns addressed Yes     Pre Focused Assessment Respiratory Efforts Unlabored LOC   Access Notes pt on vent LOC Sedated   Signs and Symptoms of Infection? No Edema GI / Bowels   Pain Screening Location None GI Soft   Does the patient have pain? No Cardiac    Respiratory Heart Rhythm Regular  Voids   Lung Sounds Diminished Telemetry Yes Completed by   Location Bilateral Notes tachycardia noted Pre Treatment Focused Assessment Completed By Patricia Mack RN    Bases Skin Time 13:00   Position Bases Skin Warm Signing    Anterior  Dry Signed By Patricia Mack RN     Education Family Education Provided? Yes Patient Education Reinforced By   Patient Education Focus or Topic Hemodialysis treatment review Patient Education Reinforced by Patricia Mack RN   Patient Educated? No Method Knowledge / Understanding Assessed Teach back    Reason Medically sedated Caregiver Education Provided? N/A      Post-Treatment Delay N Post Treatment General Information   Post Treatment Machine Disinfection Requirement Notes Pt cayden HD tx well.   Patient Transferred to Higher Level of Care? No HD machine external disinfection completed per policy Yes Completed by   Post Treatment Delay PRO external disinfection completed per policy Yes Post Treatment Form Completed By Patricia Mack RN     Post Focused Assessment Location Bilateral  Dry   Changes from Pre Focused Assessment  Bases LOC   Changes from Pre Treatment Focused Assessment? No Position Bases LOC Sedated   Access  Anterior GI / Bowels   Cath Packed with Heparin Respiratory Efforts Unlabored GI Soft   Access Port(ml) 2 Notes pt on vent    Return Port(ml) 2 Edema  Voids   Catheter clamped and capped Yes Location None Completed by   Access Flow Good Cardiac Post Treatment Focused Assessment Completed by Patricia Mack RN   Dialyzer Clearance Streaked Heart Rhythm Regular Date 11/15/2024   Pain Screening Telemetry Yes Time 15:50   Does the patient have pain? No Notes  tachycardia noted Signing   Respiratory Skin Signed By Patricia Mack RN   Lung Sounds Diminished Skin Warm

## 2024-11-15 NOTE — ASSESSMENT & PLAN NOTE
Recurrent Hyperkalemia, attributed to Influenza; CK up-trending and remains <1k; combativeness likely contributing in upternend. Recurrence of hyperK between HD sessions warranting daily requirements; medications reviewed with no culprits  RESOLVED: monitor for steroid d/c  - cont HD  - stop lokelma TID; course with resolution of hyperK   - with regards to concern for adrenal insufficiency, this should be addressed by ongoing stress dose steroids; rhabdo less likely with improving CK, work up to date with no identifiable tumor burden  - appreciate Nephrology recs

## 2024-11-15 NOTE — CONSULTS
[facility]  General Surgery  Consult Note    Patient Name: Saúl Butt  MRN: [id number]  Code Status: Full Code  Admission Date: 11/11/2024  Hospital Length of Stay: 4 days  Attending Physician: Jade Medley MD  Primary Care Provider: Ruth Primary Doctor    Patient information was obtained from ER records.     Inpatient consult to General Surgery  Consult performed by: Kapil Daniel DO  Consult ordered by: Kapil Daniel DO        Subjective:     Principal Problem: Acute respiratory failure with hypoxemia    History of Present Illness: No notes on file    No current facility-administered medications on file prior to encounter.     Current Outpatient Medications on File Prior to Encounter   Medication Sig    cloNIDine (CATAPRES) 0.3 MG tablet take 1 tablet by mouth twice a day    gabapentin (NEURONTIN) 600 MG tablet take 1 tablet by mouth twice a day    hydrALAZINE (APRESOLINE) 100 MG tablet Take 1 tablet (100 mg total) by mouth 3 (three) times daily as needed for blood pressure    losartan (COZAAR) 50 MG tablet Take 1 tablet (50 mg total) by mouth 2 (two) times a day.    metoprolol tartrate (LOPRESSOR) 100 MG tablet Take 1 tablet (100 mg total) by mouth 2 (two) times a day.    NIFEdipine (PROCARDIA-XL) 60 MG (OSM) 24 hr tablet Take 1 tablet by mouth daily    prazosin (MINIPRESS) 1 MG Cap Take 1 capsule (1 mg total) by mouth 2 (two) times a day.    amLODIPine (NORVASC) 10 MG tablet Take 1 tablet by mouth once daily.    apixaban (ELIQUIS) 2.5 mg Tab Take 2.5 mg by mouth 2 (two) times daily.    isosorbide mononitrate (IMDUR) 60 MG 24 hr tablet Take 60 mg by mouth every morning.    lanthanum (FOSRENOL) 1000 MG chewable tablet Take 1,000 mg by mouth.    meloxicam (MOBIC) 15 MG tablet Take 15 mg by mouth once daily.    pantoprazole (PROTONIX) 40 MG tablet Take 40 mg by mouth once daily.    sevelamer carbonate (RENVELA) 800 mg Tab     sucroferric oxyhydroxide (VELPHORO) 500 mg Chew        Review  of patient's allergies indicates:   Allergen Reactions    Fish containing products Swelling    Iodinated contrast media Swelling    Iodine Shortness Of Breath, Swelling and Anaphylaxis     Sob and swelling/itching/throat closes up per pt  Other reaction(s): Swelling, Hives, DifficultyBreat  Sob and swelling/itching/throat closes up per pt      Shellfish containing products Swelling    Iodine and iodide containing products        Past Medical History:   Diagnosis Date    Anemia in chronic kidney disease 12/06/2014    Chronic diastolic congestive heart failure     Deep vein thrombosis     ESRD on hemodialysis 06/17/2021    Hyperlipidemia     Hypertension     Malignant neoplasm of kidney     Moderate aortic stenosis 04/22/2023    Paroxysmal atrial fibrillation     Polycystic kidney disease     Primary osteoarthritis involving multiple joints 07/16/2024     Past Surgical History:   Procedure Laterality Date    AV FISTULA PLACEMENT      EYE SURGERY      HD CATHETER      SELECTIVE INJECTION OF ANESTHETIC AGENT AROUND LUMBAR SPINAL NERVE ROOT BY TRANSFORAMINAL APPROACH Left 6/13/2024    Procedure: Left L5-S1 TFESi;  Surgeon: Lily Cuellar MD;  Location: [facility];  Service: Pain Management;  Laterality: Left;    SURGICAL REMOVAL OF ULCER      clamped ulcer in stomach    TUNNELED VENOUS CATHETER PLACEMENT       Family History    None       Tobacco Use    Smoking status: Never    Smokeless tobacco: Never   Substance and Sexual Activity    Alcohol use: Never    Drug use: Never    Sexual activity: Not Currently     Review of Systems   Unable to perform ROS: Intubated     Objective:     Vital Signs (Most Recent):  Temp: 97.3 °F (36.3 °C) (11/15/24 0615)  Pulse: 89 (11/15/24 0615)  Resp: 16 (11/15/24 0615)  BP: 104/60 (11/15/24 0615)  SpO2: 100 % (11/15/24 0615) Vital Signs (24h Range):  Temp:  [96.1 °F (35.6 °C)-98.2 °F (36.8 °C)] 97.3 °F (36.3 °C)  Pulse:  [] 89  Resp:  [10-23] 16  SpO2:  [86 %-100 %] 100  %  BP: ()/() 104/60     Weight: 109.3 kg (240 lb 15.4 oz)  Body mass index is 32.68 kg/m².     Physical Exam  Constitutional:       Appearance: He is ill-appearing.      Interventions: He is intubated.   Cardiovascular:      Rate and Rhythm: Normal rate.   Pulmonary:      Effort: He is intubated.   Chest:          Comments: Right chest tunneled dialysis catheter           I have reviewed all pertinent lab results within the past 24 hours.  CBC:   Recent Labs   Lab 11/15/24  0416   WBC 17.28*   RBC 2.94*   HGB 9.0*   HCT 29.1*      MCV 99.0*   MCH 30.6   MCHC 30.9*     BMP:   Recent Labs   Lab 11/14/24 0227   *   *   K 6.2*   CL 93*   CO2 24   BUN 47*   CREATININE 11.10*   CALCIUM 6.5*   MG 2.2     Coagulation:   Recent Labs   Lab 11/13/24  1905 11/14/24  0227 11/15/24  0416   LABPROT 16.4*  --   --    INR 1.34  --   --    APTT 41.4*   < > 76.7*    < > = values in this interval not displayed.       Significant Diagnostics:  I have reviewed all pertinent imaging results/findings within the past 24 hours.    Assessment/Plan:     Closed fracture of multiple ribs  S/P Cardiac Arrest, CPR    11/15:  We will add 3D recons to previous CT angiogram of the chest to evaluate rib fractures.      With his age and minimally displaced fractures, I do not think rib fixation would really help him at this time.  Attempt conservative management for now; lidocaine patches over the chest on both sides were rib fractures are, pain control.  Continue to wean from vent per pulmonology.  Also consider rib block per anesthesia on bilateral sides      VTE Risk Mitigation (From admission, onward)           Ordered     heparin (porcine) injection 4,000 Units  As needed (PRN)         11/14/24 1618     heparin 25,000 units in dextrose 5% 250 mL (100 units/mL) infusion HIGH INTENSITY nomogram - [facility]  Continuous        Question:  Begin at (units/kg/hr)  Answer:  18    11/13/24 1835     heparin 25,000 units in  dextrose 5% (100 units/ml) IV bolus from bag HIGH INTENSITY nomogram - [facility]  As needed (PRN)        Question:  Heparin Infusion Adjustment (DO NOT MODIFY ANSWER)  Answer:  \\[email or web address]\epic\Images\Pharmacy\HeparinInfusions\heparin HIGH INTENSITY nomogram for [facility] [id number]    11/13/24 1835     heparin 25,000 units in dextrose 5% (100 units/ml) IV bolus from bag HIGH INTENSITY nomogram - [facility]  As needed (PRN)        Question:  Heparin Infusion Adjustment (DO NOT MODIFY ANSWER)  Answer:  \\[email or web address]\epic\Images\Pharmacy\HeparinInfusions\heparin HIGH INTENSITY nomogram for [facility] [id number]    11/13/24 1835     IP VTE HIGH RISK PATIENT  Once         11/11/24 1147     Place sequential compression device  Until discontinued         11/11/24 1147                    Thank you for your consult. I will follow-up with patient. Please contact us if you have any additional questions.    Kapil Bellamy, DO  General Surgery  [facility]

## 2024-11-16 PROBLEM — J80 ACUTE RESPIRATORY DISTRESS SYNDROME: Status: RESOLVED | Noted: 2024-11-13 | Resolved: 2024-11-16

## 2024-11-16 PROBLEM — I50.22 HEART FAILURE WITH MILDLY REDUCED EJECTION FRACTION: Status: ACTIVE | Noted: 2024-11-16

## 2024-11-16 LAB
ANION GAP SERPL CALCULATED.3IONS-SCNC: 20 MMOL/L (ref 7–16)
ANISOCYTOSIS BLD QL SMEAR: ABNORMAL
APTT PPP: 61.5 SECONDS (ref 25.2–37.3)
BASOPHILS # BLD AUTO: 0.03 K/UL (ref 0–0.2)
BASOPHILS NFR BLD AUTO: 0.2 % (ref 0–1)
BUN SERPL-MCNC: 26 MG/DL (ref 8–26)
BUN/CREAT SERPL: 4 (ref 6–20)
CALCIUM SERPL-MCNC: 6.6 MG/DL (ref 8.4–10.2)
CHLORIDE SERPL-SCNC: 90 MMOL/L (ref 98–107)
CK SERPL-CCNC: 454 U/L (ref 30–200)
CO2 SERPL-SCNC: 25 MMOL/L (ref 22–29)
CREAT SERPL-MCNC: 6.44 MG/DL (ref 0.72–1.25)
CRENATED CELLS: ABNORMAL
DIFFERENTIAL METHOD BLD: ABNORMAL
EGFR (NO RACE VARIABLE) (RUSH/TITUS): 10 ML/MIN/1.73M2
EOSINOPHIL # BLD AUTO: 0.06 K/UL (ref 0–0.5)
EOSINOPHIL NFR BLD AUTO: 0.5 % (ref 1–4)
ERYTHROCYTE [DISTWIDTH] IN BLOOD BY AUTOMATED COUNT: 17.4 % (ref 11.5–14.5)
GLUCOSE SERPL-MCNC: 197 MG/DL (ref 74–100)
HCT VFR BLD AUTO: 25.6 % (ref 40–54)
HGB BLD-MCNC: 8.2 G/DL (ref 13.5–18)
HYPOCHROMIA BLD QL SMEAR: ABNORMAL
IMM GRANULOCYTES # BLD AUTO: 0.6 K/UL (ref 0–0.04)
IMM GRANULOCYTES NFR BLD: 4.5 % (ref 0–0.4)
LYMPHOCYTES # BLD AUTO: 0.89 K/UL (ref 1–4.8)
LYMPHOCYTES NFR BLD AUTO: 6.7 % (ref 27–41)
LYMPHOCYTES NFR BLD MANUAL: 11 % (ref 27–41)
MAGNESIUM SERPL-MCNC: 2.3 MG/DL (ref 1.6–2.6)
MCH RBC QN AUTO: 30.3 PG (ref 27–31)
MCHC RBC AUTO-ENTMCNC: 32 G/DL (ref 32–36)
MCV RBC AUTO: 94.5 FL (ref 80–96)
MONOCYTES # BLD AUTO: 0.64 K/UL (ref 0–0.8)
MONOCYTES NFR BLD AUTO: 4.8 % (ref 2–6)
MONOCYTES NFR BLD MANUAL: 3 % (ref 2–6)
MPC BLD CALC-MCNC: 12.5 FL (ref 9.4–12.4)
NEUTROPHILS # BLD AUTO: 11.11 K/UL (ref 1.8–7.7)
NEUTROPHILS NFR BLD AUTO: 83.3 % (ref 53–65)
NEUTS BAND NFR BLD MANUAL: 1 % (ref 1–5)
NEUTS SEG NFR BLD MANUAL: 85 % (ref 50–62)
NRBC # BLD AUTO: 0.05 X10E3/UL
NRBC, AUTO (.00): 0.4 %
OVALOCYTES BLD QL SMEAR: ABNORMAL
PHOSPHATE SERPL-MCNC: 7.5 MG/DL (ref 2.3–4.7)
PLATELET # BLD AUTO: 226 K/UL (ref 150–400)
PLATELET MORPHOLOGY: NORMAL
POLYCHROMASIA BLD QL SMEAR: ABNORMAL
POTASSIUM SERPL-SCNC: 4.1 MMOL/L (ref 3.5–5.1)
RBC # BLD AUTO: 2.71 M/UL (ref 4.6–6.2)
SODIUM SERPL-SCNC: 131 MMOL/L (ref 136–145)
TRIGL SERPL-MCNC: 204 MG/DL (ref 34–140)
VANCOMYCIN SERPL-MCNC: 15.3 ΜG/ML (ref 15–20)
WBC # BLD AUTO: 13.33 K/UL (ref 4.5–11)

## 2024-11-16 PROCEDURE — 25000003 PHARM REV CODE 250: Performed by: STUDENT IN AN ORGANIZED HEALTH CARE EDUCATION/TRAINING PROGRAM

## 2024-11-16 PROCEDURE — 27000221 HC OXYGEN, UP TO 24 HOURS

## 2024-11-16 PROCEDURE — 99900026 HC AIRWAY MAINTENANCE (STAT)

## 2024-11-16 PROCEDURE — 20000000 HC ICU ROOM

## 2024-11-16 PROCEDURE — 94640 AIRWAY INHALATION TREATMENT: CPT

## 2024-11-16 PROCEDURE — 93005 ELECTROCARDIOGRAM TRACING: CPT

## 2024-11-16 PROCEDURE — 85025 COMPLETE CBC W/AUTO DIFF WBC: CPT | Performed by: STUDENT IN AN ORGANIZED HEALTH CARE EDUCATION/TRAINING PROGRAM

## 2024-11-16 PROCEDURE — 85730 THROMBOPLASTIN TIME PARTIAL: CPT | Performed by: STUDENT IN AN ORGANIZED HEALTH CARE EDUCATION/TRAINING PROGRAM

## 2024-11-16 PROCEDURE — 99291 CRITICAL CARE FIRST HOUR: CPT | Mod: ,,, | Performed by: STUDENT IN AN ORGANIZED HEALTH CARE EDUCATION/TRAINING PROGRAM

## 2024-11-16 PROCEDURE — 80048 BASIC METABOLIC PNL TOTAL CA: CPT | Performed by: STUDENT IN AN ORGANIZED HEALTH CARE EDUCATION/TRAINING PROGRAM

## 2024-11-16 PROCEDURE — 94003 VENT MGMT INPAT SUBQ DAY: CPT

## 2024-11-16 PROCEDURE — 84478 ASSAY OF TRIGLYCERIDES: CPT | Performed by: HOSPITALIST

## 2024-11-16 PROCEDURE — 83735 ASSAY OF MAGNESIUM: CPT | Performed by: STUDENT IN AN ORGANIZED HEALTH CARE EDUCATION/TRAINING PROGRAM

## 2024-11-16 PROCEDURE — 80202 ASSAY OF VANCOMYCIN: CPT | Performed by: STUDENT IN AN ORGANIZED HEALTH CARE EDUCATION/TRAINING PROGRAM

## 2024-11-16 PROCEDURE — 27000207 HC ISOLATION

## 2024-11-16 PROCEDURE — 84100 ASSAY OF PHOSPHORUS: CPT | Performed by: STUDENT IN AN ORGANIZED HEALTH CARE EDUCATION/TRAINING PROGRAM

## 2024-11-16 PROCEDURE — 93010 ELECTROCARDIOGRAM REPORT: CPT | Mod: ,,, | Performed by: INTERNAL MEDICINE

## 2024-11-16 PROCEDURE — 87070 CULTURE OTHR SPECIMN AEROBIC: CPT | Performed by: STUDENT IN AN ORGANIZED HEALTH CARE EDUCATION/TRAINING PROGRAM

## 2024-11-16 PROCEDURE — 25000242 PHARM REV CODE 250 ALT 637 W/ HCPCS: Performed by: STUDENT IN AN ORGANIZED HEALTH CARE EDUCATION/TRAINING PROGRAM

## 2024-11-16 PROCEDURE — 63600175 PHARM REV CODE 636 W HCPCS: Performed by: STUDENT IN AN ORGANIZED HEALTH CARE EDUCATION/TRAINING PROGRAM

## 2024-11-16 PROCEDURE — 99900035 HC TECH TIME PER 15 MIN (STAT)

## 2024-11-16 PROCEDURE — 36415 COLL VENOUS BLD VENIPUNCTURE: CPT | Performed by: HOSPITALIST

## 2024-11-16 PROCEDURE — 82550 ASSAY OF CK (CPK): CPT | Performed by: STUDENT IN AN ORGANIZED HEALTH CARE EDUCATION/TRAINING PROGRAM

## 2024-11-16 PROCEDURE — 94761 N-INVAS EAR/PLS OXIMETRY MLT: CPT

## 2024-11-16 PROCEDURE — 25000003 PHARM REV CODE 250: Performed by: NURSE PRACTITIONER

## 2024-11-16 RX ORDER — METOPROLOL TARTRATE 1 MG/ML
5 INJECTION, SOLUTION INTRAVENOUS EVERY 8 HOURS
Status: DISCONTINUED | OUTPATIENT
Start: 2024-11-16 | End: 2024-11-16

## 2024-11-16 RX ORDER — IPRATROPIUM BROMIDE AND ALBUTEROL SULFATE 2.5; .5 MG/3ML; MG/3ML
3 SOLUTION RESPIRATORY (INHALATION) EVERY 6 HOURS
Status: DISCONTINUED | OUTPATIENT
Start: 2024-11-16 | End: 2024-12-02 | Stop reason: HOSPADM

## 2024-11-16 RX ORDER — HALOPERIDOL 5 MG/ML
5 INJECTION INTRAMUSCULAR ONCE
Status: COMPLETED | OUTPATIENT
Start: 2024-11-16 | End: 2024-11-16

## 2024-11-16 RX ORDER — DEXMEDETOMIDINE HYDROCHLORIDE 4 UG/ML
0-1.4 INJECTION, SOLUTION INTRAVENOUS CONTINUOUS
Status: DISCONTINUED | OUTPATIENT
Start: 2024-11-16 | End: 2024-11-17

## 2024-11-16 RX ORDER — HALOPERIDOL 5 MG/ML
2 INJECTION INTRAMUSCULAR ONCE
Status: DISCONTINUED | OUTPATIENT
Start: 2024-11-16 | End: 2024-11-16

## 2024-11-16 RX ORDER — METOPROLOL TARTRATE 50 MG/1
100 TABLET ORAL 2 TIMES DAILY
Status: DISCONTINUED | OUTPATIENT
Start: 2024-11-16 | End: 2024-12-01

## 2024-11-16 RX ORDER — HALOPERIDOL 5 MG/ML
0.5 INJECTION INTRAMUSCULAR EVERY 6 HOURS PRN
Status: DISCONTINUED | OUTPATIENT
Start: 2024-11-16 | End: 2024-11-17

## 2024-11-16 RX ORDER — METOPROLOL TARTRATE 1 MG/ML
5 INJECTION, SOLUTION INTRAVENOUS EVERY 12 HOURS PRN
Status: DISCONTINUED | OUTPATIENT
Start: 2024-11-16 | End: 2024-12-02 | Stop reason: HOSPADM

## 2024-11-16 RX ADMIN — HALOPERIDOL LACTATE 5 MG: 5 INJECTION, SOLUTION INTRAMUSCULAR at 08:11

## 2024-11-16 RX ADMIN — IPRATROPIUM BROMIDE AND ALBUTEROL SULFATE 3 ML: .5; 3 SOLUTION RESPIRATORY (INHALATION) at 07:11

## 2024-11-16 RX ADMIN — PROPOFOL 50 MCG/KG/MIN: 10 INJECTION, EMULSION INTRAVENOUS at 09:11

## 2024-11-16 RX ADMIN — METOPROLOL TARTRATE 100 MG: 100 TABLET, FILM COATED ORAL at 08:11

## 2024-11-16 RX ADMIN — HYDROCORTISONE SODIUM SUCCINATE 50 MG: 100 INJECTION, POWDER, FOR SOLUTION INTRAMUSCULAR; INTRAVENOUS at 11:11

## 2024-11-16 RX ADMIN — VANCOMYCIN HYDROCHLORIDE 1500 MG: 500 INJECTION, POWDER, LYOPHILIZED, FOR SOLUTION INTRAVENOUS at 10:11

## 2024-11-16 RX ADMIN — APIXABAN 10 MG: 5 TABLET, FILM COATED ORAL at 08:11

## 2024-11-16 RX ADMIN — HYDRALAZINE HYDROCHLORIDE 100 MG: 100 TABLET ORAL at 08:11

## 2024-11-16 RX ADMIN — METOPROLOL TARTRATE 5 MG: 1 INJECTION, SOLUTION INTRAVENOUS at 01:11

## 2024-11-16 RX ADMIN — HYDROCORTISONE SODIUM SUCCINATE 50 MG: 100 INJECTION, POWDER, FOR SOLUTION INTRAMUSCULAR; INTRAVENOUS at 05:11

## 2024-11-16 RX ADMIN — IPRATROPIUM BROMIDE AND ALBUTEROL SULFATE 3 ML: .5; 3 SOLUTION RESPIRATORY (INHALATION) at 03:11

## 2024-11-16 RX ADMIN — LIDOCAINE 5% 2 PATCH: 700 PATCH TOPICAL at 08:11

## 2024-11-16 RX ADMIN — PROPOFOL 50 MCG/KG/MIN: 10 INJECTION, EMULSION INTRAVENOUS at 12:11

## 2024-11-16 RX ADMIN — PROPOFOL 50 MCG/KG/MIN: 10 INJECTION, EMULSION INTRAVENOUS at 06:11

## 2024-11-16 RX ADMIN — AZITHROMYCIN MONOHYDRATE 500 MG: 500 INJECTION, POWDER, LYOPHILIZED, FOR SOLUTION INTRAVENOUS at 01:11

## 2024-11-16 RX ADMIN — PROPOFOL 50 MCG/KG/MIN: 10 INJECTION, EMULSION INTRAVENOUS at 02:11

## 2024-11-16 RX ADMIN — PROPOFOL 50 MCG/KG/MIN: 10 INJECTION, EMULSION INTRAVENOUS at 11:11

## 2024-11-16 RX ADMIN — PANTOPRAZOLE SODIUM 40 MG: 40 INJECTION, POWDER, LYOPHILIZED, FOR SOLUTION INTRAVENOUS at 08:11

## 2024-11-16 RX ADMIN — PROPOFOL 50 MCG/KG/MIN: 10 INJECTION, EMULSION INTRAVENOUS at 05:11

## 2024-11-16 RX ADMIN — IPRATROPIUM BROMIDE AND ALBUTEROL SULFATE 3 ML: .5; 3 SOLUTION RESPIRATORY (INHALATION) at 12:11

## 2024-11-16 RX ADMIN — NIFEDIPINE 60 MG: 60 TABLET, FILM COATED, EXTENDED RELEASE ORAL at 08:11

## 2024-11-16 RX ADMIN — CHLORHEXIDINE GLUCONATE 15 ML: 1.2 RINSE ORAL at 08:11

## 2024-11-16 RX ADMIN — PIPERACILLIN SODIUM AND TAZOBACTAM SODIUM 4.5 G: 4; .5 INJECTION, POWDER, LYOPHILIZED, FOR SOLUTION INTRAVENOUS at 08:11

## 2024-11-16 RX ADMIN — HALOPERIDOL LACTATE 0.5 MG: 5 INJECTION, SOLUTION INTRAMUSCULAR at 11:11

## 2024-11-16 RX ADMIN — HEPARIN SODIUM 12 UNITS/KG/HR: 10000 INJECTION, SOLUTION INTRAVENOUS at 02:11

## 2024-11-16 RX ADMIN — PROPOFOL 50 MCG/KG/MIN: 10 INJECTION, EMULSION INTRAVENOUS at 03:11

## 2024-11-16 RX ADMIN — CHLORHEXIDINE GLUCONATE 15 ML: 1.2 RINSE ORAL at 09:11

## 2024-11-16 RX ADMIN — DEXMEDETOMIDINE HYDROCHLORIDE 0.2 MCG/KG/HR: 4 INJECTION, SOLUTION INTRAVENOUS at 06:11

## 2024-11-16 RX ADMIN — SODIUM ZIRCONIUM CYCLOSILICATE 10 G: 10 POWDER, FOR SUSPENSION ORAL at 08:11

## 2024-11-16 RX ADMIN — FENTANYL CITRATE 150 MCG/HR: 50 INJECTION, SOLUTION INTRAMUSCULAR; INTRAVENOUS at 05:11

## 2024-11-16 RX ADMIN — METOPROLOL TARTRATE 100 MG: 100 TABLET, FILM COATED ORAL at 10:11

## 2024-11-16 RX ADMIN — SODIUM ZIRCONIUM CYCLOSILICATE 10 G: 10 POWDER, FOR SUSPENSION ORAL at 03:11

## 2024-11-16 RX ADMIN — OSELTAMAVIR PHOSPHATE 30 MG: 30 CAPSULE ORAL at 08:11

## 2024-11-16 NOTE — PLAN OF CARE
Problem: Adult Inpatient Plan of Care  Goal: Plan of Care Review  Outcome: Progressing  Goal: Patient-Specific Goal (Individualized)  Outcome: Progressing  Goal: Absence of Hospital-Acquired Illness or Injury  Outcome: Progressing  Goal: Optimal Comfort and Wellbeing  Outcome: Progressing  Goal: Readiness for Transition of Care  Outcome: Progressing     Problem: Infection  Goal: Absence of Infection Signs and Symptoms  Outcome: Progressing     Problem: Hemodialysis  Goal: Safe, Effective Therapy Delivery  Outcome: Progressing  Goal: Effective Tissue Perfusion  Outcome: Progressing  Goal: Absence of Infection Signs and Symptoms  Outcome: Progressing     Problem: Sepsis/Septic Shock  Goal: Optimal Coping  Outcome: Progressing  Goal: Absence of Bleeding  Outcome: Progressing  Goal: Blood Glucose Level Within Targeted Range  Outcome: Progressing  Goal: Absence of Infection Signs and Symptoms  Outcome: Progressing  Goal: Optimal Nutrition Intake  Outcome: Progressing     Problem: Gas Exchange Impaired  Goal: Optimal Gas Exchange  Outcome: Progressing     Problem: Fall Injury Risk  Goal: Absence of Fall and Fall-Related Injury  Outcome: Progressing     Problem: Restraint, Nonviolent  Goal: Absence of Harm or Injury  Outcome: Progressing     Problem: Mechanical Ventilation Invasive  Goal: Effective Communication  Outcome: Progressing  Goal: Optimal Device Function  Outcome: Progressing  Goal: Mechanical Ventilation Liberation  Outcome: Progressing  Goal: Optimal Nutrition Delivery  Outcome: Progressing  Goal: Absence of Device-Related Skin and Tissue Injury  Outcome: Progressing  Goal: Absence of Ventilator-Induced Lung Injury  Outcome: Progressing     Problem: Skin Injury Risk Increased  Goal: Skin Health and Integrity  Outcome: Progressing     Problem: ARDS (Acute Respiratory Distress Syndrome)  Goal: Effective Oxygenation  Outcome: Progressing     Problem: Airway Clearance Ineffective  Goal: Effective Airway  Clearance  Outcome: Progressing
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
## 2024-11-16 NOTE — ASSESSMENT & PLAN NOTE
Rate has been reasonably controlled if it becomes more of an issue we may have to consider amiodarone

## 2024-11-16 NOTE — PLAN OF CARE
Problem: Mechanical Ventilation Invasive  Goal: Effective Communication  Outcome: Progressing

## 2024-11-16 NOTE — ASSESSMENT & PLAN NOTE
Management per Nephrology with EPO. CBC daily; no bleeding diathesis to date.   - transfuse as needed for goal Hgb >7

## 2024-11-16 NOTE — ASSESSMENT & PLAN NOTE
Post arrest TTE with LVEF 45%, normal RV size, TAPSE 2, mildly dilated LA, mildly dilated RA, moderate aortic stenosis with calcified mass, trace MR, mild-to-moderate TR.  - HFrEF, no evidence of acute systolic heart failure; LV dysfunction warrants repeat TTE outside of this critical illness  - Tn peaked and demonstrated downtrend

## 2024-11-16 NOTE — PLAN OF CARE
Problem: Adult Inpatient Plan of Care  Goal: Plan of Care Review  Outcome: Progressing  Goal: Patient-Specific Goal (Individualized)  Outcome: Progressing  Goal: Absence of Hospital-Acquired Illness or Injury  Outcome: Progressing  Goal: Optimal Comfort and Wellbeing  Outcome: Progressing  Goal: Readiness for Transition of Care  Outcome: Progressing     Problem: Infection  Goal: Absence of Infection Signs and Symptoms  Outcome: Progressing     Problem: Hemodialysis  Goal: Safe, Effective Therapy Delivery  Outcome: Progressing  Goal: Effective Tissue Perfusion  Outcome: Progressing  Goal: Absence of Infection Signs and Symptoms  Outcome: Progressing     Problem: Sepsis/Septic Shock  Goal: Optimal Coping  Outcome: Progressing  Goal: Absence of Bleeding  Outcome: Progressing  Goal: Blood Glucose Level Within Targeted Range  Outcome: Progressing  Goal: Absence of Infection Signs and Symptoms  Outcome: Progressing  Goal: Optimal Nutrition Intake  Outcome: Progressing     Problem: Gas Exchange Impaired  Goal: Optimal Gas Exchange  Outcome: Progressing     Problem: Fall Injury Risk  Goal: Absence of Fall and Fall-Related Injury  Outcome: Progressing     Problem: Restraint, Nonviolent  Goal: Absence of Harm or Injury  Outcome: Progressing     Problem: Mechanical Ventilation Invasive  Goal: Effective Communication  Outcome: Progressing  Goal: Optimal Device Function  Outcome: Progressing  Goal: Mechanical Ventilation Liberation  Outcome: Progressing  Goal: Optimal Nutrition Delivery  Outcome: Progressing  Goal: Absence of Device-Related Skin and Tissue Injury  Outcome: Progressing  Goal: Absence of Ventilator-Induced Lung Injury  Outcome: Progressing     Problem: Skin Injury Risk Increased  Goal: Skin Health and Integrity  Outcome: Progressing     Problem: ARDS (Acute Respiratory Distress Syndrome)  Goal: Effective Oxygenation  Outcome: Progressing     Problem: Restraint, Nonviolent  Goal: Absence of Harm or  Injury  Outcome: Progressing

## 2024-11-16 NOTE — PROGRESS NOTES
Pharmacy assisting in the management of vancomycin for this patient for: pneumonia    Clinical info: patient is on MWF hemodialysis and being pulse-dosed    Vancomycin level: 15.3    Will give a 1500mg dose today and check a random level on 11/19.    Pharmacy will continue to monitor daily and make adjustments as needed.    Jolene Hernandez, PharmD  [phone]

## 2024-11-16 NOTE — PROGRESS NOTES
Patient opens eyes to his name, being card and moves extremities occasionally.    Physical exam general the patient is chronically, ill appearing, heart is regular rate and rhythm, he has no pitting edema, lungs are clear to auscultation anteriorly, admin is soft, with possible sounds, feeding tube is in place and being used    Assessment/plan 1. ESRD - patient dialyzed yesterday, will plan on dialysis Monday  Two. Respiratory failure-continue ventilator support.  Three. Sepsis Dash continue anabiotics.  Four. Influenza illness - continue antivirals.  Five. Anemia - patients hematocrit is around 26 %, continue erythropoietin  6.  Hyperkalemia-patient's potassium was normal this morning.  Continue to monitor.

## 2024-11-16 NOTE — PROGRESS NOTES
[facility]  Critical Care Medicine  Progress Note    Patient Name: Saúl Butt  MRN: [id number]  Admission Date: 11/11/2024  Hospital Length of Stay: 5 days  Code Status: Full Code  Attending Provider: Jade Medley MD  Primary Care Provider: Ruht, Primary Doctor   Principal Problem: Acute respiratory failure with hypoxemia    Subjective:     HPI:  54-year-old  male presented to the ED via EMS.  Past medical history of end-stage renal disease, hypertension, Chronic anemia, Chronic diastolic CHF, moderate aortic stenosis, DVT, Hyperlipidemia, Paroxysmal atrial fibrillation, and malignant neoplasm of kidney.  Patient states he goes to dialysis Monday, Wednesday, and Friday.  States last day to dialyze was this past Friday, 11/8/24.  Patient states he has not missed any dialysis days. Patient was due to dialyze today, however he presented to the ED with 3 day history of shortness of breath which is worse with lying down, and he also reports having a cough.  He also reports 3 day history of nausea, vomiting, diarrhea. Patient reports having some fever on and off.  Denies any pain at present.    ED workup revealed sodium 131, potassium 8.4, anion gap 17, BUN and creatinine 60/13.4.  Venous blood gas revealed pH 7.42, pCO2 44, sodium 131, potassium 8.2, bicarb 28.5, lactate 1.5, and glucose 130.  Chest x-ray showed cardiomegaly with pulmonary edema.  BNP 55,331. In the ED patient received calcium gluconate 1 g IV, D10 500 mL IV, and Humulin R 10 units.  Critical care service was asked to admit patient to the ICU, where he will be dialyzed emergently to treat hyperkalemia.    Hospital/ICU Course:  11/12-- potassium 6.8 - HD gain today - went into Afib rvr on HD   11/13: continued pyrexia, resolved RVR, early am with increased work of breathing with hypoxia placed on NRB, hyperkalemia recurrence, intubated for progressive respiratory distress, severe hypoxia, aspirated during ETT s/p  successful therapeutic aspiration with bronchoscopy, am course with PEA arrest with ROSC, agitated post resuscitation warranting deep sedation  11/14-15: weaning sedation with intermittent agitation, recurrent hyperkalemia prompting HD, episodes of RVR managed with lopressor, off pressor support      Interval History/Significant Events: failed SBT and SAT for tachypnea, distress and agitation, afebrile, resolved hyperkalemia this am    Review of Systems  Objective:     Vital Signs (Most Recent):  Temp: 98.5 °F (36.9 °C) (11/16/24 0730)  Pulse: (!) 120 (11/16/24 0730)  Resp: (!) 35 (11/16/24 0827)  BP: (!) 133/59 (11/16/24 0829)  SpO2: 100 % (11/16/24 0730) Vital Signs (24h Range):  Temp:  [95.5 °F (35.3 °C)-98.8 °F (37.1 °C)] 98.5 °F (36.9 °C)  Pulse:  [] 120  Resp:  [13-50] 35  SpO2:  [78 %-100 %] 100 %  BP: ()/() 133/59   Weight: 109.3 kg (240 lb 15.4 oz)  Body mass index is 32.68 kg/m².      Intake/Output Summary (Last 24 hours) at 11/16/2024 0936  Last data filed at 11/16/2024 0820  Gross per 24 hour   Intake 4471.57 ml   Output 1304 ml   Net 3167.57 ml          Physical Exam  Constitutional:       General: He is in acute distress (during SBT and SAT, responds to sedative).      Appearance: He is ill-appearing. He is not diaphoretic.   HENT:      Head: Normocephalic and atraumatic.      Mouth/Throat:      Mouth: Mucous membranes are moist.      Pharynx: No oropharyngeal exudate.      Comments: ETT secured  Eyes:      General: No scleral icterus.  Cardiovascular:      Rate and Rhythm: Normal rate. Rhythm irregular.      Heart sounds: Murmur heard.   Pulmonary:      Effort: No respiratory distress.      Breath sounds: Rhonchi present. No wheezing.      Comments: Synchronous with ventilator, intermittently agitated  Abdominal:      General: There is no distension.      Palpations: Abdomen is soft.      Tenderness: There is no abdominal tenderness. There is no guarding.   Musculoskeletal:       Right lower leg: No edema.      Left lower leg: No edema.   Skin:     General: Skin is warm.      Coloration: Skin is not jaundiced.   Neurological:      Mental Status: He is alert.      Comments: Sedated, gag present, cough present, awakens with agitation with noxious stimulus, moving all extremities spontaneously   Psychiatric:      Comments:              Vents:  Vent Mode: A/C (11/16/24 0757)  Ventilator Initiated: Yes (11/13/24 0735)  Set Rate: 20 BPM (11/16/24 0757)  Vt Set: 480 mL (11/16/24 0757)  Pressure Support: 5 cmH20 (11/16/24 0739)  PEEP/CPAP: 6 cmH20 (11/16/24 0757)  Oxygen Concentration (%): 40 (11/16/24 0700)  Peak Airway Pressure: 30 cmH20 (11/16/24 0757)  Plateau Pressure: 17.2 cmH20 (11/16/24 0320)  Total Ve: 10.1 L/m (11/16/24 0757)  F/VT Ratio<105 (RSBI): (!) 42.74 (11/16/24 0320)  Lines/Drains/Airways       Central Venous Catheter Line  Duration                  Hemodialysis Catheter right subclavian -- days    Percutaneous Central Line - Triple Lumen  11/13/24 1400 Femoral Vein Left 2 days              Drain  Duration                  NG/OG Tube 11/13/24 0816 Robertson sump 16 Fr. Right nostril 3 days              Airway  Duration                  Airway - Non-Surgical 11/13/24 0732 Endotracheal Tube 3 days                  Significant Labs:    CBC/Anemia Profile:  Recent Labs   Lab 11/15/24  0416 11/16/24  0424   WBC 17.28* 13.33*   HGB 9.0* 8.2*   HCT 29.1* 25.6*    226   MCV 99.0* 94.5   RDW 16.7* 17.4*        Chemistries:  Recent Labs   Lab 11/15/24  0416 11/16/24  0424   * 131*   K 6.3* 4.1   CL 91* 90*   CO2 22 25   BUN 35* 26   CREATININE 8.20* 6.44*   CALCIUM 6.9* 6.6*   MG 2.4 2.3   PHOS 8.9* 7.5*       ABGs:   Recent Labs   Lab 11/15/24  0752   PH 7.27*   PCO2 62*   HCO3 28.5*   POCSATURATED 94*     All pertinent labs within the past 24 hours have been reviewed.    Significant Imaging:  I have reviewed all pertinent imaging results/findings within the past 24  hours.    ABG  Recent Labs   Lab 11/15/24  0752   PH 7.27*   PO2 81*   PCO2 62*   HCO3 28.5*     Assessment/Plan:     Neuro  Acute metabolic encephalopathy  Encephalopathy: Metabolic Encephalopathy; multifactorial including very likely influenza encephalitis  Sedated for Mechanical Ventilation  Post arrest evalaution with agitation and attempts at self extubation; will defer post-arrest normothermia protocol after neuro assessment, goal avoid fever, IV tylenol and cooling blankets to goal  - analgesia: fentanyl gtt for goal pain score 0/10  - sedation: propofol for goal RASS 0 to -1  - haldol 5 mg x1 with 0.5 mg IM available PRN for refractory agitation  - TG QOD, acceptable    Pulmonary  * Acute respiratory failure with hypoxemia  2/2 Aspiration, severe Influenza A infection and multifocal pneumonia  - severe ARDS w/ P/F 108 on intubation 11/13; ventilator assessed, dyssynchrony with lighter sedation  - deeper sedation goal as per Neuro plan, has not required paralytic  - prone positioning contraindicated Day 1 for hemodynamic instability with PEA arrest  - cont VC/AC 20/480/6/0.6   -- failing SBT for agitation  - cont DuoNebs Q6H    Aspiration pneumonia of both lungs due to gastric secretions  Concern for overnight aspiration event causing acute respiratory distress with increased work of breathing and hypoxia refractory to NRB. Intubation with aspiration of gastric contents; s/p bronchoscopy with therapeutic aspiration of gastric contents. CT Chest with multifocal pneumonia and necrotic/cavitary component in R mid lung zone. Abx broadened with decompensation; day 1 11/13.  - cont Vancomycin (pharmacy to dose) and Zosyn  - given concern for atypical infection will cover with Azithromycin as well  - will plan for 7 day course of Abx total  - MRCA PCR confounded by intranasal mupirocin administration  - f/u BCxs and Lower respiratory Cxs   -- no results available for lower respiratory cultures, will  re-send    Aspiration pneumonitis  S/p bronchoscopy and therapeutic aspiration, successful for ETT and proximal airway clearance to subsegmental level on 11/13. Further management as per pneumonia.    Cardiac/Vascular  Heart failure with mildly reduced ejection fraction  Post arrest TTE with LVEF 45%, normal RV size, TAPSE 2, mildly dilated LA, mildly dilated RA, moderate aortic stenosis with calcified mass, trace MR, mild-to-moderate TR.  - HFrEF, no evidence of acute systolic heart failure; LV dysfunction warrants repeat TTE outside of this critical illness  - Tn peaked and demonstrated downtrend        Cardiac arrest  PEA arrest with minimal down time. At this time etiology is unclear and will be attributed to electrolyte and acid/base derangements. PEA arrest occurred hours post intubation with no hypoxia. No central PE to contribute to hemodynamic burden and burden non convincing for distal propagation with compressions. Post ROSC EKG and TTE completed 11/13 not convincing for acute coronary syndrome; Tn peaked on second draw.     Atrial fibrillation with RVR  Afib with RVR 2/2 sepsis with severe viral infection. Managed with esmolol gtt with resolution. Now with episodic RVR with HD initiation; managed successfully with lopressor.  - start scheduled lopressor 100 mg BID  - given critical illness, if recurrence will plan on amiodarone gtt  - holding home Eliquis; will reinstate for PE, currently covered with heparin gtt  - will stop home lopressor during managaement of shock      Renal/  Hyperkalemia  Recurrent Hyperkalemia, attributed to Influenza; CK up-trending and remains <1k; combativeness likely contributing in upternend. Recurrence of hyperK between HD sessions warranting daily requirements; medications reviewed with no culprits  - cont HD  - cont lokelma TID, improvement in K with initiation and ongoing HD efforts  - with regards to concern for adrenal insufficiency, this should be addressed by  ongoing stress dose steroids; rhabdo less likely with improving CK, work up to date with no identifiable tumor burden; will continue assessment of ddx  - appreciate Nephrology recs    ESRD on hemodialysis  HD as per hyperkalemia plan. Of note, patient is s/p b/l nephrectomy on CT A&P.    ID  Influenza A  Severe Influenza A infection.   - pyrexia resolved with IV tylenol  - cont Tamiflu to be dosed post-HD; at this time daily requirement; will plan for extended course for severe infection    Septic shock  Multifactorial with viral pneumonia and aspiration pneumonia. Intially with severe sepsis devolving to shock. Myositis and encephalitis suspected. Broadening coverage and will maintain Vancomycin for bleb vs necrotic consolidation on CT Chest. Course with wean off pressor support 11/15 am.   - goal MAP >65  - cont stress dose steroids x7 days      Oncology  Anemia in chronic kidney disease  Management per Nephrology with EPO. CBC daily; no bleeding diathesis to date.   - transfuse as needed for goal Hgb >7    Orthopedic  Closed fracture of multiple ribs  Sequale of chest compressions for CPR.  Appreciate surgery recommendations.   - topical lidocaine patches  - nerve block if available inpatient; unavailable at this time, readdress next week if analgesia insufficient  - Surgery to eval CT; no indication for surgical intervention  - splinting will complicate vent weaning    Other  Acute pulmonary embolism without acute cor pulmonale  Provoked in setting of severe viral infection and critical illness. Acute PE and POCUS with R and L IJ occlusive opacities consistent with thrombus  - obtain u/s UE  - transition to Eliquis from heparin gtt        Critical Care Medicine Daily Checklist:11/16/2024   F: Feeding Tube Feeds at goal.   A: Analgesia Infusion Fentanyl   S: Sedation RASS goal -1to1 CAM-ICU positive Propofol           T: Thromboprophylaxis Lower extremity SCD and On systemic anticoagulation with  Apixaban   H:  HOB > 300 Yes.   U: Stress Ulcer Prophylaxis PPI   G: Glucose Control Within goal (upper limit 140-180 mg/dL).   S: SAT & SBT Coordinated?  Yes.   B: Bowel Regimen No BM in last 24hrs.   I: Indwelling Catheter Reviewed Maintain: Triple/Quad Lumen and Dialysis catheter  Remove: N/A   D: De-escalation of Antimicrobials No    Disposition ICU       Critical Care Time: 40 minutes  Critical secondary to Patient has a condition that poses threat to life and bodily function: Severe Respiratory Distress  Patient has an abrupt change in neurologic status: Encephalopathy      Critical care was time spent personally by me on the following activities: development of treatment plan with patient or surrogate and bedside caregivers, discussions with consultants, evaluation of patient's response to treatment, examination of patient, ordering and performing treatments and interventions, ordering and review of laboratory studies, ordering and review of radiographic studies, pulse oximetry, re-evaluation of patient's condition. This critical care time did not overlap with that of any other provider or involve time for any procedures.     Jade Medley MD  Critical Care Medicine  [facility]

## 2024-11-16 NOTE — PLAN OF CARE
Problem: Gas Exchange Impaired  Goal: Optimal Gas Exchange  Outcome: Progressing     Problem: Mechanical Ventilation Invasive  Goal: Effective Communication  Outcome: Progressing  Goal: Optimal Device Function  Outcome: Progressing  Goal: Mechanical Ventilation Liberation  Outcome: Progressing  Goal: Optimal Nutrition Delivery  Outcome: Progressing  Goal: Absence of Device-Related Skin and Tissue Injury  Outcome: Progressing  Goal: Absence of Ventilator-Induced Lung Injury  Outcome: Progressing     Problem: ARDS (Acute Respiratory Distress Syndrome)  Goal: Effective Oxygenation  Outcome: Progressing

## 2024-11-17 LAB
ANION GAP SERPL CALCULATED.3IONS-SCNC: 18 MMOL/L (ref 7–16)
ANISOCYTOSIS BLD QL SMEAR: ABNORMAL
BACTERIA BLD CULT: NORMAL
BACTERIA BLD CULT: NORMAL
BASOPHILS # BLD AUTO: 0.06 K/UL (ref 0–0.2)
BASOPHILS NFR BLD AUTO: 0.4 % (ref 0–1)
BUN SERPL-MCNC: 38 MG/DL (ref 8–26)
BUN/CREAT SERPL: 5 (ref 6–20)
CALCIUM SERPL-MCNC: 6.1 MG/DL (ref 8.4–10.2)
CHLORIDE SERPL-SCNC: 86 MMOL/L (ref 98–107)
CK SERPL-CCNC: 327 U/L (ref 30–200)
CO2 SERPL-SCNC: 27 MMOL/L (ref 22–29)
CREAT SERPL-MCNC: 7.76 MG/DL (ref 0.72–1.25)
DIFFERENTIAL METHOD BLD: ABNORMAL
EGFR (NO RACE VARIABLE) (RUSH/TITUS): 8 ML/MIN/1.73M2
EOSINOPHIL # BLD AUTO: 0.19 K/UL (ref 0–0.5)
EOSINOPHIL NFR BLD AUTO: 1.3 % (ref 1–4)
EOSINOPHIL NFR BLD MANUAL: 2 % (ref 1–4)
ERYTHROCYTE [DISTWIDTH] IN BLOOD BY AUTOMATED COUNT: 17.6 % (ref 11.5–14.5)
GLUCOSE SERPL-MCNC: 194 MG/DL (ref 74–100)
HCO3 UR-SCNC: 29.5 MMOL/L (ref 21–28)
HCT VFR BLD AUTO: 22.3 % (ref 40–54)
HGB BLD-MCNC: 7.2 G/DL (ref 13.5–18)
HYPOCHROMIA BLD QL SMEAR: ABNORMAL
IMM GRANULOCYTES # BLD AUTO: 1.4 K/UL (ref 0–0.04)
IMM GRANULOCYTES NFR BLD: 9.3 % (ref 0–0.4)
LYMPHOCYTES # BLD AUTO: 1.1 K/UL (ref 1–4.8)
LYMPHOCYTES NFR BLD AUTO: 7.3 % (ref 27–41)
LYMPHOCYTES NFR BLD MANUAL: 6 % (ref 27–41)
MAGNESIUM SERPL-MCNC: 2.3 MG/DL (ref 1.6–2.6)
MCH RBC QN AUTO: 30.1 PG (ref 27–31)
MCHC RBC AUTO-ENTMCNC: 32.3 G/DL (ref 32–36)
MCV RBC AUTO: 93.3 FL (ref 80–96)
MONOCYTES # BLD AUTO: 0.71 K/UL (ref 0–0.8)
MONOCYTES NFR BLD AUTO: 4.7 % (ref 2–6)
MONOCYTES NFR BLD MANUAL: 3 % (ref 2–6)
MPC BLD CALC-MCNC: 11.8 FL (ref 9.4–12.4)
NEUTROPHILS # BLD AUTO: 11.54 K/UL (ref 1.8–7.7)
NEUTROPHILS NFR BLD AUTO: 77 % (ref 53–65)
NEUTS BAND NFR BLD MANUAL: 3 % (ref 1–5)
NEUTS SEG NFR BLD MANUAL: 86 % (ref 50–62)
NRBC # BLD AUTO: 0.07 X10E3/UL
NRBC, AUTO (.00): 0.5 %
OHS QRS DURATION: 118 MS
OHS QRS DURATION: 138 MS
OHS QTC CALCULATION: 450 MS
OHS QTC CALCULATION: 461 MS
PCO2 BLDA: 56 MMHG (ref 35–48)
PH SMN: 7.33 [PH] (ref 7.35–7.45)
PHOSPHATE SERPL-MCNC: 7.4 MG/DL (ref 2.3–4.7)
PLATELET # BLD AUTO: 199 K/UL (ref 150–400)
PLATELET MORPHOLOGY: ABNORMAL
PO2 BLDA: 92 MMHG (ref 83–108)
POC BASE EXCESS: 2.4 MMOL/L (ref -2–3)
POC SATURATED O2: 97 % (ref 95–98)
POTASSIUM SERPL-SCNC: 3.7 MMOL/L (ref 3.5–5.1)
RBC # BLD AUTO: 2.39 M/UL (ref 4.6–6.2)
SODIUM SERPL-SCNC: 127 MMOL/L (ref 136–145)
TARGETS BLD QL SMEAR: ABNORMAL
WBC # BLD AUTO: 15 K/UL (ref 4.5–11)

## 2024-11-17 PROCEDURE — 27000207 HC ISOLATION

## 2024-11-17 PROCEDURE — 25000003 PHARM REV CODE 250: Performed by: NURSE PRACTITIONER

## 2024-11-17 PROCEDURE — 93005 ELECTROCARDIOGRAM TRACING: CPT

## 2024-11-17 PROCEDURE — 36415 COLL VENOUS BLD VENIPUNCTURE: CPT | Performed by: STUDENT IN AN ORGANIZED HEALTH CARE EDUCATION/TRAINING PROGRAM

## 2024-11-17 PROCEDURE — 99291 CRITICAL CARE FIRST HOUR: CPT | Mod: ,,, | Performed by: STUDENT IN AN ORGANIZED HEALTH CARE EDUCATION/TRAINING PROGRAM

## 2024-11-17 PROCEDURE — 93010 ELECTROCARDIOGRAM REPORT: CPT | Mod: ,,, | Performed by: INTERNAL MEDICINE

## 2024-11-17 PROCEDURE — 94640 AIRWAY INHALATION TREATMENT: CPT

## 2024-11-17 PROCEDURE — 80048 BASIC METABOLIC PNL TOTAL CA: CPT | Performed by: STUDENT IN AN ORGANIZED HEALTH CARE EDUCATION/TRAINING PROGRAM

## 2024-11-17 PROCEDURE — 99900035 HC TECH TIME PER 15 MIN (STAT)

## 2024-11-17 PROCEDURE — 20000000 HC ICU ROOM

## 2024-11-17 PROCEDURE — 27000221 HC OXYGEN, UP TO 24 HOURS

## 2024-11-17 PROCEDURE — 82550 ASSAY OF CK (CPK): CPT | Performed by: STUDENT IN AN ORGANIZED HEALTH CARE EDUCATION/TRAINING PROGRAM

## 2024-11-17 PROCEDURE — 94761 N-INVAS EAR/PLS OXIMETRY MLT: CPT

## 2024-11-17 PROCEDURE — 85025 COMPLETE CBC W/AUTO DIFF WBC: CPT | Performed by: STUDENT IN AN ORGANIZED HEALTH CARE EDUCATION/TRAINING PROGRAM

## 2024-11-17 PROCEDURE — 99900026 HC AIRWAY MAINTENANCE (STAT)

## 2024-11-17 PROCEDURE — 63600175 PHARM REV CODE 636 W HCPCS: Performed by: STUDENT IN AN ORGANIZED HEALTH CARE EDUCATION/TRAINING PROGRAM

## 2024-11-17 PROCEDURE — 84100 ASSAY OF PHOSPHORUS: CPT | Performed by: STUDENT IN AN ORGANIZED HEALTH CARE EDUCATION/TRAINING PROGRAM

## 2024-11-17 PROCEDURE — 94003 VENT MGMT INPAT SUBQ DAY: CPT

## 2024-11-17 PROCEDURE — 25000242 PHARM REV CODE 250 ALT 637 W/ HCPCS: Performed by: STUDENT IN AN ORGANIZED HEALTH CARE EDUCATION/TRAINING PROGRAM

## 2024-11-17 PROCEDURE — 25000003 PHARM REV CODE 250: Performed by: STUDENT IN AN ORGANIZED HEALTH CARE EDUCATION/TRAINING PROGRAM

## 2024-11-17 PROCEDURE — 36600 WITHDRAWAL OF ARTERIAL BLOOD: CPT

## 2024-11-17 PROCEDURE — 83735 ASSAY OF MAGNESIUM: CPT | Performed by: STUDENT IN AN ORGANIZED HEALTH CARE EDUCATION/TRAINING PROGRAM

## 2024-11-17 PROCEDURE — 82803 BLOOD GASES ANY COMBINATION: CPT

## 2024-11-17 RX ORDER — HALOPERIDOL DECANOATE 50 MG/ML
15 INJECTION INTRAMUSCULAR ONCE
Status: DISCONTINUED | OUTPATIENT
Start: 2024-11-17 | End: 2024-11-17

## 2024-11-17 RX ORDER — QUETIAPINE FUMARATE 25 MG/1
50 TABLET, FILM COATED ORAL 2 TIMES DAILY
Status: DISCONTINUED | OUTPATIENT
Start: 2024-11-17 | End: 2024-12-02 | Stop reason: HOSPADM

## 2024-11-17 RX ORDER — CALCIUM CARBONATE 200(500)MG
1000 TABLET,CHEWABLE ORAL EVERY 4 HOURS
Status: COMPLETED | OUTPATIENT
Start: 2024-11-17 | End: 2024-11-17

## 2024-11-17 RX ORDER — HALOPERIDOL 5 MG/ML
0.5 INJECTION INTRAMUSCULAR EVERY 12 HOURS PRN
Status: DISCONTINUED | OUTPATIENT
Start: 2024-11-17 | End: 2024-12-02 | Stop reason: HOSPADM

## 2024-11-17 RX ORDER — OSELTAMIVIR PHOSPHATE 30 MG/1
30 CAPSULE ORAL
Status: COMPLETED | OUTPATIENT
Start: 2024-11-18 | End: 2024-11-22

## 2024-11-17 RX ADMIN — HYDROCORTISONE SODIUM SUCCINATE 50 MG: 100 INJECTION, POWDER, FOR SOLUTION INTRAMUSCULAR; INTRAVENOUS at 11:11

## 2024-11-17 RX ADMIN — METOPROLOL TARTRATE 100 MG: 100 TABLET, FILM COATED ORAL at 09:11

## 2024-11-17 RX ADMIN — PROPOFOL 50 MCG/KG/MIN: 10 INJECTION, EMULSION INTRAVENOUS at 11:11

## 2024-11-17 RX ADMIN — PROPOFOL 45 MCG/KG/MIN: 10 INJECTION, EMULSION INTRAVENOUS at 10:11

## 2024-11-17 RX ADMIN — HALOPERIDOL LACTATE 0.5 MG: 5 INJECTION, SOLUTION INTRAMUSCULAR at 10:11

## 2024-11-17 RX ADMIN — QUETIAPINE FUMARATE 50 MG: 25 TABLET ORAL at 09:11

## 2024-11-17 RX ADMIN — AZITHROMYCIN MONOHYDRATE 500 MG: 500 INJECTION, POWDER, LYOPHILIZED, FOR SOLUTION INTRAVENOUS at 01:11

## 2024-11-17 RX ADMIN — HYDRALAZINE HYDROCHLORIDE 100 MG: 100 TABLET ORAL at 08:11

## 2024-11-17 RX ADMIN — CALCIUM CARBONATE (ANTACID) CHEW TAB 500 MG 1000 MG: 500 CHEW TAB at 06:11

## 2024-11-17 RX ADMIN — APIXABAN 10 MG: 5 TABLET, FILM COATED ORAL at 09:11

## 2024-11-17 RX ADMIN — PANTOPRAZOLE SODIUM 40 MG: 40 INJECTION, POWDER, LYOPHILIZED, FOR SOLUTION INTRAVENOUS at 09:11

## 2024-11-17 RX ADMIN — PROPOFOL 40 MCG/KG/MIN: 10 INJECTION, EMULSION INTRAVENOUS at 08:11

## 2024-11-17 RX ADMIN — NIFEDIPINE 60 MG: 60 TABLET, FILM COATED, EXTENDED RELEASE ORAL at 08:11

## 2024-11-17 RX ADMIN — HYDRALAZINE HYDROCHLORIDE 100 MG: 100 TABLET ORAL at 02:11

## 2024-11-17 RX ADMIN — OSELTAMAVIR PHOSPHATE 30 MG: 30 CAPSULE ORAL at 09:11

## 2024-11-17 RX ADMIN — PROPOFOL 40 MCG/KG/MIN: 10 INJECTION, EMULSION INTRAVENOUS at 01:11

## 2024-11-17 RX ADMIN — PIPERACILLIN SODIUM AND TAZOBACTAM SODIUM 4.5 G: 4; .5 INJECTION, POWDER, LYOPHILIZED, FOR SOLUTION INTRAVENOUS at 09:11

## 2024-11-17 RX ADMIN — PROPOFOL 50 MCG/KG/MIN: 10 INJECTION, EMULSION INTRAVENOUS at 06:11

## 2024-11-17 RX ADMIN — APIXABAN 10 MG: 5 TABLET, FILM COATED ORAL at 08:11

## 2024-11-17 RX ADMIN — IPRATROPIUM BROMIDE AND ALBUTEROL SULFATE 3 ML: .5; 3 SOLUTION RESPIRATORY (INHALATION) at 12:11

## 2024-11-17 RX ADMIN — CHLORHEXIDINE GLUCONATE 15 ML: 1.2 RINSE ORAL at 09:11

## 2024-11-17 RX ADMIN — PROPOFOL 40 MCG/KG/MIN: 10 INJECTION, EMULSION INTRAVENOUS at 05:11

## 2024-11-17 RX ADMIN — CALCIUM CARBONATE (ANTACID) CHEW TAB 500 MG 1000 MG: 500 CHEW TAB at 09:11

## 2024-11-17 RX ADMIN — QUETIAPINE FUMARATE 50 MG: 25 TABLET ORAL at 10:11

## 2024-11-17 RX ADMIN — CHLORHEXIDINE GLUCONATE 15 ML: 1.2 RINSE ORAL at 08:11

## 2024-11-17 RX ADMIN — IPRATROPIUM BROMIDE AND ALBUTEROL SULFATE 3 ML: .5; 3 SOLUTION RESPIRATORY (INHALATION) at 07:11

## 2024-11-17 RX ADMIN — HYDROCORTISONE SODIUM SUCCINATE 50 MG: 100 INJECTION, POWDER, FOR SOLUTION INTRAMUSCULAR; INTRAVENOUS at 05:11

## 2024-11-17 RX ADMIN — FENTANYL CITRATE 200 MCG/HR: 50 INJECTION, SOLUTION INTRAMUSCULAR; INTRAVENOUS at 04:11

## 2024-11-17 RX ADMIN — FENTANYL CITRATE 100 MCG/HR: 50 INJECTION, SOLUTION INTRAMUSCULAR; INTRAVENOUS at 08:11

## 2024-11-17 RX ADMIN — HYDRALAZINE HYDROCHLORIDE 100 MG: 100 TABLET ORAL at 09:11

## 2024-11-17 RX ADMIN — LIDOCAINE 5% 2 PATCH: 700 PATCH TOPICAL at 09:11

## 2024-11-17 RX ADMIN — PROPOFOL 50 MCG/KG/MIN: 10 INJECTION, EMULSION INTRAVENOUS at 04:11

## 2024-11-17 NOTE — PLAN OF CARE
Problem: Gas Exchange Impaired  Goal: Optimal Gas Exchange  Outcome: Progressing     Problem: Mechanical Ventilation Invasive  Goal: Effective Communication  Outcome: Progressing  Goal: Optimal Device Function  Outcome: Progressing  Goal: Mechanical Ventilation Liberation  Outcome: Progressing  Goal: Absence of Device-Related Skin and Tissue Injury  Outcome: Progressing  Goal: Absence of Ventilator-Induced Lung Injury  Outcome: Progressing

## 2024-11-17 NOTE — PROGRESS NOTES
[facility]  Critical Care Medicine  Progress Note    Patient Name: Saúl Butt  MRN: [id number]  Admission Date: 11/11/2024  Hospital Length of Stay: 6 days  Code Status: Full Code  Attending Provider: Jade Medley MD  Primary Care Provider: Ruth, Primary Doctor   Principal Problem: Acute respiratory failure with hypoxemia    Subjective:     HPI:  54-year-old  male presented to the ED via EMS.  Past medical history of end-stage renal disease, hypertension, Chronic anemia, Chronic diastolic CHF, moderate aortic stenosis, DVT, Hyperlipidemia, Paroxysmal atrial fibrillation, and malignant neoplasm of kidney.  Patient states he goes to dialysis Monday, Wednesday, and Friday.  States last day to dialyze was this past Friday, 11/8/24.  Patient states he has not missed any dialysis days. Patient was due to dialyze today, however he presented to the ED with 3 day history of shortness of breath which is worse with lying down, and he also reports having a cough.  He also reports 3 day history of nausea, vomiting, diarrhea. Patient reports having some fever on and off.  Denies any pain at present.    ED workup revealed sodium 131, potassium 8.4, anion gap 17, BUN and creatinine 60/13.4.  Venous blood gas revealed pH 7.42, pCO2 44, sodium 131, potassium 8.2, bicarb 28.5, lactate 1.5, and glucose 130.  Chest x-ray showed cardiomegaly with pulmonary edema.  BNP 55,331. In the ED patient received calcium gluconate 1 g IV, D10 500 mL IV, and Humulin R 10 units.  Critical care service was asked to admit patient to the ICU, where he will be dialyzed emergently to treat hyperkalemia.    Hospital/ICU Course:  11/12-- potassium 6.8 - HD gain today - went into Afib rvr on HD   11/13: continued pyrexia, resolved RVR, early am with increased work of breathing with hypoxia placed on NRB, hyperkalemia recurrence, intubated for progressive respiratory distress, severe hypoxia, aspirated during ETT s/p  successful therapeutic aspiration with bronchoscopy, am course with PEA arrest with ROSC, agitated post resuscitation warranting deep sedation  11/14-15: weaning sedation with intermittent agitation, recurrent hyperkalemia prompting HD, episodes of RVR managed with lopressor, off pressor support      Interval History/Significant Events: intermittent agitation, SBT with minimal vent requirements but requiring sedation    Review of Systems  Objective:     Vital Signs (Most Recent):  Temp: 98.6 °F (37 °C) (11/17/24 1515)  Pulse: 103 (11/17/24 1515)  Resp: 13 (11/17/24 1515)  BP: 135/76 (11/17/24 1515)  SpO2: 100 % (11/17/24 1515) Vital Signs (24h Range):  Temp:  [97.6 °F (36.4 °C)-98.6 °F (37 °C)] 98.6 °F (37 °C)  Pulse:  [] 103  Resp:  [7-44] 13  SpO2:  [84 %-100 %] 100 %  BP: ()/() 135/76   Weight: 107.5 kg (236 lb 15.9 oz)  Body mass index is 32.14 kg/m².      Intake/Output Summary (Last 24 hours) at 11/17/2024 1520  Last data filed at 11/17/2024 1112  Gross per 24 hour   Intake 3023 ml   Output --   Net 3023 ml          Physical Exam  Constitutional:       General: He is in acute distress (during SBT and SAT, responds to sedative).      Appearance: He is ill-appearing. He is not diaphoretic.      Comments: With sedation, patient is synchronous with vent   HENT:      Head: Normocephalic and atraumatic.      Mouth/Throat:      Mouth: Mucous membranes are moist.      Pharynx: No oropharyngeal exudate.      Comments: ETT secured  Eyes:      General: No scleral icterus.  Cardiovascular:      Rate and Rhythm: Normal rate. Rhythm irregular.      Heart sounds: Murmur heard.   Pulmonary:      Effort: No respiratory distress.      Breath sounds: Rhonchi present. No wheezing.      Comments: Synchronous with ventilator, intermittently agitated  Abdominal:      General: There is no distension.      Palpations: Abdomen is soft.      Tenderness: There is no abdominal tenderness. There is no guarding.    Musculoskeletal:      Right lower leg: No edema.      Left lower leg: No edema.   Skin:     General: Skin is warm.      Coloration: Skin is not jaundiced.   Neurological:      Mental Status: He is alert.      Comments: Communicating with head gestures, spontaneously moving all extremities, intermittent agitation with attempts at self extubation   Psychiatric:      Comments:              Vents:  Vent Mode: PSV (Patient remains on PSV trial. Tolerating well.) (11/17/24 1223)  Ventilator Initiated: Yes (11/13/24 0735)  Set Rate: 20 BPM (11/17/24 0351)  Vt Set: 480 mL (11/17/24 0351)  Pressure Support: 5 cmH20 (11/17/24 1223)  PEEP/CPAP: 6 cmH20 (11/17/24 1223)  Oxygen Concentration (%): 40 (11/17/24 1223)  Peak Airway Pressure: 15.1 cmH20 (11/17/24 1223)  Plateau Pressure: 16.4 cmH20 (11/17/24 1223)  Total Ve: 9.83 L/m (11/17/24 1223)  F/VT Ratio<105 (RSBI): (!) 17.7 (11/17/24 1223)  Lines/Drains/Airways       Central Venous Catheter Line  Duration                  Hemodialysis Catheter right subclavian -- days    Percutaneous Central Line - Triple Lumen  11/13/24 1400 Femoral Vein Left 4 days              Drain  Duration                  NG/OG Tube 11/13/24 0816 Nunda sump 16 Fr. Right nostril 4 days              Airway  Duration                  Airway - Non-Surgical 11/13/24 0732 Endotracheal Tube 4 days                  Significant Labs:    CBC/Anemia Profile:  Recent Labs   Lab 11/16/24 0424 11/17/24  0402   WBC 13.33* 15.00*   HGB 8.2* 7.2*   HCT 25.6* 22.3*    199   MCV 94.5 93.3   RDW 17.4* 17.6*        Chemistries:  Recent Labs   Lab 11/16/24  0424 11/17/24  0401   * 127*   K 4.1 3.7   CL 90* 86*   CO2 25 27   BUN 26 38*   CREATININE 6.44* 7.76*   CALCIUM 6.6* 6.1*   MG 2.3 2.3   PHOS 7.5* 7.4*       ABGs:   Recent Labs   Lab 11/17/24  0454   PH 7.33*   PCO2 56*   HCO3 29.5*   POCSATURATED 97     All pertinent labs within the past 24 hours have been reviewed.    Significant Imaging:  I have  reviewed all pertinent imaging results/findings within the past 24 hours.    ABG  Recent Labs   Lab 11/17/24  0454   PH 7.33*   PO2 92   PCO2 56*   HCO3 29.5*     Assessment/Plan:     Neuro  Acute metabolic encephalopathy  Encephalopathy: Metabolic Encephalopathy; multifactorial including very likely influenza encephalitis  Sedated for Mechanical Ventilation  Post arrest evalaution with agitation and attempts at self extubation; will defer post-arrest normothermia protocol after neuro assessment, goal avoid fever, IV tylenol and cooling blankets to goal  - analgesia: fentanyl gtt for goal pain score 0/10  - sedation: propofol for goal RASS 0 to -1   -- ketamine considered and limited by recurrent RVR  - haldol 5 mg x1 with 0.5 mg IM available PRN for refractory agitation   -- depot unavailable  - start Zyprexa 50 mg BID; will consider up-titration to 100 mg BID   -- QTc assessed 11/17, acceptable for initiation  - stop stress dose steroid therapy for agitation; shock resolved  - TG QOD, acceptable    Pulmonary  * Acute respiratory failure with hypoxemia  2/2 Aspiration, severe Influenza A infection and multifocal pneumonia  - severe ARDS w/ P/F 108 on intubation 11/13; ventilator assessed, dyssynchrony with lighter sedation  - deeper sedation goal as per Neuro plan, has not required paralytic  - prone positioning contraindicated Day 1 for hemodynamic instability with PEA arrest  - cont VC/AC 20/480/6/0.6  - daily SBT  - cont DuoNebs Q6H    Aspiration pneumonia of both lungs due to gastric secretions  Concern for overnight aspiration event causing acute respiratory distress with increased work of breathing and hypoxia refractory to NRB. Intubation with aspiration of gastric contents; s/p bronchoscopy with therapeutic aspiration of gastric contents. CT Chest with multifocal pneumonia and necrotic/cavitary component in R mid lung zone. Abx broadened with decompensation; day 1 11/13.  - cont Vancomycin (pharmacy to  dose) and Zosyn x7 days coverage total Abx (end 11/18)  - completed Azithromycin x5 days  - MRCA PCR confounded by intranasal mupirocin administration  - f/u BCxs and Lower respiratory Cxs    Aspiration pneumonitis  S/p bronchoscopy and therapeutic aspiration, successful for ETT and proximal airway clearance to subsegmental level on 11/13. Further management as per pneumonia.    Cardiac/Vascular  Heart failure with mildly reduced ejection fraction  Post arrest TTE with LVEF 45%, normal RV size, TAPSE 2, mildly dilated LA, mildly dilated RA, moderate aortic stenosis with calcified mass, trace MR, mild-to-moderate TR.  - HFrEF, no evidence of acute systolic heart failure; LV dysfunction warrants repeat TTE outside of this critical illness  - Tn peaked and demonstrated downtrend        Cardiac arrest  PEA arrest with minimal down time. At this time etiology is unclear and will be attributed to electrolyte and acid/base derangements. PEA arrest occurred hours post intubation with no hypoxia. No central PE to contribute to hemodynamic burden and burden non convincing for distal propagation with compressions. Post ROSC EKG and TTE completed 11/13 not convincing for acute coronary syndrome; Tn peaked on second draw.     Atrial fibrillation with RVR  Afib with RVR 2/2 sepsis with severe viral infection. Managed with esmolol gtt with resolution. Now with episodic RVR with HD initiation; managed successfully with lopressor.  - cont scheduled lopressor 100 mg BID  - given critical illness, if recurrence will plan on amiodarone gtt      Renal/  Hyperkalemia  Recurrent Hyperkalemia, attributed to Influenza; CK up-trending and remains <1k; combativeness likely contributing in upternend. Recurrence of hyperK between HD sessions warranting daily requirements; medications reviewed with no culprits  RESOLVED: monitor for steroid d/c  - cont HD  - stop lokelma TID; course with resolution of hyperK   - with regards to concern for  adrenal insufficiency, this should be addressed by ongoing stress dose steroids; rhabdo less likely with improving CK, work up to date with no identifiable tumor burden  - appreciate Nephrology recs    ESRD on hemodialysis  HD as per hyperkalemia plan. Of note, patient is s/p b/l nephrectomy on CT A&P.    ID  Influenza A  Severe Influenza A infection.   - pyrexia resolved with IV tylenol  - cont Tamiflu to be dosed post-HD; plan for extended course for severe infection   -- daily dosing for daily dialysis requirements   -- transition to MWF dosing post HD   -- course end 11/22    Severe sepsis  Multifactorial with viral pneumonia and aspiration pneumonia. Intially with severe sepsis devolving to shock. Myositis and encephalitis suspected. Broadening coverage and will maintain Vancomycin for bleb vs necrotic consolidation on CT Chest. Course with wean off pressor support 11/15 am.   - goal MAP >65  - stop stress dose steroid therapy for agitation; shock resolved      Oncology  Anemia in chronic kidney disease  Management per Nephrology with EPO. CBC daily; no bleeding diathesis to date.   - transfuse as needed for goal Hgb >7    Orthopedic  Closed fracture of multiple ribs  Sequale of chest compressions for CPR.  Appreciate surgery recommendations.   - topical lidocaine patches  - nerve block if available inpatient; unavailable at this time, readdress next week if analgesia insufficient  - Surgery to eval CT; no indication for surgical intervention  - splinting will complicate vent weaning    Other  Acute pulmonary embolism without acute cor pulmonale  Provoked in setting of severe viral infection and critical illness. Acute PE and POCUS with R and L IJ occlusive opacities consistent with thrombus  - u/s UE with no VTE  - cont Apixaban, PE dosing        Critical Care Medicine Daily Checklist:11/17/2024   F: Feeding Tube Feeds at goal.   A: Analgesia Infusion Fentanyl   S: Sedation RASS goal -1to1 CAM-ICU positive  Propofol and Dexmedetomidine           T: Thromboprophylaxis Lower extremity SCD and On systemic anticoagulation with  Apixaban   H: HOB > 300 Yes.   U: Stress Ulcer Prophylaxis PPI   G: Glucose Control Within goal (upper limit 140-180 mg/dL).   S: SAT & SBT Coordinated?  Yes.   B: Bowel Regimen No BM in last 24hrs.   I: Indwelling Catheter Reviewed Maintain: Triple/Quad Lumen and Dialysis catheter  Remove: N/A   D: De-escalation of Antimicrobials No    Disposition ICU       Critical Care Time: 38 minutes  Critical secondary to Patient has a condition that poses threat to life and bodily function: Severe Respiratory Distress  Patient has an abrupt change in neurologic status: Encephalopathy      Critical care was time spent personally by me on the following activities: development of treatment plan with patient or surrogate and bedside caregivers, discussions with consultants, evaluation of patient's response to treatment, examination of patient, ordering and performing treatments and interventions, ordering and review of laboratory studies, ordering and review of radiographic studies, pulse oximetry, re-evaluation of patient's condition. This critical care time did not overlap with that of any other provider or involve time for any procedures.     Jade Medley MD  Critical Care Medicine  [facility]

## 2024-11-17 NOTE — RESPIRATORY THERAPY
07:14 - Patient placed on PSV 5, PEEP of 6 at this time. At first, 02 sats did drop, but after hyper oxygenation, sats came up. Patient is tolerating fair, but did require some stimulation to encourage breathing. RN aware that patient is on trial.

## 2024-11-17 NOTE — PLAN OF CARE
Problem: Adult Inpatient Plan of Care  Goal: Plan of Care Review  Outcome: Progressing     Problem: Infection  Goal: Absence of Infection Signs and Symptoms  Outcome: Progressing  Intervention: Prevent or Manage Infection  Flowsheets (Taken 11/17/2024 0331)  Fever Reduction/Comfort Measures:   lightweight bedding   lightweight clothing  Infection Management: aseptic technique maintained  Isolation Precautions: precautions maintained     Problem: Gas Exchange Impaired  Goal: Optimal Gas Exchange  Outcome: Progressing  Intervention: Optimize Oxygenation and Ventilation  Flowsheets (Taken 11/17/2024 0331)  Airway/Ventilation Management: airway patency maintained  Head of Bed (HOB) Positioning: HOB at 30-45 degrees     Problem: Fall Injury Risk  Goal: Absence of Fall and Fall-Related Injury  Outcome: Progressing  Intervention: Identify and Manage Contributors  Flowsheets (Taken 11/17/2024 0331)  Self-Care Promotion: adaptive equipment use encouraged  Medication Review/Management: medications reviewed  Intervention: Promote Injury-Free Environment  Flowsheets (Taken 11/17/2024 0331)  Safety Promotion/Fall Prevention:   room near unit station   pulse ox   high risk medications identified     Problem: Restraint, Nonviolent  Goal: Absence of Harm or Injury  Outcome: Progressing  Intervention: Implement Least Restrictive Safety Strategies  Flowsheets (Taken 11/17/2024 0331)  Medical Device Protection: IV pole/bag removed from visual field  Less Restrictive Alternative:   appropriate expression promoted   calming techniques promoted  De-Escalation Techniques: appropriate behavior reinforced  Intervention: Protect Dignity, Rights and Personal Wellbeing  Flowsheets (Taken 11/17/2024 0331)  Trust Relationship/Rapport: care explained  Intervention: Protect Skin and Joint Integrity  Flowsheets (Taken 11/17/2024 0331)  Skin Protection:   protective footwear used   incontinence pads utilized  Range of Motion: ROM (range of motion)  performed     Problem: Mechanical Ventilation Invasive  Goal: Effective Communication  Outcome: Progressing  Intervention: Ensure Effective Communication  Flowsheets (Taken 11/17/2024 0331)  Communication Enhancement Strategies: nonverbal strategies used  Trust Relationship/Rapport: care explained

## 2024-11-17 NOTE — ASSESSMENT & PLAN NOTE
Severe Influenza A infection.   - pyrexia resolved with IV tylenol  - cont Tamiflu to be dosed post-HD; plan for extended course for severe infection   -- daily dosing for daily dialysis requirements   -- transition to MWF dosing post HD   -- course end 11/22

## 2024-11-17 NOTE — RESPIRATORY THERAPY
17:03 - Patient remains on PSV 5, PEEP of 6 at this time. He has been on since 07:14 this morning. He continues to tolerate well. He will be placed on A/C by night shift to allow to rest overnight.

## 2024-11-17 NOTE — PROGRESS NOTES
Patient opens eyes to his name, being called he also nodded his head in an appropriate fashion to my addressing him    Physical exam general the patient is chronically, ill appearing, heart is regular rate and rhythm, he has no pitting edema, lungs are clear to auscultation anteriorly, admin is soft, with possible sounds, feeding tube is in place however these are currently on hold due to increased residuals    Assessment/plan 1. ESRD - patient to dialyze tomorrow  Two. Respiratory failure-continue ventilator support.  Three. Sepsis continue anabiotics.  Four. Influenza illness - continue antivirals.  Five. Anemia - patients hematocrit is around 26 %, continue erythropoietin  6.  Hyperkalemia-potassium was 3.7 this morning

## 2024-11-18 LAB
ANION GAP SERPL CALCULATED.3IONS-SCNC: 22 MMOL/L (ref 7–16)
ANISOCYTOSIS BLD QL SMEAR: ABNORMAL
BASOPHILS # BLD AUTO: 0.09 K/UL (ref 0–0.2)
BASOPHILS NFR BLD AUTO: 0.5 % (ref 0–1)
BASOPHILS NFR BLD MANUAL: 1 % (ref 0–1)
BUN SERPL-MCNC: 52 MG/DL (ref 8–26)
BUN/CREAT SERPL: 6 (ref 6–20)
CALCIUM SERPL-MCNC: 6.7 MG/DL (ref 8.4–10.2)
CHLORIDE SERPL-SCNC: 83 MMOL/L (ref 98–107)
CK SERPL-CCNC: 240 U/L (ref 30–200)
CO2 SERPL-SCNC: 20 MMOL/L (ref 22–29)
CREAT SERPL-MCNC: 8.83 MG/DL (ref 0.72–1.25)
CULTURE, LOWER RESPIRATORY: ABNORMAL
CULTURE, LOWER RESPIRATORY: ABNORMAL
DIFFERENTIAL METHOD BLD: ABNORMAL
EGFR (NO RACE VARIABLE) (RUSH/TITUS): 7 ML/MIN/1.73M2
EOSINOPHIL # BLD AUTO: 0.62 K/UL (ref 0–0.5)
EOSINOPHIL NFR BLD AUTO: 3.3 % (ref 1–4)
EOSINOPHIL NFR BLD MANUAL: 4 % (ref 1–4)
ERYTHROCYTE [DISTWIDTH] IN BLOOD BY AUTOMATED COUNT: 17.6 % (ref 11.5–14.5)
GLUCOSE SERPL-MCNC: 98 MG/DL (ref 74–100)
GRAM STN SPEC: ABNORMAL
HCO3 UR-SCNC: 22.7 MMOL/L (ref 21–28)
HCT VFR BLD AUTO: 22.7 % (ref 40–54)
HGB BLD-MCNC: 7.6 G/DL (ref 13.5–18)
HYPOCHROMIA BLD QL SMEAR: ABNORMAL
IMM GRANULOCYTES # BLD AUTO: 3.04 K/UL (ref 0–0.04)
IMM GRANULOCYTES NFR BLD: 16 % (ref 0–0.4)
INDIRECT COOMBS: NORMAL
LYMPHOCYTES # BLD AUTO: 1.47 K/UL (ref 1–4.8)
LYMPHOCYTES NFR BLD AUTO: 7.8 % (ref 27–41)
LYMPHOCYTES NFR BLD MANUAL: 12 % (ref 27–41)
MAGNESIUM SERPL-MCNC: 2.1 MG/DL (ref 1.6–2.6)
MCH RBC QN AUTO: 31.1 PG (ref 27–31)
MCHC RBC AUTO-ENTMCNC: 33.5 G/DL (ref 32–36)
MCV RBC AUTO: 93 FL (ref 80–96)
METAMYELOCYTES NFR BLD MANUAL: 4 %
MONOCYTES # BLD AUTO: 1.01 K/UL (ref 0–0.8)
MONOCYTES NFR BLD AUTO: 5.3 % (ref 2–6)
MONOCYTES NFR BLD MANUAL: 9 % (ref 2–6)
MPC BLD CALC-MCNC: 12.1 FL (ref 9.4–12.4)
NEUTROPHILS # BLD AUTO: 12.73 K/UL (ref 1.8–7.7)
NEUTROPHILS NFR BLD AUTO: 67.1 % (ref 53–65)
NEUTS BAND NFR BLD MANUAL: 4 % (ref 1–5)
NEUTS SEG NFR BLD MANUAL: 66 % (ref 50–62)
NRBC # BLD AUTO: 0.08 X10E3/UL
NRBC, AUTO (.00): 0.4 %
PCO2 BLDA: 45 MMHG (ref 35–48)
PH SMN: 7.31 [PH] (ref 7.35–7.45)
PHOSPHATE SERPL-MCNC: 8.1 MG/DL (ref 2.3–4.7)
PLATELET # BLD AUTO: 212 K/UL (ref 150–400)
PO2 BLDA: 82 MMHG (ref 83–108)
POC BASE EXCESS: -3.6 MMOL/L (ref -2–3)
POC SATURATED O2: 95 % (ref 95–98)
POTASSIUM SERPL-SCNC: 2.9 MMOL/L (ref 3.5–5.1)
RBC # BLD AUTO: 2.44 M/UL (ref 4.6–6.2)
RH BLD: NORMAL
SODIUM SERPL-SCNC: 122 MMOL/L (ref 136–145)
SPECIMEN OUTDATE: NORMAL
TRIGL SERPL-MCNC: 231 MG/DL (ref 34–140)
WBC # BLD AUTO: 18.96 K/UL (ref 4.5–11)

## 2024-11-18 PROCEDURE — 82550 ASSAY OF CK (CPK): CPT | Performed by: STUDENT IN AN ORGANIZED HEALTH CARE EDUCATION/TRAINING PROGRAM

## 2024-11-18 PROCEDURE — 25000003 PHARM REV CODE 250: Performed by: STUDENT IN AN ORGANIZED HEALTH CARE EDUCATION/TRAINING PROGRAM

## 2024-11-18 PROCEDURE — 83735 ASSAY OF MAGNESIUM: CPT | Performed by: STUDENT IN AN ORGANIZED HEALTH CARE EDUCATION/TRAINING PROGRAM

## 2024-11-18 PROCEDURE — 94003 VENT MGMT INPAT SUBQ DAY: CPT

## 2024-11-18 PROCEDURE — 25000242 PHARM REV CODE 250 ALT 637 W/ HCPCS: Performed by: STUDENT IN AN ORGANIZED HEALTH CARE EDUCATION/TRAINING PROGRAM

## 2024-11-18 PROCEDURE — 25000003 PHARM REV CODE 250: Performed by: NURSE PRACTITIONER

## 2024-11-18 PROCEDURE — 99900035 HC TECH TIME PER 15 MIN (STAT)

## 2024-11-18 PROCEDURE — 27000221 HC OXYGEN, UP TO 24 HOURS

## 2024-11-18 PROCEDURE — 63600175 PHARM REV CODE 636 W HCPCS: Performed by: INTERNAL MEDICINE

## 2024-11-18 PROCEDURE — 94761 N-INVAS EAR/PLS OXIMETRY MLT: CPT

## 2024-11-18 PROCEDURE — 80100014 HC HEMODIALYSIS 1:1

## 2024-11-18 PROCEDURE — 27000207 HC ISOLATION

## 2024-11-18 PROCEDURE — 84478 ASSAY OF TRIGLYCERIDES: CPT | Performed by: HOSPITALIST

## 2024-11-18 PROCEDURE — 99900026 HC AIRWAY MAINTENANCE (STAT)

## 2024-11-18 PROCEDURE — 63600175 PHARM REV CODE 636 W HCPCS: Performed by: STUDENT IN AN ORGANIZED HEALTH CARE EDUCATION/TRAINING PROGRAM

## 2024-11-18 PROCEDURE — 80048 BASIC METABOLIC PNL TOTAL CA: CPT | Performed by: STUDENT IN AN ORGANIZED HEALTH CARE EDUCATION/TRAINING PROGRAM

## 2024-11-18 PROCEDURE — 84100 ASSAY OF PHOSPHORUS: CPT | Performed by: STUDENT IN AN ORGANIZED HEALTH CARE EDUCATION/TRAINING PROGRAM

## 2024-11-18 PROCEDURE — 82803 BLOOD GASES ANY COMBINATION: CPT

## 2024-11-18 PROCEDURE — 86850 RBC ANTIBODY SCREEN: CPT | Performed by: STUDENT IN AN ORGANIZED HEALTH CARE EDUCATION/TRAINING PROGRAM

## 2024-11-18 PROCEDURE — 20000000 HC ICU ROOM

## 2024-11-18 PROCEDURE — 99291 CRITICAL CARE FIRST HOUR: CPT | Mod: ,,, | Performed by: INTERNAL MEDICINE

## 2024-11-18 PROCEDURE — 25000003 PHARM REV CODE 250: Performed by: INTERNAL MEDICINE

## 2024-11-18 PROCEDURE — 94640 AIRWAY INHALATION TREATMENT: CPT

## 2024-11-18 PROCEDURE — 85025 COMPLETE CBC W/AUTO DIFF WBC: CPT | Performed by: STUDENT IN AN ORGANIZED HEALTH CARE EDUCATION/TRAINING PROGRAM

## 2024-11-18 PROCEDURE — 36600 WITHDRAWAL OF ARTERIAL BLOOD: CPT

## 2024-11-18 RX ADMIN — PROPOFOL 50 MCG/KG/MIN: 10 INJECTION, EMULSION INTRAVENOUS at 11:11

## 2024-11-18 RX ADMIN — CHLORHEXIDINE GLUCONATE 15 ML: 1.2 RINSE ORAL at 08:11

## 2024-11-18 RX ADMIN — QUETIAPINE FUMARATE 50 MG: 25 TABLET ORAL at 08:11

## 2024-11-18 RX ADMIN — PROPOFOL 50 MCG/KG/MIN: 10 INJECTION, EMULSION INTRAVENOUS at 02:11

## 2024-11-18 RX ADMIN — PIPERACILLIN SODIUM AND TAZOBACTAM SODIUM 4.5 G: 4; .5 INJECTION, POWDER, LYOPHILIZED, FOR SOLUTION INTRAVENOUS at 09:11

## 2024-11-18 RX ADMIN — FENTANYL CITRATE 175 MCG/HR: 50 INJECTION, SOLUTION INTRAMUSCULAR; INTRAVENOUS at 02:11

## 2024-11-18 RX ADMIN — IPRATROPIUM BROMIDE AND ALBUTEROL SULFATE 3 ML: .5; 3 SOLUTION RESPIRATORY (INHALATION) at 07:11

## 2024-11-18 RX ADMIN — HYDRALAZINE HYDROCHLORIDE 100 MG: 100 TABLET ORAL at 09:11

## 2024-11-18 RX ADMIN — CHLORHEXIDINE GLUCONATE 15 ML: 1.2 RINSE ORAL at 09:11

## 2024-11-18 RX ADMIN — PROPOFOL 50 MCG/KG/MIN: 10 INJECTION, EMULSION INTRAVENOUS at 07:11

## 2024-11-18 RX ADMIN — PROPOFOL 50 MCG/KG/MIN: 10 INJECTION, EMULSION INTRAVENOUS at 10:11

## 2024-11-18 RX ADMIN — OSELTAMAVIR PHOSPHATE 30 MG: 30 CAPSULE ORAL at 04:11

## 2024-11-18 RX ADMIN — LIDOCAINE 5% 2 PATCH: 700 PATCH TOPICAL at 08:11

## 2024-11-18 RX ADMIN — QUETIAPINE FUMARATE 50 MG: 25 TABLET ORAL at 09:11

## 2024-11-18 RX ADMIN — APIXABAN 10 MG: 5 TABLET, FILM COATED ORAL at 09:11

## 2024-11-18 RX ADMIN — PROPOFOL 50 MCG/KG/MIN: 10 INJECTION, EMULSION INTRAVENOUS at 04:11

## 2024-11-18 RX ADMIN — NIFEDIPINE 60 MG: 60 TABLET, FILM COATED, EXTENDED RELEASE ORAL at 09:11

## 2024-11-18 RX ADMIN — IPRATROPIUM BROMIDE AND ALBUTEROL SULFATE 3 ML: .5; 3 SOLUTION RESPIRATORY (INHALATION) at 12:11

## 2024-11-18 RX ADMIN — HEPARIN SODIUM 4000 UNITS: 1000 INJECTION, SOLUTION INTRAVENOUS; SUBCUTANEOUS at 12:11

## 2024-11-18 RX ADMIN — PROPOFOL 50 MCG/KG/MIN: 10 INJECTION, EMULSION INTRAVENOUS at 01:11

## 2024-11-18 RX ADMIN — HYDRALAZINE HYDROCHLORIDE 100 MG: 100 TABLET ORAL at 08:11

## 2024-11-18 RX ADMIN — SODIUM CHLORIDE 250 ML: 9 INJECTION, SOLUTION INTRAVENOUS at 12:11

## 2024-11-18 RX ADMIN — METOPROLOL TARTRATE 100 MG: 100 TABLET, FILM COATED ORAL at 09:11

## 2024-11-18 RX ADMIN — PANTOPRAZOLE SODIUM 40 MG: 40 INJECTION, POWDER, LYOPHILIZED, FOR SOLUTION INTRAVENOUS at 08:11

## 2024-11-18 RX ADMIN — HYDRALAZINE HYDROCHLORIDE 100 MG: 100 TABLET ORAL at 04:11

## 2024-11-18 RX ADMIN — APIXABAN 10 MG: 5 TABLET, FILM COATED ORAL at 08:11

## 2024-11-18 RX ADMIN — METOPROLOL TARTRATE 100 MG: 100 TABLET, FILM COATED ORAL at 08:11

## 2024-11-18 NOTE — PLAN OF CARE
Care Plans Advancing    Problem: Adult Inpatient Plan of Care  Goal: Plan of Care Review  Outcome: Progressing  Flowsheets (Taken 11/18/2024 0527)  Plan of Care Reviewed With: patient  Goal: Patient-Specific Goal (Individualized)  Outcome: Progressing  Goal: Absence of Hospital-Acquired Illness or Injury  Outcome: Progressing  Intervention: Identify and Manage Fall Risk  Flowsheets (Taken 11/18/2024 0527)  Safety Promotion/Fall Prevention:   bed alarm set   lighting adjusted   medications reviewed   pulse ox  Intervention: Prevent Skin Injury  Flowsheets (Taken 11/18/2024 0527)  Skin Protection: incontinence pads utilized  Device Skin Pressure Protection:   adhesive use limited   positioning supports utilized  Intervention: Prevent and Manage VTE (Venous Thromboembolism) Risk  Flowsheets (Taken 11/18/2024 0527)  VTE Prevention/Management:   ROM (active) performed   ROM (passive) performed  Intervention: Prevent Infection  Flowsheets (Taken 11/18/2024 0527)  Infection Prevention:   equipment surfaces disinfected   hand hygiene promoted   single patient room provided  Goal: Optimal Comfort and Wellbeing  Outcome: Progressing  Goal: Readiness for Transition of Care  Outcome: Progressing     Problem: Infection  Goal: Absence of Infection Signs and Symptoms  Outcome: Progressing  Intervention: Prevent or Manage Infection  Flowsheets (Taken 11/18/2024 0527)  Infection Management: aseptic technique maintained     Problem: Hemodialysis  Goal: Safe, Effective Therapy Delivery  Outcome: Progressing  Goal: Effective Tissue Perfusion  Outcome: Progressing  Goal: Absence of Infection Signs and Symptoms  Outcome: Progressing     Problem: Sepsis/Septic Shock  Goal: Optimal Coping  Outcome: Progressing  Goal: Absence of Bleeding  Outcome: Progressing  Goal: Blood Glucose Level Within Targeted Range  Outcome: Progressing  Goal: Absence of Infection Signs and Symptoms  Outcome: Progressing  Goal: Optimal Nutrition Intake  Outcome:  Progressing     Problem: Gas Exchange Impaired  Goal: Optimal Gas Exchange  Outcome: Progressing     Problem: Fall Injury Risk  Goal: Absence of Fall and Fall-Related Injury  Outcome: Progressing     Problem: Restraint, Nonviolent  Goal: Absence of Harm or Injury  Outcome: Progressing     Problem: Mechanical Ventilation Invasive  Goal: Effective Communication  Outcome: Progressing  Goal: Optimal Device Function  Outcome: Progressing  Goal: Mechanical Ventilation Liberation  Outcome: Progressing  Goal: Optimal Nutrition Delivery  Outcome: Progressing  Goal: Absence of Device-Related Skin and Tissue Injury  Outcome: Progressing  Goal: Absence of Ventilator-Induced Lung Injury  Outcome: Progressing     Problem: Skin Injury Risk Increased  Goal: Skin Health and Integrity  Outcome: Progressing     Problem: ARDS (Acute Respiratory Distress Syndrome)  Goal: Effective Oxygenation  Outcome: Progressing     Problem: Airway Clearance Ineffective  Goal: Effective Airway Clearance  Outcome: Progressing

## 2024-11-18 NOTE — PLAN OF CARE
Problem: Gas Exchange Impaired  Goal: Optimal Gas Exchange  Outcome: Progressing     Problem: Mechanical Ventilation Invasive  Goal: Effective Communication  Outcome: Progressing  Goal: Optimal Device Function  Outcome: Progressing  Goal: Mechanical Ventilation Liberation  Outcome: Progressing  Goal: Optimal Nutrition Delivery  Outcome: Progressing  Goal: Absence of Device-Related Skin and Tissue Injury  Outcome: Progressing  Goal: Absence of Ventilator-Induced Lung Injury  Outcome: Progressing     Problem: ARDS (Acute Respiratory Distress Syndrome)  Goal: Effective Oxygenation  Outcome: Progressing     Problem: Airway Clearance Ineffective  Goal: Effective Airway Clearance  Outcome: Progressing

## 2024-11-18 NOTE — PROGRESS NOTES
Patient is seen on hemodialysis, he is tolerating this well, we will continue his treatment unchanged.    Assessment/plan number one. ESRd - continue HD support.

## 2024-11-18 NOTE — SUBJECTIVE & OBJECTIVE
Interval History:   In ICU, on vent.  SBT today.  Rib fractures stable and nondisplaced on 3D recon.  Would not recommend rib plating.  General surgery will sign off.    Medications:  Continuous Infusions:   fentanyl  0-200 mcg/hr Intravenous Continuous 17.5 mL/hr at 11/18/24 1410 175 mcg/hr at 11/18/24 1410    propofoL  0-50 mcg/kg/min Intravenous Continuous 32.5 mL/hr at 11/18/24 1326 50 mcg/kg/min at 11/18/24 1326     Scheduled Meds:   albuterol-ipratropium  3 mL Nebulization Q6H    apixaban  10 mg Oral BID    Followed by    [START ON 11/23/2024] apixaban  5 mg Oral BID    chlorhexidine  15 mL Mouth/Throat BID    epoetin milla  50 Units/kg Intravenous Every Tues, Thurs, Sat    hydrALAZINE  100 mg Oral TID    LIDOcaine  2 patch Transdermal Q24H    metoprolol tartrate  100 mg Oral BID    NIFEdipine  60 mg Oral QHS    oseltamivir  30 mg Oral Every Mon, Wed, Fri    pantoprazole  40 mg Intravenous Daily    piperacillin-tazobactam (Zosyn) IV (PEDS and ADULTS) (extended infusion is not appropriate)  4.5 g Intravenous Q12H    QUEtiapine  50 mg Oral BID     PRN Meds:  Current Facility-Administered Medications:     0.9% NaCl, , Intravenous, PRN    0.9% NaCl, , Intravenous, PRN    acetaminophen, 650 mg, Oral, Q4H PRN    albuterol sulfate, 2.5 mg, Nebulization, Q4H PRN    fentanyl, 75 mcg, Intravenous, Q30 Min PRN    haloperidol lactate, 0.5 mg, Intramuscular, Q12H PRN    heparin (porcine), 4,000 Units, Intra-Catheter, PRN    metoprolol, 5 mg, Intravenous, Q12H PRN    ondansetron, 4 mg, Intravenous, Q8H PRN    sodium chloride 0.9%, 250 mL, Intravenous, PRN    sodium chloride 0.9%, 10 mL, Intravenous, PRN    Pharmacy to dose Vancomycin consult, , , Once **AND** vancomycin - pharmacy to dose, , Intravenous, pharmacy to manage frequency     Review of patient's allergies indicates:   Allergen Reactions    Fish containing products Swelling    Iodinated contrast media Swelling    Iodine Shortness Of Breath, Swelling and Anaphylaxis      Sob and swelling/itching/throat closes up per pt  Other reaction(s): Swelling, Hives, DifficultyBreat  Sob and swelling/itching/throat closes up per pt      Shellfish containing products Swelling    Iodine and iodide containing products      Objective:     Vital Signs (Most Recent):  Temp: 99.4 °F (37.4 °C) (11/18/24 1115)  Pulse: 110 (11/18/24 1208)  Resp: (!) 22 (11/18/24 1208)  BP: (!) 142/95 (11/18/24 1245)  SpO2: 99 % (11/18/24 1208) Vital Signs (24h Range):  Temp:  [98.4 °F (36.9 °C)-100.2 °F (37.9 °C)] 99.4 °F (37.4 °C)  Pulse:  [] 110  Resp:  [9-54] 22  SpO2:  [89 %-100 %] 99 %  BP: ()/() 142/95     Weight: 111.3 kg (245 lb 6 oz)  Body mass index is 33.28 kg/m².    Intake/Output - Last 3 Shifts         11/16 0700  11/17 0659 11/17 0700  11/18 0659 11/18 0700  11/19 0659    I.V. (mL/kg) 1291.9 (12) 1054.4 (9.5)     Other       NG/GT 1798 460     IV Piggyback 640.9 445.3     Total Intake(mL/kg) 3730.8 (34.7) 1959.6 (17.6)     Drains 30      Other       Stool 0      Total Output 30      Net +3700.8 +1959.6            Stool Occurrence 3 x 1 x              Physical Exam  Vitals reviewed.   Constitutional:       Interventions: He is sedated and intubated.   Cardiovascular:      Rate and Rhythm: Normal rate.   Pulmonary:      Effort: No respiratory distress. He is intubated.      Comments: Mechanically ventilated  Skin:     General: Skin is warm and dry.   Neurological:      Comments: Sedated          Significant Labs:  I have reviewed all pertinent lab results within the past 24 hours.  CBC:   Recent Labs   Lab 11/18/24  0552   WBC 18.96*   RBC 2.44*   HGB 7.6*   HCT 22.7*      MCV 93.0   MCH 31.1*   MCHC 33.5     CMP:   Recent Labs   Lab 11/13/24  1014 11/14/24  0227 11/18/24  0552   GLU 86   < > 98   CALCIUM 8.2*   < > 6.7*   ALBUMIN 3.4*  --   --    PROT 7.4  --   --       < > 122*   K 4.9   < > 2.9*   CO2 23   < > 20*   CL 97*   < > 83*   BUN 30*   < > 52*   CREATININE 7.86*    < > 8.83*   ALKPHOS 89  --   --    ALT 28  --   --    AST 89*  --   --    BILITOT 0.9  --   --     < > = values in this interval not displayed.       Significant Diagnostics:  I have reviewed all pertinent imaging results/findings within the past 24 hours.

## 2024-11-18 NOTE — NURSING
[facility]  DIALYSIS TREATMENT SUMMARY      Note: Consult with the attending physician for patient treatment orders, this document is not a physician order.      Patient Information   Patient Saúl Butt   Date of Birth June 26, 1970   Chart Number [id number]   Location [facility] [place]   Location MRN [id number]   Gender Male   SSN (last 4) [id number]     Treatment Information   Treatment Type Hemodialysis   Treatment Id [id number]   Start Time November 18, 2024 09:45   End Time November 18, 2024 12:45   Acutal Duration 03:00     Treatment Balances   Total Saline Administered 500   Net Fluid Balance 1000    Hemodialysis Orders   Therapy Standard   Orders Verified Time 11/18/2024 09:30    Date Verified 11/18/2024   Duration 3:00   Isolated UF/Bypass No   BFR (mL) 400   DFR X1.5 BFR   DFR (mL) 600   Dialyzer Type OPTIFLUX 160NR   UF Order UF Goal Ordered   UF Goal Ordered (mL) 1000   With BP Parameters Yes   As Tolerated Yes   Crit-Line used No   Heparin Initial Units Bolus No   Heparin IV Maintenance Bolus No   Heparin IV Infusion No   Potassium (mEq/L) 3   Calcium (mEq/L) 2.5   Bicarb (mg/dL) 33   Sodium (mEq/L) 137   Additional Orders if sbp drops 20 points and patient becomes symptomatic turn uf off, heparin 2,000 units each port of HD catheter   Clinician Oscar Elmore RN    Dialysis Access   Access Type Central Venous Catheter   Central Venous Catheter   Access Type Catheter - Tunneled   Access Location Chest Wall - R   Current/New [facility] Patient Yes   Catheter Care Completed per Policy Yes   Dressing Dry and Intact on Arrival Yes   Dressing Changed Yes   Type of Dressing Film Biopatch/CHG   Dressing Changed By [facility] Staff   Type of HD Caps Curos   HD Caps Changed Yes   CVC Line Education Provided No - Intubated      Vitals   Pre-Treatment Vitals   Time Is BP being recorded? Pre BP Map BP Method Pulse RR Temp How was Weight Obtained? Pre Weight Previous Dry Weight Previous Post Weight Metric  Target Fluid Removal (mL) Dialysate Confirmed Clinician    11/18/2024 09:40 BP/Map 95/49 (64) Noninvasive 93 14 99.4 How Obtained: Bed Scale 110.3   Kgs 1500 Yes Oscar Elmore RN    Comments: catheter care      Post Treatment Vitals   Time Is BP being recorded? BP Map Pulse RR Temp How was Weight Obtained? Post Weight Metric BVP UF Goal Ordered NSS Given Intra-Procedure Total Machine UF Removed (mL) Crit-Line Ending Profile Crit-Line Refill Crit-Line Ending HCT Crit-Line Max BV% Clinician    11/18/2024 12:50 BP/Map 147/83 (104) 120 23 99 How Obtained: Bed scale 110.3 Kgs 62.1 1000 0 1500     Oscar Elmore RN    Comments: blood rinsed back      Safety checks include: access uncovered and secured, Hemaclip secured for all central line access, machine checks performed, and alarm limits confirmed.     Labs   Hepatitis   HBsAG Lab Result HBsAG Lab Result HBsAG Draw Date Transient Antigenemia(MD Diagnosis Only) Anti-HBs Lab Result Anti-HBs Lab Value Anti-HBs Draw Date Documented By Documented Date Hepatitis Status Hepatitis Status   Negative  11/11/2024  Negative  11/11/2024 Oscar Elmore 11/18/2024  Susceptible   Notes:       Pre-Treatment Hepatitis Precautions Hard copy verified by nurse and placed in patients hospital chart Yes Signing   Patient tx at bedside 1:1 Yes Copy of hepatitis results verified in hospital EMR Yes Signed By Oscar Elmore RN   Patient tx with immune pts only Yes Labs Drawn Yes    Patient tx outside buffer zone Yes Hepatitis Information Entered By Oscar Elmore RN      Pre-Treatment Handoff   Staff Report Received Yes   Report Given by Primary Nurse deb wolf   Time 09:25     Patient Arrival   Patient ID Verified Date of Birth    Full Name   Patient Consent to treatment verified Yes   Blood Transfusion Consent Verified N/A     Treatment Comments   Treatment Notes tolerated treatment well     Post-Treatment Handoff   Report Given to Primary Nurse deb wolf   Time Report Given 13:15    Report Given By Chasity Elmore RN    Machine Validation   Time 09:40   Date 11/18/2024   Auto Alarm Test Passed Yes   Machine Serial # [id number]   Portable RO Yes   RO Serial# [id number]   Residual Bleach Negative Yes   Was a manufactured mix used? No   Machine Log Completed Yes   Total Chlorine (less than 0.1)? Yes   Total Chlorine Log Completed Yes   Bicarb BiBag   Bibag Size 650   Machine Temp 36.5   Machine Conductivity 13.5   Meter Type N/A   Meter Conductivity    Independent Conductivity 13.6   pH Status Pass Pass   pH    TCD Value    TCD Alarm with +/- 0.5 Yes   NVL enabled validated 100 asymmetric Yes   Safety check complete Chasity Elmore RN   Second Verification Performed? Yes   Second Verification Performed By Patricia Mack RN   Reason Not Verified       Serum Lab Values   Time BUN Creatinine Na K (mEq/L) Cl CO2 Ca (mEq/L) Phos Mg (mg/dL) Alb (g/dL) Glucose Hgb Hct WBC Plt PT aPTT INR Other Clinician    11/18/2024 05:52 52 8.3 122 2.9  20      7.6 22.7 18.9      Chasity Elmore RN    Comments:         Administered Medications   Time Medication Dose Route Reason for Admin Clinician    11/18/2024 12:50 heparin 4 ml IVP cath lock Chasity Elmore RN    Comments:         Facility Information Location ICU Diagnosis Acute respiratory failure with hypoxemia   Facility Information Room # ICS 2 Isolation Information   Admission Date 11/11/2024 Patient Type Chronic dialysis patient with diagnosis of ESRD Isolation Required? N   Ordering MD Kiet Martinez Patient Chronic Unit [facility] Completed by   Account/Finance Number [id number] Code Status Full Code General Tx information Entered by Chasity Elmore RN   Admission Status InPatient Diagnosis      Start Treatment Time Out Confirmed by chasity elmore rn Correct access site verified Yes   Treatment Initiation Connections Secured Time Out Completed 09:43 Treatment Start Date 11/18/2024    Saline line double clamped Correct patient verified Yes Treatment Start Time 09:45     Hemaclip Applied Correct procedure verified Yes Patient/Family questions and concerns addressed Yes     Pre Focused Assessment Respiratory Efforts Unlabored LOC   Access Notes sedated, intubated, on ventilator LOC Sedated   Signs and Symptoms of Infection? No Edema GI / Bowels   Pain Screening Location Generalized GI Soft   Does the patient have pain? No Edema Grade 1+    Respiratory Cardiac  Voids   Lung Sounds Coarse Heart Rhythm Regular Completed by   Location Bases Telemetry Yes Pre Treatment Focused Assessment Completed By Oscar Elmore RN    Bilateral Skin Time 09:40   Position Bases Skin Warm Signing    Anterior  Dry Signed By Oscar Elmore RN     Education Reason Medically sedated Patient Education N/A   Patient Education Family Education Provided? N/A    Patient Educated? No Caregiver Education Provided? N/A      Post-Treatment HD machine external disinfection completed per policy Yes Completed by   Post Treatment Delay PRO external disinfection completed per policy Yes Post Treatment Form Completed By Oscar Elmore RN   Delay N Post Treatment General Information    Machine Disinfection Requirement Notes stable      Post Focused Assessment Position Bases LOC   Changes from Pre Focused Assessment  Anterior LOC Sedated   Changes from Pre Treatment Focused Assessment? No Respiratory Efforts Unlabored GI / Bowels   Access Notes sedated, intubated, on ventilator GI Soft   Catheter clamped and capped Yes Edema    Access Flow Good Location Generalized  Voids   Dialyzer Clearance Streaked Edema Grade 1+ Completed by   Pain Screening Cardiac Post Treatment Focused Assessment Completed by Oscar Elmore RN   Does the patient have pain? No Heart Rhythm Regular Date 11/18/2024   Respiratory Telemetry Yes Time 12:55   Lung Sounds Coarse Skin Signing   Location Bilateral Skin Warm Signed By Oscar Elmore RN    Bases  Dry

## 2024-11-18 NOTE — PROGRESS NOTES
[facility]  Critical Care Medicine  Progress Note    Patient Name: Saúl Butt  MRN: [id number]  Admission Date: 11/11/2024  Hospital Length of Stay: 7 days  Code Status: Full Code  Attending Provider: Jade Medley MD  Primary Care Provider: Ruth, Primary Doctor   Principal Problem: Acute respiratory failure with hypoxemia    Subjective:     HPI:  54-year-old  male presented to the ED via EMS.  Past medical history of end-stage renal disease, hypertension, Chronic anemia, Chronic diastolic CHF, moderate aortic stenosis, DVT, Hyperlipidemia, Paroxysmal atrial fibrillation, and malignant neoplasm of kidney.  Patient states he goes to dialysis Monday, Wednesday, and Friday.  States last day to dialyze was this past Friday, 11/8/24.  Patient states he has not missed any dialysis days. Patient was due to dialyze today, however he presented to the ED with 3 day history of shortness of breath which is worse with lying down, and he also reports having a cough.  He also reports 3 day history of nausea, vomiting, diarrhea. Patient reports having some fever on and off.  Denies any pain at present.    ED workup revealed sodium 131, potassium 8.4, anion gap 17, BUN and creatinine 60/13.4.  Venous blood gas revealed pH 7.42, pCO2 44, sodium 131, potassium 8.2, bicarb 28.5, lactate 1.5, and glucose 130.  Chest x-ray showed cardiomegaly with pulmonary edema.  BNP 55,331. In the ED patient received calcium gluconate 1 g IV, D10 500 mL IV, and Humulin R 10 units.  Critical care service was asked to admit patient to the ICU, where he will be dialyzed emergently to treat hyperkalemia.    Hospital/ICU Course:  11/12-- potassium 6.8 - HD gain today - went into Afib rvr on HD   11/13: continued pyrexia, resolved RVR, early am with increased work of breathing with hypoxia placed on NRB, hyperkalemia recurrence, intubated for progressive respiratory distress, severe hypoxia, aspirated during ETT s/p  successful therapeutic aspiration with bronchoscopy, am course with PEA arrest with ROSC, agitated post resuscitation warranting deep sedation  11/14-15: weaning sedation with intermittent agitation, recurrent hyperkalemia prompting HD, episodes of RVR managed with lopressor, off pressor support    Interval History/Significant Events: intermittent agitation, SBT with minimal vent requirements but requiring sedation    Review of Systems  Objective:     Vital Signs (Most Recent):  Temp: 98.6 °F (37 °C) (11/17/24 1515)  Pulse: 103 (11/17/24 1515)  Resp: 13 (11/17/24 1515)  BP: 135/76 (11/17/24 1515)  SpO2: 100 % (11/17/24 1515) Vital Signs (24h Range):  Temp:  [97.6 °F (36.4 °C)-98.6 °F (37 °C)] 98.6 °F (37 °C)  Pulse:  [] 103  Resp:  [7-44] 13  SpO2:  [84 %-100 %] 100 %  BP: ()/() 135/76   Weight: 107.5 kg (236 lb 15.9 oz)  Body mass index is 32.14 kg/m².      Intake/Output Summary (Last 24 hours) at 11/17/2024 1520  Last data filed at 11/17/2024 1112  Gross per 24 hour   Intake 3023 ml   Output --   Net 3023 ml          Physical Exam  Vitals reviewed.   Constitutional:       General: He is in acute distress (during SBT and SAT, responds to sedative).      Appearance: Normal appearance. He is ill-appearing. He is not diaphoretic.      Interventions: He is not intubated.     Comments: With sedation, patient is synchronous with vent   HENT:      Head: Normocephalic and atraumatic.      Nose: Nose normal.      Mouth/Throat:      Mouth: Mucous membranes are moist.      Pharynx: Oropharynx is clear. No oropharyngeal exudate.      Comments: ETT secured  Eyes:      General: No scleral icterus.     Extraocular Movements: Extraocular movements intact.      Conjunctiva/sclera: Conjunctivae normal.      Pupils: Pupils are equal, round, and reactive to light.   Cardiovascular:      Rate and Rhythm: Normal rate. Rhythm irregular.      Heart sounds: Murmur heard.   Pulmonary:      Effort: Pulmonary effort is  normal. No respiratory distress. He is not intubated.      Breath sounds: Rhonchi present. No wheezing.      Comments: Synchronous with ventilator, intermittently agitated  Abdominal:      General: Abdomen is flat. Bowel sounds are normal. There is no distension.      Palpations: Abdomen is soft.      Tenderness: There is no abdominal tenderness. There is no guarding.   Musculoskeletal:         General: Normal range of motion.      Cervical back: Normal range of motion and neck supple.      Right lower leg: No edema.      Left lower leg: No edema.   Skin:     General: Skin is warm and dry.      Capillary Refill: Capillary refill takes less than 2 seconds.      Coloration: Skin is not jaundiced.   Neurological:      General: No focal deficit present.      Mental Status: He is alert and oriented to person, place, and time.      Comments: Communicating with head gestures, spontaneously moving all extremities, intermittent agitation with attempts at self extubation   Psychiatric:         Mood and Affect: Mood normal.         Behavior: Behavior normal.      Comments:              Vents:  Vent Mode: PSV (Patient remains on PSV trial. Tolerating well.) (11/17/24 1223)  Ventilator Initiated: Yes (11/13/24 0735)  Set Rate: 20 BPM (11/17/24 0351)  Vt Set: 480 mL (11/17/24 0351)  Pressure Support: 5 cmH20 (11/17/24 1223)  PEEP/CPAP: 6 cmH20 (11/17/24 1223)  Oxygen Concentration (%): 40 (11/17/24 1223)  Peak Airway Pressure: 15.1 cmH20 (11/17/24 1223)  Plateau Pressure: 16.4 cmH20 (11/17/24 1223)  Total Ve: 9.83 L/m (11/17/24 1223)  F/VT Ratio<105 (RSBI): (!) 17.7 (11/17/24 1223)  Lines/Drains/Airways       Central Venous Catheter Line  Duration                  Hemodialysis Catheter right subclavian -- days    Percutaneous Central Line - Triple Lumen  11/13/24 1400 Femoral Vein Left 4 days              Drain  Duration                  NG/OG Tube 11/13/24 0816 Butler sump 16 Fr. Right nostril 4 days              Airway  Duration                   Airway - Non-Surgical 11/13/24 0732 Endotracheal Tube 4 days                  Significant Labs:    CBC/Anemia Profile:  Recent Labs   Lab 11/16/24  0424 11/17/24  0402   WBC 13.33* 15.00*   HGB 8.2* 7.2*   HCT 25.6* 22.3*    199   MCV 94.5 93.3   RDW 17.4* 17.6*        Chemistries:  Recent Labs   Lab 11/16/24 0424 11/17/24  0401   * 127*   K 4.1 3.7   CL 90* 86*   CO2 25 27   BUN 26 38*   CREATININE 6.44* 7.76*   CALCIUM 6.6* 6.1*   MG 2.3 2.3   PHOS 7.5* 7.4*       ABGs:   Recent Labs   Lab 11/17/24 0454   PH 7.33*   PCO2 56*   HCO3 29.5*   POCSATURATED 97     All pertinent labs within the past 24 hours have been reviewed.    Significant Imaging:  I have reviewed all pertinent imaging results/findings within the past 24 hours.    ABG  Recent Labs   Lab 11/17/24 0454   PH 7.33*   PO2 92   PCO2 56*   HCO3 29.5*     Assessment/Plan:     Neuro  Acute metabolic encephalopathy  Encephalopathy: Metabolic Encephalopathy; multifactorial including very likely influenza encephalitis  Sedated for Mechanical Ventilation  Post arrest evalaution with agitation and attempts at self extubation; will defer post-arrest normothermia protocol after neuro assessment, goal avoid fever, IV tylenol and cooling blankets to goal  - analgesia: fentanyl gtt for goal pain score 0/10  - sedation: propofol for goal RASS 0 to -1   -- ketamine considered and limited by recurrent RVR  - haldol 5 mg x1 with 0.5 mg IM available PRN for refractory agitation   -- depot unavailable  - start Zyprexa 50 mg BID; will consider up-titration to 100 mg BID   -- QTc assessed 11/17, acceptable for initiation  - stop stress dose steroid therapy for agitation; shock resolved  - TG QOD, acceptable    Pulmonary  * Acute respiratory failure with hypoxemia  Acute respiratory failure secondary to influenza infection being treated also component of ARDS patient is tolerating long spells of spontaneous breathing were going to go ahead and  try pressure support trial 8 5 this morning agitation the biggest issue we may extubate him if he was good arterial blood gases today    Aspiration pneumonia of both lungs due to gastric secretions  Will repeat chest x-ray continue current antibiotics    Aspiration pneumonitis  S/p bronchoscopy and therapeutic aspiration, successful for ETT and proximal airway clearance to subsegmental level on 11/13. Further management as per pneumonia.    Cardiac/Vascular  Heart failure with mildly reduced ejection fraction  Post arrest TTE with LVEF 45%, normal RV size, TAPSE 2, mildly dilated LA, mildly dilated RA, moderate aortic stenosis with calcified mass, trace MR, mild-to-moderate TR.  - HFrEF, no evidence of acute systolic heart failure; LV dysfunction warrants repeat TTE outside of this critical illness  - Tn peaked and demonstrated downtrend        Cardiac arrest  PEA arrest with minimal down time. At this time etiology is unclear and will be attributed to electrolyte and acid/base derangements. PEA arrest occurred hours post intubation with no hypoxia. No central PE to contribute to hemodynamic burden and burden non convincing for distal propagation with compressions. Post ROSC EKG and TTE completed 11/13 not convincing for acute coronary syndrome; Tn peaked on second draw.     Atrial fibrillation with RVR  Rate has been reasonably controlled if it becomes more of an issue we may have to consider amiodarone      Renal/  Hyperkalemia  Recurrent Hyperkalemia, attributed to Influenza; CK up-trending and remains <1k; combativeness likely contributing in upternend. Recurrence of hyperK between HD sessions warranting daily requirements; medications reviewed with no culprits  RESOLVED: monitor for steroid d/c  - cont HD  - stop lokelma TID; course with resolution of hyperK   - with regards to concern for adrenal insufficiency, this should be addressed by ongoing stress dose steroids; rhabdo less likely with improving CK,  work up to date with no identifiable tumor burden  - appreciate Nephrology recs    ESRD on hemodialysis  HD as per hyperkalemia plan. Of note, patient is s/p b/l nephrectomy on CT A&P.    ID  Influenza A  Severe Influenza A infection.   - pyrexia resolved with IV tylenol  - cont Tamiflu to be dosed post-HD; plan for extended course for severe infection   -- daily dosing for daily dialysis requirements   -- transition to MWF dosing post HD   -- course end 11/22    Severe sepsis  Brown pneumonia plus aspiration pneumonia shock has resolved      Oncology  Anemia in chronic kidney disease  Management per Nephrology with EPO. CBC daily; no bleeding diathesis to date.   - transfuse as needed for goal Hgb >7    Orthopedic  Closed fracture of multiple ribs  Sequale of chest compressions for CPR.  Appreciate surgery recommendations.   - topical lidocaine patches  - nerve block if available inpatient; unavailable at this time, readdress next week if analgesia insufficient  - Surgery to eval CT; no indication for surgical intervention  - splinting will complicate vent weaning    Other  Acute pulmonary embolism without acute cor pulmonale  Provoked in setting of severe viral infection and critical illness. Acute PE and POCUS with R and L IJ occlusive opacities consistent with thrombus  - u/s UE with no VTE  - cont Apixaban, PE dosing       Critical Care Daily Checklist:    A: Awake: RASS Goal/Actual Goal: RASS Goal: -2-->light sedation  Actual:     B: Spontaneous Breathing Trial Performed? Spon. Breathing Trial Initiated?: Initiated (PSV 5, PEEP of 6 started at this time.) (11/17/24 0714)   C: SAT & SBT Coordinated?  Yes                      D: Delirium: CAM-ICU Overall CAM-ICU: Positive   E: Early Mobility Performed? Yes   F: Feeding Goal: Goals: Weight maintenance during admission, tube feeding tolerance at goal  Status: Nutrition Goal Status: progressing towards goal   Current Diet Order   Procedures    Diet NPO      AS:  Analgesia/Sedation Diprivan fentanyl Seroquel   T: Thromboembolic Prophylaxis Eliquis   H: HOB > 300 Yes   U: Stress Ulcer Prophylaxis (if needed) Protonix   G: Glucose Control Two hundred   B: Bowel Function Stool Occurrence: 1   I: Indwelling Catheter (Lines & Marley) Necessity Central line   D: De-escalation of Antimicrobials/Pharmacotherapies Not appropriate    Plan for the day/ETD Spontaneous breathing trial possibly extubate    Code Status:  Family/Goals of Care: Full Code  Discuss with family     Critical Care Time:  35 minutes  Critical secondary to Patient has a condition that poses threat to life and bodily function:  Acute renal failure secondary to pneumonia aspiration severe agitation      Critical care was time spent personally by me on the following activities: development of treatment plan with patient or surrogate and bedside caregivers, discussions with consultants, evaluation of patient's response to treatment, examination of patient, ordering and performing treatments and interventions, ordering and review of laboratory studies, ordering and review of radiographic studies, pulse oximetry, re-evaluation of patient's condition. This critical care time did not overlap with that of any other provider or involve time for any procedures.     Nir Araujo MD  Critical Care Medicine  [facility]

## 2024-11-18 NOTE — PROGRESS NOTES
[facility] ICU  General Surgery  Progress Note    Subjective:     History of Present Illness:  No notes on file    Post-Op Info:  * No surgery found *         Interval History:   In ICU, on vent.  SBT today.  Rib fractures stable and nondisplaced on 3D recon.  Would not recommend rib plating.  General surgery will sign off.    Medications:  Continuous Infusions:   fentanyl  0-200 mcg/hr Intravenous Continuous 17.5 mL/hr at 11/18/24 1410 175 mcg/hr at 11/18/24 1410    propofoL  0-50 mcg/kg/min Intravenous Continuous 32.5 mL/hr at 11/18/24 1326 50 mcg/kg/min at 11/18/24 1326     Scheduled Meds:   albuterol-ipratropium  3 mL Nebulization Q6H    apixaban  10 mg Oral BID    Followed by    [START ON 11/23/2024] apixaban  5 mg Oral BID    chlorhexidine  15 mL Mouth/Throat BID    epoetin milla  50 Units/kg Intravenous Every Tues, Thurs, Sat    hydrALAZINE  100 mg Oral TID    LIDOcaine  2 patch Transdermal Q24H    metoprolol tartrate  100 mg Oral BID    NIFEdipine  60 mg Oral QHS    oseltamivir  30 mg Oral Every Mon, Wed, Fri    pantoprazole  40 mg Intravenous Daily    piperacillin-tazobactam (Zosyn) IV (PEDS and ADULTS) (extended infusion is not appropriate)  4.5 g Intravenous Q12H    QUEtiapine  50 mg Oral BID     PRN Meds:  Current Facility-Administered Medications:     0.9% NaCl, , Intravenous, PRN    0.9% NaCl, , Intravenous, PRN    acetaminophen, 650 mg, Oral, Q4H PRN    albuterol sulfate, 2.5 mg, Nebulization, Q4H PRN    fentanyl, 75 mcg, Intravenous, Q30 Min PRN    haloperidol lactate, 0.5 mg, Intramuscular, Q12H PRN    heparin (porcine), 4,000 Units, Intra-Catheter, PRN    metoprolol, 5 mg, Intravenous, Q12H PRN    ondansetron, 4 mg, Intravenous, Q8H PRN    sodium chloride 0.9%, 250 mL, Intravenous, PRN    sodium chloride 0.9%, 10 mL, Intravenous, PRN    Pharmacy to dose Vancomycin consult, , , Once **AND** vancomycin - pharmacy to dose, , Intravenous, pharmacy to manage frequency     Review of  patient's allergies indicates:   Allergen Reactions    Fish containing products Swelling    Iodinated contrast media Swelling    Iodine Shortness Of Breath, Swelling and Anaphylaxis     Sob and swelling/itching/throat closes up per pt  Other reaction(s): Swelling, Hives, DifficultyBreat  Sob and swelling/itching/throat closes up per pt      Shellfish containing products Swelling    Iodine and iodide containing products      Objective:     Vital Signs (Most Recent):  Temp: 99.4 °F (37.4 °C) (11/18/24 1115)  Pulse: 110 (11/18/24 1208)  Resp: (!) 22 (11/18/24 1208)  BP: (!) 142/95 (11/18/24 1245)  SpO2: 99 % (11/18/24 1208) Vital Signs (24h Range):  Temp:  [98.4 °F (36.9 °C)-100.2 °F (37.9 °C)] 99.4 °F (37.4 °C)  Pulse:  [] 110  Resp:  [9-54] 22  SpO2:  [89 %-100 %] 99 %  BP: ()/() 142/95     Weight: 111.3 kg (245 lb 6 oz)  Body mass index is 33.28 kg/m².    Intake/Output - Last 3 Shifts         11/16 0700  11/17 0659 11/17 0700  11/18 0659 11/18 0700  11/19 0659    I.V. (mL/kg) 1291.9 (12) 1054.4 (9.5)     Other       NG/GT 1798 460     IV Piggyback 640.9 445.3     Total Intake(mL/kg) 3730.8 (34.7) 1959.6 (17.6)     Drains 30      Other       Stool 0      Total Output 30      Net +3700.8 +1959.6            Stool Occurrence 3 x 1 x              Physical Exam  Vitals reviewed.   Constitutional:       Interventions: He is sedated and intubated.   Cardiovascular:      Rate and Rhythm: Normal rate.   Pulmonary:      Effort: No respiratory distress. He is intubated.      Comments: Mechanically ventilated  Skin:     General: Skin is warm and dry.   Neurological:      Comments: Sedated          Significant Labs:  I have reviewed all pertinent lab results within the past 24 hours.  CBC:   Recent Labs   Lab 11/18/24  0552   WBC 18.96*   RBC 2.44*   HGB 7.6*   HCT 22.7*      MCV 93.0   MCH 31.1*   MCHC 33.5     CMP:   Recent Labs   Lab 11/13/24  1014 11/14/24  0227 11/18/24  0552   GLU 86   < > 98    CALCIUM 8.2*   < > 6.7*   ALBUMIN 3.4*  --   --    PROT 7.4  --   --       < > 122*   K 4.9   < > 2.9*   CO2 23   < > 20*   CL 97*   < > 83*   BUN 30*   < > 52*   CREATININE 7.86*   < > 8.83*   ALKPHOS 89  --   --    ALT 28  --   --    AST 89*  --   --    BILITOT 0.9  --   --     < > = values in this interval not displayed.       Significant Diagnostics:  I have reviewed all pertinent imaging results/findings within the past 24 hours.  Assessment/Plan:     Closed fracture of multiple ribs  S/P Cardiac Arrest, CPR    11/15:  We will add 3D recons to previous CT angiogram of the chest to evaluate rib fractures.      With his age and minimally displaced fractures, I do not think rib fixation would really help him at this time.  Attempt conservative management for now; lidocaine patches over the chest on both sides were rib fractures are, pain control.  Continue to wean from vent per pulmonology.  Also consider rib block per anesthesia on bilateral sides        Martínez Pérez, JW  General Surgery  [facility]

## 2024-11-19 LAB
ANION GAP SERPL CALCULATED.3IONS-SCNC: 21 MMOL/L (ref 7–16)
ANISOCYTOSIS BLD QL SMEAR: ABNORMAL
BACTERIA BLD CULT: NORMAL
BACTERIA BLD CULT: NORMAL
BASOPHILS # BLD AUTO: 0.07 K/UL (ref 0–0.2)
BASOPHILS NFR BLD AUTO: 0.3 % (ref 0–1)
BUN SERPL-MCNC: 34 MG/DL (ref 8–26)
BUN/CREAT SERPL: 5 (ref 6–20)
CALCIUM SERPL-MCNC: 7.6 MG/DL (ref 8.4–10.2)
CHLORIDE SERPL-SCNC: 92 MMOL/L (ref 98–107)
CK SERPL-CCNC: 114 U/L (ref 30–200)
CO2 SERPL-SCNC: 20 MMOL/L (ref 22–29)
CREAT SERPL-MCNC: 6.91 MG/DL (ref 0.72–1.25)
DACRYOCYTES BLD QL SMEAR: ABNORMAL
DIFFERENTIAL METHOD BLD: ABNORMAL
EGFR (NO RACE VARIABLE) (RUSH/TITUS): 9 ML/MIN/1.73M2
EOSINOPHIL # BLD AUTO: 0.64 K/UL (ref 0–0.5)
EOSINOPHIL NFR BLD AUTO: 2.8 % (ref 1–4)
EOSINOPHIL NFR BLD MANUAL: 1 % (ref 1–4)
ERYTHROCYTE [DISTWIDTH] IN BLOOD BY AUTOMATED COUNT: 18.2 % (ref 11.5–14.5)
GLUCOSE SERPL-MCNC: 95 MG/DL (ref 74–100)
HCT VFR BLD AUTO: 21.7 % (ref 40–54)
HGB BLD-MCNC: 7.1 G/DL (ref 13.5–18)
HYPOCHROMIA BLD QL SMEAR: ABNORMAL
IMM GRANULOCYTES # BLD AUTO: 3.54 K/UL (ref 0–0.04)
IMM GRANULOCYTES NFR BLD: 15.6 % (ref 0–0.4)
LYMPHOCYTES # BLD AUTO: 0.93 K/UL (ref 1–4.8)
LYMPHOCYTES NFR BLD AUTO: 4.1 % (ref 27–41)
LYMPHOCYTES NFR BLD MANUAL: 4 % (ref 27–41)
MAGNESIUM SERPL-MCNC: 2.2 MG/DL (ref 1.6–2.6)
MCH RBC QN AUTO: 31.3 PG (ref 27–31)
MCHC RBC AUTO-ENTMCNC: 32.7 G/DL (ref 32–36)
MCV RBC AUTO: 95.6 FL (ref 80–96)
METAMYELOCYTES NFR BLD MANUAL: 6 %
MONOCYTES # BLD AUTO: 1.57 K/UL (ref 0–0.8)
MONOCYTES NFR BLD AUTO: 6.9 % (ref 2–6)
MONOCYTES NFR BLD MANUAL: 5 % (ref 2–6)
MPC BLD CALC-MCNC: 11.2 FL (ref 9.4–12.4)
NEUTROPHILS # BLD AUTO: 15.9 K/UL (ref 1.8–7.7)
NEUTROPHILS NFR BLD AUTO: 70.3 % (ref 53–65)
NEUTS BAND NFR BLD MANUAL: 4 % (ref 1–5)
NEUTS SEG NFR BLD MANUAL: 80 % (ref 50–62)
NRBC # BLD AUTO: 0.08 X10E3/UL
NRBC BLD MANUAL-RTO: 1 /100 WBC
NRBC, AUTO (.00): 0.4 %
OVALOCYTES BLD QL SMEAR: ABNORMAL
PHOSPHATE SERPL-MCNC: 8 MG/DL (ref 2.3–4.7)
PLATELET # BLD AUTO: 223 K/UL (ref 150–400)
PLATELET MORPHOLOGY: NORMAL
POLYCHROMASIA BLD QL SMEAR: ABNORMAL
POTASSIUM SERPL-SCNC: 3.5 MMOL/L (ref 3.5–5.1)
RBC # BLD AUTO: 2.27 M/UL (ref 4.6–6.2)
SODIUM SERPL-SCNC: 129 MMOL/L (ref 136–145)
VANCOMYCIN SERPL-MCNC: 24.8 ΜG/ML (ref 15–20)
WBC # BLD AUTO: 22.65 K/UL (ref 4.5–11)

## 2024-11-19 PROCEDURE — 99233 SBSQ HOSP IP/OBS HIGH 50: CPT | Mod: ,,, | Performed by: STUDENT IN AN ORGANIZED HEALTH CARE EDUCATION/TRAINING PROGRAM

## 2024-11-19 PROCEDURE — 92610 EVALUATE SWALLOWING FUNCTION: CPT

## 2024-11-19 PROCEDURE — 85025 COMPLETE CBC W/AUTO DIFF WBC: CPT | Performed by: STUDENT IN AN ORGANIZED HEALTH CARE EDUCATION/TRAINING PROGRAM

## 2024-11-19 PROCEDURE — 25000003 PHARM REV CODE 250: Performed by: STUDENT IN AN ORGANIZED HEALTH CARE EDUCATION/TRAINING PROGRAM

## 2024-11-19 PROCEDURE — 20000000 HC ICU ROOM

## 2024-11-19 PROCEDURE — 94003 VENT MGMT INPAT SUBQ DAY: CPT

## 2024-11-19 PROCEDURE — 97166 OT EVAL MOD COMPLEX 45 MIN: CPT

## 2024-11-19 PROCEDURE — 99900026 HC AIRWAY MAINTENANCE (STAT)

## 2024-11-19 PROCEDURE — 27000221 HC OXYGEN, UP TO 24 HOURS

## 2024-11-19 PROCEDURE — 94640 AIRWAY INHALATION TREATMENT: CPT

## 2024-11-19 PROCEDURE — 99900017 HC EXTUBATION W/PARAMETERS (STAT)

## 2024-11-19 PROCEDURE — 63600175 PHARM REV CODE 636 W HCPCS: Performed by: STUDENT IN AN ORGANIZED HEALTH CARE EDUCATION/TRAINING PROGRAM

## 2024-11-19 PROCEDURE — 25000003 PHARM REV CODE 250: Performed by: NURSE PRACTITIONER

## 2024-11-19 PROCEDURE — 80048 BASIC METABOLIC PNL TOTAL CA: CPT | Performed by: STUDENT IN AN ORGANIZED HEALTH CARE EDUCATION/TRAINING PROGRAM

## 2024-11-19 PROCEDURE — 83735 ASSAY OF MAGNESIUM: CPT | Performed by: STUDENT IN AN ORGANIZED HEALTH CARE EDUCATION/TRAINING PROGRAM

## 2024-11-19 PROCEDURE — 99900035 HC TECH TIME PER 15 MIN (STAT)

## 2024-11-19 PROCEDURE — 63600175 PHARM REV CODE 636 W HCPCS: Performed by: INTERNAL MEDICINE

## 2024-11-19 PROCEDURE — 97161 PT EVAL LOW COMPLEX 20 MIN: CPT

## 2024-11-19 PROCEDURE — 99291 CRITICAL CARE FIRST HOUR: CPT | Mod: ,,, | Performed by: INTERNAL MEDICINE

## 2024-11-19 PROCEDURE — 84100 ASSAY OF PHOSPHORUS: CPT | Performed by: STUDENT IN AN ORGANIZED HEALTH CARE EDUCATION/TRAINING PROGRAM

## 2024-11-19 PROCEDURE — 25000242 PHARM REV CODE 250 ALT 637 W/ HCPCS: Performed by: STUDENT IN AN ORGANIZED HEALTH CARE EDUCATION/TRAINING PROGRAM

## 2024-11-19 PROCEDURE — 82550 ASSAY OF CK (CPK): CPT | Performed by: STUDENT IN AN ORGANIZED HEALTH CARE EDUCATION/TRAINING PROGRAM

## 2024-11-19 PROCEDURE — 80202 ASSAY OF VANCOMYCIN: CPT | Performed by: STUDENT IN AN ORGANIZED HEALTH CARE EDUCATION/TRAINING PROGRAM

## 2024-11-19 RX ADMIN — PANTOPRAZOLE SODIUM 40 MG: 40 INJECTION, POWDER, LYOPHILIZED, FOR SOLUTION INTRAVENOUS at 08:11

## 2024-11-19 RX ADMIN — APIXABAN 10 MG: 5 TABLET, FILM COATED ORAL at 08:11

## 2024-11-19 RX ADMIN — QUETIAPINE FUMARATE 50 MG: 25 TABLET ORAL at 08:11

## 2024-11-19 RX ADMIN — METOPROLOL TARTRATE 100 MG: 100 TABLET, FILM COATED ORAL at 08:11

## 2024-11-19 RX ADMIN — EPOETIN ALFA 5460 UNITS: 3000 SOLUTION INTRAVENOUS; SUBCUTANEOUS at 09:11

## 2024-11-19 RX ADMIN — IPRATROPIUM BROMIDE AND ALBUTEROL SULFATE 3 ML: .5; 3 SOLUTION RESPIRATORY (INHALATION) at 07:11

## 2024-11-19 RX ADMIN — CHLORHEXIDINE GLUCONATE 15 ML: 1.2 RINSE ORAL at 09:11

## 2024-11-19 RX ADMIN — PROPOFOL 50 MCG/KG/MIN: 10 INJECTION, EMULSION INTRAVENOUS at 01:11

## 2024-11-19 RX ADMIN — APIXABAN 10 MG: 5 TABLET, FILM COATED ORAL at 09:11

## 2024-11-19 RX ADMIN — CHLORHEXIDINE GLUCONATE 15 ML: 1.2 RINSE ORAL at 08:11

## 2024-11-19 RX ADMIN — HYDRALAZINE HYDROCHLORIDE 100 MG: 100 TABLET ORAL at 02:11

## 2024-11-19 RX ADMIN — IPRATROPIUM BROMIDE AND ALBUTEROL SULFATE 3 ML: .5; 3 SOLUTION RESPIRATORY (INHALATION) at 12:11

## 2024-11-19 RX ADMIN — HYDRALAZINE HYDROCHLORIDE 100 MG: 100 TABLET ORAL at 09:11

## 2024-11-19 RX ADMIN — PROPOFOL 50 MCG/KG/MIN: 10 INJECTION, EMULSION INTRAVENOUS at 05:11

## 2024-11-19 RX ADMIN — QUETIAPINE FUMARATE 50 MG: 25 TABLET ORAL at 09:11

## 2024-11-19 RX ADMIN — METOPROLOL TARTRATE 100 MG: 100 TABLET, FILM COATED ORAL at 09:11

## 2024-11-19 RX ADMIN — FENTANYL CITRATE 200 MCG/HR: 50 INJECTION, SOLUTION INTRAMUSCULAR; INTRAVENOUS at 12:11

## 2024-11-19 RX ADMIN — HYDRALAZINE HYDROCHLORIDE 100 MG: 100 TABLET ORAL at 08:11

## 2024-11-19 RX ADMIN — NIFEDIPINE 60 MG: 60 TABLET, FILM COATED, EXTENDED RELEASE ORAL at 09:11

## 2024-11-19 RX ADMIN — LIDOCAINE 5% 2 PATCH: 700 PATCH TOPICAL at 08:11

## 2024-11-19 NOTE — ASSESSMENT & PLAN NOTE
Currently no positive cultures other than some yeast on respiratory culture, being treated for influenza

## 2024-11-19 NOTE — PROGRESS NOTES
Patient repeated this morning, he is looking about, he had some questions but is a little confused presently.    Physical exam general the patient is chronically, ill appearing, heart is regular rate and rhythm, he has no pitting edema, lungs some wheezing on the left anteriorly, abdomen is soft, with positive bowel sounds    Assessment/plan 1. ESRD - patient to dialyze tomorrow  Two. Respiratory failure-continue ventilator support.  Three. Sepsis continue antibiotics.  Four. Influenza illness - continue antivirals.  Five. Anemia - patients hematocrit is around 26%, continue erythropoietin

## 2024-11-19 NOTE — CARE UPDATE
Done by RT student Kimmy Sharp       11/19/24 0838   Patient Assessment/Suction   Level of Consciousness (AVPU) responds to voice   Respiratory Effort Normal   Expansion/Accessory Muscles/Retractions no use of accessory muscles   All Lung Fields Breath Sounds Anterior:;coarse   Rhythm/Pattern, Respiratory assisted mechanically   Cough Frequency with stimulation   Cough Type assisted   Suction Method tracheal;oral   Suction Pressure (mmHg) -120 mmHg   $ Suction Charges Inline Suction Procedure Stat Charge   Secretions Amount small   Secretions Color tan   Secretions Characteristics thick   Skin Integrity   $ Wound Care Tech Time 15 min   Area Observed Left;Right;Behind ear   Skin Appearance without discoloration   Barrier used? Other (see comments)  (soft cannula)   PRE-TX-O2   Device (Oxygen Therapy) nasal cannula   Flow (L/min) (Oxygen Therapy) 3   Oxygen Concentration (%) 32   Pulse Oximetry Type Continuous   Ready to Wean/Extubation Screen   FIO2<=50 (chart decimal) 0.32   Ready to Wean Parameters   $ Extubation Tech Time Extubation w/ Parameters;Tech Time 15 min   SBT Results Pass   Extubated? Yes   Reason for Extubation Extubated   Ventilator Discontinued Yes

## 2024-11-19 NOTE — PT/OT/SLP EVAL
"Physical Therapy Evaluation    Patient Name:  Saúl Butt   MRN:  [id number]    Recommendations:     Discharge Recommendations: Moderate Intensity Therapy   Discharge Equipment Recommendations: to be determined by next level of care   Barriers to discharge:  ongoing medical treatment    Assessment:     Saúl Butt is a 54 y.o. male admitted with a medical diagnosis of Acute respiratory failure with hypoxemia.  He presents with the following impairments/functional limitations: weakness, impaired self care skills, impaired functional mobility, decreased safety awareness, decreased coordination     Patient agreeable to PT evaluation. Overall weak from intubation and hospitalization. Able to perform STS with assistance but very unsteady. PT will follow to progress as able. Patient lives home alone so may need swb at [place].    Rehab Prognosis: Good; patient would benefit from acute skilled PT services to address these deficits and reach maximum level of function.    Recent Surgery: * No surgery found *      Plan:     During this hospitalization, patient to be seen 5 x/week to address the identified rehab impairments via gait training, therapeutic activities, therapeutic exercises, neuromuscular re-education and progress toward the following goals:    Plan of Care Expires:  12/24/24    Subjective     Chief Complaint: agreeable to evaluation  Patient/Family Comments/goals: "I want to try to stand"  Pain/Comfort:  Pain Rating 1: 0/10    Patients cultural, spiritual, Denominational conflicts given the current situation: no    Living Environment:  Home alone, friends check on him  Prior to admission, patients level of function was ambulating with SPC, recent fall using CAM boot.  Equipment used at home: cane, straight.  DME owned (not currently used): none.  Upon discharge, patient will have assistance from facility staff.    Objective:     Communicated with nurse prior to session.  Patient found HOB elevated with telemetry, NG " tube, blood pressure cuff, pulse ox (continuous), oxygen, SCD  upon PT entry to room.    General Precautions: Standard, fall  Orthopedic Precautions:    Braces:    Respiratory Status: Nasal cannula, flow 3 L/min    Exams:  RLE ROM: WNL  RLE Strength: 3/5  LLE ROM: WNL  LLE Strength: 3/5    Functional Mobility:  Bed Mobility:     Supine to Sit: maximal assistance  Sit to Supine: maximal assistance  Transfers:     Sit to Stand:  moderate assistance with platform walker  Balance: posterior lean with sitting balance requiring mod assist to maintain  Patient was very unsteady once standing, lack of knee control       AM-PAC 6 CLICK MOBILITY  Total Score:9       Treatment & Education:  Patient educated on the role of physical therapy in the acute care setting, call light usage, and safety including calling nurse for mobility  Patient educated on importance of OOB activity  PT answered all patient questions within PT scope of practice  Mobility as noted      Patient left HOB elevated with all lines intact, call button in reach, and nurse notified.    GOALS:   Multidisciplinary Problems       Physical Therapy Goals          Problem: Physical Therapy    Goal Priority Disciplines Outcome Interventions   Physical Therapy Goal     PT, PT/OT Progressing    Description: Short term goals:  . Supine to sit with Contact Guard Assistance  . Sit to stand transfer with Contact Guard Assistance  . Gait  x 75 feet with Contact Guard Assistance using Rolling Walker.     Long term goals:  Patient will regain full independent functional mobility with lowest level of assistive device to return to desired living arrangement and prior ADL's.                          History:     Past Medical History:   Diagnosis Date    Anemia in chronic kidney disease 12/06/2014    Chronic diastolic congestive heart failure     Deep vein thrombosis     ESRD on hemodialysis 06/17/2021    Hyperlipidemia     Hypertension     Malignant neoplasm of kidney      Moderate aortic stenosis 04/22/2023    Paroxysmal atrial fibrillation     Polycystic kidney disease     Primary osteoarthritis involving multiple joints 07/16/2024       Past Surgical History:   Procedure Laterality Date    AV FISTULA PLACEMENT      EYE SURGERY      HD CATHETER      SELECTIVE INJECTION OF ANESTHETIC AGENT AROUND LUMBAR SPINAL NERVE ROOT BY TRANSFORAMINAL APPROACH Left 6/13/2024    Procedure: Left L5-S1 TFESi;  Surgeon: Lily Cuellar MD;  Location: [facility];  Service: Pain Management;  Laterality: Left;    SURGICAL REMOVAL OF ULCER      clamped ulcer in stomach    TUNNELED VENOUS CATHETER PLACEMENT         Time Tracking:     PT Received On: 11/19/24  PT Start Time: 1335     PT Stop Time: 1400  PT Total Time (min): 25 min     Billable Minutes: Evaluation 25 11/19/2024

## 2024-11-19 NOTE — PT/OT/SLP EVAL
Speech Language Pathology Evaluation  Bedside Swallow    Patient Name:  Saúl Butt   MRN:  [id number]  Admitting Diagnosis: Acute respiratory failure with hypoxemia    Recommendations:                 General Recommendations:  Follow-up not indicated  Diet recommendations:    Full liquids   Aspiration Precautions: 1 bite/sip at a time, Assistance with meals, HOB to 90 degrees, Remain upright 30 minutes post meal, Small bites/sips, and Standard aspiration precautions   General Precautions: Standard    Communication strategies:   Patient able to follow simple commands during swallow eval.    Assessment:     Saúl Butt is a 54 y.o. male with an SLP diagnosis of  possible dysphagia s/p extubation .  He presents with being able to swallow water and apple sauce without difficulty.    History:     Past Medical History:   Diagnosis Date    Anemia in chronic kidney disease 12/06/2014    Chronic diastolic congestive heart failure     Deep vein thrombosis     ESRD on hemodialysis 06/17/2021    Hyperlipidemia     Hypertension     Malignant neoplasm of kidney     Moderate aortic stenosis 04/22/2023    Paroxysmal atrial fibrillation     Polycystic kidney disease     Primary osteoarthritis involving multiple joints 07/16/2024       Past Surgical History:   Procedure Laterality Date    AV FISTULA PLACEMENT      EYE SURGERY      HD CATHETER      SELECTIVE INJECTION OF ANESTHETIC AGENT AROUND LUMBAR SPINAL NERVE ROOT BY TRANSFORAMINAL APPROACH Left 6/13/2024    Procedure: Left L5-S1 TFESi;  Surgeon: Lily Cuellar MD;  Location: [facility];  Service: Pain Management;  Laterality: Left;    SURGICAL REMOVAL OF ULCER      clamped ulcer in stomach    TUNNELED VENOUS CATHETER PLACEMENT         Social History: Unknown living arrangements.    Prior Intubation HX:  extubated this morning    Modified Barium Swallow: n/a    Chest X-Rays: see chart    Prior diet: unknown.    Occupation/hobbies/homemaking: not  stated.    Subjective     Patient lying in bed awake and watching television. Patient agreeable to BEDSIDE SWALLOW EVALUATION.  Patient goals: not stated     Pain/Comfort:  Pain Rating 1: 0/10 (No pain reported by pt during swallow eval.)    Respiratory Status: Nasal cannula, flow 3 L/min    Objective:     Oral Musculature Evaluation  Dentition: present and adequate  Secretion Management: adequate  Mucosal Quality: adequate  Oral Labial Strength and Mobility: WFL  Lingual Strength and Mobility: WFL    Bedside Swallow Eval:   Consistencies Assessed:  Thin liquids No difficulties noted.  Puree No difficulties noted.       Oral Phase:   WFL    Pharyngeal Phase:   no overt clinical signs/symptoms of aspiration  no overt clinical signs/symptoms of pharyngeal dysphagia    Compensatory Strategies  None    Treatment: Rec begin with a full liquid diet and advance as tolerated.    Goals:   Multidisciplinary Problems       SLP Goals       Not on file                    Plan:     Patient to be seen:      Plan of Care expires:     Plan of Care reviewed with:      SLP Follow-Up:  No       Discharge recommendations:      Barriers to Discharge:  Decreased Care Giver Support    Time Tracking:     SLP Treatment Date:      Speech Start Time:  1405  Speech Stop Time:  1430     Speech Total Time (min):  25 min    Billable Minutes: Eval Swallow and Oral Function 25 11/19/2024

## 2024-11-19 NOTE — PROGRESS NOTES
[facility]  Critical Care Medicine  Progress Note    Patient Name: Saúl Butt  MRN: [id number]  Admission Date: 11/11/2024  Hospital Length of Stay: 8 days  Code Status: Full Code  Attending Provider: Jade Medley MD  Primary Care Provider: Ruth, Primary Doctor   Principal Problem: Acute respiratory failure with hypoxemia    Subjective:     HPI:  54-year-old  male presented to the ED via EMS.  Past medical history of end-stage renal disease, hypertension, Chronic anemia, Chronic diastolic CHF, moderate aortic stenosis, DVT, Hyperlipidemia, Paroxysmal atrial fibrillation, and malignant neoplasm of kidney.  Patient states he goes to dialysis Monday, Wednesday, and Friday.  States last day to dialyze was this past Friday, 11/8/24.  Patient states he has not missed any dialysis days. Patient was due to dialyze today, however he presented to the ED with 3 day history of shortness of breath which is worse with lying down, and he also reports having a cough.  He also reports 3 day history of nausea, vomiting, diarrhea. Patient reports having some fever on and off.  Denies any pain at present.    ED workup revealed sodium 131, potassium 8.4, anion gap 17, BUN and creatinine 60/13.4.  Venous blood gas revealed pH 7.42, pCO2 44, sodium 131, potassium 8.2, bicarb 28.5, lactate 1.5, and glucose 130.  Chest x-ray showed cardiomegaly with pulmonary edema.  BNP 55,331. In the ED patient received calcium gluconate 1 g IV, D10 500 mL IV, and Humulin R 10 units.  Critical care service was asked to admit patient to the ICU, where he will be dialyzed emergently to treat hyperkalemia.    Hospital/ICU Course:  11/12-- potassium 6.8 - HD gain today - went into Afib rvr on HD   11/13: continued pyrexia, resolved RVR, early am with increased work of breathing with hypoxia placed on NRB, hyperkalemia recurrence, intubated for progressive respiratory distress, severe hypoxia, aspirated during ETT s/p  successful therapeutic aspiration with bronchoscopy, am course with PEA arrest with ROSC, agitated post resuscitation warranting deep sedation  11/14-15: weaning sedation with intermittent agitation, recurrent hyperkalemia prompting HD, episodes of RVR managed with lopressor, off pressor support    Interval History/Significant Events:  Patient without complaints this morning less agitated    Review of Systems  Objective:     Vital Signs (Most Recent):  Temp: 99.2 °F (37.3 °C) (11/19/24 0315)  Pulse: (!) 114 (11/19/24 0518)  Resp: 15 (11/19/24 0518)  BP: 101/61 (11/19/24 0518)  SpO2: 99 % (11/19/24 0518) Vital Signs (24h Range):  Temp:  [98.9 °F (37.2 °C)-100.2 °F (37.9 °C)] 99.2 °F (37.3 °C)  Pulse:  [] 114  Resp:  [13-56] 15  SpO2:  [90 %-100 %] 99 %  BP: ()/() 101/61   Weight: 113.8 kg (250 lb 14.1 oz)  Body mass index is 34.03 kg/m².      Intake/Output Summary (Last 24 hours) at 11/19/2024 0605  Last data filed at 11/19/2024 0405  Gross per 24 hour   Intake 1936.14 ml   Output --   Net 1936.14 ml          Physical Exam  Vitals reviewed.   Constitutional:       Appearance: Normal appearance.      Interventions: He is not intubated.  HENT:      Head: Normocephalic and atraumatic.      Nose: Nose normal.      Mouth/Throat:      Mouth: Mucous membranes are dry.      Pharynx: Oropharynx is clear.   Eyes:      Extraocular Movements: Extraocular movements intact.      Conjunctiva/sclera: Conjunctivae normal.      Pupils: Pupils are equal, round, and reactive to light.   Cardiovascular:      Rate and Rhythm: Normal rate.      Heart sounds: Normal heart sounds. No murmur heard.  Pulmonary:      Effort: Pulmonary effort is normal. He is not intubated.      Breath sounds: Normal breath sounds.   Abdominal:      General: Abdomen is flat. Bowel sounds are normal.      Palpations: Abdomen is soft.   Musculoskeletal:         General: Normal range of motion.      Cervical back: Normal range of motion and neck  supple.      Right lower leg: No edema.      Left lower leg: No edema.   Skin:     General: Skin is warm and dry.      Capillary Refill: Capillary refill takes less than 2 seconds.   Neurological:      General: No focal deficit present.      Mental Status: He is alert and oriented to person, place, and time.   Psychiatric:         Mood and Affect: Mood normal.         Behavior: Behavior normal.            Vents:  Vent Mode: SPONT-PS (11/19/24 0348)  Ventilator Initiated: Yes (11/13/24 0735)  Set Rate: 20 BPM (11/18/24 0350)  Vt Set: 480 mL (11/18/24 0350)  Pressure Support: 7 cmH20 (11/19/24 0348)  PEEP/CPAP: 5 cmH20 (11/19/24 0348)  Oxygen Concentration (%): 40 (11/19/24 0348)  Peak Airway Pressure: 15.5 cmH20 (11/19/24 0348)  Plateau Pressure: 16.4 cmH20 (11/19/24 0348)  Total Ve: 9.68 L/m (11/19/24 0348)  F/VT Ratio<105 (RSBI): (!) 22.4 (11/19/24 0348)  Lines/Drains/Airways       Central Venous Catheter Line  Duration                  Hemodialysis Catheter right subclavian -- days    Percutaneous Central Line - Triple Lumen  11/13/24 1400 Femoral Vein Left 5 days              Drain  Duration                  NG/OG Tube 11/13/24 0816 [place] sump 16 Fr. Right nostril 5 days              Airway  Duration                  Airway - Non-Surgical 11/13/24 0732 Endotracheal Tube 5 days                  Significant Labs:    CBC/Anemia Profile:  Recent Labs   Lab 11/18/24  0552 11/19/24  0433   WBC 18.96* 22.65*   HGB 7.6* 7.1*   HCT 22.7* 21.7*    223   MCV 93.0 95.6   RDW 17.6* 18.2*        Chemistries:  Recent Labs   Lab 11/18/24  0552 11/19/24  0433   * 129*   K 2.9* 3.5   CL 83* 92*   CO2 20* 20*   BUN 52* 34*   CREATININE 8.83* 6.91*   CALCIUM 6.7* 7.6*   MG 2.1 2.2   PHOS 8.1* 8.0*       Recent Lab Results         11/19/24  0433   11/18/24  0743        Anion Gap 21         Baso # 0.07         Basophil % 0.3         BUN 34         BUN/CREAT RATIO 5         Calcium 7.6         Chloride 92         CO2 20                   Creatinine 6.91         Differential Method Manual         eGFR 9         Eos # 0.64         Eos % 2.8         Glucose 95         Hematocrit 21.7         Hemoglobin 7.1         Immature Grans (Abs) 3.54         Immature Granulocytes 15.6         Lymph # 0.93         Lymph % 4.1         Magnesium  2.2         MCH 31.3         MCHC 32.7         MCV 95.6         Mono # 1.57         Mono % 6.9         MPV 11.2         Neutrophils, Abs 15.90         Neutrophils Relative 70.3         nRBC 0.4         NUCLEATED RBC ABSOLUTE 0.08         Phosphorus Level 8.0         Platelet Count 223         POC Base Excess   -3.6       POC HCO3   22.7       POC PCO2   45       POC PH   7.31       POC PO2   82       POC SATURATED O2   95       Potassium 3.5         RBC 2.27         RDW 18.2         Sodium 129         Vancomycin, Random 24.8         WBC 22.65                 Significant Imaging:  I have reviewed all pertinent imaging results/findings within the past 24 hours.    ABG  Recent Labs   Lab 11/18/24  0743   PH 7.31*   PO2 82*   PCO2 45   HCO3 22.7     Assessment/Plan:     Neuro  Acute metabolic encephalopathy  Agitation remains a problem will review his meds    Pulmonary  * Acute respiratory failure with hypoxemia  Tolerating pressure support ventilation well now 2 days were going to try to extubate this morning agitation has been an issue    Aspiration pneumonia of both lungs due to gastric secretions  Chest x-ray looks improved, continue therapy    Aspiration pneumonitis  Chest x-ray looks  a little improved    Cardiac/Vascular  Heart failure with mildly reduced ejection fraction  Post arrest TTE with LVEF 45%, normal RV size, TAPSE 2, mildly dilated LA, mildly dilated RA, moderate aortic stenosis with calcified mass, trace MR, mild-to-moderate TR.  - HFrEF, no evidence of acute systolic heart failure; LV dysfunction warrants repeat TTE outside of this critical illness  - Tn peaked and demonstrated  downtrend        Cardiac arrest  PEA arrest with minimal down time. At this time etiology is unclear and will be attributed to electrolyte and acid/base derangements. PEA arrest occurred hours post intubation with no hypoxia. No central PE to contribute to hemodynamic burden and burden non convincing for distal propagation with compressions. Post ROSC EKG and TTE completed 11/13 not convincing for acute coronary syndrome; Tn peaked on second draw.     Atrial fibrillation with RVR  Rate still has been a mild issue but blood pressure is better may push rate control      Renal/  Hyperkalemia  Recurrent Hyperkalemia, attributed to Influenza; CK up-trending and remains <1k; combativeness likely contributing in upternend. Recurrence of hyperK between HD sessions warranting daily requirements; medications reviewed with no culprits  RESOLVED: monitor for steroid d/c  - cont HD  - stop lokelma TID; course with resolution of hyperK   - with regards to concern for adrenal insufficiency, this should be addressed by ongoing stress dose steroids; rhabdo less likely with improving CK, work up to date with no identifiable tumor burden  - appreciate Nephrology recs    ESRD on hemodialysis  HD as per hyperkalemia plan. Of note, patient is s/p b/l nephrectomy on CT A&P.    ID  Influenza A  Severe Influenza A infection.   - pyrexia resolved with IV tylenol  - cont Tamiflu to be dosed post-HD; plan for extended course for severe infection   -- daily dosing for daily dialysis requirements   -- transition to MWF dosing post HD   -- course end 11/22    Severe sepsis  Currently no positive cultures other than some yeast on respiratory culture, being treated for influenza      Oncology  Anemia in chronic kidney disease  Management per Nephrology with EPO. CBC daily; no bleeding diathesis to date.   - transfuse as needed for goal Hgb >7    Orthopedic  Closed fracture of multiple ribs  Sequale of chest compressions for CPR.  Appreciate  surgery recommendations.   - topical lidocaine patches  - nerve block if available inpatient; unavailable at this time, readdress next week if analgesia insufficient  - Surgery to eval CT; no indication for surgical intervention  - splinting will complicate vent weaning    Other  Acute pulmonary embolism without acute cor pulmonale  Provoked in setting of severe viral infection and critical illness. Acute PE and POCUS with R and L IJ occlusive opacities consistent with thrombus  - u/s UE with no VTE  - cont Apixaban, PE dosing       Critical Care Daily Checklist:    A: Awake: RASS Goal/Actual Goal: RASS Goal: -2-->light sedation  Actual:     B: Spontaneous Breathing Trial Performed? Spon. Breathing Trial Initiated?: Initiated (PSV 5, PEEP of 6 started at this time.) (11/17/24 4991)   C: SAT & SBT Coordinated?  Yes                      D: Delirium: CAM-ICU Overall CAM-ICU: Positive   E: Early Mobility Performed? Yes   F: Feeding Goal: Goals: Weight maintenance during admission, tube feeding tolerance at goal  Status: Nutrition Goal Status: progressing towards goal   Current Diet Order   Procedures    Diet NPO      AS: Analgesia/Sedation Seroquel Diprivan fentanyl   T: Thromboembolic Prophylaxis Eliquis   H: HOB > 300 Yes   U: Stress Ulcer Prophylaxis (if needed) Protonix   G: Glucose Control At goal   B: Bowel Function Stool Occurrence: 1   I: Indwelling Catheter (Lines & Marley) Necessity Dialysis   D: De-escalation of Antimicrobials/Pharmacotherapies     Plan for the day/ETD Extubate today    Code Status:  Family/Goals of Care: Full Code  Discuss with family     Critical Care Time:  35 minutes  Critical secondary to Patient has a condition that poses threat to life and bodily function:  Acute respiratory failure      Critical care was time spent personally by me on the following activities: development of treatment plan with patient or surrogate and bedside caregivers, discussions with consultants, evaluation of  patient's response to treatment, examination of patient, ordering and performing treatments and interventions, ordering and review of laboratory studies, ordering and review of radiographic studies, pulse oximetry, re-evaluation of patient's condition. This critical care time did not overlap with that of any other provider or involve time for any procedures.     Nir Araujo MD  Critical Care Medicine  [facility]

## 2024-11-19 NOTE — PLAN OF CARE
Care Plans Advancing    Problem: Adult Inpatient Plan of Care  Goal: Plan of Care Review  Outcome: Progressing  Flowsheets (Taken 11/18/2024 2231)  Plan of Care Reviewed With: patient  Goal: Patient-Specific Goal (Individualized)  Outcome: Progressing  Goal: Absence of Hospital-Acquired Illness or Injury  Outcome: Progressing  Intervention: Identify and Manage Fall Risk  Flowsheets (Taken 11/18/2024 2231)  Safety Promotion/Fall Prevention:   bed alarm set   medications reviewed   lighting adjusted   pulse ox  Intervention: Prevent Skin Injury  Flowsheets (Taken 11/18/2024 2231)  Skin Protection: incontinence pads utilized  Device Skin Pressure Protection:   adhesive use limited   positioning supports utilized  Intervention: Prevent and Manage VTE (Venous Thromboembolism) Risk  Flowsheets (Taken 11/18/2024 2231)  VTE Prevention/Management: remove, assess skin, and reapply sequential compression device  Intervention: Prevent Infection  Flowsheets (Taken 11/18/2024 2231)  Infection Prevention:   equipment surfaces disinfected   hand hygiene promoted   single patient room provided  Goal: Optimal Comfort and Wellbeing  Outcome: Progressing  Goal: Readiness for Transition of Care  Outcome: Progressing     Problem: Infection  Goal: Absence of Infection Signs and Symptoms  Outcome: Progressing  Intervention: Prevent or Manage Infection  Flowsheets (Taken 11/18/2024 2231)  Infection Management: aseptic technique maintained     Problem: Hemodialysis  Goal: Safe, Effective Therapy Delivery  Outcome: Progressing  Goal: Effective Tissue Perfusion  Outcome: Progressing  Goal: Absence of Infection Signs and Symptoms  Outcome: Progressing     Problem: Sepsis/Septic Shock  Goal: Optimal Coping  Outcome: Progressing  Goal: Absence of Bleeding  Outcome: Progressing  Goal: Blood Glucose Level Within Targeted Range  Outcome: Progressing  Goal: Absence of Infection Signs and Symptoms  Outcome: Progressing  Goal: Optimal Nutrition  Intake  Outcome: Progressing     Problem: Gas Exchange Impaired  Goal: Optimal Gas Exchange  Outcome: Progressing     Problem: Fall Injury Risk  Goal: Absence of Fall and Fall-Related Injury  Outcome: Progressing     Problem: Restraint, Nonviolent  Goal: Absence of Harm or Injury  Outcome: Progressing     Problem: Mechanical Ventilation Invasive  Goal: Effective Communication  Outcome: Progressing  Goal: Optimal Device Function  Outcome: Progressing  Goal: Mechanical Ventilation Liberation  Outcome: Progressing  Goal: Optimal Nutrition Delivery  Outcome: Progressing  Goal: Absence of Device-Related Skin and Tissue Injury  Outcome: Progressing  Goal: Absence of Ventilator-Induced Lung Injury  Outcome: Progressing     Problem: Skin Injury Risk Increased  Goal: Skin Health and Integrity  Outcome: Progressing     Problem: ARDS (Acute Respiratory Distress Syndrome)  Goal: Effective Oxygenation  Outcome: Progressing     Problem: Airway Clearance Ineffective  Goal: Effective Airway Clearance  Outcome: Progressing

## 2024-11-19 NOTE — PLAN OF CARE
Problem: Physical Therapy  Goal: Physical Therapy Goal  Description: Short term goals:  . Supine to sit with Contact Guard Assistance  . Sit to stand transfer with Contact Guard Assistance  . Gait  x 75 feet with Contact Guard Assistance using Rolling Walker.     Long term goals:  Patient will regain full independent functional mobility with lowest level of assistive device to return to desired living arrangement and prior ADL's.     Outcome: Progressing

## 2024-11-19 NOTE — PLAN OF CARE
Problem: Occupational Therapy  Goal: Occupational Therapy Goal  Description: STG:  Pt will perform grooming with setup  Pt will bathe with ModA with setup at EOB  Pt will perform UE dressing with Genesis  Pt will perform LE dressing with ModA  Pt will sit EOB x 10 min with CGA  Pt will transfer bed/chair/bsc with Genesis with RW  Pt will perform standing task x 2 min with Genesis with RW  Pt will tolerate 15 minutes of tx without fatigue      LT.Restore to max I with self care and mobility.      Outcome: Progressing

## 2024-11-19 NOTE — PROGRESS NOTES
[facility]  Adult Nutrition  Follow-up Note         Reason for Assessment  Reason For Assessment: RD follow-up   Nutrition Risk Screen: tube feeding or parenteral nutrition    Assessment and Plan    11/19/2024: RD follow up. Patient extubated to 3L NC. SLP evaluation pending. Recommend advance diet per SLP recommendations. If unable to advance PO diet, recommend resume enteral feeding. Last BM 11/18. RD following.      11/15/2024: RD follow up. Patient remains intubated and on enteral feeding. Feeding currently at 30mL/hour with no tolerance issues noted. Recommend continue to advance towards goal rate of 45mL/hour as tolerated. RD following.     11/13/2024: Consult received and appreciated. Consult for nutrition recs-patient is intubated. Patient is a 53yo male admitted 11/11 for hyperkalemia.     Patient is 108.3kg with a BMI of 32.38 and is obese. He has a PMH of ESRD on HD. Recommend start Novasource Renal with a goal rate of 45mL/hour with 30mL/hour free water flush. Keep HOB at 30-45 degrees to reduce risk for aspiration. Start rate at 20mL/hour and advance by 10mL q8h until goal rate is reached. Monitor for tolerance: n/v/d/c. Hold feeding and notify RD if symptoms of intolerance develop.     Last BM 11/12 per flowsheet.    Medications/labs reviewed. RD following.           Learning Needs/Social Determinants of Health    Learning Assessment       11/11/2024 1310 [facility] (11/11/2024 - Present)   Created by Berenice Casillas, RN - RN (Nurse) Status: Complete                 PRIMARY LEARNER     Primary Learner Name:  Criss Butt EL - 11/11/2024 1310    Relationship:  Patient EL - 11/11/2024 1310    Does the primary learner have any barriers to learning?:  No Barriers EL - 11/11/2024 1310    What is the preferred language of the primary learner?:  English EL - 11/11/2024 1310    Is an  required?:  Yes EL - 11/11/2024 1310    How does the primary learner  prefer to learn new concepts?:  Listening EL - 11/11/2024 1310    How often do you need to have someone help you read instructions, pamphlets, or written material from your doctor or pharmacy?:  Never EL - 11/11/2024 1310        CO-LEARNER #1     No question answered        CO-LEARNER #2     No question answered        SPECIAL TOPICS     No question answered        ANSWERED BY:     No question answered        Comments         Edit History       Berenice Casillas RN - RN (Nurse)   11/11/2024 1310                           Social Drivers of Health     Tobacco Use: Low Risk  (11/11/2024)    Patient History     Smoking Tobacco Use: Never     Smokeless Tobacco Use: Never     Passive Exposure: Not on file   Alcohol Use: Not At Risk (6/19/2024)    AUDIT-C     Frequency of Alcohol Consumption: Never     Average Number of Drinks: Patient does not drink     Frequency of Binge Drinking: Never   Financial Resource Strain: Low Risk  (11/11/2024)    Overall Financial Resource Strain (CARDIA)     Difficulty of Paying Living Expenses: Not hard at all   Food Insecurity: No Food Insecurity (11/11/2024)    Hunger Vital Sign     Worried About Running Out of Food in the Last Year: Never true     Ran Out of Food in the Last Year: Never true   Transportation Needs: No Transportation Needs (11/11/2024)    TRANSPORTATION NEEDS     Transportation : No   Physical Activity: Inactive (6/19/2024)    Exercise Vital Sign     Days of Exercise per Week: 0 days     Minutes of Exercise per Session: 0 min   Stress: No Stress Concern Present (11/11/2024)    [employer] - Occupational Stress Questionnaire     Feeling of Stress : Not at all   Housing Stability: Low Risk  (11/11/2024)    Housing Stability Vital Sign     Unable to Pay for Housing in the Last Year: No     Homeless in the Last Year: No   Depression: Low Risk  (6/17/2024)    Depression     Last PHQ-4: Flowsheet Data: 0   Utilities: Not At Risk (11/11/2024)    [facility]  Utilities     Threatened with loss of utilities: No   Health Literacy: Adequate Health Literacy (11/11/2024)     Health Literacy     Frequency of need for help with medical instructions: Rarely   Social Isolation: Socially Integrated (6/19/2024)    Social Isolation     Social Isolation: 1          Malnutrition  Is Patient Malnourished: No    Nutrition Diagnosis  Inadequate energy intake related to Decreased ability to consume sufficient energy as evidenced by intubated  Comments: continue enteral feeding    Recent Labs   Lab 11/19/24  0433   GLU 95         Nutrition Prescription / Recommendations  Recommendation/Intervention: Recommen continue current POC as tolerated.  Goals: Weight maintenance during admission, tube feeding tolerance at goal  Nutrition Goal Status: progressing towards goal  Current Diet Order: NPO  Chewing or Swallowing Difficulty?: No Chewing or swallowing difficulty  Recommended Diet: Enteral Nutrition  Recommended Oral Supplement: No Oral Supplements  Is Nutrition Support Recommended:   Needs Calculated    Energy Calorie Requirements (kcal): 2166-2708kcal (20-25kcal/kg)  Protein Requirements: 87-108g (0.8-1.0g/kg)  Enteral Nutrition   Enteral Nutrition Formula Provides:  2160 kcals Propofol Rate: Yes - 343kcal  98 g Protein  198 g Carbohydrates  108 g Fat Propofol Rate: Yes - 31g  774 ml Fluid without Flush    720 ml Fluid by flush   1494 ml Total Fluid  Enteral Nutrition Recommended Order:  Tube feeding via NG/ [place] Sump  Tube feeding formula: Novasource Renal NG/ [place] Sump at 45mL/hour  Free Water Flush: 30 ml hourly  Modular Supplements:No Modular Supplements needed  Enteral Nutrition meets needs?: yes  Enteral Nutrition Status: New Order  Is Nutrition Education Recommended: No    Monitor and Evaluation  % current Intake: Enteral Nutrition progressing to goal  % intake to meet estimated needs: Enteral Nutrition   Food and Nutrient Intake: enteral nutrition intake  Food and Nutrient  Adminstration: enteral and parenteral nutrition administration  Anthropometric Measurements: weight, weight change  Biochemical Data, Medical Tests and Procedures: electrolyte and renal panel, gastrointestinal profile, glucose/endocrine profile, inflammatory profile, lipid profile       Current Medical Diagnosis and Past Medical History  Diagnosis: other (see comments)  Past Medical History:   Diagnosis Date    Anemia in chronic kidney disease 12/06/2014    Chronic diastolic congestive heart failure     Deep vein thrombosis     ESRD on hemodialysis 06/17/2021    Hyperlipidemia     Hypertension     Malignant neoplasm of kidney     Moderate aortic stenosis 04/22/2023    Paroxysmal atrial fibrillation     Polycystic kidney disease     Primary osteoarthritis involving multiple joints 07/16/2024       Nutrition/Diet History       Lab/Procedures/Meds  Recent Labs   Lab 11/19/24  0433   *   K 3.5   BUN 34*   CREATININE 6.91*   CALCIUM 7.6*   CL 92*   PHOS 8.0*   Note: Na+, Cl-, Ca++ low. K+, Phos, BUN, Cr elevated. PMH ESRD on HD  Last A1c:   Lab Results   Component Value Date    HGBA1C 6.0 07/11/2023     Lab Results   Component Value Date    RBC 2.27 (L) 11/19/2024    HGB 7.1 (L) 11/19/2024    HCT 21.7 (L) 11/19/2024    MCV 95.6 11/19/2024    MCH 31.3 (H) 11/19/2024    MCHC 32.7 11/19/2024   Note: H&H low. On HD.     Pertinent Labs Reviewed: reviewed  Pertinent Medications Reviewed: reviewed  Scheduled Meds:   albuterol-ipratropium  3 mL Nebulization Q6H    apixaban  10 mg Oral BID    Followed by    [START ON 11/23/2024] apixaban  5 mg Oral BID    chlorhexidine  15 mL Mouth/Throat BID    epoetin milla  50 Units/kg Intravenous Every Tues, Thurs, Sat    hydrALAZINE  100 mg Oral TID    LIDOcaine  2 patch Transdermal Q24H    metoprolol tartrate  100 mg Oral BID    NIFEdipine  60 mg Oral QHS    oseltamivir  30 mg Oral Every Mon, Wed, Fri    pantoprazole  40 mg Intravenous Daily    QUEtiapine  50 mg Oral BID     Continuous  Infusions:   fentanyl  0-200 mcg/hr Intravenous Continuous   Stopped at 11/19/24 0739    propofoL  0-50 mcg/kg/min Intravenous Continuous   Stopped at 11/19/24 0833     PRN Meds:.  Current Facility-Administered Medications:     0.9% NaCl, , Intravenous, PRN    0.9% NaCl, , Intravenous, PRN    acetaminophen, 650 mg, Oral, Q4H PRN    albuterol sulfate, 2.5 mg, Nebulization, Q4H PRN    fentanyl, 75 mcg, Intravenous, Q30 Min PRN    haloperidol lactate, 0.5 mg, Intramuscular, Q12H PRN    heparin (porcine), 4,000 Units, Intra-Catheter, PRN    metoprolol, 5 mg, Intravenous, Q12H PRN    ondansetron, 4 mg, Intravenous, Q8H PRN    sodium chloride 0.9%, 250 mL, Intravenous, PRN    sodium chloride 0.9%, 10 mL, Intravenous, PRN    Anthropometrics  Temp: 99.3 °F (37.4 °C)  Height Method: Stated  Height: 6' (182.9 cm)  Height (inches): 72 in  Weight Method: Bed Scale  Weight: 113.8 kg (250 lb 14.1 oz)  Weight (lb): 250.89 lb  Ideal Body Weight (IBW), Male: 178 lb  % Ideal Body Weight, Male (lb): 134.13 %  BMI (Calculated): 34       Estimated/Assessed Needs  RMR (Gates-St. Jeor Equation): 2026   Total Ve: 10.5 L/m Temp: 99.3 °F (37.4 °C)Axillary  Weight Used For Calorie Calculations: 108.3 kg (238 lb 12.1 oz)     Energy Calorie Requirements (kcal): 2166-2708kcal (20-25kcal/kg)  Weight Used For Protein Calculations: 108.3 kg (238 lb 12.1 oz)  Protein Requirements: 87-108g (0.8-1.0g/kg)       RDA Method (mL): 2166       Nutrition by Nursing  Diet/Nutrition Received: tube feeding           Last Bowel Movement: 11/18/24       NG/OG Tube 11/13/24 0816 [place] sump 16 Fr. Right nostril-Feeding Type: continuous, by pump       NG/OG Tube 11/13/24 0816 [place] sump 16 Fr. Right nostril-Current Rate (mL/hr): 10 mL/hr       NG/OG Tube 11/13/24 0816 [place] sump 16 Fr. Right nostril-Goal Rate (mL/hr): 45 mL/hr       NG/OG Tube 11/13/24 0816 [place] sump 16 Fr. Right nostril-Formula Name: Novasource renal    Nutrition Follow-Up  RD Follow-up?:  Yes      Nutrition Discharge Planning: RD following for discharge needs          Jessica Vora, MS, RD, LD  Available via Secure Chat

## 2024-11-19 NOTE — PT/OT/SLP EVAL
Occupational Therapy   Evaluation    Name: Saúl Butt  MRN: [id number]  Admitting Diagnosis: Acute respiratory failure with hypoxemia  Recent Surgery: * No surgery found *      Recommendations:     Discharge Recommendations: Moderate Intensity Therapy  Discharge Equipment Recommendations:  to be determined by next level of care  Barriers to discharge:  Other (Comment) (ongoing medical treatment)    Assessment:     Saúl Butt is a 54 y.o. male with a medical diagnosis of Acute respiratory failure with hypoxemia.  He presents with weakness and decline in ADLs. Performance deficits affecting function: weakness, impaired endurance, impaired self care skills, impaired functional mobility, gait instability, impaired balance.  Pt extubated this AM and presents with weakness following vent. OT to follow in order to maximize independence with ADL tasks and functional mobility.    Rehab Prognosis: Good; patient would benefit from acute skilled OT services to address these deficits and reach maximum level of function.       Plan:     Patient to be seen 5 x/week to address the above listed problems via self-care/home management, therapeutic activities, therapeutic exercises  Plan of Care Expires: 12/17/24  Plan of Care Reviewed with: patient    Subjective     Chief Complaint: acute respiratory failure with hypoxemia  Patient/Family Comments/goals: pt agreeable to OT eval    Occupational Profile:  Living Environment: pt lives alone in apartment with elevator  Previous level of function: independent with all ADL tasks and utilized cane for functional mobility   Roles and Routines: perform self care  Equipment Used at Home: cane, straight  Assistance upon Discharge: swing bed    Pain/Comfort:  Pain Rating 1: 0/10    Patients cultural, spiritual, L[employer]-day conflicts given the current situation: no    Objective:     Communicated with: RON Kuo prior to session.  Patient found HOB elevated with blood pressure cuff, NG tube,  oxygen, peripheral IV, pulse ox (continuous), telemetry upon OT entry to room.    General Precautions: Standard, fall  Orthopedic Precautions: N/A  Braces: N/A  Respiratory Status: Nasal cannula, flow 2 L/min    Occupational Performance:    Bed Mobility:    Patient completed Supine to Sit with maximal assistance and 2 persons  Patient completed Sit to Supine with maximal assistance and 2 persons    Functional Mobility/Transfers:  Patient completed Sit <> Stand Transfer with moderate assistance and of 2 persons  with  rolling walker   Functional Mobility: not performed    Activities of Daily Living:  Lower Body Dressing: maximal assistance to avni socks    Cognitive/Visual Perceptual:  Cognitive/Psychosocial Skills:     -       Oriented to: Person, Place, and Situation   -       Follows Commands/attention:Follows two-step commands  -       Mood/Affect/Coping skills/emotional control: Cooperative    Physical Exam:  Balance:    -       sitting balance Mod-Genesis; standing balance ModA  Upper Extremity Range of Motion:     -       Right Upper Extremity: WFL  -       Left Upper Extremity: WFL  Upper Extremity Strength:    -       Right Upper Extremity: 3+/5  -       Left Upper Extremity: 3+/5  Gross motor coordination:   decreased d/t weakness    AMPAC 6 Click ADL:  AMPAC Total Score: 15    Treatment & Education:  Pt educated on OT role/POC.   Importance of OOB activity with staff assistance.  Importance of sitting up in the chair throughout the day as tolerated, especially for meals   Safety during functional t/f and mobility with use of RW  Importance of assisting with self-care activities   All questions/concerns answered within OT scope of practice      Patient left HOB elevated with all lines intact, call button in reach, and RON Kuo notified    GOALS:   Multidisciplinary Problems       Occupational Therapy Goals          Problem: Occupational Therapy    Goal Priority Disciplines Outcome Interventions   Occupational  Therapy Goal     OT, PT/OT Progressing    Description: STG:  Pt will perform grooming with setup  Pt will bathe with ModA with setup at EOB  Pt will perform UE dressing with Genesis  Pt will perform LE dressing with ModA  Pt will sit EOB x 10 min with CGA  Pt will transfer bed/chair/bsc with Genesis with RW  Pt will perform standing task x 2 min with Genesis with RW  Pt will tolerate 15 minutes of tx without fatigue      LT.Restore to max I with self care and mobility.                           History:     Past Medical History:   Diagnosis Date    Anemia in chronic kidney disease 2014    Chronic diastolic congestive heart failure     Deep vein thrombosis     ESRD on hemodialysis 2021    Hyperlipidemia     Hypertension     Malignant neoplasm of kidney     Moderate aortic stenosis 2023    Paroxysmal atrial fibrillation     Polycystic kidney disease     Primary osteoarthritis involving multiple joints 2024         Past Surgical History:   Procedure Laterality Date    AV FISTULA PLACEMENT      EYE SURGERY      HD CATHETER      SELECTIVE INJECTION OF ANESTHETIC AGENT AROUND LUMBAR SPINAL NERVE ROOT BY TRANSFORAMINAL APPROACH Left 2024    Procedure: Left L5-S1 TFESi;  Surgeon: Lily Cuellar MD;  Location: [facility];  Service: Pain Management;  Laterality: Left;    SURGICAL REMOVAL OF ULCER      clamped ulcer in stomach    TUNNELED VENOUS CATHETER PLACEMENT         Time Tracking:     OT Date of Treatment: 24  OT Start Time: 1336  OT Stop Time: 1403  OT Total Time (min): 27 min    Billable Minutes:Evaluation OT mod complexity eval    2024

## 2024-11-19 NOTE — PROGRESS NOTES
Pharmacy assisting in the management of vancomycin for this patient for: pneumonia    Clinical info: patient is on dialysis    Random level drawn this morning after 1 dialysis    Vancomycin level: 24.8    No doses today. Will check random after one more dialysis    Pharmacy will continue to monitor daily and make adjustments as needed.    Jolene Hernandez, PharmD  [phone]

## 2024-11-19 NOTE — ASSESSMENT & PLAN NOTE
Tolerating pressure support ventilation well now 2 days were going to try to extubate this morning agitation has been an issue

## 2024-11-20 LAB
ABO + RH BLD: NORMAL
ABO + RH BLD: NORMAL
ABORH RETYPE: NORMAL
ANION GAP SERPL CALCULATED.3IONS-SCNC: 27 MMOL/L (ref 7–16)
ANISOCYTOSIS BLD QL SMEAR: ABNORMAL
BASOPHILS # BLD AUTO: 0.09 K/UL (ref 0–0.2)
BASOPHILS NFR BLD AUTO: 0.4 % (ref 0–1)
BLD PROD TYP BPU: NORMAL
BLD PROD TYP BPU: NORMAL
BLOOD UNIT EXPIRATION DATE: NORMAL
BLOOD UNIT EXPIRATION DATE: NORMAL
BLOOD UNIT TYPE CODE: 5100
BLOOD UNIT TYPE CODE: 5100
BUN SERPL-MCNC: 51 MG/DL (ref 8–26)
BUN/CREAT SERPL: 6 (ref 6–20)
CALCIUM SERPL-MCNC: 7.6 MG/DL (ref 8.4–10.2)
CHLORIDE SERPL-SCNC: 92 MMOL/L (ref 98–107)
CO2 SERPL-SCNC: 15 MMOL/L (ref 22–29)
CREAT SERPL-MCNC: 8.83 MG/DL (ref 0.72–1.25)
CROSSMATCH INTERPRETATION: NORMAL
CROSSMATCH INTERPRETATION: NORMAL
DACRYOCYTES BLD QL SMEAR: ABNORMAL
DIFFERENTIAL METHOD BLD: ABNORMAL
DISPENSE STATUS: NORMAL
DISPENSE STATUS: NORMAL
EGFR (NO RACE VARIABLE) (RUSH/TITUS): 7 ML/MIN/1.73M2
EOSINOPHIL # BLD AUTO: 0.53 K/UL (ref 0–0.5)
EOSINOPHIL NFR BLD AUTO: 2.2 % (ref 1–4)
EOSINOPHIL NFR BLD MANUAL: 3 % (ref 1–4)
ERYTHROCYTE [DISTWIDTH] IN BLOOD BY AUTOMATED COUNT: 18.5 % (ref 11.5–14.5)
GLUCOSE SERPL-MCNC: 90 MG/DL (ref 74–100)
HCT VFR BLD AUTO: 19.2 % (ref 40–54)
HGB BLD-MCNC: 6.2 G/DL (ref 13.5–18)
IMM GRANULOCYTES # BLD AUTO: 4.52 K/UL (ref 0–0.04)
IMM GRANULOCYTES NFR BLD: 18.4 % (ref 0–0.4)
LYMPHOCYTES # BLD AUTO: 1.06 K/UL (ref 1–4.8)
LYMPHOCYTES NFR BLD AUTO: 4.3 % (ref 27–41)
LYMPHOCYTES NFR BLD MANUAL: 5 % (ref 27–41)
MAGNESIUM SERPL-MCNC: 2.4 MG/DL (ref 1.6–2.6)
MCH RBC QN AUTO: 31 PG (ref 27–31)
MCHC RBC AUTO-ENTMCNC: 32.3 G/DL (ref 32–36)
MCV RBC AUTO: 96 FL (ref 80–96)
METAMYELOCYTES NFR BLD MANUAL: 6 %
MONOCYTES # BLD AUTO: 1.68 K/UL (ref 0–0.8)
MONOCYTES NFR BLD AUTO: 6.8 % (ref 2–6)
MONOCYTES NFR BLD MANUAL: 7 % (ref 2–6)
MPC BLD CALC-MCNC: 11.3 FL (ref 9.4–12.4)
MYELOCYTES NFR BLD MANUAL: 2 %
NEUTROPHILS # BLD AUTO: 16.71 K/UL (ref 1.8–7.7)
NEUTROPHILS NFR BLD AUTO: 67.9 % (ref 53–65)
NEUTS BAND NFR BLD MANUAL: 7 % (ref 1–5)
NEUTS SEG NFR BLD MANUAL: 70 % (ref 50–62)
NRBC # BLD AUTO: 0.05 X10E3/UL
NRBC, AUTO (.00): 0.2 %
PAPPENHEIMER BOD BLD QL SMEAR: ABNORMAL
PHOSPHATE SERPL-MCNC: 9.8 MG/DL (ref 2.3–4.7)
PLATELET # BLD AUTO: 295 K/UL (ref 150–400)
PLATELET MORPHOLOGY: ABNORMAL
POLYCHROMASIA BLD QL SMEAR: ABNORMAL
POTASSIUM SERPL-SCNC: 3.5 MMOL/L (ref 3.5–5.1)
RBC # BLD AUTO: 2 M/UL (ref 4.6–6.2)
SODIUM SERPL-SCNC: 130 MMOL/L (ref 136–145)
TARGETS BLD QL SMEAR: ABNORMAL
TRIGL SERPL-MCNC: 383 MG/DL (ref 34–140)
UNIT NUMBER: NORMAL
UNIT NUMBER: NORMAL
WBC # BLD AUTO: 24.59 K/UL (ref 4.5–11)

## 2024-11-20 PROCEDURE — 94640 AIRWAY INHALATION TREATMENT: CPT

## 2024-11-20 PROCEDURE — 80100014 HC HEMODIALYSIS 1:1

## 2024-11-20 PROCEDURE — 94761 N-INVAS EAR/PLS OXIMETRY MLT: CPT

## 2024-11-20 PROCEDURE — 97530 THERAPEUTIC ACTIVITIES: CPT

## 2024-11-20 PROCEDURE — 99900035 HC TECH TIME PER 15 MIN (STAT)

## 2024-11-20 PROCEDURE — 80048 BASIC METABOLIC PNL TOTAL CA: CPT | Performed by: INTERNAL MEDICINE

## 2024-11-20 PROCEDURE — 25000003 PHARM REV CODE 250: Performed by: INTERNAL MEDICINE

## 2024-11-20 PROCEDURE — 85025 COMPLETE CBC W/AUTO DIFF WBC: CPT | Performed by: INTERNAL MEDICINE

## 2024-11-20 PROCEDURE — 63600175 PHARM REV CODE 636 W HCPCS: Performed by: INTERNAL MEDICINE

## 2024-11-20 PROCEDURE — 11000001 HC ACUTE MED/SURG PRIVATE ROOM

## 2024-11-20 PROCEDURE — 86923 COMPATIBILITY TEST ELECTRIC: CPT | Mod: 91 | Performed by: INTERNAL MEDICINE

## 2024-11-20 PROCEDURE — 36415 COLL VENOUS BLD VENIPUNCTURE: CPT | Performed by: INTERNAL MEDICINE

## 2024-11-20 PROCEDURE — 97116 GAIT TRAINING THERAPY: CPT

## 2024-11-20 PROCEDURE — 27000221 HC OXYGEN, UP TO 24 HOURS

## 2024-11-20 PROCEDURE — 25000242 PHARM REV CODE 250 ALT 637 W/ HCPCS: Performed by: STUDENT IN AN ORGANIZED HEALTH CARE EDUCATION/TRAINING PROGRAM

## 2024-11-20 PROCEDURE — 25000003 PHARM REV CODE 250: Performed by: NURSE PRACTITIONER

## 2024-11-20 PROCEDURE — 99233 SBSQ HOSP IP/OBS HIGH 50: CPT | Mod: ,,, | Performed by: INTERNAL MEDICINE

## 2024-11-20 PROCEDURE — 30233N1 TRANSFUSION OF NONAUTOLOGOUS RED BLOOD CELLS INTO PERIPHERAL VEIN, PERCUTANEOUS APPROACH: ICD-10-PCS | Performed by: INTERNAL MEDICINE

## 2024-11-20 PROCEDURE — 25000003 PHARM REV CODE 250: Performed by: STUDENT IN AN ORGANIZED HEALTH CARE EDUCATION/TRAINING PROGRAM

## 2024-11-20 PROCEDURE — 63600175 PHARM REV CODE 636 W HCPCS: Performed by: STUDENT IN AN ORGANIZED HEALTH CARE EDUCATION/TRAINING PROGRAM

## 2024-11-20 PROCEDURE — P9016 RBC LEUKOCYTES REDUCED: HCPCS | Performed by: INTERNAL MEDICINE

## 2024-11-20 PROCEDURE — 36430 TRANSFUSION BLD/BLD COMPNT: CPT

## 2024-11-20 PROCEDURE — 25000003 PHARM REV CODE 250

## 2024-11-20 PROCEDURE — 84478 ASSAY OF TRIGLYCERIDES: CPT | Performed by: INTERNAL MEDICINE

## 2024-11-20 PROCEDURE — 84100 ASSAY OF PHOSPHORUS: CPT | Performed by: STUDENT IN AN ORGANIZED HEALTH CARE EDUCATION/TRAINING PROGRAM

## 2024-11-20 PROCEDURE — 83735 ASSAY OF MAGNESIUM: CPT | Performed by: STUDENT IN AN ORGANIZED HEALTH CARE EDUCATION/TRAINING PROGRAM

## 2024-11-20 RX ORDER — DILTIAZEM HYDROCHLORIDE 30 MG/1
30 TABLET, FILM COATED ORAL EVERY 6 HOURS
Status: DISCONTINUED | OUTPATIENT
Start: 2024-11-20 | End: 2024-12-01

## 2024-11-20 RX ORDER — HYDROCODONE BITARTRATE AND ACETAMINOPHEN 500; 5 MG/1; MG/1
TABLET ORAL
Status: DISCONTINUED | OUTPATIENT
Start: 2024-11-20 | End: 2024-12-02 | Stop reason: HOSPADM

## 2024-11-20 RX ORDER — AMLODIPINE BESYLATE 10 MG/1
10 TABLET ORAL DAILY
Status: DISCONTINUED | OUTPATIENT
Start: 2024-11-20 | End: 2024-12-02 | Stop reason: HOSPADM

## 2024-11-20 RX ORDER — HYDROCODONE BITARTRATE AND ACETAMINOPHEN 500; 5 MG/1; MG/1
TABLET ORAL
Status: DISCONTINUED | OUTPATIENT
Start: 2024-11-20 | End: 2024-11-21

## 2024-11-20 RX ADMIN — IPRATROPIUM BROMIDE AND ALBUTEROL SULFATE 3 ML: .5; 3 SOLUTION RESPIRATORY (INHALATION) at 07:11

## 2024-11-20 RX ADMIN — LIDOCAINE 5% 2 PATCH: 700 PATCH TOPICAL at 08:11

## 2024-11-20 RX ADMIN — QUETIAPINE FUMARATE 50 MG: 25 TABLET ORAL at 08:11

## 2024-11-20 RX ADMIN — HYDRALAZINE HYDROCHLORIDE 100 MG: 100 TABLET ORAL at 08:11

## 2024-11-20 RX ADMIN — METOPROLOL TARTRATE 100 MG: 100 TABLET, FILM COATED ORAL at 08:11

## 2024-11-20 RX ADMIN — APIXABAN 10 MG: 5 TABLET, FILM COATED ORAL at 08:11

## 2024-11-20 RX ADMIN — DILTIAZEM HYDROCHLORIDE 30 MG: 30 TABLET, FILM COATED ORAL at 11:11

## 2024-11-20 RX ADMIN — OSELTAMAVIR PHOSPHATE 30 MG: 30 CAPSULE ORAL at 05:11

## 2024-11-20 RX ADMIN — HYDRALAZINE HYDROCHLORIDE 100 MG: 100 TABLET ORAL at 02:11

## 2024-11-20 RX ADMIN — PANTOPRAZOLE SODIUM 40 MG: 40 INJECTION, POWDER, LYOPHILIZED, FOR SOLUTION INTRAVENOUS at 08:11

## 2024-11-20 RX ADMIN — DILTIAZEM HYDROCHLORIDE 30 MG: 30 TABLET, FILM COATED ORAL at 05:11

## 2024-11-20 RX ADMIN — CHLORHEXIDINE GLUCONATE 15 ML: 1.2 RINSE ORAL at 08:11

## 2024-11-20 RX ADMIN — HEPARIN SODIUM 4000 UNITS: 1000 INJECTION, SOLUTION INTRAVENOUS; SUBCUTANEOUS at 11:11

## 2024-11-20 RX ADMIN — IPRATROPIUM BROMIDE AND ALBUTEROL SULFATE 3 ML: .5; 3 SOLUTION RESPIRATORY (INHALATION) at 01:11

## 2024-11-20 RX ADMIN — NIFEDIPINE 60 MG: 60 TABLET, FILM COATED, EXTENDED RELEASE ORAL at 08:11

## 2024-11-20 RX ADMIN — AMLODIPINE BESYLATE 10 MG: 10 TABLET ORAL at 02:11

## 2024-11-20 NOTE — PLAN OF CARE
Problem: Adult Inpatient Plan of Care  Goal: Plan of Care Review  Outcome: Progressing     Problem: Gas Exchange Impaired  Goal: Optimal Gas Exchange  Outcome: Progressing     Problem: ARDS (Acute Respiratory Distress Syndrome)  Goal: Effective Oxygenation  Outcome: Progressing     Problem: Airway Clearance Ineffective  Goal: Effective Airway Clearance  Outcome: Progressing

## 2024-11-20 NOTE — SUBJECTIVE & OBJECTIVE
Interval History/Significant Events:  Patient without complaints    Review of Systems  Objective:     Vital Signs (Most Recent):  Temp: 98.9 °F (37.2 °C) (11/20/24 0302)  Pulse: (!) 112 (11/20/24 0515)  Resp: 14 (11/20/24 0515)  BP: 114/71 (11/20/24 0500)  SpO2: 100 % (11/20/24 0515) Vital Signs (24h Range):  Temp:  [98.8 °F (37.1 °C)-99.3 °F (37.4 °C)] 98.9 °F (37.2 °C)  Pulse:  [] 112  Resp:  [11-28] 14  SpO2:  [88 %-100 %] 100 %  BP: ()/() 114/71   Weight: 113.8 kg (250 lb 14.1 oz)  Body mass index is 34.03 kg/m².      Intake/Output Summary (Last 24 hours) at 11/20/2024 0617  Last data filed at 11/19/2024 1739  Gross per 24 hour   Intake 343.74 ml   Output --   Net 343.74 ml          Physical Exam  Vitals reviewed.   Constitutional:       Appearance: Normal appearance.      Interventions: He is not intubated.  HENT:      Head: Normocephalic and atraumatic.      Nose: Nose normal.      Mouth/Throat:      Mouth: Mucous membranes are dry.      Pharynx: Oropharynx is clear.   Eyes:      Extraocular Movements: Extraocular movements intact.      Conjunctiva/sclera: Conjunctivae normal.      Pupils: Pupils are equal, round, and reactive to light.   Cardiovascular:      Rate and Rhythm: Normal rate.      Heart sounds: Normal heart sounds. No murmur heard.  Pulmonary:      Effort: Pulmonary effort is normal. He is not intubated.      Breath sounds: Normal breath sounds.   Abdominal:      General: Abdomen is flat. Bowel sounds are normal.      Palpations: Abdomen is soft.   Musculoskeletal:         General: Normal range of motion.      Cervical back: Normal range of motion and neck supple.      Right lower leg: No edema.      Left lower leg: No edema.   Skin:     General: Skin is warm and dry.      Capillary Refill: Capillary refill takes less than 2 seconds.   Neurological:      General: No focal deficit present.      Mental Status: He is alert and oriented to person, place, and time.   Psychiatric:          Mood and Affect: Mood normal.         Behavior: Behavior normal.            Vents:  Vent Mode: PSV (11/19/24 0713)  Ventilator Initiated: Yes (11/13/24 0735)  Set Rate: 20 BPM (11/18/24 0350)  Vt Set: 480 mL (11/18/24 0350)  Pressure Support: 7 cmH20 (11/19/24 0713)  PEEP/CPAP: 5 cmH20 (11/19/24 0713)  Oxygen Concentration (%): 32 (11/20/24 0154)  Peak Airway Pressure: 15 cmH20 (11/19/24 0713)  Plateau Pressure: 11.5 cmH20 (11/19/24 0713)  Total Ve: 10.5 L/m (11/19/24 0713)  F/VT Ratio<105 (RSBI): (!) 28.7 (11/19/24 0713)  Lines/Drains/Airways       Central Venous Catheter Line  Duration                  Hemodialysis Catheter right subclavian -- days    Percutaneous Central Line - Triple Lumen  11/13/24 1400 Femoral Vein Left 6 days              Peripheral Intravenous Line  Duration                  Peripheral IV - Single Lumen 11/19/24 1256 20 G  Anterior;Left Forearm <1 day                  Significant Labs:    CBC/Anemia Profile:  Recent Labs   Lab 11/19/24 0433   WBC 22.65*   HGB 7.1*   HCT 21.7*      MCV 95.6   RDW 18.2*        Chemistries:  Recent Labs   Lab 11/19/24 0433   *   K 3.5   CL 92*   CO2 20*   BUN 34*   CREATININE 6.91*   CALCIUM 7.6*   MG 2.2   PHOS 8.0*       Recent Lab Results       None            Significant Imaging:  I have reviewed all pertinent imaging results/findings within the past 24 hours.

## 2024-11-20 NOTE — PROGRESS NOTES
Patient states he is breathing okay.  Review of systems GI he denies nausea or vomiting     Physical exam general patient is chronically ill-appearing, heart is regular rate and rhythm, he has no pitting edema, lungs reveal clear nurse on the left and a few extraneous sounds on the right, abdomen is soft with positive bowel sounds     Assessment/plan 1.  ESRD-will plan on dialysis today   2.  Hyperkalemia-patient's potassium is 3.5, he states his favorite foods in the outpatient setting or yogurt in orange juice.  I suspect this is why a large reservoir potassium and required about 5 dialysis treatments in a row to catch up to this.  3. HTN-blood pressure is 122/73 continue hypertensives  4. Patient's hematocrit is 19% I will transfuse 2 units packed red blood cells on dialysis today

## 2024-11-20 NOTE — PT/OT/SLP PROGRESS
"Physical Therapy Treatment    Patient Name:  Saúl Butt   MRN:  [id number]    Recommendations:     Discharge Recommendations: Moderate Intensity Therapy  Discharge Equipment Recommendations: to be determined by next level of care  Barriers to discharge:  ongoing treatment    Assessment:     Saúl Butt is a 54 y.o. male admitted with a medical diagnosis of Acute respiratory failure with hypoxemia.  He presents with the following impairments/functional limitations: weakness, impaired endurance, impaired functional mobility, gait instability, impaired balance, impaired coordination     Patient able to ambulate 8 ft with RW and mod assist. Unsteady and required assist for balance. Will continue to progress as able    Rehab Prognosis: Good; patient would benefit from acute skilled PT services to address these deficits and reach maximum level of function.    Recent Surgery: * No surgery found *      Plan:     During this hospitalization, patient to be seen 5 x/week to address the identified rehab impairments via gait training, therapeutic activities, therapeutic exercises, neuromuscular re-education and progress toward the following goals:    Plan of Care Expires:  12/24/24    Subjective     Chief Complaint: na  Patient/Family Comments/goals: "I want to try and walk"  Pain/Comfort:  Pain Rating 1: 0/10      Objective:     Communicated with nurse prior to session.  Patient found supine with blood pressure cuff, oxygen, peripheral IV, pulse ox (continuous), telemetry upon PT entry to room.     General Precautions: Standard, fall  Orthopedic Precautions:    Braces:    Respiratory Status: Nasal cannula, flow 2 L/min     Functional Mobility:  Bed Mobility:     Supine to Sit: moderate assistance  Transfers:     Sit to Stand:  minimum assistance with rolling walker  Gait: 8 ft with RW and mod assist, very unsteady and decreased safety awareness  Balance: poor      AM-PAC 6 CLICK MOBILITY  Turning over in bed (including " adjusting bedclothes, sheets and blankets)?: 2  Sitting down on and standing up from a chair with arms (e.g., wheelchair, bedside commode, etc.): 2  Moving from lying on back to sitting on the side of the bed?: 2  Moving to and from a bed to a chair (including a wheelchair)?: 2  Need to walk in hospital room?: 2  Climbing 3-5 steps with a railing?: 1  Basic Mobility Total Score: 11       Treatment & Education:  Mobility as noted  Educated on safety awareness  X3 STS trials  Sat EOB for 5 mins before ambulation    Patient left up in chair with all lines intact, call button in reach, chair alarm on, and nurse notified..    GOALS:   Multidisciplinary Problems       Physical Therapy Goals          Problem: Physical Therapy    Goal Priority Disciplines Outcome Interventions   Physical Therapy Goal     PT, PT/OT Progressing    Description: Short term goals:  . Supine to sit with Contact Guard Assistance  . Sit to stand transfer with Contact Guard Assistance  . Gait  x 75 feet with Contact Guard Assistance using Rolling Walker.     Long term goals:  Patient will regain full independent functional mobility with lowest level of assistive device to return to desired living arrangement and prior ADL's.                          Time Tracking:     PT Received On: 11/20/24  PT Start Time: 1322     PT Stop Time: 1350  PT Total Time (min): 28 min     Billable Minutes: Gait Training 15 and Therapeutic Activity 13    Treatment Type: Treatment  PT/PTA: PT     Number of PTA visits since last PT visit: 0     11/20/2024

## 2024-11-20 NOTE — PLAN OF CARE
Problem: Gas Exchange Impaired  Goal: Optimal Gas Exchange  Outcome: Progressing     Problem: Skin Injury Risk Increased  Goal: Skin Health and Integrity  Outcome: Progressing     Problem: ARDS (Acute Respiratory Distress Syndrome)  Goal: Effective Oxygenation  Outcome: Progressing     Problem: Airway Clearance Ineffective  Goal: Effective Airway Clearance  Outcome: Progressing     Problem: Mechanical Ventilation Invasive  Goal: Effective Communication  Outcome: Met  Goal: Optimal Device Function  Outcome: Met  Goal: Mechanical Ventilation Liberation  Outcome: Met  Goal: Optimal Nutrition Delivery  Outcome: Met  Goal: Absence of Device-Related Skin and Tissue Injury  Outcome: Met  Goal: Absence of Ventilator-Induced Lung Injury  Outcome: Met

## 2024-11-20 NOTE — PT/OT/SLP PROGRESS
Occupational Therapy   Treatment    Name: Saúl Butt  MRN: [id number]  Admitting Diagnosis:  Acute respiratory failure with hypoxemia       Recommendations:     Discharge Recommendations: Moderate Intensity Therapy  Discharge Equipment Recommendations:  to be determined by next level of care  Barriers to discharge:  Other (Comment) (ongoing medical treatment)    Assessment:     Saúl Butt is a 54 y.o. male with a medical diagnosis of Acute respiratory failure with hypoxemia.  He presents with weakness and decline in ADLs. Performance deficits affecting function are weakness, impaired endurance, impaired self care skills, impaired functional mobility, gait instability, impaired balance.     Rehab Prognosis:  Good; patient would benefit from acute skilled OT services to address these deficits and reach maximum level of function.       Plan:     Patient to be seen 5 x/week to address the above listed problems via self-care/home management, therapeutic activities, therapeutic exercises  Plan of Care Expires: 12/17/24  Plan of Care Reviewed with: patient    Subjective     Chief Complaint: acute respiratory failure with hypoxemia  Patient/Family Comments/goals: pt agreeable to OT tx  Pain/Comfort:  Pain Rating 1: 0/10    Objective:     Communicated with: RON Stearns prior to session.  Patient found HOB elevated with blood pressure cuff, oxygen, peripheral IV, pulse ox (continuous), telemetry, SCD upon OT entry to room.    General Precautions: Standard, fall    Orthopedic Precautions:N/A  Braces: N/A  Respiratory Status: Nasal cannula, flow 3 L/min     Occupational Performance:     Bed Mobility:    Patient completed Supine to Sit with maximal assistance and 2 persons     Functional Mobility/Transfers:  Patient completed Sit <> Stand Transfer with minimum assistance and of 2 persons  with  platform walker   Patient completed Bed <> Chair Transfer using Step Transfer technique with moderate assistance with platform  walker  Functional Mobility: pt performed functional mobility in room with ModA with platform walker    Activities of Daily Living:  Not performed      [facility] 6 Click ADL:      Treatment & Education:  Pt performed sit to stand t/f x 3 attempts with ModA to Genesis x 2 with platform walker. Pt with ModA to maintain standing balance. Pt performed EOB sitting with Genesis to maintain static sitting balance.     Patient left up in chair with all lines intact, call button in reach, and RON Stearns notified    GOALS:   Multidisciplinary Problems       Occupational Therapy Goals          Problem: Occupational Therapy    Goal Priority Disciplines Outcome Interventions   Occupational Therapy Goal     OT, PT/OT Progressing    Description: STG:  Pt will perform grooming with setup  Pt will bathe with ModA with setup at EOB  Pt will perform UE dressing with Genesis  Pt will perform LE dressing with ModA  Pt will sit EOB x 10 min with CGA  Pt will transfer bed/chair/bsc with Genesis with RW  Pt will perform standing task x 2 min with Genesis with RW  Pt will tolerate 15 minutes of tx without fatigue      LT.Restore to max I with self care and mobility.                           Time Tracking:     OT Date of Treatment: 24  OT Start Time: 1325  OT Stop Time: 1349  OT Total Time (min): 24 min    Billable Minutes:Therapeutic Activity 24 minutes    OT/NAVNEET: OT          2024

## 2024-11-20 NOTE — PLAN OF CARE
Problem: Adult Inpatient Plan of Care  Goal: Plan of Care Review  Outcome: Progressing  Goal: Patient-Specific Goal (Individualized)  Outcome: Progressing  Goal: Absence of Hospital-Acquired Illness or Injury  Outcome: Progressing  Goal: Optimal Comfort and Wellbeing  Outcome: Progressing  Goal: Readiness for Transition of Care  Outcome: Progressing     Problem: Infection  Goal: Absence of Infection Signs and Symptoms  Outcome: Progressing     Problem: Hemodialysis  Goal: Safe, Effective Therapy Delivery  Outcome: Progressing  Goal: Effective Tissue Perfusion  Outcome: Progressing  Goal: Absence of Infection Signs and Symptoms  Outcome: Progressing     Problem: Sepsis/Septic Shock  Goal: Optimal Coping  Outcome: Progressing  Goal: Absence of Bleeding  Outcome: Progressing  Goal: Blood Glucose Level Within Targeted Range  Outcome: Progressing  Goal: Absence of Infection Signs and Symptoms  Outcome: Progressing  Goal: Optimal Nutrition Intake  Outcome: Progressing     Problem: Gas Exchange Impaired  Goal: Optimal Gas Exchange  Outcome: Progressing     Problem: Fall Injury Risk  Goal: Absence of Fall and Fall-Related Injury  Outcome: Progressing     Problem: Restraint, Nonviolent  Goal: Absence of Harm or Injury  Outcome: Progressing     Problem: Skin Injury Risk Increased  Goal: Skin Health and Integrity  Outcome: Progressing     Problem: ARDS (Acute Respiratory Distress Syndrome)  Goal: Effective Oxygenation  Outcome: Progressing     Problem: Airway Clearance Ineffective  Goal: Effective Airway Clearance  Outcome: Progressing

## 2024-11-20 NOTE — ASSESSMENT & PLAN NOTE
Severe Influenza A infection.   - pyrexia resolved with IV tylenol  - cont Tamiflu to be dosed post-HD; plan for extended course for severe infection   -- daily dosing for daily dialysis requirements   -- transition to MWF dosing post HD   -- course end 11/22  Improving

## 2024-11-20 NOTE — PROGRESS NOTES
[facility]  Critical Care Medicine  Progress Note    Patient Name: Saúl Butt  MRN: [id number]  Admission Date: 11/11/2024  Hospital Length of Stay: 9 days  Code Status: Full Code  Attending Provider: Jade Medley MD  Primary Care Provider: Ruth, Primary Doctor   Principal Problem: Acute respiratory failure with hypoxemia    Subjective:     HPI:  54-year-old  male presented to the ED via EMS.  Past medical history of end-stage renal disease, hypertension, Chronic anemia, Chronic diastolic CHF, moderate aortic stenosis, DVT, Hyperlipidemia, Paroxysmal atrial fibrillation, and malignant neoplasm of kidney.  Patient states he goes to dialysis Monday, Wednesday, and Friday.  States last day to dialyze was this past Friday, 11/8/24.  Patient states he has not missed any dialysis days. Patient was due to dialyze today, however he presented to the ED with 3 day history of shortness of breath which is worse with lying down, and he also reports having a cough.  He also reports 3 day history of nausea, vomiting, diarrhea. Patient reports having some fever on and off.  Denies any pain at present.    ED workup revealed sodium 131, potassium 8.4, anion gap 17, BUN and creatinine 60/13.4.  Venous blood gas revealed pH 7.42, pCO2 44, sodium 131, potassium 8.2, bicarb 28.5, lactate 1.5, and glucose 130.  Chest x-ray showed cardiomegaly with pulmonary edema.  BNP 55,331. In the ED patient received calcium gluconate 1 g IV, D10 500 mL IV, and Humulin R 10 units.  Critical care service was asked to admit patient to the ICU, where he will be dialyzed emergently to treat hyperkalemia.    Hospital/ICU Course:  11/12-- potassium 6.8 - HD gain today - went into Afib rvr on HD   11/13: continued pyrexia, resolved RVR, early am with increased work of breathing with hypoxia placed on NRB, hyperkalemia recurrence, intubated for progressive respiratory distress, severe hypoxia, aspirated during ETT s/p  successful therapeutic aspiration with bronchoscopy, am course with PEA arrest with ROSC, agitated post resuscitation warranting deep sedation  11/14-15: weaning sedation with intermittent agitation, recurrent hyperkalemia prompting HD, episodes of RVR managed with lopressor, off pressor support  11/20/2024 patient extubated yesterday doing well mental status improving    Interval History/Significant Events:  Patient without complaints    Review of Systems  Objective:     Vital Signs (Most Recent):  Temp: 98.9 °F (37.2 °C) (11/20/24 0302)  Pulse: (!) 112 (11/20/24 0515)  Resp: 14 (11/20/24 0515)  BP: 114/71 (11/20/24 0500)  SpO2: 100 % (11/20/24 0515) Vital Signs (24h Range):  Temp:  [98.8 °F (37.1 °C)-99.3 °F (37.4 °C)] 98.9 °F (37.2 °C)  Pulse:  [] 112  Resp:  [11-28] 14  SpO2:  [88 %-100 %] 100 %  BP: ()/() 114/71   Weight: 113.8 kg (250 lb 14.1 oz)  Body mass index is 34.03 kg/m².      Intake/Output Summary (Last 24 hours) at 11/20/2024 0617  Last data filed at 11/19/2024 1739  Gross per 24 hour   Intake 343.74 ml   Output --   Net 343.74 ml          Physical Exam  Vitals reviewed.   Constitutional:       Appearance: Normal appearance.      Interventions: He is not intubated.  HENT:      Head: Normocephalic and atraumatic.      Nose: Nose normal.      Mouth/Throat:      Mouth: Mucous membranes are dry.      Pharynx: Oropharynx is clear.   Eyes:      Extraocular Movements: Extraocular movements intact.      Conjunctiva/sclera: Conjunctivae normal.      Pupils: Pupils are equal, round, and reactive to light.   Cardiovascular:      Rate and Rhythm: Normal rate.      Heart sounds: Normal heart sounds. No murmur heard.  Pulmonary:      Effort: Pulmonary effort is normal. He is not intubated.      Breath sounds: Normal breath sounds.   Abdominal:      General: Abdomen is flat. Bowel sounds are normal.      Palpations: Abdomen is soft.   Musculoskeletal:         General: Normal range of motion.       Cervical back: Normal range of motion and neck supple.      Right lower leg: No edema.      Left lower leg: No edema.   Skin:     General: Skin is warm and dry.      Capillary Refill: Capillary refill takes less than 2 seconds.   Neurological:      General: No focal deficit present.      Mental Status: He is alert and oriented to person, place, and time.   Psychiatric:         Mood and Affect: Mood normal.         Behavior: Behavior normal.            Vents:  Vent Mode: PSV (11/19/24 0713)  Ventilator Initiated: Yes (11/13/24 0735)  Set Rate: 20 BPM (11/18/24 0350)  Vt Set: 480 mL (11/18/24 0350)  Pressure Support: 7 cmH20 (11/19/24 0713)  PEEP/CPAP: 5 cmH20 (11/19/24 0713)  Oxygen Concentration (%): 32 (11/20/24 0154)  Peak Airway Pressure: 15 cmH20 (11/19/24 0713)  Plateau Pressure: 11.5 cmH20 (11/19/24 0713)  Total Ve: 10.5 L/m (11/19/24 0713)  F/VT Ratio<105 (RSBI): (!) 28.7 (11/19/24 0713)  Lines/Drains/Airways       Central Venous Catheter Line  Duration                  Hemodialysis Catheter right subclavian -- days    Percutaneous Central Line - Triple Lumen  11/13/24 1400 Femoral Vein Left 6 days              Peripheral Intravenous Line  Duration                  Peripheral IV - Single Lumen 11/19/24 1256 20 G  Anterior;Left Forearm <1 day                  Significant Labs:    CBC/Anemia Profile:  Recent Labs   Lab 11/19/24  0433   WBC 22.65*   HGB 7.1*   HCT 21.7*      MCV 95.6   RDW 18.2*        Chemistries:  Recent Labs   Lab 11/19/24  0433   *   K 3.5   CL 92*   CO2 20*   BUN 34*   CREATININE 6.91*   CALCIUM 7.6*   MG 2.2   PHOS 8.0*       Recent Lab Results       None            Significant Imaging:  I have reviewed all pertinent imaging results/findings within the past 24 hours.    ABG  Recent Labs   Lab 11/18/24  0743   PH 7.31*   PO2 82*   PCO2 45   HCO3 22.7     Assessment/Plan:     Neuro  Acute metabolic encephalopathy  Agitation has resolved still little bit confused but improving  daily    Pulmonary  * Acute respiratory failure with hypoxemia  Extubated tolerating nasal cannula oxygen without problem    Aspiration pneumonia of both lungs due to gastric secretions  Chest x-ray looks improved, continue therapy    Aspiration pneumonitis  Chest x-ray looks  a little improved    Cardiac/Vascular  Heart failure with mildly reduced ejection fraction  Post arrest TTE with LVEF 45%, normal RV size, TAPSE 2, mildly dilated LA, mildly dilated RA, moderate aortic stenosis with calcified mass, trace MR, mild-to-moderate TR.  - HFrEF, no evidence of acute systolic heart failure; LV dysfunction warrants repeat TTE outside of this critical illness  - Tn peaked and demonstrated downtrend        Cardiac arrest  PEA arrest with minimal down time. At this time etiology is unclear and will be attributed to electrolyte and acid/base derangements. PEA arrest occurred hours post intubation with no hypoxia. No central PE to contribute to hemodynamic burden and burden non convincing for distal propagation with compressions. Post ROSC EKG and TTE completed 11/13 not convincing for acute coronary syndrome; Tn peaked on second draw.     Atrial fibrillation with RVR  Rate still up blood pressure adequate will adjust meds      Renal/  Hyperkalemia  Resolved    ESRD on hemodialysis  HD as per hyperkalemia plan. Of note, patient is s/p b/l nephrectomy on CT A&P.    ID  Influenza A  Severe Influenza A infection.   - pyrexia resolved with IV tylenol  - cont Tamiflu to be dosed post-HD; plan for extended course for severe infection   -- daily dosing for daily dialysis requirements   -- transition to MWF dosing post HD   -- course end 11/22  Improving    Severe sepsis  Currently no positive cultures other than some yeast on respiratory culture, being treated for influenza      Oncology  Anemia in chronic kidney disease  Management per Nephrology with EPO. CBC daily; no bleeding diathesis to date.   - transfuse as needed for  goal Hgb >7    Orthopedic  Closed fracture of multiple ribs  Sequale of chest compressions for CPR.  Appreciate surgery recommendations.   - topical lidocaine patches  - nerve block if available inpatient; unavailable at this time, readdress next week if analgesia insufficient  - Surgery to eval CT; no indication for surgical intervention  - splinting will complicate vent weaning    Other  Acute pulmonary embolism without acute cor pulmonale  Provoked in setting of severe viral infection and critical illness. Acute PE and POCUS with R and L IJ occlusive opacities consistent with thrombus  - u/s UE with no VTE  - cont Apixaban, PE dosing             Nir Araujo MD  Critical Care Medicine  [facility]

## 2024-11-20 NOTE — PLAN OF CARE
Care Plans Advancing    Problem: Adult Inpatient Plan of Care  Goal: Plan of Care Review  Outcome: Progressing  Flowsheets (Taken 11/19/2024 2239)  Plan of Care Reviewed With: patient  Goal: Patient-Specific Goal (Individualized)  Outcome: Progressing  Goal: Absence of Hospital-Acquired Illness or Injury  Outcome: Progressing  Intervention: Identify and Manage Fall Risk  Flowsheets (Taken 11/19/2024 2239)  Safety Promotion/Fall Prevention:   assistive device/personal item within reach   pulse ox   lighting adjusted   medications reviewed   room near unit station  Intervention: Prevent Skin Injury  Flowsheets (Taken 11/19/2024 2239)  Skin Protection: incontinence pads utilized  Device Skin Pressure Protection:   adhesive use limited   positioning supports utilized   pressure points protected  Intervention: Prevent and Manage VTE (Venous Thromboembolism) Risk  Flowsheets (Taken 11/19/2024 2239)  VTE Prevention/Management: remove, assess skin, and reapply sequential compression device  Intervention: Prevent Infection  Flowsheets (Taken 11/19/2024 2239)  Infection Prevention:   single patient room provided   equipment surfaces disinfected   hand hygiene promoted  Goal: Optimal Comfort and Wellbeing  Outcome: Progressing  Goal: Readiness for Transition of Care  Outcome: Progressing     Problem: Infection  Goal: Absence of Infection Signs and Symptoms  Outcome: Progressing  Intervention: Prevent or Manage Infection  Flowsheets (Taken 11/19/2024 2239)  Infection Management: aseptic technique maintained     Problem: Hemodialysis  Goal: Safe, Effective Therapy Delivery  Outcome: Progressing  Goal: Effective Tissue Perfusion  Outcome: Progressing  Goal: Absence of Infection Signs and Symptoms  Outcome: Progressing     Problem: Sepsis/Septic Shock  Goal: Optimal Coping  Outcome: Progressing  Goal: Absence of Bleeding  Outcome: Progressing  Goal: Blood Glucose Level Within Targeted Range  Outcome: Progressing  Goal: Absence of  Infection Signs and Symptoms  Outcome: Progressing  Goal: Optimal Nutrition Intake  Outcome: Progressing     Problem: Gas Exchange Impaired  Goal: Optimal Gas Exchange  Outcome: Progressing     Problem: Fall Injury Risk  Goal: Absence of Fall and Fall-Related Injury  Outcome: Progressing     Problem: Restraint, Nonviolent  Goal: Absence of Harm or Injury  Outcome: Progressing     Problem: Mechanical Ventilation Invasive  Goal: Effective Communication  Outcome: Progressing  Goal: Optimal Device Function  Outcome: Progressing  Goal: Mechanical Ventilation Liberation  Outcome: Progressing  Goal: Optimal Nutrition Delivery  Outcome: Progressing  Goal: Absence of Device-Related Skin and Tissue Injury  Outcome: Progressing  Goal: Absence of Ventilator-Induced Lung Injury  Outcome: Progressing     Problem: Skin Injury Risk Increased  Goal: Skin Health and Integrity  Outcome: Progressing     Problem: ARDS (Acute Respiratory Distress Syndrome)  Goal: Effective Oxygenation  Outcome: Progressing     Problem: Airway Clearance Ineffective  Goal: Effective Airway Clearance  Outcome: Progressing

## 2024-11-21 LAB
ANION GAP SERPL CALCULATED.3IONS-SCNC: 26 MMOL/L (ref 7–16)
ANISOCYTOSIS BLD QL SMEAR: ABNORMAL
BASOPHILS # BLD AUTO: 0.11 K/UL (ref 0–0.2)
BASOPHILS NFR BLD AUTO: 0.6 % (ref 0–1)
BUN SERPL-MCNC: 37 MG/DL (ref 8–26)
BUN/CREAT SERPL: 6 (ref 6–20)
CALCIUM SERPL-MCNC: 8.2 MG/DL (ref 8.4–10.2)
CHLORIDE SERPL-SCNC: 94 MMOL/L (ref 98–107)
CO2 SERPL-SCNC: 16 MMOL/L (ref 22–29)
CREAT SERPL-MCNC: 6.65 MG/DL (ref 0.72–1.25)
DIFFERENTIAL METHOD BLD: ABNORMAL
EGFR (NO RACE VARIABLE) (RUSH/TITUS): 9 ML/MIN/1.73M2
EOSINOPHIL # BLD AUTO: 0.5 K/UL (ref 0–0.5)
EOSINOPHIL NFR BLD AUTO: 2.8 % (ref 1–4)
EOSINOPHIL NFR BLD MANUAL: 2 % (ref 1–4)
ERYTHROCYTE [DISTWIDTH] IN BLOOD BY AUTOMATED COUNT: 18.6 % (ref 11.5–14.5)
GLUCOSE SERPL-MCNC: 91 MG/DL (ref 74–100)
HCT VFR BLD AUTO: 23.2 % (ref 40–54)
HGB BLD-MCNC: 7.6 G/DL (ref 13.5–18)
IMM GRANULOCYTES # BLD AUTO: 2.67 K/UL (ref 0–0.04)
IMM GRANULOCYTES NFR BLD: 15 % (ref 0–0.4)
INFLUENZA A MOLECULAR (OHS): POSITIVE
INFLUENZA B MOLECULAR (OHS): NEGATIVE
LYMPHOCYTES # BLD AUTO: 1.05 K/UL (ref 1–4.8)
LYMPHOCYTES NFR BLD AUTO: 5.9 % (ref 27–41)
LYMPHOCYTES NFR BLD MANUAL: 8 % (ref 27–41)
MAGNESIUM SERPL-MCNC: 2.5 MG/DL (ref 1.6–2.6)
MCH RBC QN AUTO: 30 PG (ref 27–31)
MCHC RBC AUTO-ENTMCNC: 32.8 G/DL (ref 32–36)
MCV RBC AUTO: 91.7 FL (ref 80–96)
METAMYELOCYTES NFR BLD MANUAL: 4 %
MONOCYTES # BLD AUTO: 1.49 K/UL (ref 0–0.8)
MONOCYTES NFR BLD AUTO: 8.4 % (ref 2–6)
MONOCYTES NFR BLD MANUAL: 5 % (ref 2–6)
MPC BLD CALC-MCNC: 10.3 FL (ref 9.4–12.4)
NEUTROPHILS # BLD AUTO: 11.97 K/UL (ref 1.8–7.7)
NEUTROPHILS NFR BLD AUTO: 67.3 % (ref 53–65)
NEUTS BAND NFR BLD MANUAL: 5 % (ref 1–5)
NEUTS SEG NFR BLD MANUAL: 76 % (ref 50–62)
NRBC # BLD AUTO: 0.03 X10E3/UL
NRBC, AUTO (.00): 0.2 %
PHOSPHATE SERPL-MCNC: 7.5 MG/DL (ref 2.3–4.7)
PLATELET # BLD AUTO: 400 K/UL (ref 150–400)
PLATELET MORPHOLOGY: NORMAL
POLYCHROMASIA BLD QL SMEAR: ABNORMAL
POTASSIUM SERPL-SCNC: 3.3 MMOL/L (ref 3.5–5.1)
RBC # BLD AUTO: 2.53 M/UL (ref 4.6–6.2)
SARS-COV-2 RDRP RESP QL NAA+PROBE: NEGATIVE
SODIUM SERPL-SCNC: 133 MMOL/L (ref 136–145)
TARGETS BLD QL SMEAR: ABNORMAL
WBC # BLD AUTO: 17.79 K/UL (ref 4.5–11)

## 2024-11-21 PROCEDURE — 84100 ASSAY OF PHOSPHORUS: CPT | Performed by: STUDENT IN AN ORGANIZED HEALTH CARE EDUCATION/TRAINING PROGRAM

## 2024-11-21 PROCEDURE — 87040 BLOOD CULTURE FOR BACTERIA: CPT

## 2024-11-21 PROCEDURE — 87635 SARS-COV-2 COVID-19 AMP PRB: CPT | Performed by: HOSPITALIST

## 2024-11-21 PROCEDURE — 25000003 PHARM REV CODE 250: Performed by: STUDENT IN AN ORGANIZED HEALTH CARE EDUCATION/TRAINING PROGRAM

## 2024-11-21 PROCEDURE — 63600175 PHARM REV CODE 636 W HCPCS: Performed by: INTERNAL MEDICINE

## 2024-11-21 PROCEDURE — 99900035 HC TECH TIME PER 15 MIN (STAT)

## 2024-11-21 PROCEDURE — 97530 THERAPEUTIC ACTIVITIES: CPT

## 2024-11-21 PROCEDURE — 25000003 PHARM REV CODE 250: Performed by: HOSPITALIST

## 2024-11-21 PROCEDURE — 85025 COMPLETE CBC W/AUTO DIFF WBC: CPT | Performed by: INTERNAL MEDICINE

## 2024-11-21 PROCEDURE — 94640 AIRWAY INHALATION TREATMENT: CPT

## 2024-11-21 PROCEDURE — 27000221 HC OXYGEN, UP TO 24 HOURS

## 2024-11-21 PROCEDURE — 25000003 PHARM REV CODE 250: Performed by: INTERNAL MEDICINE

## 2024-11-21 PROCEDURE — 83735 ASSAY OF MAGNESIUM: CPT | Performed by: STUDENT IN AN ORGANIZED HEALTH CARE EDUCATION/TRAINING PROGRAM

## 2024-11-21 PROCEDURE — 25000242 PHARM REV CODE 250 ALT 637 W/ HCPCS: Performed by: STUDENT IN AN ORGANIZED HEALTH CARE EDUCATION/TRAINING PROGRAM

## 2024-11-21 PROCEDURE — 97116 GAIT TRAINING THERAPY: CPT

## 2024-11-21 PROCEDURE — 80048 BASIC METABOLIC PNL TOTAL CA: CPT | Performed by: INTERNAL MEDICINE

## 2024-11-21 PROCEDURE — 11000001 HC ACUTE MED/SURG PRIVATE ROOM

## 2024-11-21 PROCEDURE — 94761 N-INVAS EAR/PLS OXIMETRY MLT: CPT

## 2024-11-21 PROCEDURE — 63600175 PHARM REV CODE 636 W HCPCS: Performed by: STUDENT IN AN ORGANIZED HEALTH CARE EDUCATION/TRAINING PROGRAM

## 2024-11-21 PROCEDURE — 36415 COLL VENOUS BLD VENIPUNCTURE: CPT | Performed by: INTERNAL MEDICINE

## 2024-11-21 PROCEDURE — 87502 INFLUENZA DNA AMP PROBE: CPT | Performed by: HOSPITALIST

## 2024-11-21 PROCEDURE — 36415 COLL VENOUS BLD VENIPUNCTURE: CPT

## 2024-11-21 PROCEDURE — 25000003 PHARM REV CODE 250

## 2024-11-21 PROCEDURE — 63600175 PHARM REV CODE 636 W HCPCS: Performed by: HOSPITALIST

## 2024-11-21 PROCEDURE — 99233 SBSQ HOSP IP/OBS HIGH 50: CPT | Mod: ,,, | Performed by: HOSPITALIST

## 2024-11-21 PROCEDURE — 25000003 PHARM REV CODE 250: Performed by: NURSE PRACTITIONER

## 2024-11-21 RX ADMIN — AMLODIPINE BESYLATE 10 MG: 10 TABLET ORAL at 08:11

## 2024-11-21 RX ADMIN — GENTAMICIN SULFATE 92 MG: 40 INJECTION, SOLUTION INTRAMUSCULAR; INTRAVENOUS at 06:11

## 2024-11-21 RX ADMIN — CHLORHEXIDINE GLUCONATE 15 ML: 1.2 RINSE ORAL at 08:11

## 2024-11-21 RX ADMIN — METOPROLOL TARTRATE 100 MG: 100 TABLET, FILM COATED ORAL at 08:11

## 2024-11-21 RX ADMIN — IPRATROPIUM BROMIDE AND ALBUTEROL SULFATE 3 ML: .5; 3 SOLUTION RESPIRATORY (INHALATION) at 12:11

## 2024-11-21 RX ADMIN — DILTIAZEM HYDROCHLORIDE 30 MG: 30 TABLET, FILM COATED ORAL at 11:11

## 2024-11-21 RX ADMIN — EPOETIN ALFA 5460 UNITS: 3000 SOLUTION INTRAVENOUS; SUBCUTANEOUS at 10:11

## 2024-11-21 RX ADMIN — IPRATROPIUM BROMIDE AND ALBUTEROL SULFATE 3 ML: .5; 3 SOLUTION RESPIRATORY (INHALATION) at 07:11

## 2024-11-21 RX ADMIN — DILTIAZEM HYDROCHLORIDE 30 MG: 30 TABLET, FILM COATED ORAL at 05:11

## 2024-11-21 RX ADMIN — HYDRALAZINE HYDROCHLORIDE 100 MG: 100 TABLET ORAL at 08:11

## 2024-11-21 RX ADMIN — QUETIAPINE FUMARATE 50 MG: 25 TABLET ORAL at 08:11

## 2024-11-21 RX ADMIN — APIXABAN 10 MG: 5 TABLET, FILM COATED ORAL at 08:11

## 2024-11-21 RX ADMIN — ACETAMINOPHEN 650 MG: 325 TABLET ORAL at 05:11

## 2024-11-21 RX ADMIN — NIFEDIPINE 60 MG: 60 TABLET, FILM COATED, EXTENDED RELEASE ORAL at 08:11

## 2024-11-21 RX ADMIN — HYDRALAZINE HYDROCHLORIDE 100 MG: 100 TABLET ORAL at 02:11

## 2024-11-21 RX ADMIN — PANTOPRAZOLE SODIUM 40 MG: 40 INJECTION, POWDER, LYOPHILIZED, FOR SOLUTION INTRAVENOUS at 08:11

## 2024-11-21 RX ADMIN — LIDOCAINE 5% 2 PATCH: 700 PATCH TOPICAL at 08:11

## 2024-11-21 NOTE — PT/OT/SLP PROGRESS
Occupational Therapy   Treatment    Name: Saúl Butt  MRN: [id number]  Admitting Diagnosis:  Acute respiratory failure with hypoxemia       Recommendations:     Discharge Recommendations: Moderate Intensity Therapy  Discharge Equipment Recommendations:  to be determined by next level of care  Barriers to discharge:  Other (Comment) (ongoing medical treatment)    Assessment:     Saúl Butt is a 54 y.o. male with a medical diagnosis of Acute respiratory failure with hypoxemia.  He presents with weakness and decline in ADLs. Performance deficits affecting function are weakness, impaired endurance, impaired self care skills, impaired functional mobility, gait instability, impaired balance. Pt with increased SOB and decreased endurance noted throughout tx session as compared to last tx session.     Rehab Prognosis:  Good; patient would benefit from acute skilled OT services to address these deficits and reach maximum level of function.       Plan:     Patient to be seen 5 x/week to address the above listed problems via self-care/home management, therapeutic activities, therapeutic exercises  Plan of Care Expires: 12/17/24  Plan of Care Reviewed with: patient    Subjective     Chief Complaint: acute respiratory failure with hypoxemia  Patient/Family Comments/goals: pt agreeable to OT tx  Pain/Comfort:  Pain Rating 1: 0/10    Objective:     Communicated with: RON Montoya prior to session.  Patient found supine with peripheral IV, oxygen, telemetry upon OT entry to room.    General Precautions: Standard, fall    Orthopedic Precautions:N/A  Braces: N/A  Respiratory Status: Nasal cannula, flow 2 L/min     Occupational Performance:     Bed Mobility:    Patient completed Supine to Sit with minimum assistance     Functional Mobility/Transfers:  Patient completed Sit <> Stand Transfer with minimum assistance and of 2 persons  with  platform walker   Patient completed Bed <> Chair Transfer using Step Transfer technique with  minimum assistance and of 2 persons with platform walker  Functional Mobility: pt performed functional mobility in room with Genesis x 2 with platform walker    Activities of Daily Living:  Bathing: maximal assistance to perform total body bathing from bed  Lower Body Dressing: maximal assistance to avni shoes      AMPAC 6 Click ADL:      Treatment & Education:  Pt performed ADL t/f training and functional mobility as listed above. Pt with increased time and effort needed to perform all functional mobility.     Patient left up in chair with all lines intact, call button in reach, and RON Montoya notified    GOALS:   Multidisciplinary Problems       Occupational Therapy Goals          Problem: Occupational Therapy    Goal Priority Disciplines Outcome Interventions   Occupational Therapy Goal     OT, PT/OT Progressing    Description: STG:  Pt will perform grooming with setup  Pt will bathe with ModA with setup at EOB  Pt will perform UE dressing with Genesis  Pt will perform LE dressing with ModA  Pt will sit EOB x 10 min with CGA  Pt will transfer bed/chair/bsc with Genesis with RW  Pt will perform standing task x 2 min with Genesis with RW  Pt will tolerate 15 minutes of tx without fatigue      LT.Restore to max I with self care and mobility.                           Time Tracking:     OT Date of Treatment: 24  OT Start Time: 917  OT Stop Time: 949  OT Total Time (min): 32 min    Billable Minutes:Therapeutic Activity 25 minutes    OT/NAVNEET: OT          2024

## 2024-11-21 NOTE — PT/OT/SLP PROGRESS
"Physical Therapy Treatment    Patient Name:  Saúl Butt   MRN:  [id number]    Recommendations:     Discharge Recommendations: Moderate Intensity Therapy  Discharge Equipment Recommendations: to be determined by next level of care  Barriers to discharge:  requires extensive assistance    Assessment:     Saúl Butt is a 54 y.o. male admitted with a medical diagnosis of Acute respiratory failure with hypoxemia.  He presents with the following impairments/functional limitations: weakness, impaired endurance, impaired sensation, impaired functional mobility, gait instability, impaired balance, decreased lower extremity function, decreased safety awareness. Pt noted to be SOB, shallow breathing, increased wheezing and noted "crackling" breath sounds on inspiration. Attempted to instruct pt in deep breathing and coughing, pt unable to return demo. RN notified of findings.     Rehab Prognosis: Good and Fair; patient would benefit from acute skilled PT services to address these deficits and reach maximum level of function.    Recent Surgery: * No surgery found *      Plan:     During this hospitalization, patient to be seen 5 x/week to address the identified rehab impairments via gait training, therapeutic activities, therapeutic exercises, neuromuscular re-education and progress toward the following goals:    Plan of Care Expires:  12/24/24    Subjective     Chief Complaint: "No, not today"  Patient/Family Comments/goals: after encouragement, pt agreeable to PT  Pain/Comfort:  Pain Rating 1: 0/10      Objective:     Communicated with RENE Lemons RN and EL Osullivan RN prior to session.  Patient found HOB elevated with peripheral IV, oxygen, telemetry, bed alarm upon PT entry to room.     General Precautions: Standard, fall  Orthopedic Precautions: N/A  Braces: N/A  Respiratory Status: Nasal cannula, flow 2 L/min     Functional Mobility:  Bed Mobility:     Rolling Left:  contact guard assistance  Rolling Right: contact " guard assistance  Supine to Sit: minimum assistance  Transfers:     Sit to Stand:  minimum assistance with platform walker  Bed to Chair: minimum assistance and of 2 persons with  platform walker  using  Stand Pivot  Gait: 8ft forward, 7ft backward with bilat platform RW, Min A x 2 with heavy cues for safety, technique, sequencing and safety  Balance: Fair- in stance       AM-PAC 6 CLICK MOBILITY  Turning over in bed (including adjusting bedclothes, sheets and blankets)?: 3  Sitting down on and standing up from a chair with arms (e.g., wheelchair, bedside commode, etc.): 3  Moving from lying on back to sitting on the side of the bed?: 3  Moving to and from a bed to a chair (including a wheelchair)?: 2  Need to walk in hospital room?: 2  Climbing 3-5 steps with a railing?: 1  Basic Mobility Total Score: 14       Treatment & Education:  Mobility as stated above.     Patient left up in chair with all lines intact, call button in reach, and RN notified..    GOALS:   Multidisciplinary Problems       Physical Therapy Goals          Problem: Physical Therapy    Goal Priority Disciplines Outcome Interventions   Physical Therapy Goal     PT, PT/OT Progressing    Description: Short term goals:  . Supine to sit with Contact Guard Assistance  . Sit to stand transfer with Contact Guard Assistance  . Gait  x 75 feet with Contact Guard Assistance using Rolling Walker.     Long term goals:  Patient will regain full independent functional mobility with lowest level of assistive device to return to desired living arrangement and prior ADL's.                          Time Tracking:     PT Received On: 11/21/24  PT Start Time: 0917     PT Stop Time: 0950  PT Total Time (min): 33 min     Billable Minutes: Gait Training 15 and Therapeutic Activity 15    Treatment Type: Treatment  PT/PTA: PT     Number of PTA visits since last PT visit: 0     11/21/2024

## 2024-11-21 NOTE — ASSESSMENT & PLAN NOTE
Hyperkalemia is likely due to ESRD.The patients most recent potassium results are listed below.  Recent Labs     11/19/24  0433 11/20/24  0553 11/21/24  0511   K 3.5 3.5 3.3*     Plan  - Monitor for arrhythmias with EKG and/or continuous telemetry.   - Treat the hyperkalemia with Potassium Binders if great than 4.5.   - Monitor potassium:  daily with BMP  - The patient's hyperkalemia is resolved

## 2024-11-21 NOTE — PROGRESS NOTES
Patient states he is breathing okay.  Review of systems GI he denies nausea or vomiting     Physical exam general patient is chronically ill-appearing, heart is regular rate and rhythm, he has no pitting edema, lungs reveal clear nurse on the left and a few extraneous sounds on the right, abdomen is soft with positive bowel sounds     Assessment/plan 1.  ESRD-continue HD support 2.  Hypokalemia-patient's potassium is 3.3, patient is starting to eat again, I suspect it will not be long before his potassium starts to improve and probably become hyperkalemic.    3. HTN-controlled continue present antihypertensives   4. Anemia- Patient's hematocrit is 23% after transfusion will continue to monitor, continue EPO but will increase dose

## 2024-11-21 NOTE — ASSESSMENT & PLAN NOTE
Anemia is likely due to chronic disease due to ESRD. Most recent hemoglobin and hematocrit are listed below.  Recent Labs     11/19/24  0433 11/20/24  0553 11/21/24  0511   HGB 7.1* 6.2* 7.6*   HCT 21.7* 19.2* 23.2*     Plan  - Monitor serial CBC: Daily  - Transfuse PRBC if patient becomes hemodynamically unstable, symptomatic or H/H drops below 7/21.  - Patient has received 2 units of PRBCs on 11/20/24  - Patient's anemia is currently improving

## 2024-11-21 NOTE — ASSESSMENT & PLAN NOTE
Overall impression at the time of admission was that patient had sepsis with an abnormal CXR. Pressor support not provided. Patient initial started on Rocephin 1 gm IV then switched to zosyn plus azithromycin. Patient received a total of 6 days of IV antibiotics. Blood cultures show no growth to date. Blood pressure is now stable. Lactic acid 1.5 on 11/14. Resolved. Patient will need close monitor give respiratory status.

## 2024-11-21 NOTE — NURSING
Pt noted with increasing temp and appears more lethargic. Breathing labored. Dr. Key notified. Came to bedside to assess pt. States will put in new orders.

## 2024-11-21 NOTE — SUBJECTIVE & OBJECTIVE
Interval History: ICU downgrade admitted for hyperkalemia now resolved, on 2L NC with an O2 sat 91%, prior intubation for respiratory distress complicated by aspiration, received 2 units of PRBCs with a stable Hgb on EPO. On eliquis for acute pulmonary embolism and Afib with RVR, no events overnight with a rate of 94 this am. On hemodialysis for ESRD, dialyzed yesterday, s/p.      Review of Systems   Constitutional:  Positive for fatigue. Negative for fever.   HENT: Negative.     Respiratory:  Positive for shortness of breath. Negative for cough, choking and wheezing.    Cardiovascular:  Negative for chest pain, palpitations and leg swelling.   Gastrointestinal:  Negative for abdominal pain, nausea and vomiting.   Genitourinary:  Negative for decreased urine volume.        ESRD on HD   Musculoskeletal:         L. Foot fracture   Skin:  Positive for wound.   Neurological:  Negative for weakness, light-headedness and headaches.     Objective:     Vital Signs (Most Recent):  Temp: 98.8 °F (37.1 °C) (11/21/24 0707)  Pulse: 94 (11/21/24 0707)  Resp: 20 (11/21/24 0707)  BP: (!) 162/51 (11/21/24 0707)  SpO2: (!) 91 % (11/21/24 0707) Vital Signs (24h Range):  Temp:  [98.5 °F (36.9 °C)-99.2 °F (37.3 °C)] 98.8 °F (37.1 °C)  Pulse:  [] 94  Resp:  [16-35] 20  SpO2:  [90 %-100 %] 91 %  BP: (113-202)/() 162/51     Weight: 113.8 kg (250 lb 14.1 oz)  Body mass index is 34.03 kg/m².    Intake/Output Summary (Last 24 hours) at 11/21/2024 0847  Last data filed at 11/20/2024 1200  Gross per 24 hour   Intake 1429 ml   Output 3000 ml   Net -1571 ml         Physical Exam  Vitals and nursing note reviewed.   Constitutional:       General: He is not in acute distress.     Appearance: Normal appearance. He is not ill-appearing.   HENT:      Mouth/Throat:      Mouth: Mucous membranes are dry.      Pharynx: Oropharynx is clear.   Eyes:      Comments: Right cataract    Cardiovascular:      Rate and Rhythm: Normal rate and regular  rhythm.      Pulses: Normal pulses.      Heart sounds: Normal heart sounds. No murmur heard.     No friction rub. No gallop.   Pulmonary:      Breath sounds: No stridor. Rhonchi present. No wheezing or rales.   Abdominal:      General: Bowel sounds are normal.      Palpations: Abdomen is soft.      Tenderness: There is no abdominal tenderness. There is no guarding or rebound.   Musculoskeletal:      Right lower leg: No edema.      Left lower leg: No edema.      Comments: L. Chest wall tenderness. L. Foot boot in place   Skin:     General: Skin is warm and dry.      Capillary Refill: Capillary refill takes less than 2 seconds.      Comments: Bilateral heel protectors, sacral foam dressing in place appears c/d/i   Neurological:      Mental Status: He is alert and oriented to person, place, and time.           Significant Labs: All pertinent labs within the past 24 hours have been reviewed.    Significant Imaging: I have reviewed all pertinent imaging results/findings within the past 24 hours.

## 2024-11-21 NOTE — PROGRESS NOTES
[facility] Hospital Medicine  Progress Note    Patient Name: Saúl Butt  MRN: [id number]  Patient Class: IP- Inpatient   Admission Date: 11/11/2024  Length of Stay: 10 days  Attending Physician: Benjy West MD  Primary Care Provider: Ruth, Primary Doctor        Subjective:     Principal Problem:Acute respiratory failure with hypoxemia        HPI:  No notes on file    Overview/Hospital Course:  No notes on file    Interval History: ICU downgrade admitted for hyperkalemia now resolved, on 2L NC with an O2 sat 91%, prior intubation for respiratory distress complicated by aspiration, received 2 units of PRBCs with a stable Hgb on EPO. On eliquis for acute pulmonary embolism and Afib with RVR, no events overnight with a rate of 94 this am. On hemodialysis for ESRD, dialyzed yesterday, s/p.      Review of Systems   Constitutional:  Positive for fatigue. Negative for fever.   HENT: Negative.     Respiratory:  Positive for shortness of breath. Negative for cough, choking and wheezing.    Cardiovascular:  Negative for chest pain, palpitations and leg swelling.   Gastrointestinal:  Negative for abdominal pain, nausea and vomiting.   Genitourinary:  Negative for decreased urine volume.        ESRD on HD   Musculoskeletal:         L. Foot fracture   Skin:  Positive for wound.   Neurological:  Negative for weakness, light-headedness and headaches.     Objective:     Vital Signs (Most Recent):  Temp: 98.8 °F (37.1 °C) (11/21/24 0707)  Pulse: 94 (11/21/24 0707)  Resp: 20 (11/21/24 0707)  BP: (!) 162/51 (11/21/24 0707)  SpO2: (!) 91 % (11/21/24 0707) Vital Signs (24h Range):  Temp:  [98.5 °F (36.9 °C)-99.2 °F (37.3 °C)] 98.8 °F (37.1 °C)  Pulse:  [] 94  Resp:  [16-35] 20  SpO2:  [90 %-100 %] 91 %  BP: (113-202)/() 162/51     Weight: 113.8 kg (250 lb 14.1 oz)  Body mass index is 34.03 kg/m².    Intake/Output Summary (Last 24 hours) at 11/21/2024 0847  Last data filed at  11/20/2024 1200  Gross per 24 hour   Intake 1429 ml   Output 3000 ml   Net -1571 ml         Physical Exam  Vitals and nursing note reviewed.   Constitutional:       General: He is not in acute distress.     Appearance: Normal appearance. He is not ill-appearing.   HENT:      Mouth/Throat:      Mouth: Mucous membranes are dry.      Pharynx: Oropharynx is clear.   Eyes:      Comments: Right cataract    Cardiovascular:      Rate and Rhythm: Normal rate and regular rhythm.      Pulses: Normal pulses.      Heart sounds: Normal heart sounds. No murmur heard.     No friction rub. No gallop.   Pulmonary:      Breath sounds: No stridor. Rhonchi present. No wheezing or rales.   Abdominal:      General: Bowel sounds are normal.      Palpations: Abdomen is soft.      Tenderness: There is no abdominal tenderness. There is no guarding or rebound.   Musculoskeletal:      Right lower leg: No edema.      Left lower leg: No edema.      Comments: L. Chest wall tenderness. L. Foot boot in place   Skin:     General: Skin is warm and dry.      Capillary Refill: Capillary refill takes less than 2 seconds.      Comments: Bilateral heel protectors, sacral foam dressing in place appears c/d/i   Neurological:      Mental Status: He is alert and oriented to person, place, and time.           Significant Labs: All pertinent labs within the past 24 hours have been reviewed.    Significant Imaging: I have reviewed all pertinent imaging results/findings within the past 24 hours.    Assessment/Plan:      * Acute respiratory failure with hypoxemia  Admitted to the ICU for acute respiratory distress and hyperkalemia now s/p extubation. On 2L NC at 96%. Patient does not use home oxygen. Complaints of sob and increase work of breathing attributed to multiple rib fracture from resuscitative efforts while in the ICU. Patient will require close monitor. Patient is not stable for discharge.   .    Severe sepsis  Overall impression at the time of admission  was that patient had sepsis with an abnormal CXR. Pressor support not provided. Patient initial started on Rocephin 1 gm IV then switched to zosyn plus azithromycin. Patient received a total of 6 days of IV antibiotics. Blood cultures show no growth to date. Blood pressure is now stable. Lactic acid 1.5 on 11/14. Resolved. Patient will need close monitor give respiratory status.      Hyperkalemia  Hyperkalemia is likely due to ESRD.The patients most recent potassium results are listed below.  Recent Labs     11/19/24  0433 11/20/24  0553 11/21/24  0511   K 3.5 3.5 3.3*     Plan  - Monitor for arrhythmias with EKG and/or continuous telemetry.   - Treat the hyperkalemia with Potassium Binders if great than 4.5.   - Monitor potassium:  daily with BMP  - The patient's hyperkalemia is resolved            Influenza A  Patient positive on admission. Continue Tamiflu due to HD.       ESRD on hemodialysis  Creatine stable for now. BMP reviewed- noted Estimated Creatinine Clearance: 16.9 mL/min (A) (based on SCr of 6.65 mg/dL (H)). according to latest data. Based on current GFR, CKD stage is end stage.  Monitor UOP and serial BMP and adjust therapy as needed. Renally dose meds. Avoid nephrotoxic medications and procedures.     Continue HD, Monday Wednesday Friday.     Atrial fibrillation with RVR  Patient has paroxysmal (<7 days) atrial fibrillation. Patient is currently in sinus rhythm. [id number] Score: The patient doesn't have any registry metric data available. The patients heart rate in the last 24 hours is as follows:  Pulse  Min: 78  Max: 121     Antiarrhythmics  metoprolol injection 5 mg, Every 12 hours PRN, Intravenous  metoprolol tartrate (LOPRESSOR) tablet 100 mg, 2 times daily, Oral  diltiaZEM tablet 30 mg, Every 6 hours, Oral    Anticoagulants  heparin (porcine) injection 4,000 Units, As needed (PRN), Intra-Catheter  apixaban tablet 10 mg, 2 times daily, Oral  apixaban tablet 5 mg, 2 times daily, Oral    Plan  -  "Replete lytes with a goal of K>4, Mg >2  - Patient is anticoagulated, see medications listed above.  - Patient's afib is currently controlled  - Telemetry; no events overnight, NS 94 this am  - Continue to monitor        Anemia in chronic kidney disease  Anemia is likely due to chronic disease due to ESRD. Most recent hemoglobin and hematocrit are listed below.  Recent Labs     11/19/24  0433 11/20/24  0553 11/21/24  0511   HGB 7.1* 6.2* 7.6*   HCT 21.7* 19.2* 23.2*     Plan  - Monitor serial CBC: Daily  - Transfuse PRBC if patient becomes hemodynamically unstable, symptomatic or H/H drops below 7/21.  - Patient has received 2 units of PRBCs on 11/20/24  - Patient's anemia is currently improving    Acute metabolic encephalopathy  Patient's mental status resolving. Has some response time changes likely secondary to respiratory status. Likely near baseline.        VTE Risk Mitigation (From admission, onward)           Ordered     apixaban tablet 5 mg  2 times daily        Placed in "Followed by" Linked Group    11/15/24 1346     apixaban tablet 10 mg  2 times daily        Placed in "Followed by" Linked Group    11/15/24 1346     heparin (porcine) injection 4,000 Units  As needed (PRN)         11/14/24 1618     IP VTE HIGH RISK PATIENT  Once         11/11/24 1147     Place sequential compression device  Until discontinued         11/11/24 1147                    Discharge Planning   TORY:      Code Status: Full Code   Is the patient medically ready for discharge?:     Reason for patient still in hospital (select all that apply): Treatment  Discharge Plan A: Home                  Roxanna Merrill MD  Department of Hospital Medicine   [facility]    "

## 2024-11-21 NOTE — PLAN OF CARE
CM spoke with pt about discharge planning and discharge needs. Pt lives at home alone, says he has family that will help him when he discharge. MD came in and we spoke with pt. Pt agreeable for CM to make a referral to [facility] and if he gets better he can decide not to go to SNF. CM will make a referral.

## 2024-11-21 NOTE — ASSESSMENT & PLAN NOTE
Patient has paroxysmal (<7 days) atrial fibrillation. Patient is currently in sinus rhythm. [id number] Score: The patient doesn't have any registry metric data available. The patients heart rate in the last 24 hours is as follows:  Pulse  Min: 78  Max: 121     Antiarrhythmics  metoprolol injection 5 mg, Every 12 hours PRN, Intravenous  metoprolol tartrate (LOPRESSOR) tablet 100 mg, 2 times daily, Oral  diltiaZEM tablet 30 mg, Every 6 hours, Oral    Anticoagulants  heparin (porcine) injection 4,000 Units, As needed (PRN), Intra-Catheter  apixaban tablet 10 mg, 2 times daily, Oral  apixaban tablet 5 mg, 2 times daily, Oral    Plan  - Replete lytes with a goal of K>4, Mg >2  - Patient is anticoagulated, see medications listed above.  - Patient's afib is currently controlled  - Telemetry; no events overnight, NS 94 this am  - Continue to monitor

## 2024-11-21 NOTE — ASSESSMENT & PLAN NOTE
Admitted to the ICU for acute respiratory distress and hyperkalemia now s/p extubation. On 2L NC at 96%. Patient does not use home oxygen. Complaints of sob and increase work of breathing attributed to multiple rib fracture from resuscitative efforts while in the ICU. Patient will require close monitor. Patient is not stable for discharge.   .

## 2024-11-21 NOTE — ASSESSMENT & PLAN NOTE
Creatine stable for now. BMP reviewed- noted Estimated Creatinine Clearance: 16.9 mL/min (A) (based on SCr of 6.65 mg/dL (H)). according to latest data. Based on current GFR, CKD stage is end stage.  Monitor UOP and serial BMP and adjust therapy as needed. Renally dose meds. Avoid nephrotoxic medications and procedures.     Continue HD, Monday Wednesday Friday.

## 2024-11-21 NOTE — PLAN OF CARE
Problem: Adult Inpatient Plan of Care  Goal: Plan of Care Review  Outcome: Progressing  Goal: Patient-Specific Goal (Individualized)  Outcome: Progressing  Goal: Absence of Hospital-Acquired Illness or Injury  Outcome: Progressing  Goal: Optimal Comfort and Wellbeing  Outcome: Progressing  Goal: Readiness for Transition of Care  Outcome: Progressing     Problem: Gas Exchange Impaired  Goal: Optimal Gas Exchange  Outcome: Progressing     Problem: ARDS (Acute Respiratory Distress Syndrome)  Goal: Effective Oxygenation  Outcome: Progressing     Problem: Airway Clearance Ineffective  Goal: Effective Airway Clearance  Outcome: Progressing

## 2024-11-21 NOTE — ASSESSMENT & PLAN NOTE
Patient's mental status resolving. Has some response time changes likely secondary to respiratory status. Likely near baseline.

## 2024-11-22 PROBLEM — E55.9 VITAMIN D DEFICIENCY: Status: ACTIVE | Noted: 2024-11-22

## 2024-11-22 LAB
25(OH)D3 SERPL-MCNC: 12.8 NG/ML (ref 30–80)
ANION GAP SERPL CALCULATED.3IONS-SCNC: 25 MMOL/L (ref 7–16)
ANISOCYTOSIS BLD QL SMEAR: ABNORMAL
BASOPHILS # BLD AUTO: 0.19 K/UL (ref 0–0.2)
BASOPHILS NFR BLD AUTO: 1 % (ref 0–1)
BUN SERPL-MCNC: 50 MG/DL (ref 8–26)
BUN/CREAT SERPL: 6 (ref 6–20)
CALCIUM SERPL-MCNC: 8.7 MG/DL (ref 8.4–10.2)
CHLORIDE SERPL-SCNC: 95 MMOL/L (ref 98–107)
CO2 SERPL-SCNC: 15 MMOL/L (ref 22–29)
CREAT SERPL-MCNC: 8.93 MG/DL (ref 0.72–1.25)
DIFFERENTIAL METHOD BLD: ABNORMAL
EGFR (NO RACE VARIABLE) (RUSH/TITUS): 7 ML/MIN/1.73M2
EOSINOPHIL # BLD AUTO: 0.74 K/UL (ref 0–0.5)
EOSINOPHIL NFR BLD AUTO: 3.8 % (ref 1–4)
EOSINOPHIL NFR BLD MANUAL: 5 % (ref 1–4)
ERYTHROCYTE [DISTWIDTH] IN BLOOD BY AUTOMATED COUNT: 18.8 % (ref 11.5–14.5)
FOLATE SERPL-MCNC: 4.9 NG/ML (ref 7–31.4)
GLUCOSE SERPL-MCNC: 122 MG/DL (ref 74–100)
HCO3 UR-SCNC: 18.6 MMOL/L (ref 21–28)
HCT VFR BLD AUTO: 26.5 % (ref 40–54)
HGB BLD-MCNC: 8.4 G/DL (ref 13.5–18)
IMM GRANULOCYTES # BLD AUTO: 1.91 K/UL (ref 0–0.04)
IMM GRANULOCYTES NFR BLD: 9.9 % (ref 0–0.4)
LYMPHOCYTES # BLD AUTO: 1.23 K/UL (ref 1–4.8)
LYMPHOCYTES NFR BLD AUTO: 6.4 % (ref 27–41)
LYMPHOCYTES NFR BLD MANUAL: 8 % (ref 27–41)
MAGNESIUM SERPL-MCNC: 2.6 MG/DL (ref 1.6–2.6)
MCH RBC QN AUTO: 30.2 PG (ref 27–31)
MCHC RBC AUTO-ENTMCNC: 31.7 G/DL (ref 32–36)
MCV RBC AUTO: 95.3 FL (ref 80–96)
METAMYELOCYTES NFR BLD MANUAL: 5 %
MONOCYTES # BLD AUTO: 2.27 K/UL (ref 0–0.8)
MONOCYTES NFR BLD AUTO: 11.8 % (ref 2–6)
MONOCYTES NFR BLD MANUAL: 9 % (ref 2–6)
MPC BLD CALC-MCNC: 9.6 FL (ref 9.4–12.4)
NEUTROPHILS # BLD AUTO: 12.9 K/UL (ref 1.8–7.7)
NEUTROPHILS NFR BLD AUTO: 67.1 % (ref 53–65)
NEUTS BAND NFR BLD MANUAL: 3 % (ref 1–5)
NEUTS SEG NFR BLD MANUAL: 70 % (ref 50–62)
NRBC # BLD AUTO: 0.03 X10E3/UL
NRBC, AUTO (.00): 0.2 %
PCO2 BLDA: 33 MMHG (ref 35–48)
PH SMN: 7.36 [PH] (ref 7.35–7.45)
PHOSPHATE SERPL-MCNC: 8 MG/DL (ref 2.3–4.7)
PLATELET # BLD AUTO: 441 K/UL (ref 150–400)
PLATELET MORPHOLOGY: ABNORMAL
PO2 BLDA: 56 MMHG (ref 83–108)
POC BASE EXCESS: -6 MMOL/L (ref -2–3)
POC SATURATED O2: 87 % (ref 95–98)
POLYCHROMASIA BLD QL SMEAR: ABNORMAL
POTASSIUM SERPL-SCNC: 3.3 MMOL/L (ref 3.5–5.1)
PREALB SERPL NEPH-MCNC: <3 MG/DL (ref 18–45)
RBC # BLD AUTO: 2.78 M/UL (ref 4.6–6.2)
RBCS: NORMAL
SODIUM SERPL-SCNC: 132 MMOL/L (ref 136–145)
T4 SERPL-MCNC: 2.1 ΜG/DL (ref 4.9–11.7)
TRIGL SERPL-MCNC: 314 MG/DL (ref 34–140)
TSH SERPL DL<=0.005 MIU/L-ACNC: 1.8 UIU/ML (ref 0.35–4.94)
URATE SERPL-MCNC: 9.8 MG/DL (ref 3.5–7.2)
VIT B12 SERPL-MCNC: 870 PG/ML (ref 213–816)
WBC # BLD AUTO: 19.24 K/UL (ref 4.5–11)

## 2024-11-22 PROCEDURE — 84443 ASSAY THYROID STIM HORMONE: CPT | Performed by: HOSPITALIST

## 2024-11-22 PROCEDURE — 84100 ASSAY OF PHOSPHORUS: CPT | Performed by: STUDENT IN AN ORGANIZED HEALTH CARE EDUCATION/TRAINING PROGRAM

## 2024-11-22 PROCEDURE — 27000221 HC OXYGEN, UP TO 24 HOURS

## 2024-11-22 PROCEDURE — 82306 VITAMIN D 25 HYDROXY: CPT | Performed by: HOSPITALIST

## 2024-11-22 PROCEDURE — 82803 BLOOD GASES ANY COMBINATION: CPT

## 2024-11-22 PROCEDURE — 84550 ASSAY OF BLOOD/URIC ACID: CPT | Performed by: HOSPITALIST

## 2024-11-22 PROCEDURE — 99232 SBSQ HOSP IP/OBS MODERATE 35: CPT | Mod: ,,, | Performed by: HOSPITALIST

## 2024-11-22 PROCEDURE — 25000003 PHARM REV CODE 250: Performed by: INTERNAL MEDICINE

## 2024-11-22 PROCEDURE — 84436 ASSAY OF TOTAL THYROXINE: CPT | Performed by: HOSPITALIST

## 2024-11-22 PROCEDURE — 94640 AIRWAY INHALATION TREATMENT: CPT

## 2024-11-22 PROCEDURE — 63600175 PHARM REV CODE 636 W HCPCS: Performed by: INTERNAL MEDICINE

## 2024-11-22 PROCEDURE — 25000003 PHARM REV CODE 250: Performed by: HOSPITALIST

## 2024-11-22 PROCEDURE — 84134 ASSAY OF PREALBUMIN: CPT | Performed by: HOSPITALIST

## 2024-11-22 PROCEDURE — 36600 WITHDRAWAL OF ARTERIAL BLOOD: CPT

## 2024-11-22 PROCEDURE — 25000242 PHARM REV CODE 250 ALT 637 W/ HCPCS: Performed by: STUDENT IN AN ORGANIZED HEALTH CARE EDUCATION/TRAINING PROGRAM

## 2024-11-22 PROCEDURE — 36415 COLL VENOUS BLD VENIPUNCTURE: CPT | Performed by: INTERNAL MEDICINE

## 2024-11-22 PROCEDURE — 25000003 PHARM REV CODE 250: Performed by: STUDENT IN AN ORGANIZED HEALTH CARE EDUCATION/TRAINING PROGRAM

## 2024-11-22 PROCEDURE — 90935 HEMODIALYSIS ONE EVALUATION: CPT

## 2024-11-22 PROCEDURE — 80048 BASIC METABOLIC PNL TOTAL CA: CPT | Performed by: INTERNAL MEDICINE

## 2024-11-22 PROCEDURE — 84478 ASSAY OF TRIGLYCERIDES: CPT | Performed by: INTERNAL MEDICINE

## 2024-11-22 PROCEDURE — 94761 N-INVAS EAR/PLS OXIMETRY MLT: CPT

## 2024-11-22 PROCEDURE — 25000003 PHARM REV CODE 250: Performed by: NURSE PRACTITIONER

## 2024-11-22 PROCEDURE — 11000001 HC ACUTE MED/SURG PRIVATE ROOM

## 2024-11-22 PROCEDURE — 97110 THERAPEUTIC EXERCISES: CPT | Mod: CQ

## 2024-11-22 PROCEDURE — 82746 ASSAY OF FOLIC ACID SERUM: CPT | Performed by: HOSPITALIST

## 2024-11-22 PROCEDURE — 83735 ASSAY OF MAGNESIUM: CPT | Performed by: STUDENT IN AN ORGANIZED HEALTH CARE EDUCATION/TRAINING PROGRAM

## 2024-11-22 PROCEDURE — 99900035 HC TECH TIME PER 15 MIN (STAT)

## 2024-11-22 PROCEDURE — 85025 COMPLETE CBC W/AUTO DIFF WBC: CPT | Performed by: INTERNAL MEDICINE

## 2024-11-22 RX ORDER — SODIUM BICARBONATE 650 MG/1
1300 TABLET ORAL 2 TIMES DAILY
Status: DISCONTINUED | OUTPATIENT
Start: 2024-11-22 | End: 2024-12-02 | Stop reason: HOSPADM

## 2024-11-22 RX ORDER — CHOLECALCIFEROL (VITAMIN D3) 25 MCG
1000 TABLET ORAL DAILY
Status: DISCONTINUED | OUTPATIENT
Start: 2024-11-22 | End: 2024-12-02 | Stop reason: HOSPADM

## 2024-11-22 RX ORDER — ALLOPURINOL 100 MG/1
100 TABLET ORAL DAILY
Status: DISCONTINUED | OUTPATIENT
Start: 2024-11-22 | End: 2024-12-02 | Stop reason: HOSPADM

## 2024-11-22 RX ORDER — SODIUM BICARBONATE 1 MEQ/ML
50 SYRINGE (ML) INTRAVENOUS ONCE
Status: COMPLETED | OUTPATIENT
Start: 2024-11-22 | End: 2024-11-22

## 2024-11-22 RX ADMIN — IPRATROPIUM BROMIDE AND ALBUTEROL SULFATE 3 ML: .5; 3 SOLUTION RESPIRATORY (INHALATION) at 12:11

## 2024-11-22 RX ADMIN — HYDRALAZINE HYDROCHLORIDE 100 MG: 100 TABLET ORAL at 04:11

## 2024-11-22 RX ADMIN — IPRATROPIUM BROMIDE AND ALBUTEROL SULFATE 3 ML: .5; 3 SOLUTION RESPIRATORY (INHALATION) at 01:11

## 2024-11-22 RX ADMIN — IPRATROPIUM BROMIDE AND ALBUTEROL SULFATE 3 ML: .5; 3 SOLUTION RESPIRATORY (INHALATION) at 07:11

## 2024-11-22 RX ADMIN — METOPROLOL TARTRATE 5 MG: 1 INJECTION, SOLUTION INTRAVENOUS at 07:11

## 2024-11-22 RX ADMIN — METOPROLOL TARTRATE 100 MG: 100 TABLET, FILM COATED ORAL at 12:11

## 2024-11-22 RX ADMIN — QUETIAPINE FUMARATE 50 MG: 25 TABLET ORAL at 09:11

## 2024-11-22 RX ADMIN — SODIUM BICARBONATE 50 MEQ: 84 INJECTION INTRAVENOUS at 12:11

## 2024-11-22 RX ADMIN — OSELTAMAVIR PHOSPHATE 30 MG: 30 CAPSULE ORAL at 04:11

## 2024-11-22 RX ADMIN — CHLORHEXIDINE GLUCONATE 15 ML: 1.2 RINSE ORAL at 09:11

## 2024-11-22 RX ADMIN — HYDRALAZINE HYDROCHLORIDE 100 MG: 100 TABLET ORAL at 09:11

## 2024-11-22 RX ADMIN — SODIUM BICARBONATE 650 MG TABLET 1300 MG: at 09:11

## 2024-11-22 RX ADMIN — DILTIAZEM HYDROCHLORIDE 30 MG: 30 TABLET, FILM COATED ORAL at 04:11

## 2024-11-22 RX ADMIN — NIFEDIPINE 60 MG: 60 TABLET, FILM COATED, EXTENDED RELEASE ORAL at 09:11

## 2024-11-22 RX ADMIN — SODIUM CHLORIDE: 9 INJECTION, SOLUTION INTRAVENOUS at 10:11

## 2024-11-22 RX ADMIN — APIXABAN 10 MG: 5 TABLET, FILM COATED ORAL at 12:11

## 2024-11-22 RX ADMIN — DILTIAZEM HYDROCHLORIDE 30 MG: 30 TABLET, FILM COATED ORAL at 11:11

## 2024-11-22 RX ADMIN — APIXABAN 10 MG: 5 TABLET, FILM COATED ORAL at 09:11

## 2024-11-22 RX ADMIN — HEPARIN SODIUM 4000 UNITS: 1000 INJECTION, SOLUTION INTRAVENOUS; SUBCUTANEOUS at 10:11

## 2024-11-22 RX ADMIN — METOPROLOL TARTRATE 100 MG: 100 TABLET, FILM COATED ORAL at 09:11

## 2024-11-22 RX ADMIN — DILTIAZEM HYDROCHLORIDE 30 MG: 30 TABLET, FILM COATED ORAL at 05:11

## 2024-11-22 NOTE — NURSING
Pt returned to floor from dialysis. 15L NRB noted.Pt 02 SAT 93%. Attempted to wean pt down to 5L NC so pt can eat lunch. Unable as pt 02 SAT dropped to 89%. NRB reapplied. 02 sat 91%.

## 2024-11-22 NOTE — PROGRESS NOTES
[facility] - [facility] Tele[facility] Hospital Medicine  Progress Note    Patient Name: Saúl Butt  MRN: [id number]  Patient Class: IP- Inpatient   Admission Date: 11/11/2024  Length of Stay: 11 days  Attending Physician: Benjy West MD  Primary Care Provider: Ruth, Primary Doctor        Subjective:     Principal Problem:Acute respiratory failure with hypoxemia        HPI:  No notes on file    Overview/Hospital Course:  No notes on file    Interval History: Yesterday team called to the floor for SOB. Orders placed for UA, blood cultures. This am Hgb low will order a unit of blood and repeat H/H. This morning taken for dailysis and returned on 15L high flow. Attempt to wean not tolerated. Repeat ABG shows metabolic acidosis. Place back on an oximeter. Currently at 10L. Will wean with a goal of 88-92% O2 sat.       Review of Systems   Constitutional:  Positive for fatigue. Negative for fever.   HENT: Negative.     Respiratory:  Positive for shortness of breath. Negative for cough, choking and wheezing.    Cardiovascular:  Negative for chest pain, palpitations and leg swelling.   Gastrointestinal:  Negative for abdominal pain, nausea and vomiting.   Genitourinary:  Negative for decreased urine volume.        ESRD on HD   Musculoskeletal:         L. Foot fracture   Skin:  Positive for wound.   Neurological:  Negative for weakness, light-headedness and headaches.     Objective:     Vital Signs (Most Recent):  Temp: 99.5 °F (37.5 °C) (11/22/24 0342)  Pulse: 102 (11/22/24 0342)  Resp: (!) 24 (11/22/24 0342)  BP: (!) 158/54 (11/22/24 0342)  SpO2: (!) 94 % (11/22/24 0342) Vital Signs (24h Range):  Temp:  [98.3 °F (36.8 °C)-101.2 °F (38.4 °C)] 99.5 °F (37.5 °C)  Pulse:  [] 102  Resp:  [18-28] 24  SpO2:  [91 %-100 %] 94 %  BP: (121-167)/(51-85) 158/54     Weight: 113.9 kg (251 lb 1.7 oz)  Body mass index is 34.06 kg/m².  No intake or output data in the 24 hours ending 11/22/24 0658        Physical  Exam  Vitals and nursing note reviewed.   Constitutional:       General: He is not in acute distress.     Appearance: Normal appearance. He is not ill-appearing.   HENT:      Mouth/Throat:      Mouth: Mucous membranes are dry.      Pharynx: Oropharynx is clear.   Eyes:      Comments: Right cataract    Cardiovascular:      Rate and Rhythm: Normal rate and regular rhythm.      Pulses: Normal pulses.      Heart sounds: Normal heart sounds. No murmur heard.     No friction rub. No gallop.   Pulmonary:      Breath sounds: No stridor. Rhonchi present. No wheezing or rales.   Abdominal:      General: Bowel sounds are normal.      Palpations: Abdomen is soft.      Tenderness: There is no abdominal tenderness. There is no guarding or rebound.   Musculoskeletal:      Right lower leg: No edema.      Left lower leg: No edema.      Comments: L. Chest wall tenderness. L. Foot boot in place   Skin:     General: Skin is warm and dry.      Capillary Refill: Capillary refill takes less than 2 seconds.      Comments: Bilateral heel protectors, sacral foam dressing in place appears c/d/i   Neurological:      Mental Status: He is alert and oriented to person, place, and time.           Significant Labs: All pertinent labs within the past 24 hours have been reviewed.    Significant Imaging: I have reviewed all pertinent imaging results/findings within the past 24 hours.    Assessment/Plan:      * Acute respiratory failure with hypoxemia  Admitted to the ICU for acute respiratory distress and hyperkalemia now s/p extubation. On 2L NC at 96%. Patient does not use home oxygen. Complaints of sob and increase work of breathing attributed to multiple rib fracture from resuscitative efforts while in the ICU. Patient will require close monitor. Patient is not stable for discharge.     11/22: Patient stable this am with sats in mid 90's on 2L NC. Returned to the floor on 15L highflow. Patient did not tolerated switching to NC. ABG showed  metabolic acidosis. Placed back on 10L with an oximeter. Will attempt to wean off overnight. Goal of O2 sat of 88-92%. See respiratory therapy note.   .    Severe sepsis  Overall impression at the time of admission was that patient had sepsis with an abnormal CXR. Pressor support not provided. Patient initial started on Rocephin 1 gm IV then switched to zosyn plus azithromycin. Patient received a total of 6 days of IV antibiotics. Blood cultures show no growth to date. Blood pressure is now stable. Lactic acid 1.5 on 11/14. Resolved. Patient will need close monitor give respiratory status.      11/22: Today patient appears more stable. Still require oxygen supplementation. Overnight was afebrile with WBC of 19 with stable bp. Will continue IV antibiotic coverage.     Hyperkalemia  Hyperkalemia is likely due to ESRD.The patients most recent potassium results are listed below.  Recent Labs     11/20/24  0553 11/21/24  0511   K 3.5 3.3*       Plan  - Monitor for arrhythmias with EKG and/or continuous telemetry.   - Treat the hyperkalemia with Potassium Binders if great than 4.5.   - Monitor potassium:  daily with BMP  - The patient's hyperkalemia is resolved    11/22: Dialysis today with 4L taken off. Patient returned to floor on 15L. Attempt made to wean down and patient desaturated to 89%. Placed back on 10L. Nephrology thinks it was secondary to taking off fluid. Will continue to monitor with daily BMP. Anticipate patient will continue to have hyperkalemia. Will monitor.              Influenza A  Patient positive on admission. Continue Tamiflu due to HD.           ESRD on hemodialysis  Creatine stable for now. BMP reviewed- noted Estimated Creatinine Clearance: 12.6 mL/min (A) (based on SCr of 8.93 mg/dL (H)). according to latest data. Based on current GFR, CKD stage is end stage.  Monitor UOP and serial BMP and adjust therapy as needed. Renally dose meds. Avoid nephrotoxic medications and procedures.     Continue  "HD, Monday Wednesday Friday.     11/22: Taken to dialysis today. Patient is net negative 4L.     Atrial fibrillation with RVR  Patient has paroxysmal (<7 days) atrial fibrillation. Patient is currently in sinus rhythm. [id number] Score: The patient doesn't have any registry metric data available. The patients heart rate in the last 24 hours is as follows:  Pulse  Min: 82  Max: 129     Antiarrhythmics  metoprolol injection 5 mg, Every 12 hours PRN, Intravenous  metoprolol tartrate (LOPRESSOR) tablet 100 mg, 2 times daily, Oral  diltiaZEM tablet 30 mg, Every 6 hours, Oral    Anticoagulants  heparin (porcine) injection 4,000 Units, As needed (PRN), Intra-Catheter  apixaban tablet 10 mg, 2 times daily, Oral  apixaban tablet 5 mg, 2 times daily, Oral    Plan  - Replete lytes with a goal of K>4, Mg >2  - Patient is anticoagulated, see medications listed above.  - Patient's afib is currently controlled  - Telemetry; no events overnight, NS 94 this am  - Continue to monitor    11/22: Overnight no events per telemetry. Continue eliquis.         Anemia in chronic kidney disease  Anemia is likely due to chronic disease due to ESRD. Most recent hemoglobin and hematocrit are listed below.  Recent Labs     11/20/24  0553 11/21/24  0511 11/22/24  0617   HGB 6.2* 7.6* 8.4*   HCT 19.2* 23.2* 26.5*       Plan  - Monitor serial CBC: Daily  - Transfuse PRBC if patient becomes hemodynamically unstable, symptomatic or H/H drops below 7/21.  - Patient has received 2 units of PRBCs on 11/20/24  - Patient's anemia is currently improving    11/22: Hemoglobin improved slightly overnight. On EPO 10,000 units.    Acute metabolic encephalopathy  Patient's mental status resolving. Has some response time changes likely secondary to respiratory status. Likely near baseline.        VTE Risk Mitigation (From admission, onward)           Ordered     apixaban tablet 5 mg  2 times daily        Placed in "Followed by" Linked Group    11/15/24 6774     " "apixaban tablet 10 mg  2 times daily        Placed in "Followed by" Linked Group    11/15/24 1346     heparin (porcine) injection 4,000 Units  As needed (PRN)         11/14/24 1618     IP VTE HIGH RISK PATIENT  Once         11/11/24 1147     Place sequential compression device  Until discontinued         11/11/24 1147                    Discharge Planning   TORY:      Code Status: Full Code   Is the patient medically ready for discharge?:     Reason for patient still in hospital (select all that apply): Treatment  Discharge Plan A: Home                  Roxanna Merrill MD  Department of Hospital Medicine   [facility] - [address] [place] [address]    "

## 2024-11-22 NOTE — ASSESSMENT & PLAN NOTE
Overall impression at the time of admission was that patient had sepsis with an abnormal CXR. Pressor support not provided. Patient initial started on Rocephin 1 gm IV then switched to zosyn plus azithromycin. Patient received a total of 6 days of IV antibiotics. Blood cultures show no growth to date. Blood pressure is now stable. Lactic acid 1.5 on 11/14. Resolved. Patient will need close monitor give respiratory status.      11/22: Today patient appears more stable. Still require oxygen supplementation. Overnight was afebrile with WBC of 19 with stable bp. Will continue IV antibiotic coverage.

## 2024-11-22 NOTE — NURSING
[facility]  DIALYSIS TREATMENT SUMMARY      Note: Consult with the attending physician for patient treatment orders, this document is not a physician order.      Patient Information   Patient Saúl Butt   Date of Birth June 26, 1970   Chart Number [id number]   Location [facility]   Location MRN [id number]   Gender Male   SSN (last 4) [id number]     Treatment Information   Treatment Type Hemodialysis   Treatment Id [id number]   Start Time November 22, 2024 09:05   End Time November 22, 2024 12:05   Acutal Duration 03:00     Treatment Balances   Total Saline Administered 500   Net Fluid Balance 1631    Hemodialysis Orders   Therapy Standard   Orders Verified Time 11/22/2024 08:30    Date Verified 11/22/2024   Duration 3:00   Isolated UF/Bypass No   BFR (mL) 400   DFR (mL) 800   Dialyzer Type OPTIFLUX 160NR   UF Order UF Range   UF Range (mL) 2000 - 3000   Crit-Line used No   Heparin Initial Units Bolus No   Heparin IV Maintenance Bolus No   Heparin IV Infusion No   Potassium (mEq/L) 3   Calcium (mEq/L) 2.5   Bicarb (mg/dL) 33   Sodium (mEq/L) 137   Additional Orders 1. Lock HD cath, each port with 2000 units of heparin 2. Turn UF off if pt becomes symptomatic associated with drop in SBP >20mmHg   Clinician Damien Valdivia, RON    Dialysis Access   Access Type Central Venous Catheter   Central Venous Catheter   Access Type Catheter - Tunneled   Access Location Chest Wall - R   Current/New [facility] Patient Yes   1st Use Catheter Verified by Previous Use   Catheter Care Completed per Policy Yes   Dressing Dry and Intact on Arrival Yes   Dressing Changed Yes   Type of Dressing Transparent Polyurethane   Dressing Changed By [facility] Staff   Type of HD Caps Not Listed   HD Caps Changed Yes   CVC Line Education Provided Yes - Infection Prevention      Vitals   Pre-Treatment Vitals   Time Is BP being recorded? Pre BP Map BP Method Pulse RR Temp How was Weight Obtained? Pre Weight Previous Dry Weight Previous  Post Weight Metric Target Fluid Removal (mL) Dialysate Confirmed Clinician    11/22/2024 09:00 BP/Map 146/42 (77) Noninvasive 105 18 97.6 How Obtained: Bed Scale 113.9   Kgs 3000 Yes Damien Valdivia RN    Comments:       Post Treatment Vitals   Time Is BP being recorded? BP Map Pulse RR Temp How was Weight Obtained? Post Weight Metric BVP UF Goal Ordered NSS Given Intra-Procedure Total Machine UF Removed (mL) Crit-Line Ending Profile Crit-Line Refill Crit-Line Ending HCT Crit-Line Max BV% Clinician    11/22/2024 12:07 BP/Map 105/48 (67) 125 22 99.1 How Obtained: Bed scale 107 Kgs 63.4 2000 0 2131     Damien Valdivia RN    Comments: vitals stable      Safety checks include: access uncovered and secured, Hemaclip secured for all central line access, machine checks performed, and alarm limits confirmed.     Labs   Hepatitis   HBsAG Lab Result HBsAG Lab Result HBsAG Draw Date Transient Antigenemia(MD Diagnosis Only) Anti-HBs Lab Result Anti-HBs Lab Value Anti-HBs Draw Date Documented By Documented Date Hepatitis Status Hepatitis Status   Negative  11/11/2024  Unknown  11/11/2024 Damien Valdivia 11/22/2024  Susceptible   Notes:       Pre-Treatment Hepatitis Precautions Copy of hepatitis results verified in hospital EMR Yes Signing   Patient tx with immune pts only Yes Hepatitis Information Entered By Damien Valdivia RN Signed By Damien Valdivia RN     Pre-Treatment Handoff   Staff Report Received Yes   Report Given by Primary Nurse Kristin Regan   Time 08:30     Patient Arrival   Patient ID Verified Date of Birth    MRN    Full Name   Patient Consent to treatment verified Yes   Blood Transfusion Consent Verified N/A     Treatment Comments   Treatment Notes pt tolerated HD well. vitals stable. afebrile. catheter care done per p/p. good blood flow     Post-Treatment Handoff   Report Given to Primary Nurse Kristin Regan   Time Report Given 12:15   Report Given By Damien Valdivia RN    Machine  Validation   Time 08:45   Date 11/22/2024   Auto Alarm Test Passed Yes   Machine Serial # [id number]   Portable RO Yes   RO Serial# [id number]   Residual Bleach Negative Yes   Was a manufactured mix used? Yes   Machine Log Completed Yes   Total Chlorine (less than 0.1)? Yes   Total Chlorine Log Completed Yes   Bicarb BiBag   Bibag Size 650   Machine Temp 37   Machine Conductivity 13.8   Meter Type N/A   Meter Conductivity    Independent Conductivity 13.9   pH Status Pass Pass   pH    TCD Value 13.7   TCD Alarm with +/- 0.5 Yes   NVL enabled validated 100 asymmetric Yes   Safety check complete Damien Valdivia RN   Second Verification Performed? Yes   Second Verification Performed By Patricia Mack RN   Reason Not Verified       Serum Lab Values   Time BUN Creatinine Na K (mEq/L) Cl CO2 Ca (mEq/L) Phos Mg (mg/dL) Alb (g/dL) Glucose Hgb Hct WBC Plt PT aPTT INR Other Clinician    11/21/2024 05:11 37 6.65 133 3.3 94 16 8.2 7.5 2.5  91 8.4 26.5 19.24 441     Damien Valdivia RN    Comments:         Facility Information Location [facility]   Facility Information Room # 548 Diagnosis ACUTE RESPIRATORY FAILURE WITH HYPOXEMIA   Admission Date 11/11/2024 Patient Type Chronic dialysis patient with diagnosis of ESRD Isolation Information   Ordering MD Dr. Kiet Martinez Patient Chronic Unit [facility] Isolation Required? N   Account/Finance Number [id number] Patient Home Unit [facility] Completed by   Admission Status InPatient Code Status Full Code General Tx information Entered by Damien Valdivia RN     Start Treatment Time Out Confirmed by RENE Valdivia RN Correct access site verified Yes   Treatment Initiation Connections Secured Time Out Completed 09:03 Treatment Start Date 11/22/2024    Saline line double clamped Correct patient verified Yes Treatment Start Time 09:05    Hemaclip Applied Correct procedure verified Yes Patient/Family questions and concerns addressed Yes     Pre Focused Assessment Notes 50%  venturi mask-O2 sats-94% LOC Alert and Oriented x3   Access Edema GI / Bowels   Signs and Symptoms of Infection? No Location Generalized GI Soft   Pain Screening Cardiac    Does the patient have pain? No Heart Rhythm Regular  Anuric   Respiratory Telemetry No Completed by   Lung Sounds Clear Skin Pre Treatment Focused Assessment Completed By Damien Valdivia RN   Location Bilateral Skin Warm Time 08:55   Position Anterior  Dry Signing   Respiratory Efforts Labored LOC Signed By Damien Valdivia RN     Education  Treatment Options Patient Education Introduced By   Patient Education Method Knowledge / Understanding Assessed Teach back Patient Education Introduced By Damien Valdivia RN   Patient Educated? Yes Family Education Provided? N/A    Focus or Topic Access Maintenance Caregiver Education Provided? N/A      Post-Treatment HD machine external disinfection completed per policy Yes Completed by   Post Treatment Delay PRO external disinfection completed per policy Yes Post Treatment Form Completed By Damien Valdivia RN   Delay N Post Treatment General Information    Machine Disinfection Requirement Notes vitals stable    Post Focused Assessment Lung Sounds Clear LOC   Changes from Pre Focused Assessment Location Bilateral LOC Alert and Oriented x3   Changes from Pre Treatment Focused Assessment? No Position Anterior GI / Bowels   Access Respiratory Efforts Labored GI Soft   Cath Packed with Heparin Notes 50% venturi mask-O2 sats-94%    Access Port(ml) 2 Edema  Anuric   Return Port(ml) 2 Location Generalized Completed by   Catheter clamped and capped Yes Cardiac Post Treatment Focused Assessment Completed by Damien Valdivia RN   Access Flow Good Heart Rhythm Regular Date

## 2024-11-22 NOTE — PT/OT/SLP PROGRESS
Occupational Therapy      Patient Name:  Saúl Butt   MRN:  [id number]    Patient not seen today secondary to dialysis in am and pt refused all offers for  OT in PM. Will follow-up on next treatment day.    11/22/2024

## 2024-11-22 NOTE — ASSESSMENT & PLAN NOTE
Anemia is likely due to chronic disease due to ESRD. Most recent hemoglobin and hematocrit are listed below.  Recent Labs     11/20/24  0553 11/21/24  0511 11/22/24  0617   HGB 6.2* 7.6* 8.4*   HCT 19.2* 23.2* 26.5*       Plan  - Monitor serial CBC: Daily  - Transfuse PRBC if patient becomes hemodynamically unstable, symptomatic or H/H drops below 7/21.  - Patient has received 2 units of PRBCs on 11/20/24  - Patient's anemia is currently improving    11/22: Hemoglobin improved slightly overnight. On EPO 10,000 units.

## 2024-11-22 NOTE — ASSESSMENT & PLAN NOTE
Hyperkalemia is likely due to ESRD.The patients most recent potassium results are listed below.  Recent Labs     11/20/24  0553 11/21/24  0511   K 3.5 3.3*       Plan  - Monitor for arrhythmias with EKG and/or continuous telemetry.   - Treat the hyperkalemia with Potassium Binders if great than 4.5.   - Monitor potassium:  daily with BMP  - The patient's hyperkalemia is resolved    11/22: Dialysis today with 4L taken off. Patient returned to floor on 15L. Attempt made to wean down and patient desaturated to 89%. Placed back on 10L. Nephrology thinks it was secondary to taking off fluid. Will continue to monitor with daily BMP. Anticipate patient will continue to have hyperkalemia. Will monitor.

## 2024-11-22 NOTE — DIALYSIS ROUNDING
Patient is seen on hemodialysis, they are tolerating this well, we will continue their treatment unchanged.  Diagnosis: End Stage Renal Disease

## 2024-11-22 NOTE — ASSESSMENT & PLAN NOTE
Creatine stable for now. BMP reviewed- noted Estimated Creatinine Clearance: 12.6 mL/min (A) (based on SCr of 8.93 mg/dL (H)). according to latest data. Based on current GFR, CKD stage is end stage.  Monitor UOP and serial BMP and adjust therapy as needed. Renally dose meds. Avoid nephrotoxic medications and procedures.     Continue HD, Monday Wednesday Friday.     11/22: Taken to dialysis today. Patient is net negative 4L.

## 2024-11-22 NOTE — PROGRESS NOTES
[facility]  Adult Nutrition  Follow-up Note         Reason for Assessment  Reason For Assessment: RD follow-up   Nutrition Risk Screen: no indicators present    Assessment and Plan    11/22/2024: RD follow up. Patient advanced to a Renal diet. Poor PO intakes documented, generally 25% or refusing meals. Discussed with MD during IDT rounds. Decision to liberalize to see if regular diet will improve intakes. Encourage good PO intakes. If continued poor PO intakes, may need to resume alternate route of nutrition to meet needs. RD following.    11/19/2024: RD follow up. Patient extubated to 3L NC. SLP evaluation pending. Recommend advance diet per SLP recommendations. If unable to advance PO diet, recommend resume enteral feeding. Last BM 11/18. RD following.      11/15/2024: RD follow up. Patient remains intubated and on enteral feeding. Feeding currently at 30mL/hour with no tolerance issues noted. Recommend continue to advance towards goal rate of 45mL/hour as tolerated. RD following.     11/13/2024: Consult received and appreciated. Consult for nutrition recs-patient is intubated. Patient is a 53yo male admitted 11/11 for hyperkalemia.     Patient is 108.3kg with a BMI of 32.38 and is obese. He has a PMH of ESRD on HD. Recommend start Novasource Renal with a goal rate of 45mL/hour with 30mL/hour free water flush. Keep HOB at 30-45 degrees to reduce risk for aspiration. Start rate at 20mL/hour and advance by 10mL q8h until goal rate is reached. Monitor for tolerance: n/v/d/c. Hold feeding and notify RD if symptoms of intolerance develop.     Last BM 11/12 per flowsheet.    Medications/labs reviewed. RD following.           Learning Needs/Social Determinants of Health    Learning Assessment       11/11/2024 1310 [facility] (11/11/2024 - Present)   Created by Berenice Casillas, RN - RN (Nurse) Status: Complete                 PRIMARY LEARNER     Primary Learner Name:  Criss  Daquan EL - 11/11/2024 1310    Relationship:  Patient EL - 11/11/2024 1310    Does the primary learner have any barriers to learning?:  No Barriers EL - 11/11/2024 1310    What is the preferred language of the primary learner?:  English EL - 11/11/2024 1310    Is an  required?:  Yes EL - 11/11/2024 1310    How does the primary learner prefer to learn new concepts?:  Listening EL - 11/11/2024 1310    How often do you need to have someone help you read instructions, pamphlets, or written material from your doctor or pharmacy?:  Never EL - 11/11/2024 1310        CO-LEARNER #1     No question answered        CO-LEARNER #2     No question answered        SPECIAL TOPICS     No question answered        ANSWERED BY:     No question answered        Comments         Edit History       Berenice Casillas, RN - RN (Nurse)   11/11/2024 1310                           Social Drivers of Health     Tobacco Use: Low Risk  (11/11/2024)    Patient History     Smoking Tobacco Use: Never     Smokeless Tobacco Use: Never     Passive Exposure: Not on file   Alcohol Use: Not At Risk (6/19/2024)    AUDIT-C     Frequency of Alcohol Consumption: Never     Average Number of Drinks: Patient does not drink     Frequency of Binge Drinking: Never   Financial Resource Strain: Low Risk  (11/11/2024)    Overall Financial Resource Strain (CARDIA)     Difficulty of Paying Living Expenses: Not hard at all   Food Insecurity: No Food Insecurity (11/11/2024)    Hunger Vital Sign     Worried About Running Out of Food in the Last Year: Never true     Ran Out of Food in the Last Year: Never true   Transportation Needs: No Transportation Needs (11/11/2024)    TRANSPORTATION NEEDS     Transportation : No   Physical Activity: Inactive (6/19/2024)    Exercise Vital Sign     Days of Exercise per Week: 0 days     Minutes of Exercise per Session: 0 min   Stress: No Stress Concern Present (11/11/2024)    [employer] [place] [employer] - Occupational  Stress Questionnaire     Feeling of Stress : Not at all   Housing Stability: Low Risk  (11/11/2024)    Housing Stability Vital Sign     Unable to Pay for Housing in the Last Year: No     Homeless in the Last Year: No   Depression: Low Risk  (6/17/2024)    Depression     Last PHQ-4: Flowsheet Data: 0   Utilities: Not At Risk (11/11/2024)    [facility] Utilities     Threatened with loss of utilities: No   Health Literacy: Adequate Health Literacy (11/11/2024)     Health Literacy     Frequency of need for help with medical instructions: Rarely   Social Isolation: Socially Integrated (6/19/2024)    Social Isolation     Social Isolation: 1          Malnutrition  Is Patient Malnourished: No    Nutrition Diagnosis  Inadequate energy intake related to Decreased ability to consume sufficient energy as evidenced by intubated  Comments: continue enteral feeding    Recent Labs   Lab 11/22/24  0617   *     Note: Glucose elevated. No PMH DM2. Likely related to stress response to infection.    Nutrition Prescription / Recommendations  Recommendation/Intervention: Recommen continue current POC as tolerated.  Goals: Weight maintenance during admission, tube feeding tolerance at goal  Nutrition Goal Status: progressing towards goal  Current Diet Order: Renal  Chewing or Swallowing Difficulty?: No Chewing or swallowing difficulty  Recommended Diet: Renal (High Protein)  Recommended Oral Supplement: No Oral Supplements  Is Nutrition Support Recommended: No  Is Nutrition Education Recommended: No    Monitor and Evaluation  % current Intake: P.O. intake of 0 - 10%  % intake to meet estimated needs: 50 - 75 %  Food and Nutrient Intake: enteral nutrition intake  Food and Nutrient Adminstration: enteral and parenteral nutrition administration  Anthropometric Measurements: weight, weight change  Biochemical Data, Medical Tests and Procedures: electrolyte and renal panel, gastrointestinal profile, glucose/endocrine profile, inflammatory  profile, lipid profile       Current Medical Diagnosis and Past Medical History  Diagnosis: other (see comments)  Past Medical History:   Diagnosis Date    Anemia in chronic kidney disease 12/06/2014    Chronic diastolic congestive heart failure     Deep vein thrombosis     ESRD on hemodialysis 06/17/2021    Hyperlipidemia     Hypertension     Malignant neoplasm of kidney     Moderate aortic stenosis 04/22/2023    Paroxysmal atrial fibrillation     Polycystic kidney disease     Primary osteoarthritis involving multiple joints 07/16/2024       Nutrition/Diet History  Spiritual, Cultural Beliefs, Adventism Practices, Values that Affect Care: no    Lab/Procedures/Meds  Recent Labs   Lab 11/22/24  0617   *   K 3.3*   BUN 50*   CREATININE 8.93*   CALCIUM 8.7   CL 95*   PHOS 8.0*   Note: Na+, Cl- K+, low. Phos, BUN, Cr elevated. PMH ESRD on HD  Last A1c:   Lab Results   Component Value Date    HGBA1C 6.0 07/11/2023     Lab Results   Component Value Date    RBC 2.78 (L) 11/22/2024    HGB 8.4 (L) 11/22/2024    HCT 26.5 (L) 11/22/2024    MCV 95.3 11/22/2024    MCH 30.2 11/22/2024    MCHC 31.7 (L) 11/22/2024   Note: H&H low. On HD.     Pertinent Labs Reviewed: reviewed  Pertinent Medications Reviewed: reviewed  Scheduled Meds:   albuterol-ipratropium  3 mL Nebulization Q6H    allopurinoL  100 mg Oral Daily    amLODIPine  10 mg Oral Daily    apixaban  10 mg Oral BID    Followed by    [START ON 11/23/2024] apixaban  5 mg Oral BID    chlorhexidine  15 mL Mouth/Throat BID    diltiaZEM  30 mg Oral Q6H    [START ON 11/23/2024] epoetin milla  9,990 Units Intravenous Every Tues, Thurs, Sat    hydrALAZINE  100 mg Oral TID    LIDOcaine  2 patch Transdermal Q24H    metoprolol tartrate  100 mg Oral BID    NIFEdipine  60 mg Oral QHS    oseltamivir  30 mg Oral Every Mon, Wed, Fri    pantoprazole  40 mg Intravenous Daily    QUEtiapine  50 mg Oral BID    sodium bicarbonate  50 mEq Intravenous Once    vitamin D  1,000 Units Oral Daily      Continuous Infusions:      PRN Meds:.  Current Facility-Administered Medications:     0.9%  NaCl infusion (for blood administration), , Intravenous, Q24H PRN    0.9% NaCl, , Intravenous, PRN    0.9% NaCl, , Intravenous, PRN    acetaminophen, 650 mg, Oral, Q4H PRN    albuterol sulfate, 2.5 mg, Nebulization, Q4H PRN    haloperidol lactate, 0.5 mg, Intramuscular, Q12H PRN    heparin (porcine), 4,000 Units, Intra-Catheter, PRN    metoprolol, 5 mg, Intravenous, Q12H PRN    ondansetron, 4 mg, Intravenous, Q8H PRN    sodium chloride 0.9%, 250 mL, Intravenous, PRN    sodium chloride 0.9%, 10 mL, Intravenous, PRN    Anthropometrics  Temp: 97.9 °F (36.6 °C)  Height Method: Stated  Height: 6' (182.9 cm)  Height (inches): 72 in  Weight Method: Bed Scale  Weight: 113.9 kg (251 lb 1.7 oz)  Weight (lb): 251.11 lb  Ideal Body Weight (IBW), Male: 178 lb  % Ideal Body Weight, Male (lb): 134.13 %  BMI (Calculated): 34       Estimated/Assessed Needs  RMR (Chama-St. Jeor Equation): 2027   Total Ve: 10.5 L/m Temp: 97.9 °F (36.6 °C)Oral  Weight Used For Calorie Calculations: 108.3 kg (238 lb 12.1 oz)     Energy Calorie Requirements (kcal): 2166-2708kcal (20-25kcal/kg)  Weight Used For Protein Calculations: 108.3 kg (238 lb 12.1 oz)  Protein Requirements: 87-108g (0.8-1.0g/kg)       RDA Method (mL): 2166       Nutrition by Nursing  Diet/Nutrition Received: full liquid  Intake (%): 25%     Diet/Feeding Tolerance: refused  Last Bowel Movement: 11/19/24  [REMOVED]      NG/OG Tube 11/13/24 0816 Oliver sump 16 Fr. Right nostril-Feeding Type: continuous, by pump  [REMOVED]      NG/OG Tube 11/13/24 0816 Oliver sump 16 Fr. Right nostril-Current Rate (mL/hr): 10 mL/hr  [REMOVED]      NG/OG Tube 11/13/24 0816 Oliver sump 16 Fr. Right nostril-Goal Rate (mL/hr): 45 mL/hr  [REMOVED]      NG/OG Tube 11/13/24 0816 Oliver sump 16 Fr. Right nostril-Formula Name: Novasource renal    Nutrition Follow-Up  RD Follow-up?: Yes      Nutrition Discharge  Planning: RD following for discharge needs          Jessica Vora, MS, RD, LD  Available via Secure Chat

## 2024-11-22 NOTE — ASSESSMENT & PLAN NOTE
Admitted to the ICU for acute respiratory distress and hyperkalemia now s/p extubation. On 2L NC at 96%. Patient does not use home oxygen. Complaints of sob and increase work of breathing attributed to multiple rib fracture from resuscitative efforts while in the ICU. Patient will require close monitor. Patient is not stable for discharge.     11/22: Patient stable this am with sats in mid 90's on 2L NC. Returned to the floor on 15L highflow. Patient did not tolerated switching to NC. ABG showed metabolic acidosis. Placed back on 10L with an oximeter. Will attempt to wean off overnight. Goal of O2 sat of 88-92%. See respiratory therapy note.   .

## 2024-11-22 NOTE — SUBJECTIVE & OBJECTIVE
Interval History: Yesterday team called to the floor for SOB. Orders placed for UA, blood cultures. This am Hgb low will order a unit of blood and repeat H/H. This morning taken for dailysis and returned on 15L high flow. Attempt to wean not tolerated. Repeat ABG shows metabolic acidosis. Place back on an oximeter. Currently at 10L. Will wean with a goal of 88-92% O2 sat.       Review of Systems   Constitutional:  Positive for fatigue. Negative for fever.   HENT: Negative.     Respiratory:  Positive for shortness of breath. Negative for cough, choking and wheezing.    Cardiovascular:  Negative for chest pain, palpitations and leg swelling.   Gastrointestinal:  Negative for abdominal pain, nausea and vomiting.   Genitourinary:  Negative for decreased urine volume.        ESRD on HD   Musculoskeletal:         L. Foot fracture   Skin:  Positive for wound.   Neurological:  Negative for weakness, light-headedness and headaches.     Objective:     Vital Signs (Most Recent):  Temp: 99.5 °F (37.5 °C) (11/22/24 0342)  Pulse: 102 (11/22/24 0342)  Resp: (!) 24 (11/22/24 0342)  BP: (!) 158/54 (11/22/24 0342)  SpO2: (!) 94 % (11/22/24 0342) Vital Signs (24h Range):  Temp:  [98.3 °F (36.8 °C)-101.2 °F (38.4 °C)] 99.5 °F (37.5 °C)  Pulse:  [] 102  Resp:  [18-28] 24  SpO2:  [91 %-100 %] 94 %  BP: (121-167)/(51-85) 158/54     Weight: 113.9 kg (251 lb 1.7 oz)  Body mass index is 34.06 kg/m².  No intake or output data in the 24 hours ending 11/22/24 0658        Physical Exam  Vitals and nursing note reviewed.   Constitutional:       General: He is not in acute distress.     Appearance: Normal appearance. He is not ill-appearing.   HENT:      Mouth/Throat:      Mouth: Mucous membranes are dry.      Pharynx: Oropharynx is clear.   Eyes:      Comments: Right cataract    Cardiovascular:      Rate and Rhythm: Normal rate and regular rhythm.      Pulses: Normal pulses.      Heart sounds: Normal heart sounds. No murmur heard.     No  friction rub. No gallop.   Pulmonary:      Breath sounds: No stridor. Rhonchi present. No wheezing or rales.   Abdominal:      General: Bowel sounds are normal.      Palpations: Abdomen is soft.      Tenderness: There is no abdominal tenderness. There is no guarding or rebound.   Musculoskeletal:      Right lower leg: No edema.      Left lower leg: No edema.      Comments: L. Chest wall tenderness. L. Foot boot in place   Skin:     General: Skin is warm and dry.      Capillary Refill: Capillary refill takes less than 2 seconds.      Comments: Bilateral heel protectors, sacral foam dressing in place appears c/d/i   Neurological:      Mental Status: He is alert and oriented to person, place, and time.           Significant Labs: All pertinent labs within the past 24 hours have been reviewed.    Significant Imaging: I have reviewed all pertinent imaging results/findings within the past 24 hours.

## 2024-11-22 NOTE — PT/OT/SLP PROGRESS
"Physical Therapy Treatment    Patient Name:  Saúl Butt   MRN:  [id number]    Recommendations:     Discharge Recommendations: Moderate Intensity Therapy  Discharge Equipment Recommendations: to be determined by next level of care  Barriers to discharge:  ongoing medical treatment    Assessment:     Saúl Butt is a 54 y.o. male admitted with a medical diagnosis of Acute respiratory failure with hypoxemia.  He presents with the following impairments/functional limitations: weakness, impaired endurance, impaired self care skills, impaired functional mobility, decreased lower extremity function, decreased safety awareness.    Pt with increased fatigue, increased heart rate and labored breathing post dialysis, required increased verbal cueing and manual assist to assist with exercise    PT POC discussed with Delgado Connelly,PT     Rehab Prognosis: Fair; patient would benefit from acute skilled PT services to address these deficits and reach maximum level of function.    Recent Surgery: * No surgery found *      Plan:     During this hospitalization, patient to be seen 5 x/week to address the identified rehab impairments via gait training, therapeutic activities, therapeutic exercises, neuromuscular re-education and progress toward the following goals:    Plan of Care Expires:  12/24/24    Subjective     Chief Complaint: resp failure  Patient/Family Comments/goals: "I wanna sit in that chair"  Pain/Comfort:         Objective:     Communicated with Kristin Regan RN prior to session.  Patient found up in chair with peripheral IV, oxygen, telemetry, bed alarm upon PT entry to room.     General Precautions: Standard, fall  Orthopedic Precautions: N/A  Braces: N/A  Respiratory Status: Nasal cannula, flow 5 L/min     Functional Mobility:  Transfers:     Sit to Stand:  minimum assistance and of 2 persons with platform walker  Bed to Chair: minimum assistance and of 1-2 persons with  platform walker  using  Step " Transfer      AM-PAC 6 CLICK MOBILITY  Turning over in bed (including adjusting bedclothes, sheets and blankets)?: 3  Sitting down on and standing up from a chair with arms (e.g., wheelchair, bedside commode, etc.): 3  Moving from lying on back to sitting on the side of the bed?: 3  Moving to and from a bed to a chair (including a wheelchair)?: 3  Need to walk in hospital room?: 2  Climbing 3-5 steps with a railing?: 1  Basic Mobility Total Score: 15       Treatment & Education:  Pt performed 2 x 15 reps (B) LE exercises: ap, quad set, glut set, straight leg raise, hip ab/adduction, heel slide with near constant verbal and tactile cueing to assist and remain on task     Patient left up in chair with all lines intact, call button in reach, and mother present..    GOALS:   Multidisciplinary Problems       Physical Therapy Goals          Problem: Physical Therapy    Goal Priority Disciplines Outcome Interventions   Physical Therapy Goal     PT, PT/OT Progressing    Description: Short term goals:  . Supine to sit with Contact Guard Assistance  . Sit to stand transfer with Contact Guard Assistance  . Gait  x 75 feet with Contact Guard Assistance using Rolling Walker.     Long term goals:  Patient will regain full independent functional mobility with lowest level of assistive device to return to desired living arrangement and prior ADL's.                          Time Tracking:     PT Received On: 11/22/24  PT Start Time: 1524     PT Stop Time: 1541  PT Total Time (min): 17 min     Billable Minutes: Therapeutic Exercise 15    Treatment Type: Treatment  PT/PTA: PTA     Number of PTA visits since last PT visit: 1 11/22/2024

## 2024-11-22 NOTE — ASSESSMENT & PLAN NOTE
Patient has paroxysmal (<7 days) atrial fibrillation. Patient is currently in sinus rhythm. [id number] Score: The patient doesn't have any registry metric data available. The patients heart rate in the last 24 hours is as follows:  Pulse  Min: 82  Max: 129     Antiarrhythmics  metoprolol injection 5 mg, Every 12 hours PRN, Intravenous  metoprolol tartrate (LOPRESSOR) tablet 100 mg, 2 times daily, Oral  diltiaZEM tablet 30 mg, Every 6 hours, Oral    Anticoagulants  heparin (porcine) injection 4,000 Units, As needed (PRN), Intra-Catheter  apixaban tablet 10 mg, 2 times daily, Oral  apixaban tablet 5 mg, 2 times daily, Oral    Plan  - Replete lytes with a goal of K>4, Mg >2  - Patient is anticoagulated, see medications listed above.  - Patient's afib is currently controlled  - Telemetry; no events overnight, NS 94 this am  - Continue to monitor    11/22: Overnight no events per telemetry. Continue eliquis.

## 2024-11-23 PROBLEM — N18.6 ESRD (END STAGE RENAL DISEASE): Status: RESOLVED | Noted: 2024-11-15 | Resolved: 2024-11-23

## 2024-11-23 PROBLEM — I50.22 HEART FAILURE WITH MILDLY REDUCED EJECTION FRACTION: Status: RESOLVED | Noted: 2024-11-16 | Resolved: 2024-11-23

## 2024-11-23 LAB
ANION GAP SERPL CALCULATED.3IONS-SCNC: 20 MMOL/L (ref 7–16)
ANISOCYTOSIS BLD QL SMEAR: ABNORMAL
BASOPHILS # BLD AUTO: 0.17 K/UL (ref 0–0.2)
BASOPHILS NFR BLD AUTO: 1.1 % (ref 0–1)
BUN SERPL-MCNC: 30 MG/DL (ref 8–26)
BUN/CREAT SERPL: 5 (ref 6–20)
CALCIUM SERPL-MCNC: 9.1 MG/DL (ref 8.4–10.2)
CHLORIDE SERPL-SCNC: 99 MMOL/L (ref 98–107)
CO2 SERPL-SCNC: 21 MMOL/L (ref 22–29)
CREAT SERPL-MCNC: 6.43 MG/DL (ref 0.72–1.25)
DIFFERENTIAL METHOD BLD: ABNORMAL
EGFR (NO RACE VARIABLE) (RUSH/TITUS): 10 ML/MIN/1.73M2
EOSINOPHIL # BLD AUTO: 0.59 K/UL (ref 0–0.5)
EOSINOPHIL NFR BLD AUTO: 3.8 % (ref 1–4)
EOSINOPHIL NFR BLD MANUAL: 4 % (ref 1–4)
ERYTHROCYTE [DISTWIDTH] IN BLOOD BY AUTOMATED COUNT: 18.9 % (ref 11.5–14.5)
GLUCOSE SERPL-MCNC: 116 MG/DL (ref 74–100)
HCT VFR BLD AUTO: 28.8 % (ref 40–54)
HGB BLD-MCNC: 9.1 G/DL (ref 13.5–18)
IMM GRANULOCYTES # BLD AUTO: 1.01 K/UL (ref 0–0.04)
IMM GRANULOCYTES NFR BLD: 6.5 % (ref 0–0.4)
LYMPHOCYTES # BLD AUTO: 1.3 K/UL (ref 1–4.8)
LYMPHOCYTES NFR BLD AUTO: 8.3 % (ref 27–41)
LYMPHOCYTES NFR BLD MANUAL: 12 % (ref 27–41)
MAGNESIUM SERPL-MCNC: 2.4 MG/DL (ref 1.6–2.6)
MCH RBC QN AUTO: 29.9 PG (ref 27–31)
MCHC RBC AUTO-ENTMCNC: 31.6 G/DL (ref 32–36)
MCV RBC AUTO: 94.7 FL (ref 80–96)
MONOCYTES # BLD AUTO: 1.85 K/UL (ref 0–0.8)
MONOCYTES NFR BLD AUTO: 11.9 % (ref 2–6)
MONOCYTES NFR BLD MANUAL: 12 % (ref 2–6)
MPC BLD CALC-MCNC: 9.8 FL (ref 9.4–12.4)
NEUTROPHILS # BLD AUTO: 10.65 K/UL (ref 1.8–7.7)
NEUTROPHILS NFR BLD AUTO: 68.4 % (ref 53–65)
NEUTS BAND NFR BLD MANUAL: 4 % (ref 1–5)
NEUTS SEG NFR BLD MANUAL: 68 % (ref 50–62)
NRBC # BLD AUTO: 0.04 X10E3/UL
NRBC, AUTO (.00): 0.3 %
OVALOCYTES BLD QL SMEAR: ABNORMAL
PHOSPHATE SERPL-MCNC: 5.1 MG/DL (ref 2.3–4.7)
PLATELET # BLD AUTO: 491 K/UL (ref 150–400)
PLATELET MORPHOLOGY: ABNORMAL
POLYCHROMASIA BLD QL SMEAR: ABNORMAL
POTASSIUM SERPL-SCNC: 3 MMOL/L (ref 3.5–5.1)
RBC # BLD AUTO: 3.04 M/UL (ref 4.6–6.2)
SODIUM SERPL-SCNC: 137 MMOL/L (ref 136–145)
TARGETS BLD QL SMEAR: ABNORMAL
WBC # BLD AUTO: 15.57 K/UL (ref 4.5–11)

## 2024-11-23 PROCEDURE — 84100 ASSAY OF PHOSPHORUS: CPT | Performed by: STUDENT IN AN ORGANIZED HEALTH CARE EDUCATION/TRAINING PROGRAM

## 2024-11-23 PROCEDURE — 36415 COLL VENOUS BLD VENIPUNCTURE: CPT | Performed by: INTERNAL MEDICINE

## 2024-11-23 PROCEDURE — 25000003 PHARM REV CODE 250: Performed by: HOSPITALIST

## 2024-11-23 PROCEDURE — 99900035 HC TECH TIME PER 15 MIN (STAT)

## 2024-11-23 PROCEDURE — 99232 SBSQ HOSP IP/OBS MODERATE 35: CPT | Mod: ,,, | Performed by: HOSPITALIST

## 2024-11-23 PROCEDURE — 25000003 PHARM REV CODE 250: Performed by: STUDENT IN AN ORGANIZED HEALTH CARE EDUCATION/TRAINING PROGRAM

## 2024-11-23 PROCEDURE — 27000221 HC OXYGEN, UP TO 24 HOURS

## 2024-11-23 PROCEDURE — 83735 ASSAY OF MAGNESIUM: CPT | Performed by: STUDENT IN AN ORGANIZED HEALTH CARE EDUCATION/TRAINING PROGRAM

## 2024-11-23 PROCEDURE — 25000003 PHARM REV CODE 250

## 2024-11-23 PROCEDURE — 94761 N-INVAS EAR/PLS OXIMETRY MLT: CPT

## 2024-11-23 PROCEDURE — 25000003 PHARM REV CODE 250: Performed by: NURSE PRACTITIONER

## 2024-11-23 PROCEDURE — 63600175 PHARM REV CODE 636 W HCPCS: Performed by: STUDENT IN AN ORGANIZED HEALTH CARE EDUCATION/TRAINING PROGRAM

## 2024-11-23 PROCEDURE — 63600175 PHARM REV CODE 636 W HCPCS: Performed by: INTERNAL MEDICINE

## 2024-11-23 PROCEDURE — 94640 AIRWAY INHALATION TREATMENT: CPT

## 2024-11-23 PROCEDURE — 80048 BASIC METABOLIC PNL TOTAL CA: CPT | Performed by: INTERNAL MEDICINE

## 2024-11-23 PROCEDURE — 25000003 PHARM REV CODE 250: Performed by: INTERNAL MEDICINE

## 2024-11-23 PROCEDURE — 11000001 HC ACUTE MED/SURG PRIVATE ROOM

## 2024-11-23 PROCEDURE — 25000242 PHARM REV CODE 250 ALT 637 W/ HCPCS: Performed by: STUDENT IN AN ORGANIZED HEALTH CARE EDUCATION/TRAINING PROGRAM

## 2024-11-23 PROCEDURE — 85025 COMPLETE CBC W/AUTO DIFF WBC: CPT | Performed by: INTERNAL MEDICINE

## 2024-11-23 RX ORDER — LORAZEPAM 1 MG/1
1 TABLET ORAL ONCE
Status: COMPLETED | OUTPATIENT
Start: 2024-11-23 | End: 2024-11-23

## 2024-11-23 RX ORDER — LABETALOL HYDROCHLORIDE 5 MG/ML
10 INJECTION, SOLUTION INTRAVENOUS EVERY 4 HOURS PRN
Status: DISCONTINUED | OUTPATIENT
Start: 2024-11-23 | End: 2024-12-02 | Stop reason: HOSPADM

## 2024-11-23 RX ORDER — LABETALOL HYDROCHLORIDE 5 MG/ML
10 INJECTION, SOLUTION INTRAVENOUS ONCE
Status: COMPLETED | OUTPATIENT
Start: 2024-11-23 | End: 2024-11-23

## 2024-11-23 RX ADMIN — DILTIAZEM HYDROCHLORIDE 30 MG: 30 TABLET, FILM COATED ORAL at 12:11

## 2024-11-23 RX ADMIN — HYDRALAZINE HYDROCHLORIDE 100 MG: 100 TABLET ORAL at 08:11

## 2024-11-23 RX ADMIN — LIDOCAINE 5% 2 PATCH: 700 PATCH TOPICAL at 09:11

## 2024-11-23 RX ADMIN — APIXABAN 5 MG: 5 TABLET, FILM COATED ORAL at 09:11

## 2024-11-23 RX ADMIN — AMLODIPINE BESYLATE 10 MG: 10 TABLET ORAL at 09:11

## 2024-11-23 RX ADMIN — NIFEDIPINE 60 MG: 60 TABLET, FILM COATED, EXTENDED RELEASE ORAL at 08:11

## 2024-11-23 RX ADMIN — DILTIAZEM HYDROCHLORIDE 30 MG: 30 TABLET, FILM COATED ORAL at 05:11

## 2024-11-23 RX ADMIN — CHLORHEXIDINE GLUCONATE 15 ML: 1.2 RINSE ORAL at 09:11

## 2024-11-23 RX ADMIN — SODIUM BICARBONATE 650 MG TABLET 1300 MG: at 09:11

## 2024-11-23 RX ADMIN — SODIUM BICARBONATE 650 MG TABLET 1300 MG: at 08:11

## 2024-11-23 RX ADMIN — IPRATROPIUM BROMIDE AND ALBUTEROL SULFATE 3 ML: .5; 3 SOLUTION RESPIRATORY (INHALATION) at 01:11

## 2024-11-23 RX ADMIN — LABETALOL HYDROCHLORIDE 10 MG: 5 INJECTION, SOLUTION INTRAVENOUS at 04:11

## 2024-11-23 RX ADMIN — LABETALOL HYDROCHLORIDE 10 MG: 5 INJECTION, SOLUTION INTRAVENOUS at 05:11

## 2024-11-23 RX ADMIN — CHLORHEXIDINE GLUCONATE 15 ML: 1.2 RINSE ORAL at 08:11

## 2024-11-23 RX ADMIN — PANTOPRAZOLE SODIUM 40 MG: 40 INJECTION, POWDER, LYOPHILIZED, FOR SOLUTION INTRAVENOUS at 09:11

## 2024-11-23 RX ADMIN — LABETALOL HYDROCHLORIDE 10 MG: 5 INJECTION, SOLUTION INTRAVENOUS at 06:11

## 2024-11-23 RX ADMIN — ALLOPURINOL 100 MG: 100 TABLET ORAL at 09:11

## 2024-11-23 RX ADMIN — HYDRALAZINE HYDROCHLORIDE 100 MG: 100 TABLET ORAL at 09:11

## 2024-11-23 RX ADMIN — Medication 1000 UNITS: at 09:11

## 2024-11-23 RX ADMIN — METOPROLOL TARTRATE 100 MG: 100 TABLET, FILM COATED ORAL at 08:11

## 2024-11-23 RX ADMIN — METOPROLOL TARTRATE 100 MG: 100 TABLET, FILM COATED ORAL at 09:11

## 2024-11-23 RX ADMIN — QUETIAPINE FUMARATE 50 MG: 25 TABLET ORAL at 09:11

## 2024-11-23 RX ADMIN — LORAZEPAM 1 MG: 1 TABLET ORAL at 01:11

## 2024-11-23 RX ADMIN — APIXABAN 5 MG: 5 TABLET, FILM COATED ORAL at 08:11

## 2024-11-23 RX ADMIN — QUETIAPINE FUMARATE 50 MG: 25 TABLET ORAL at 08:11

## 2024-11-23 RX ADMIN — HYDRALAZINE HYDROCHLORIDE 100 MG: 100 TABLET ORAL at 02:11

## 2024-11-23 RX ADMIN — IPRATROPIUM BROMIDE AND ALBUTEROL SULFATE 3 ML: .5; 3 SOLUTION RESPIRATORY (INHALATION) at 07:11

## 2024-11-23 NOTE — HOSPITAL COURSE
Patient admitted  with acute respiratory failure with hypoxemia.   Records reviewed. Admitted 11/11 to ICU with SOB and orthopnea.   Echo EF 50%, Mod AS,  No DVT on US.   PMHx:  end-stage renal disease (dialysis MWF / old AV fistula arm but uses right subclavin area dialysis cath) , hypertension, Chronic anemia, Chronic diastolic CHF, moderate aortic stenosis, DVT, Hyperlipidemia, Paroxysmal atrial fibrillation, and malignant neoplasm of kidney.   11/12-- potassium 6.8 - HD gain today - went into Afib rvr on HD   11/13 Intubated progressive resp failure. PEA arrest and required CPR and evidence aspiration. Has rib fxs.  11/20 extubated. Transferred to floor  Needs stronger. Maybe to SWB at DC  11/22 Better.Mother in room and convinced him to go to SWB.   Therapy following. No DVT. Discussed with Dr Martinez.    11/23 Breathing better but still weak. Eating some. Work on strength. Look into SWB placement.  11/24 Weak but stronger daily.  Rib pain from prior injury try Lidoderm patch   Potassium low but to address at dialysis.  12/1: Patient Heart rate decreased to 40s after morning medication. BB and Cardizem Held. Will decrease dose for next dosage  12/2 Dialysis today. HR 80s no distress noted. Patient sitting up in chair ready for discharge  Patient has reached maximum benefit from this admission. Patient educated on the importance of medication compliance and follow up with appointments Patient verbalized understanding. New medication explained to patient and patient verbalized understanding. Patient will be discharged back to Living center.

## 2024-11-23 NOTE — PROGRESS NOTES
[facility] - [facility] Tele[facility] Medicine  Progress Note    Patient Name: Saúl Butt  MRN: [id number]  Patient Class: IP- Inpatient   Admission Date: 11/11/2024  Length of Stay: 12 days  Attending Physician: Benjy West MD  Primary Care Provider: Ruth, Primary Doctor        Subjective:     Principal Problem:Acute respiratory failure with hypoxemia        HPI:  54-year-old  male presented to the ED via EMS.  Past medical history of end-stage renal disease, hypertension, Chronic anemia, Chronic diastolic CHF, moderate aortic stenosis, DVT, Hyperlipidemia, Paroxysmal atrial fibrillation, and malignant neoplasm of kidney.  Patient states he goes to dialysis Monday, Wednesday, and Friday.  States last day to dialyze was this past Friday, 11/8/24.  Patient states he has not missed any dialysis days. Patient was due to dialyze today, however he presented to the ED with 3 day history of shortness of breath which is worse with lying down, and he also reports having a cough.  He also reports 3 day history of nausea, vomiting, diarrhea. Patient reports having some fever on and off.  Denies any pain at present.     ED workup revealed sodium 131, potassium 8.4, anion gap 17, BUN and creatinine 60/13.4.  Venous blood gas revealed pH 7.42, pCO2 44, sodium 131, potassium 8.2, bicarb 28.5, lactate 1.5, and glucose 130.  Chest x-ray showed cardiomegaly with pulmonary edema.  BNP 55,331. In the ED patient received calcium gluconate 1 g IV, D10 500 mL IV, and Humulin R 10 units.  Critical care service was asked to admit patient to the ICU, where he will be dialyzed emergently to treat hyperkalemia.    Overview/Hospital Course:  11/12-- potassium 6.8 - HD gain today - went into Afib rvr on HD   11/13: continued pyrexia, resolved RVR, early am with increased work of breathing with hypoxia placed on NRB, hyperkalemia recurrence, intubated for progressive respiratory distress, severe hypoxia,  aspirated during ETT s/p successful therapeutic aspiration with bronchoscopy, am course with PEA arrest with ROSC, agitated post resuscitation warranting deep sedation  11/14-15: weaning sedation with intermittent agitation, recurrent hyperkalemia prompting HD, episodes of RVR managed with lopressor, off pressor support  11/20/2024 patient extubated yesterday doing well mental status improving    Interval History: Patient seen resting comfortably sitting on the side of the bed.  No acute events overnight.  Patient had dialysis yesterday without issue.  Patient breathing well on low flow nasal cannula.  Awaiting swing bed placement.    Review of Systems   Constitutional:  Positive for fatigue. Negative for fever.   HENT: Negative.     Respiratory:  Positive for shortness of breath. Negative for cough, choking and wheezing.    Cardiovascular:  Negative for chest pain, palpitations and leg swelling.   Gastrointestinal:  Negative for abdominal pain, nausea and vomiting.   Genitourinary:  Negative for decreased urine volume.        ESRD on HD   Musculoskeletal:         L. Foot fracture   Skin:  Positive for wound.   Neurological:  Negative for weakness, light-headedness and headaches.   All other systems reviewed and are negative.    Objective:     Vital Signs (Most Recent):  Temp: 98.7 °F (37.1 °C) (11/23/24 1058)  Pulse: 101 (11/23/24 1058)  Resp: (!) 22 (11/23/24 1058)  BP: (!) 166/85 (11/23/24 1058)  SpO2: 96 % (11/23/24 1058) Vital Signs (24h Range):  Temp:  [97.2 °F (36.2 °C)-99.1 °F (37.3 °C)] 98.7 °F (37.1 °C)  Pulse:  [100-130] 101  Resp:  [18-24] 22  SpO2:  [91 %-100 %] 96 %  BP: (101-220)/() 166/85     Weight: 112.1 kg (247 lb 3.2 oz)  Body mass index is 33.53 kg/m².    Intake/Output Summary (Last 24 hours) at 11/23/2024 1128  Last data filed at 11/22/2024 1207  Gross per 24 hour   Intake --   Output 2131 ml   Net -2131 ml         Physical Exam  Vitals and nursing note reviewed.   Constitutional:        General: He is awake. He is not in acute distress.     Appearance: Normal appearance. He is well-developed. He is obese. He is ill-appearing. He is not toxic-appearing.   HENT:      Head: Normocephalic and atraumatic.      Mouth/Throat:      Mouth: Mucous membranes are dry.      Pharynx: Oropharynx is clear.   Eyes:      Comments: Right cataract    Cardiovascular:      Rate and Rhythm: Normal rate and regular rhythm.      Pulses: Normal pulses.      Heart sounds: Normal heart sounds. No murmur heard.     No friction rub. No gallop.   Pulmonary:      Breath sounds: No stridor. Rhonchi and rales present. No wheezing.   Abdominal:      General: Bowel sounds are normal.      Palpations: Abdomen is soft.      Tenderness: There is no abdominal tenderness. There is no guarding or rebound.   Musculoskeletal:      Right lower leg: No edema.      Left lower leg: No edema.      Comments: L. Chest wall tenderness. L. Foot boot in place   Skin:     General: Skin is warm and dry.      Capillary Refill: Capillary refill takes less than 2 seconds.      Comments: Bilateral heel protectors, sacral foam dressing in place appears c/d/i   Neurological:      Mental Status: He is alert and oriented to person, place, and time.   Psychiatric:         Mood and Affect: Mood normal.         Behavior: Behavior is cooperative.             Significant Labs: All pertinent labs within the past 24 hours have been reviewed.    Significant Imaging: I have reviewed all pertinent imaging results/findings within the past 24 hours.    Assessment/Plan:      * Acute respiratory failure with hypoxemia  Admitted to the ICU for acute respiratory distress and hyperkalemia now s/p extubation. On 2L NC at 96%. Patient does not use home oxygen. Complaints of sob and increase work of breathing attributed to multiple rib fracture from resuscitative efforts while in the ICU. Patient will require close monitor. Patient is not stable for discharge.     11/22  Patient  stable this am with sats in mid 90's on 2L NC. Returned to the floor on 15L highflow. Patient did not tolerated switching to NC. ABG showed metabolic acidosis. Placed back on 10L with an oximeter. Will attempt to wean off overnight. Goal of O2 sat of 88-92%. See respiratory therapy note.     11/23: Patient stable this morning SpO2 mid 90s on 2 L NC.    Severe sepsis  Overall impression at the time of admission was that patient had sepsis with an abnormal CXR. Pressor support not provided. Patient initial started on Rocephin 1 gm IV then switched to zosyn plus azithromycin. Patient received a total of 6 days of IV antibiotics. Blood cultures show no growth to date. Blood pressure is now stable. Lactic acid 1.5 on 11/14. Resolved. Patient will need close monitor give respiratory status.      Today patient appears more stable. Still require oxygen supplementation. Overnight was afebrile, stable bp. Will continue IV antibiotic coverage.     ESRD on hemodialysis  Creatine stable for now. BMP reviewed- noted Estimated Creatinine Clearance: 12.6 mL/min (A) (based on SCr of 8.93 mg/dL (H)). according to latest data. Based on current GFR, CKD stage is end stage.  Monitor UOP and serial BMP and adjust therapy as needed. Renally dose meds. Avoid nephrotoxic medications and procedures.     Continue HD, Monday Wednesday Friday.     11/22: Taken to dialysis today. Patient is net negative 4L.     Hyperkalemia  Hyperkalemia is likely due to ESRD.The patients most recent potassium results are listed below.  Recent Labs     11/21/24  0511 11/22/24  0617 11/23/24  0532   K 3.3* 3.3* 3.0*       Plan  - Monitor for arrhythmias with EKG and/or continuous telemetry.   - Treat the hyperkalemia with Potassium Binders if great than 4.5.   - Monitor potassium:  daily with BMP  - The patient's hyperkalemia is resolved    11/22: Dialysis today with 4L taken off. Patient returned to floor on 15L. Attempt made to wean down and patient desaturated  to 89%. Placed back on 10L. Nephrology thinks it was secondary to taking off fluid. Will continue to monitor with daily BMP. Anticipate patient will continue to have hyperkalemia. Will monitor.              Acute metabolic encephalopathy  Patient's mental status resolving. Has some response time changes likely secondary to respiratory status. Likely near baseline.      Influenza A  Patient positive on admission. Continue Tamiflu due to HD.           Atrial fibrillation with RVR  Patient has paroxysmal (<7 days) atrial fibrillation. Patient is currently in sinus rhythm. [id number] Score: The patient doesn't have any registry metric data available. The patients heart rate in the last 24 hours is as follows:  Pulse  Min: 100  Max: 130     Antiarrhythmics  metoprolol injection 5 mg, Every 12 hours PRN, Intravenous  metoprolol tartrate (LOPRESSOR) tablet 100 mg, 2 times daily, Oral  diltiaZEM tablet 30 mg, Every 6 hours, Oral    Anticoagulants  heparin (porcine) injection 4,000 Units, As needed (PRN), Intra-Catheter  apixaban tablet 5 mg, 2 times daily, Oral    Plan  - Replete lytes with a goal of K>4, Mg >2  - Patient is anticoagulated, see medications listed above.  - Patient's afib is currently controlled  - Telemetry; no events overnight, NS 94 this am  - Continue to monitor    Continue eliquis.         Anemia in chronic kidney disease  Anemia is likely due to chronic disease due to ESRD. Most recent hemoglobin and hematocrit are listed below.  Recent Labs     11/21/24  0511 11/22/24  0617 11/23/24  0532   HGB 7.6* 8.4* 9.1*   HCT 23.2* 26.5* 28.8*       Plan  - Monitor serial CBC: Daily  - Transfuse PRBC if patient becomes hemodynamically unstable, symptomatic or H/H drops below 7/21.  - Patient has received 2 units of PRBCs on 11/20/24  - Patient's anemia is currently improving    Hemoglobin improved slightly overnight. On EPO 10,000 units.      VTE Risk Mitigation (From admission, onward)           Ordered      "apixaban tablet 5 mg  2 times daily        Placed in "Followed by" Linked Group    11/15/24 1346     heparin (porcine) injection 4,000 Units  As needed (PRN)         11/14/24 1618     IP VTE HIGH RISK PATIENT  Once         11/11/24 1147     Place sequential compression device  Until discontinued         11/11/24 1147                    Discharge Planning   TORY:      Code Status: Full Code   Is the patient medically ready for discharge?:     Reason for patient still in hospital (select all that apply): Patient trending condition, Laboratory test, Treatment, Consult recommendations, PT / OT recommendations, and Pending disposition  Discharge Plan A: Home                  Aidan Key MD  Department of Hospital Medicine   [facility] - [address] [place] [address]    "

## 2024-11-23 NOTE — ASSESSMENT & PLAN NOTE
Admitted to the ICU for acute respiratory distress and hyperkalemia now s/p extubation. On 2L NC at 96%. Patient does not use home oxygen. Complaints of sob and increase work of breathing attributed to multiple rib fracture from resuscitative efforts while in the ICU. Patient will require close monitor. Patient is not stable for discharge.     11/22  Patient stable this am with sats in mid 90's on 2L NC. Returned to the floor on 15L highflow. Patient did not tolerated switching to NC. ABG showed metabolic acidosis. Placed back on 10L with an oximeter. Will attempt to wean off overnight. Goal of O2 sat of 88-92%. See respiratory therapy note.     11/23: Patient stable this morning SpO2 mid 90s on 2 L NC.

## 2024-11-23 NOTE — NURSING
0028- notified Dr. Pappas via secure chat that pt /106. MD made aware that pt had received prn metoprolol 5mg IV. He also received all of his schedule Blood pressure meds during night med pass.  (See MAR) No new orders given at this time.     0432- made Dr Pappas Aware of pt BP being 217/144. New orders given for IV Labetalol 10 mg (See MAR). PRN med given at 0440. Re-evaluated BP at 0515 and it was 220/114. Notified Elyse via secure chat. Instructed to give another dose of Labetalol 10mg IV. Given at 0525. Re-evaluated BP at 0600 and it was 180/110 with a heart rate of 125.  Dr. West added to secure chat.   0608-Dr. Pappas responded to give another dose of labetalol 10 mg IV. Will continue to monitor.

## 2024-11-23 NOTE — ASSESSMENT & PLAN NOTE
Overall impression at the time of admission was that patient had sepsis with an abnormal CXR. Pressor support not provided. Patient initial started on Rocephin 1 gm IV then switched to zosyn plus azithromycin. Patient received a total of 6 days of IV antibiotics. Blood cultures show no growth to date. Blood pressure is now stable. Lactic acid 1.5 on 11/14. Resolved. Patient will need close monitor give respiratory status.      Today patient appears more stable. Still require oxygen supplementation. Overnight was afebrile, stable bp. Will continue IV antibiotic coverage.

## 2024-11-23 NOTE — SUBJECTIVE & OBJECTIVE
Interval History: Patient seen resting comfortably sitting on the side of the bed.  No acute events overnight.  Patient had dialysis yesterday without issue.  Patient breathing well on low flow nasal cannula.  Awaiting swing bed placement.    Review of Systems   Constitutional:  Positive for fatigue. Negative for fever.   HENT: Negative.     Respiratory:  Positive for shortness of breath. Negative for cough, choking and wheezing.    Cardiovascular:  Negative for chest pain, palpitations and leg swelling.   Gastrointestinal:  Negative for abdominal pain, nausea and vomiting.   Genitourinary:  Negative for decreased urine volume.        ESRD on HD   Musculoskeletal:         L. Foot fracture   Skin:  Positive for wound.   Neurological:  Negative for weakness, light-headedness and headaches.   All other systems reviewed and are negative.    Objective:     Vital Signs (Most Recent):  Temp: 98.7 °F (37.1 °C) (11/23/24 1058)  Pulse: 101 (11/23/24 1058)  Resp: (!) 22 (11/23/24 1058)  BP: (!) 166/85 (11/23/24 1058)  SpO2: 96 % (11/23/24 1058) Vital Signs (24h Range):  Temp:  [97.2 °F (36.2 °C)-99.1 °F (37.3 °C)] 98.7 °F (37.1 °C)  Pulse:  [100-130] 101  Resp:  [18-24] 22  SpO2:  [91 %-100 %] 96 %  BP: (101-220)/() 166/85     Weight: 112.1 kg (247 lb 3.2 oz)  Body mass index is 33.53 kg/m².    Intake/Output Summary (Last 24 hours) at 11/23/2024 1128  Last data filed at 11/22/2024 1207  Gross per 24 hour   Intake --   Output 2131 ml   Net -2131 ml         Physical Exam  Vitals and nursing note reviewed.   Constitutional:       General: He is awake. He is not in acute distress.     Appearance: Normal appearance. He is well-developed. He is obese. He is ill-appearing. He is not toxic-appearing.   HENT:      Head: Normocephalic and atraumatic.      Mouth/Throat:      Mouth: Mucous membranes are dry.      Pharynx: Oropharynx is clear.   Eyes:      Comments: Right cataract    Cardiovascular:      Rate and Rhythm: Normal rate  and regular rhythm.      Pulses: Normal pulses.      Heart sounds: Normal heart sounds. No murmur heard.     No friction rub. No gallop.   Pulmonary:      Breath sounds: No stridor. Rhonchi and rales present. No wheezing.   Abdominal:      General: Bowel sounds are normal.      Palpations: Abdomen is soft.      Tenderness: There is no abdominal tenderness. There is no guarding or rebound.   Musculoskeletal:      Right lower leg: No edema.      Left lower leg: No edema.      Comments: L. Chest wall tenderness. L. Foot boot in place   Skin:     General: Skin is warm and dry.      Capillary Refill: Capillary refill takes less than 2 seconds.      Comments: Bilateral heel protectors, sacral foam dressing in place appears c/d/i   Neurological:      Mental Status: He is alert and oriented to person, place, and time.   Psychiatric:         Mood and Affect: Mood normal.         Behavior: Behavior is cooperative.             Significant Labs: All pertinent labs within the past 24 hours have been reviewed.    Significant Imaging: I have reviewed all pertinent imaging results/findings within the past 24 hours.

## 2024-11-23 NOTE — ASSESSMENT & PLAN NOTE
Anemia is likely due to chronic disease due to ESRD. Most recent hemoglobin and hematocrit are listed below.  Recent Labs     11/21/24  0511 11/22/24  0617 11/23/24  0532   HGB 7.6* 8.4* 9.1*   HCT 23.2* 26.5* 28.8*       Plan  - Monitor serial CBC: Daily  - Transfuse PRBC if patient becomes hemodynamically unstable, symptomatic or H/H drops below 7/21.  - Patient has received 2 units of PRBCs on 11/20/24  - Patient's anemia is currently improving    Hemoglobin improved slightly overnight. On EPO 10,000 units.

## 2024-11-23 NOTE — PROGRESS NOTES
[facility] - [address] [place] [address]  Nephrology  Progress Note    Patient Name: Saúl Butt  MRN: [id number]  Admission Date: 11/11/2024  Hospital Length of Stay: 12 days  Attending Provider: Benjy West MD   Primary Care Physician: Ruth, Primary Doctor  Principal Problem:Acute respiratory failure with hypoxemia    Subjective:     HPI: Chief complaint:  Shortness of breath    History of present illness-Mr. Butt is a 54-year-old  gentleman with end-stage renal disease who dialyzes on a Monday Wednesday Friday basis in [facility].  The patient's last dialysis was this past Friday.  The patient states he started to feel bad about 2 days prior to admission.  He has been having some nausea vomiting and diarrhea.  He has also developed shortness of breath associated with a cough productive yellowish sputum.  The patient feels like he has a virus.  He has had some sore throat as well.  The patient also complains of having shakes and chills.  We were asked see the patient for dialysis.  The patient's potassium was noted to be 8.4 on admission.  The patient states he has been eating couple oranges a day as well as a couple of containers of yogurt daily.    Past Medical History:   Diagnosis Date    Anemia in chronic kidney disease 12/06/2014    Chronic diastolic congestive heart failure     Deep vein thrombosis     ESRD on hemodialysis 06/17/2021    Hyperlipidemia     Hypertension     Malignant neoplasm of kidney     Moderate aortic stenosis 04/22/2023    Paroxysmal atrial fibrillation     Polycystic kidney disease     Primary osteoarthritis involving multiple joints 07/16/2024       Past Surgical History:   Procedure Laterality Date    AV FISTULA PLACEMENT      EYE SURGERY      HD CATHETER      SELECTIVE INJECTION OF ANESTHETIC AGENT AROUND LUMBAR SPINAL NERVE ROOT BY TRANSFORAMINAL APPROACH Left 6/13/2024    Procedure: Left L5-S1 TFESi;  Surgeon: Lily Cuellar MD;  Location: [facility];  Service: Pain Management;  Laterality: Left;    SURGICAL REMOVAL OF ULCER      clamped ulcer in stomach    TUNNELED VENOUS CATHETER PLACEMENT         Review of patient's allergies indicates:   Allergen Reactions    Fish containing products Swelling    Iodinated contrast media Swelling    Iodine Shortness Of Breath, Swelling and Anaphylaxis     Sob and swelling/itching/throat closes up per pt  Other reaction(s): Swelling, Hives, DifficultyBreat  Sob and swelling/itching/throat closes up per pt      Shellfish containing products Swelling    Iodine and iodide containing products      Current Facility-Administered Medications   Medication Frequency    acetaminophen tablet 650 mg Q4H PRN    albuterol nebulizer solution 2.5 mg Q4H PRN    calcium gluconate 1 g in NS IVPB (premixed) Q10 Min PRN    dextrose 10% bolus 250 mL 250 mL PRN    heparin (porcine) injection 5,000 Units Q8H    mupirocin 2 % ointment BID    ondansetron injection 4 mg Q8H PRN    oxyCODONE immediate release tablet 5 mg Q4H PRN    sodium bicarbonate 150 mEq in D5W 1,000 mL infusion Continuous    sodium chloride 0.9% flush 10 mL PRN     Family History    None       Tobacco Use    Smoking status: Never    Smokeless tobacco: Never   Substance and Sexual Activity    Alcohol use: Never    Drug use: Never    Sexual activity: Not Currently     Review of Systems   Constitutional:  Negative for fever.   HENT:  Positive for sore throat.    Respiratory:  Positive for shortness of breath.    Cardiovascular:  Positive for chest pain.   Gastrointestinal:  Negative for vomiting.   Genitourinary:  Negative for dysuria.   Musculoskeletal:  Negative for arthralgias.   Skin:  Positive for rash (Patient gets an occasional rash).   Neurological:  Negative for seizures.   Hematological:  Negative for adenopathy.     Objective:     Vital Signs (Most Recent):  Temp: 98.8 °F (37.1 °C) (11/11/24 1220)  Pulse: 109 (11/11/24 1330)  Resp: (!) 25 (11/11/24 1330)  BP:  (!) 173/88 (11/11/24 1330)  SpO2: (!) 93 % (11/11/24 1330) Vital Signs (24h Range):  Temp:  [98.6 °F (37 °C)-98.8 °F (37.1 °C)] 98.8 °F (37.1 °C)  Pulse:  [] 109  Resp:  [10-29] 25  SpO2:  [90 %-100 %] 93 %  BP: (142-219)/() 173/88     Weight: 108.3 kg (238 lb 12.1 oz) (11/11/24 1220)  Body mass index is 32.38 kg/m².  Body surface area is 2.35 meters squared.    No intake/output data recorded.     Physical Exam  Vitals reviewed.   Constitutional:       General: He is in acute distress.      Appearance: He is ill-appearing and toxic-appearing (Patient has a continuous whole-body shake during the exam.).   HENT:      Head: Normocephalic and atraumatic.      Nose: Nose normal.      Mouth/Throat:      Mouth: Mucous membranes are moist.      Pharynx: Oropharynx is clear.   Eyes:      General: No scleral icterus.     Conjunctiva/sclera: Conjunctivae normal.      Comments: Patient has some opacity about his right eye, his pupil is about 1 mm on this side.   Cardiovascular:      Rate and Rhythm: Normal rate and regular rhythm.   Pulmonary:      Effort: No respiratory distress.      Breath sounds: Normal breath sounds.   Abdominal:      General: Bowel sounds are normal.      Palpations: Abdomen is soft. There is no mass.   Musculoskeletal:         General: Swelling (1 to 2+ pretibial edema) present. No tenderness.      Cervical back: Normal range of motion and neck supple.   Lymphadenopathy:      Cervical: No cervical adenopathy.   Skin:     General: Skin is warm and dry.      Findings: No erythema.   Neurological:      General: No focal deficit present.      Mental Status: Mental status is at baseline.   Psychiatric:         Mood and Affect: Mood normal.         Behavior: Behavior normal.          Significant Labs:  All labs within the past 24 hours have been reviewed.    Significant Imaging:    Assessment/Plan:     Neuro  Shakes-resolved as of 11/13/2024  Patient has some total body shakes during the interview,  his white counts in the normal range.  He complained of some sore throat and feels like he has a viral syndrome.  Continue to monitor.    Cardiac/Vascular  Hypertension-resolved as of 11/13/2024  Continue present antihypertensives    Renal/  Hyperkalemia  Patient's potassium is 8.4, we will dialyze him today is to 2K bath    ESRD on hemodialysis  Continue with scheduled hemodialysis for this patient.    Oncology  Anemia in chronic kidney disease  Patient's hematocrit is around 30%, continue to monitor        Thank you for your consult. I will follow-up with patient. Please contact us if you have any additional questions.    George Rowell Jr, MD  Nephrology  [facility] - [address] [place] [address]

## 2024-11-23 NOTE — PLAN OF CARE
Problem: Gas Exchange Impaired  Goal: Optimal Gas Exchange  Outcome: Progressing     Problem: Skin Injury Risk Increased  Goal: Skin Health and Integrity  Outcome: Progressing     Problem: ARDS (Acute Respiratory Distress Syndrome)  Goal: Effective Oxygenation  Outcome: Progressing     Problem: Airway Clearance Ineffective  Goal: Effective Airway Clearance  Outcome: Progressing

## 2024-11-23 NOTE — ASSESSMENT & PLAN NOTE
Patient has paroxysmal (<7 days) atrial fibrillation. Patient is currently in sinus rhythm. [id number] Score: The patient doesn't have any registry metric data available. The patients heart rate in the last 24 hours is as follows:  Pulse  Min: 100  Max: 130     Antiarrhythmics  metoprolol injection 5 mg, Every 12 hours PRN, Intravenous  metoprolol tartrate (LOPRESSOR) tablet 100 mg, 2 times daily, Oral  diltiaZEM tablet 30 mg, Every 6 hours, Oral    Anticoagulants  heparin (porcine) injection 4,000 Units, As needed (PRN), Intra-Catheter  apixaban tablet 5 mg, 2 times daily, Oral    Plan  - Replete lytes with a goal of K>4, Mg >2  - Patient is anticoagulated, see medications listed above.  - Patient's afib is currently controlled  - Telemetry; no events overnight, NS 94 this am  - Continue to monitor    Continue eliquis.

## 2024-11-23 NOTE — ASSESSMENT & PLAN NOTE
Hyperkalemia is likely due to ESRD.The patients most recent potassium results are listed below.  Recent Labs     11/21/24  0511 11/22/24  0617 11/23/24  0532   K 3.3* 3.3* 3.0*       Plan  - Monitor for arrhythmias with EKG and/or continuous telemetry.   - Treat the hyperkalemia with Potassium Binders if great than 4.5.   - Monitor potassium:  daily with BMP  - The patient's hyperkalemia is resolved    11/22: Dialysis today with 4L taken off. Patient returned to floor on 15L. Attempt made to wean down and patient desaturated to 89%. Placed back on 10L. Nephrology thinks it was secondary to taking off fluid. Will continue to monitor with daily BMP. Anticipate patient will continue to have hyperkalemia. Will monitor.

## 2024-11-24 PROBLEM — E87.6 HYPOKALEMIA: Status: ACTIVE | Noted: 2024-11-24

## 2024-11-24 PROBLEM — H21.561: Status: ACTIVE | Noted: 2024-11-24

## 2024-11-24 PROBLEM — H54.61 VISION LOSS OF RIGHT EYE: Status: RESOLVED | Noted: 2024-11-24 | Resolved: 2024-11-24

## 2024-11-24 PROBLEM — H54.61 VISION LOSS OF RIGHT EYE: Status: ACTIVE | Noted: 2024-11-24

## 2024-11-24 LAB
ANION GAP SERPL CALCULATED.3IONS-SCNC: 19 MMOL/L (ref 7–16)
ANISOCYTOSIS BLD QL SMEAR: ABNORMAL
BASOPHILS # BLD AUTO: 0.15 K/UL (ref 0–0.2)
BASOPHILS NFR BLD AUTO: 1.1 % (ref 0–1)
BASOPHILS NFR BLD MANUAL: 1 % (ref 0–1)
BUN SERPL-MCNC: 40 MG/DL (ref 8–26)
BUN/CREAT SERPL: 4 (ref 6–20)
CALCIUM SERPL-MCNC: 8.6 MG/DL (ref 8.4–10.2)
CHLORIDE SERPL-SCNC: 97 MMOL/L (ref 98–107)
CO2 SERPL-SCNC: 22 MMOL/L (ref 22–29)
CREAT SERPL-MCNC: 9.06 MG/DL (ref 0.72–1.25)
DIFFERENTIAL METHOD BLD: ABNORMAL
EGFR (NO RACE VARIABLE) (RUSH/TITUS): 6 ML/MIN/1.73M2
EOSINOPHIL # BLD AUTO: 0.52 K/UL (ref 0–0.5)
EOSINOPHIL NFR BLD AUTO: 3.8 % (ref 1–4)
EOSINOPHIL NFR BLD MANUAL: 3 % (ref 1–4)
ERYTHROCYTE [DISTWIDTH] IN BLOOD BY AUTOMATED COUNT: 18.7 % (ref 11.5–14.5)
GLUCOSE SERPL-MCNC: 118 MG/DL (ref 74–100)
HCT VFR BLD AUTO: 26.1 % (ref 40–54)
HGB BLD-MCNC: 8.1 G/DL (ref 13.5–18)
HYPOCHROMIA BLD QL SMEAR: ABNORMAL
IMM GRANULOCYTES # BLD AUTO: 0.62 K/UL (ref 0–0.04)
IMM GRANULOCYTES NFR BLD: 4.5 % (ref 0–0.4)
LYMPHOCYTES # BLD AUTO: 1.41 K/UL (ref 1–4.8)
LYMPHOCYTES NFR BLD AUTO: 10.2 % (ref 27–41)
LYMPHOCYTES NFR BLD MANUAL: 13 % (ref 27–41)
MACROCYTES BLD QL SMEAR: ABNORMAL
MAGNESIUM SERPL-MCNC: 2.4 MG/DL (ref 1.6–2.6)
MCH RBC QN AUTO: 30.1 PG (ref 27–31)
MCHC RBC AUTO-ENTMCNC: 31 G/DL (ref 32–36)
MCV RBC AUTO: 97 FL (ref 80–96)
MONOCYTES # BLD AUTO: 1.71 K/UL (ref 0–0.8)
MONOCYTES NFR BLD AUTO: 12.4 % (ref 2–6)
MONOCYTES NFR BLD MANUAL: 15 % (ref 2–6)
MPC BLD CALC-MCNC: 10.1 FL (ref 9.4–12.4)
NEUTROPHILS # BLD AUTO: 9.37 K/UL (ref 1.8–7.7)
NEUTROPHILS NFR BLD AUTO: 68 % (ref 53–65)
NEUTS BAND NFR BLD MANUAL: 6 % (ref 1–5)
NEUTS SEG NFR BLD MANUAL: 62 % (ref 50–62)
NRBC # BLD AUTO: 0 X10E3/UL
NRBC, AUTO (.00): 0 %
PHOSPHATE SERPL-MCNC: 6 MG/DL (ref 2.3–4.7)
PLATELET # BLD AUTO: 448 K/UL (ref 150–400)
PLATELET MORPHOLOGY: ABNORMAL
POLYCHROMASIA BLD QL SMEAR: ABNORMAL
POTASSIUM SERPL-SCNC: 2.7 MMOL/L (ref 3.5–5.1)
RBC # BLD AUTO: 2.69 M/UL (ref 4.6–6.2)
SODIUM SERPL-SCNC: 135 MMOL/L (ref 136–145)
TARGETS BLD QL SMEAR: ABNORMAL
TRIGL SERPL-MCNC: 177 MG/DL (ref 34–140)
WBC # BLD AUTO: 13.78 K/UL (ref 4.5–11)

## 2024-11-24 PROCEDURE — 94761 N-INVAS EAR/PLS OXIMETRY MLT: CPT

## 2024-11-24 PROCEDURE — 99900035 HC TECH TIME PER 15 MIN (STAT)

## 2024-11-24 PROCEDURE — 27000221 HC OXYGEN, UP TO 24 HOURS

## 2024-11-24 PROCEDURE — 25000003 PHARM REV CODE 250: Performed by: STUDENT IN AN ORGANIZED HEALTH CARE EDUCATION/TRAINING PROGRAM

## 2024-11-24 PROCEDURE — 84478 ASSAY OF TRIGLYCERIDES: CPT | Performed by: INTERNAL MEDICINE

## 2024-11-24 PROCEDURE — 25000003 PHARM REV CODE 250

## 2024-11-24 PROCEDURE — 11000001 HC ACUTE MED/SURG PRIVATE ROOM

## 2024-11-24 PROCEDURE — 80048 BASIC METABOLIC PNL TOTAL CA: CPT | Performed by: INTERNAL MEDICINE

## 2024-11-24 PROCEDURE — 25000242 PHARM REV CODE 250 ALT 637 W/ HCPCS: Performed by: STUDENT IN AN ORGANIZED HEALTH CARE EDUCATION/TRAINING PROGRAM

## 2024-11-24 PROCEDURE — 25000003 PHARM REV CODE 250: Performed by: HOSPITALIST

## 2024-11-24 PROCEDURE — 25000003 PHARM REV CODE 250: Performed by: INTERNAL MEDICINE

## 2024-11-24 PROCEDURE — 83735 ASSAY OF MAGNESIUM: CPT | Performed by: STUDENT IN AN ORGANIZED HEALTH CARE EDUCATION/TRAINING PROGRAM

## 2024-11-24 PROCEDURE — 94640 AIRWAY INHALATION TREATMENT: CPT

## 2024-11-24 PROCEDURE — 63600175 PHARM REV CODE 636 W HCPCS: Performed by: STUDENT IN AN ORGANIZED HEALTH CARE EDUCATION/TRAINING PROGRAM

## 2024-11-24 PROCEDURE — 99232 SBSQ HOSP IP/OBS MODERATE 35: CPT | Mod: ,,, | Performed by: HOSPITALIST

## 2024-11-24 PROCEDURE — 25000003 PHARM REV CODE 250: Performed by: NURSE PRACTITIONER

## 2024-11-24 PROCEDURE — 36415 COLL VENOUS BLD VENIPUNCTURE: CPT | Performed by: INTERNAL MEDICINE

## 2024-11-24 PROCEDURE — 84100 ASSAY OF PHOSPHORUS: CPT | Performed by: STUDENT IN AN ORGANIZED HEALTH CARE EDUCATION/TRAINING PROGRAM

## 2024-11-24 PROCEDURE — 85025 COMPLETE CBC W/AUTO DIFF WBC: CPT | Performed by: INTERNAL MEDICINE

## 2024-11-24 RX ORDER — POTASSIUM CHLORIDE 20 MEQ/1
20 TABLET, EXTENDED RELEASE ORAL ONCE
Status: CANCELLED | OUTPATIENT
Start: 2024-11-24 | End: 2024-11-24

## 2024-11-24 RX ORDER — POTASSIUM CHLORIDE 20 MEQ/1
20 TABLET, EXTENDED RELEASE ORAL ONCE
Status: COMPLETED | OUTPATIENT
Start: 2024-11-24 | End: 2024-11-24

## 2024-11-24 RX ADMIN — METOPROLOL TARTRATE 5 MG: 1 INJECTION, SOLUTION INTRAVENOUS at 06:11

## 2024-11-24 RX ADMIN — HYDRALAZINE HYDROCHLORIDE 100 MG: 100 TABLET ORAL at 01:11

## 2024-11-24 RX ADMIN — IPRATROPIUM BROMIDE AND ALBUTEROL SULFATE 3 ML: .5; 3 SOLUTION RESPIRATORY (INHALATION) at 01:11

## 2024-11-24 RX ADMIN — SODIUM BICARBONATE 650 MG TABLET 1300 MG: at 09:11

## 2024-11-24 RX ADMIN — ACETAMINOPHEN 650 MG: 325 TABLET ORAL at 01:11

## 2024-11-24 RX ADMIN — TRAZODONE HYDROCHLORIDE 25 MG: 50 TABLET ORAL at 02:11

## 2024-11-24 RX ADMIN — CHLORHEXIDINE GLUCONATE 15 ML: 1.2 RINSE ORAL at 09:11

## 2024-11-24 RX ADMIN — APIXABAN 5 MG: 5 TABLET, FILM COATED ORAL at 09:11

## 2024-11-24 RX ADMIN — QUETIAPINE FUMARATE 50 MG: 25 TABLET ORAL at 09:11

## 2024-11-24 RX ADMIN — DILTIAZEM HYDROCHLORIDE 30 MG: 30 TABLET, FILM COATED ORAL at 01:11

## 2024-11-24 RX ADMIN — DILTIAZEM HYDROCHLORIDE 30 MG: 30 TABLET, FILM COATED ORAL at 12:11

## 2024-11-24 RX ADMIN — Medication 1000 UNITS: at 09:11

## 2024-11-24 RX ADMIN — DILTIAZEM HYDROCHLORIDE 30 MG: 30 TABLET, FILM COATED ORAL at 05:11

## 2024-11-24 RX ADMIN — IPRATROPIUM BROMIDE AND ALBUTEROL SULFATE 3 ML: .5; 3 SOLUTION RESPIRATORY (INHALATION) at 08:11

## 2024-11-24 RX ADMIN — TRAZODONE HYDROCHLORIDE 25 MG: 50 TABLET ORAL at 09:11

## 2024-11-24 RX ADMIN — POTASSIUM CHLORIDE 20 MEQ: 1500 TABLET, EXTENDED RELEASE ORAL at 09:11

## 2024-11-24 RX ADMIN — HYDRALAZINE HYDROCHLORIDE 100 MG: 100 TABLET ORAL at 09:11

## 2024-11-24 RX ADMIN — METOPROLOL TARTRATE 100 MG: 100 TABLET, FILM COATED ORAL at 09:11

## 2024-11-24 RX ADMIN — AMLODIPINE BESYLATE 10 MG: 10 TABLET ORAL at 09:11

## 2024-11-24 RX ADMIN — HALOPERIDOL LACTATE 0.5 MG: 5 INJECTION, SOLUTION INTRAMUSCULAR at 01:11

## 2024-11-24 RX ADMIN — NIFEDIPINE 60 MG: 60 TABLET, FILM COATED, EXTENDED RELEASE ORAL at 09:11

## 2024-11-24 RX ADMIN — ACETAMINOPHEN 650 MG: 325 TABLET ORAL at 09:11

## 2024-11-24 RX ADMIN — PANTOPRAZOLE SODIUM 40 MG: 40 INJECTION, POWDER, LYOPHILIZED, FOR SOLUTION INTRAVENOUS at 09:11

## 2024-11-24 RX ADMIN — IPRATROPIUM BROMIDE AND ALBUTEROL SULFATE 3 ML: .5; 3 SOLUTION RESPIRATORY (INHALATION) at 07:11

## 2024-11-24 RX ADMIN — ALLOPURINOL 100 MG: 100 TABLET ORAL at 09:11

## 2024-11-24 NOTE — ASSESSMENT & PLAN NOTE
Creatine stable for now. BMP reviewed- noted Estimated Creatinine Clearance: 12.3 mL/min (A) (based on SCr of 9.06 mg/dL (H)). according to latest data. Based on current GFR, CKD stage is end stage.  Monitor UOP and serial BMP and adjust therapy as needed. Renally dose meds. Avoid nephrotoxic medications and procedures.     Continue HD, Monday Wednesday Friday.     11/22: Taken to dialysis today. Patient is net negative 4L.     11/24: Dialysis tomorrow.

## 2024-11-24 NOTE — SUBJECTIVE & OBJECTIVE
Interval History: The patient is sitting up resting.  He voices no complaints.  Serum potassium is 2 today.    Review of patient's allergies indicates:   Allergen Reactions    Fish containing products Swelling    Iodinated contrast media Swelling    Iodine Shortness Of Breath, Swelling and Anaphylaxis     Sob and swelling/itching/throat closes up per pt  Other reaction(s): Swelling, Hives, DifficultyBreat  Sob and swelling/itching/throat closes up per pt      Shellfish containing products Swelling    Iodine and iodide containing products      Current Facility-Administered Medications   Medication Frequency    0.9%  NaCl infusion (for blood administration) Q24H PRN    0.9%  NaCl infusion PRN    0.9%  NaCl infusion PRN    acetaminophen tablet 650 mg Q4H PRN    albuterol nebulizer solution 2.5 mg Q4H PRN    albuterol-ipratropium 2.5 mg-0.5 mg/3 mL nebulizer solution 3 mL Q6H    allopurinoL tablet 100 mg Daily    amLODIPine tablet 10 mg Daily    apixaban tablet 5 mg BID    chlorhexidine 0.12 % solution 15 mL BID    diltiaZEM tablet 30 mg Q6H    epoetin milla-epbx injection 10,000 Units Every Tues, Thurs, Sat    haloperidol lactate injection 0.5 mg Q12H PRN    heparin (porcine) injection 4,000 Units PRN    hydrALAZINE tablet 100 mg TID    labetaloL injection 10 mg Q4H PRN    LIDOcaine 5 % patch 2 patch Q24H    metoprolol injection 5 mg Q12H PRN    metoprolol tartrate (LOPRESSOR) tablet 100 mg BID    NIFEdipine 24 hr tablet 60 mg QHS    ondansetron injection 4 mg Q8H PRN    pantoprazole injection 40 mg Daily    QUEtiapine tablet 50 mg BID    sodium bicarbonate tablet 1,300 mg BID    sodium chloride 0.9% bolus 250 mL 250 mL PRN    sodium chloride 0.9% flush 10 mL PRN    trazodone split tablet 25 mg QHS    vitamin D 1000 units tablet 1,000 Units Daily       Objective:     Vital Signs (Most Recent):  Temp: 99.1 °F (37.3 °C) (11/24/24 0700)  Pulse: 104 (11/24/24 0728)  Resp: 20 (11/24/24 0728)  BP: (!) 165/94 (11/24/24  0701)  SpO2: (!) 94 % (11/24/24 0728) Vital Signs (24h Range):  Temp:  [97.9 °F (36.6 °C)-99.5 °F (37.5 °C)] 99.1 °F (37.3 °C)  Pulse:  [] 104  Resp:  [18-24] 20  SpO2:  [91 %-97 %] 94 %  BP: (146-166)/(85-94) 165/94     Weight: 112.1 kg (247 lb 3.2 oz) (11/23/24 0534)  Body mass index is 33.53 kg/m².  Body surface area is 2.39 meters squared.    No intake/output data recorded.     Physical Exam  Vitals reviewed.   Constitutional:       Appearance: Normal appearance.   HENT:      Head: Normocephalic and atraumatic.      Mouth/Throat:      Mouth: Mucous membranes are moist.   Cardiovascular:      Rate and Rhythm: Regular rhythm.      Heart sounds: Normal heart sounds.   Pulmonary:      Breath sounds: Normal breath sounds.   Abdominal:      Palpations: Abdomen is soft.   Musculoskeletal:      Cervical back: Neck supple.   Neurological:      Mental Status: He is alert and oriented to person, place, and time.   Psychiatric:         Mood and Affect: Mood normal.          Significant Labs:  BMP:   Recent Labs   Lab 11/24/24 0528   *   *   K 2.7*   CL 97*   CO2 22   BUN 40*   CREATININE 9.06*   CALCIUM 8.6   MG 2.4     CBC:   Recent Labs   Lab 11/24/24 0528   WBC 13.78*   RBC 2.69*   HGB 8.1*   HCT 26.1*   *   MCV 97.0*   MCH 30.1   MCHC 31.0*        Significant Imaging:  Labs: Reviewed
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
## 2024-11-24 NOTE — ASSESSMENT & PLAN NOTE
Admitted to the ICU for acute respiratory distress and hyperkalemia now s/p extubation. On 2L NC at 96%. Patient does not use home oxygen. Complaints of sob and increase work of breathing attributed to multiple rib fracture from resuscitative efforts while in the ICU. Patient will require close monitor. Patient is not stable for discharge.     11/22  Patient stable this am with sats in mid 90's on 2L NC. Returned to the floor on 15L highflow. Patient did not tolerated switching to NC. ABG showed metabolic acidosis. Placed back on 10L with an oximeter. Will attempt to wean off overnight. Goal of O2 sat of 88-92%. See respiratory therapy note.     11/23: Patient stable this morning SpO2 mid 90s on 4 L NC.    11/24: SpO2 stable in the mid 90's on 3L NC. Continue to wean oxygen.

## 2024-11-24 NOTE — SUBJECTIVE & OBJECTIVE
Interval History: Patient seen resting comfortably sitting on the side of the bed.  No acute events overnight.  Weaned oxygen overnight. Patient saturating well on 3L nasal cannula.  Complaints of chest wall pain this am. Lidocaine patch applied. Awaiting swing bed placement.    Review of Systems   Constitutional:  Positive for fatigue. Negative for fever.   HENT: Negative.     Respiratory:  Positive for shortness of breath. Negative for cough, choking and wheezing.    Cardiovascular:  Negative for chest pain, palpitations and leg swelling.   Gastrointestinal:  Negative for abdominal pain, nausea and vomiting.   Genitourinary:  Negative for decreased urine volume.        ESRD on HD   Musculoskeletal:         L. Foot fracture   Skin:  Positive for wound.   Neurological:  Negative for weakness, light-headedness and headaches.   All other systems reviewed and are negative.    Objective:     Vital Signs (Most Recent):  Temp: 98.9 °F (37.2 °C) (11/24/24 1047)  Pulse: 86 (11/24/24 1346)  Resp: (!) 24 (11/24/24 1346)  BP: (!) 154/86 (11/24/24 1048)  SpO2: (!) 94 % (11/24/24 1346) Vital Signs (24h Range):  Temp:  [97.9 °F (36.6 °C)-99.5 °F (37.5 °C)] 98.9 °F (37.2 °C)  Pulse:  [] 86  Resp:  [18-24] 24  SpO2:  [92 %-97 %] 94 %  BP: (146-165)/(86-94) 154/86     Weight: 112.1 kg (247 lb 3.2 oz)  Body mass index is 33.53 kg/m².    Intake/Output Summary (Last 24 hours) at 11/24/2024 1539  Last data filed at 11/24/2024 1048  Gross per 24 hour   Intake 480 ml   Output --   Net 480 ml         Physical Exam  Vitals and nursing note reviewed.   Constitutional:       General: He is awake. He is not in acute distress.     Appearance: Normal appearance. He is well-developed. He is obese. He is ill-appearing. He is not toxic-appearing.   HENT:      Head: Normocephalic and atraumatic.      Mouth/Throat:      Mouth: Mucous membranes are dry.      Pharynx: Oropharynx is clear.   Eyes:      Comments: Right eye vision loss    Cardiovascular:      Rate and Rhythm: Normal rate and regular rhythm.      Pulses: Normal pulses.      Heart sounds: Normal heart sounds. No murmur heard.     No friction rub. No gallop.   Pulmonary:      Breath sounds: No stridor. Rhonchi and rales present. No wheezing.   Abdominal:      General: Bowel sounds are normal.      Palpations: Abdomen is soft.      Tenderness: There is no abdominal tenderness. There is no guarding or rebound.   Musculoskeletal:      Right lower leg: No edema.      Left lower leg: No edema.      Comments: L. Chest wall tenderness. L. Foot boot in place   Skin:     General: Skin is warm and dry.      Capillary Refill: Capillary refill takes less than 2 seconds.      Comments: Bilateral heel protectors, sacral foam dressing in place appears c/d/i   Neurological:      Mental Status: He is alert and oriented to person, place, and time.   Psychiatric:         Mood and Affect: Mood normal.         Behavior: Behavior is cooperative.             Significant Labs: All pertinent labs within the past 24 hours have been reviewed.    Significant Imaging: I have reviewed all pertinent imaging results/findings within the past 24 hours.

## 2024-11-24 NOTE — ASSESSMENT & PLAN NOTE
Overall impression at the time of admission was that patient had sepsis with an abnormal CXR. Pressor support not provided. Patient initial started on Rocephin 1 gm IV then switched to zosyn plus azithromycin. Patient received a total of 6 days of IV antibiotics. Blood cultures show no growth to date. Blood pressure is now stable. Lactic acid 1.5 on 11/14. Resolved. Patient will need close monitor give respiratory status.      Today patient appears more stable. Still require oxygen supplementation. Overnight was afebrile, stable bp. Will continue IV antibiotic coverage.     11/24: Patient stable with stable but elevated bp; currently on supplemental oxygen. Has been afebrile overnight. IV abx discontinued.

## 2024-11-24 NOTE — ASSESSMENT & PLAN NOTE
Patient's most recent potassium results are listed below.   Recent Labs     11/22/24  0617 11/23/24  0532 11/24/24  0528   K 3.3* 3.0* 2.7*     Plan  - Monitor potassium Daily  - Patient initially presented with hyperkalemia requiring dialysis; now hypokalemic  - Asymptomatic, with no events on telemetry  - Monitor and if patient becomes symptomatic or continues to trend down replete

## 2024-11-24 NOTE — PROGRESS NOTES
[facility] - [address] [place] [address]  Nephrology  Progress Note    Patient Name: Saúl Butt  MRN: [id number]  Admission Date: 11/11/2024  Hospital Length of Stay: 13 days  Attending Provider: Benjy West MD   Primary Care Physician: Ruth, Primary Doctor  Principal Problem:Acute respiratory failure with hypoxemia    Subjective:     HPI: Chief complaint:  Shortness of breath    History of present illness-Mr. Butt is a 54-year-old  gentleman with end-stage renal disease who dialyzes on a Monday Wednesday Friday basis in [facility].  The patient's last dialysis was this past Friday.  The patient states he started to feel bad about 2 days prior to admission.  He has been having some nausea vomiting and diarrhea.  He has also developed shortness of breath associated with a cough productive yellowish sputum.  The patient feels like he has a virus.  He has had some sore throat as well.  The patient also complains of having shakes and chills.  We were asked see the patient for dialysis.  The patient's potassium was noted to be 8.4 on admission.  The patient states he has been eating couple oranges a day as well as a couple of containers of yogurt daily.    Interval History: The patient is sitting up resting.  He voices no complaints.  Serum potassium is 2 today.    Review of patient's allergies indicates:   Allergen Reactions    Fish containing products Swelling    Iodinated contrast media Swelling    Iodine Shortness Of Breath, Swelling and Anaphylaxis     Sob and swelling/itching/throat closes up per pt  Other reaction(s): Swelling, Hives, DifficultyBreat  Sob and swelling/itching/throat closes up per pt      Shellfish containing products Swelling    Iodine and iodide containing products      Current Facility-Administered Medications   Medication Frequency    0.9%  NaCl infusion (for blood administration) Q24H PRN    0.9%  NaCl infusion PRN    0.9%  NaCl infusion PRN     acetaminophen tablet 650 mg Q4H PRN    albuterol nebulizer solution 2.5 mg Q4H PRN    albuterol-ipratropium 2.5 mg-0.5 mg/3 mL nebulizer solution 3 mL Q6H    allopurinoL tablet 100 mg Daily    amLODIPine tablet 10 mg Daily    apixaban tablet 5 mg BID    chlorhexidine 0.12 % solution 15 mL BID    diltiaZEM tablet 30 mg Q6H    epoetin milla-epbx injection 10,000 Units Every Tues, Thurs, Sat    haloperidol lactate injection 0.5 mg Q12H PRN    heparin (porcine) injection 4,000 Units PRN    hydrALAZINE tablet 100 mg TID    labetaloL injection 10 mg Q4H PRN    LIDOcaine 5 % patch 2 patch Q24H    metoprolol injection 5 mg Q12H PRN    metoprolol tartrate (LOPRESSOR) tablet 100 mg BID    NIFEdipine 24 hr tablet 60 mg QHS    ondansetron injection 4 mg Q8H PRN    pantoprazole injection 40 mg Daily    QUEtiapine tablet 50 mg BID    sodium bicarbonate tablet 1,300 mg BID    sodium chloride 0.9% bolus 250 mL 250 mL PRN    sodium chloride 0.9% flush 10 mL PRN    trazodone split tablet 25 mg QHS    vitamin D 1000 units tablet 1,000 Units Daily       Objective:     Vital Signs (Most Recent):  Temp: 99.1 °F (37.3 °C) (11/24/24 0700)  Pulse: 104 (11/24/24 0728)  Resp: 20 (11/24/24 0728)  BP: (!) 165/94 (11/24/24 0701)  SpO2: (!) 94 % (11/24/24 0728) Vital Signs (24h Range):  Temp:  [97.9 °F (36.6 °C)-99.5 °F (37.5 °C)] 99.1 °F (37.3 °C)  Pulse:  [] 104  Resp:  [18-24] 20  SpO2:  [91 %-97 %] 94 %  BP: (146-166)/(85-94) 165/94     Weight: 112.1 kg (247 lb 3.2 oz) (11/23/24 0534)  Body mass index is 33.53 kg/m².  Body surface area is 2.39 meters squared.    No intake/output data recorded.     Physical Exam  Vitals reviewed.   Constitutional:       Appearance: Normal appearance.   HENT:      Head: Normocephalic and atraumatic.      Mouth/Throat:      Mouth: Mucous membranes are moist.   Cardiovascular:      Rate and Rhythm: Regular rhythm.      Heart sounds: Normal heart sounds.   Pulmonary:      Breath sounds: Normal breath sounds.    Abdominal:      Palpations: Abdomen is soft.   Musculoskeletal:      Cervical back: Neck supple.   Neurological:      Mental Status: He is alert and oriented to person, place, and time.   Psychiatric:         Mood and Affect: Mood normal.          Significant Labs:  BMP:   Recent Labs   Lab 11/24/24  0528   *   *   K 2.7*   CL 97*   CO2 22   BUN 40*   CREATININE 9.06*   CALCIUM 8.6   MG 2.4     CBC:   Recent Labs   Lab 11/24/24  0528   WBC 13.78*   RBC 2.69*   HGB 8.1*   HCT 26.1*   *   MCV 97.0*   MCH 30.1   MCHC 31.0*        Significant Imaging:  Labs: Reviewed  Assessment/Plan:     Renal/  Hyperkalemia  This has resolved.  Serum potassium is low.    ESRD on hemodialysis  Continue with scheduled hemodialysis for this patient.    Give one dose of KCL 20meq     Thank you for your consult. I will follow-up with patient. Please contact us if you have any additional questions.    George Rowell Jr, MD  Nephrology  [facility] - [address] [place] [address]

## 2024-11-24 NOTE — ASSESSMENT & PLAN NOTE
Hyperkalemia is likely due to ESRD.The patients most recent potassium results are listed below.  Recent Labs     11/22/24  0617 11/23/24  0532 11/24/24  0528   K 3.3* 3.0* 2.7*       Plan  - Monitor for arrhythmias with EKG and/or continuous telemetry.   - Treat the hyperkalemia with Potassium Binders if great than 4.5.   - Monitor potassium:  daily with BMP  - The patient's hyperkalemia is resolved    11/22: Dialysis today with 4L taken off. Patient returned to floor on 15L. Attempt made to wean down and patient desaturated to 89%. Placed back on 10L. Nephrology thinks it was secondary to taking off fluid. Will continue to monitor with daily BMP. Anticipate patient will continue to have hyperkalemia. Will monitor.      11/24: Resolved. Scheduled for HD tomorrow with neprhology.
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
## 2024-11-25 LAB
ANION GAP SERPL CALCULATED.3IONS-SCNC: 17 MMOL/L (ref 7–16)
BASOPHILS # BLD AUTO: 0.12 K/UL (ref 0–0.2)
BASOPHILS NFR BLD AUTO: 1 % (ref 0–1)
BUN SERPL-MCNC: 47 MG/DL (ref 8–26)
BUN/CREAT SERPL: 4 (ref 6–20)
CALCIUM SERPL-MCNC: 8.1 MG/DL (ref 8.4–10.2)
CHLORIDE SERPL-SCNC: 99 MMOL/L (ref 98–107)
CO2 SERPL-SCNC: 22 MMOL/L (ref 22–29)
CREAT SERPL-MCNC: 10.96 MG/DL (ref 0.72–1.25)
DIFFERENTIAL METHOD BLD: ABNORMAL
EGFR (NO RACE VARIABLE) (RUSH/TITUS): 5 ML/MIN/1.73M2
EOSINOPHIL # BLD AUTO: 0.71 K/UL (ref 0–0.5)
EOSINOPHIL NFR BLD AUTO: 6.2 % (ref 1–4)
ERYTHROCYTE [DISTWIDTH] IN BLOOD BY AUTOMATED COUNT: 18.3 % (ref 11.5–14.5)
GLUCOSE SERPL-MCNC: 120 MG/DL (ref 74–100)
HCT VFR BLD AUTO: 24 % (ref 40–54)
HGB BLD-MCNC: 7.5 G/DL (ref 13.5–18)
IMM GRANULOCYTES # BLD AUTO: 0.33 K/UL (ref 0–0.04)
IMM GRANULOCYTES NFR BLD: 2.9 % (ref 0–0.4)
LYMPHOCYTES # BLD AUTO: 1.46 K/UL (ref 1–4.8)
LYMPHOCYTES NFR BLD AUTO: 12.7 % (ref 27–41)
MAGNESIUM SERPL-MCNC: 2.4 MG/DL (ref 1.6–2.6)
MCH RBC QN AUTO: 30.6 PG (ref 27–31)
MCHC RBC AUTO-ENTMCNC: 31.3 G/DL (ref 32–36)
MCV RBC AUTO: 98 FL (ref 80–96)
MONOCYTES # BLD AUTO: 1.38 K/UL (ref 0–0.8)
MONOCYTES NFR BLD AUTO: 12 % (ref 2–6)
MPC BLD CALC-MCNC: 10.3 FL (ref 9.4–12.4)
NEUTROPHILS # BLD AUTO: 7.54 K/UL (ref 1.8–7.7)
NEUTROPHILS NFR BLD AUTO: 65.2 % (ref 53–65)
NRBC # BLD AUTO: 0 X10E3/UL
NRBC, AUTO (.00): 0 %
PHOSPHATE SERPL-MCNC: 7.3 MG/DL (ref 2.3–4.7)
PLATELET # BLD AUTO: 426 K/UL (ref 150–400)
POTASSIUM SERPL-SCNC: 3 MMOL/L (ref 3.5–5.1)
RBC # BLD AUTO: 2.45 M/UL (ref 4.6–6.2)
SODIUM SERPL-SCNC: 135 MMOL/L (ref 136–145)
WBC # BLD AUTO: 11.54 K/UL (ref 4.5–11)

## 2024-11-25 PROCEDURE — 97110 THERAPEUTIC EXERCISES: CPT

## 2024-11-25 PROCEDURE — 11000001 HC ACUTE MED/SURG PRIVATE ROOM

## 2024-11-25 PROCEDURE — 25000003 PHARM REV CODE 250: Performed by: HOSPITALIST

## 2024-11-25 PROCEDURE — 94640 AIRWAY INHALATION TREATMENT: CPT

## 2024-11-25 PROCEDURE — 99232 SBSQ HOSP IP/OBS MODERATE 35: CPT | Mod: GC,,, | Performed by: FAMILY MEDICINE

## 2024-11-25 PROCEDURE — 90935 HEMODIALYSIS ONE EVALUATION: CPT

## 2024-11-25 PROCEDURE — 25000003 PHARM REV CODE 250: Performed by: INTERNAL MEDICINE

## 2024-11-25 PROCEDURE — 27000221 HC OXYGEN, UP TO 24 HOURS

## 2024-11-25 PROCEDURE — 25000003 PHARM REV CODE 250: Performed by: STUDENT IN AN ORGANIZED HEALTH CARE EDUCATION/TRAINING PROGRAM

## 2024-11-25 PROCEDURE — 99900035 HC TECH TIME PER 15 MIN (STAT)

## 2024-11-25 PROCEDURE — 63600175 PHARM REV CODE 636 W HCPCS: Mod: JZ,JG | Performed by: INTERNAL MEDICINE

## 2024-11-25 PROCEDURE — 63600175 PHARM REV CODE 636 W HCPCS: Performed by: STUDENT IN AN ORGANIZED HEALTH CARE EDUCATION/TRAINING PROGRAM

## 2024-11-25 PROCEDURE — 84100 ASSAY OF PHOSPHORUS: CPT | Performed by: STUDENT IN AN ORGANIZED HEALTH CARE EDUCATION/TRAINING PROGRAM

## 2024-11-25 PROCEDURE — 94799 UNLISTED PULMONARY SVC/PX: CPT

## 2024-11-25 PROCEDURE — 25000242 PHARM REV CODE 250 ALT 637 W/ HCPCS: Performed by: STUDENT IN AN ORGANIZED HEALTH CARE EDUCATION/TRAINING PROGRAM

## 2024-11-25 PROCEDURE — 83735 ASSAY OF MAGNESIUM: CPT | Performed by: STUDENT IN AN ORGANIZED HEALTH CARE EDUCATION/TRAINING PROGRAM

## 2024-11-25 PROCEDURE — 94761 N-INVAS EAR/PLS OXIMETRY MLT: CPT

## 2024-11-25 PROCEDURE — 97530 THERAPEUTIC ACTIVITIES: CPT

## 2024-11-25 PROCEDURE — 85025 COMPLETE CBC W/AUTO DIFF WBC: CPT | Performed by: INTERNAL MEDICINE

## 2024-11-25 PROCEDURE — 25000003 PHARM REV CODE 250

## 2024-11-25 PROCEDURE — 25000003 PHARM REV CODE 250: Performed by: NURSE PRACTITIONER

## 2024-11-25 PROCEDURE — 80048 BASIC METABOLIC PNL TOTAL CA: CPT | Performed by: INTERNAL MEDICINE

## 2024-11-25 PROCEDURE — 36415 COLL VENOUS BLD VENIPUNCTURE: CPT | Performed by: INTERNAL MEDICINE

## 2024-11-25 RX ORDER — LORAZEPAM 1 MG/1
1 TABLET ORAL EVERY 6 HOURS PRN
Status: DISCONTINUED | OUTPATIENT
Start: 2024-11-25 | End: 2024-12-02 | Stop reason: HOSPADM

## 2024-11-25 RX ORDER — SODIUM CHLORIDE 9 MG/ML
INJECTION, SOLUTION INTRAVENOUS ONCE
Status: DISCONTINUED | OUTPATIENT
Start: 2024-11-25 | End: 2024-12-02 | Stop reason: HOSPADM

## 2024-11-25 RX ORDER — SODIUM CHLORIDE 9 MG/ML
INJECTION, SOLUTION INTRAVENOUS
Status: DISCONTINUED | OUTPATIENT
Start: 2024-11-25 | End: 2024-12-02 | Stop reason: HOSPADM

## 2024-11-25 RX ADMIN — Medication 1000 UNITS: at 09:11

## 2024-11-25 RX ADMIN — SODIUM BICARBONATE 650 MG TABLET 1300 MG: at 09:11

## 2024-11-25 RX ADMIN — HYDRALAZINE HYDROCHLORIDE 100 MG: 100 TABLET ORAL at 02:11

## 2024-11-25 RX ADMIN — QUETIAPINE FUMARATE 50 MG: 25 TABLET ORAL at 09:11

## 2024-11-25 RX ADMIN — LORAZEPAM 1 MG: 1 TABLET ORAL at 04:11

## 2024-11-25 RX ADMIN — CHLORHEXIDINE GLUCONATE 15 ML: 1.2 RINSE ORAL at 09:11

## 2024-11-25 RX ADMIN — AMLODIPINE BESYLATE 10 MG: 10 TABLET ORAL at 09:11

## 2024-11-25 RX ADMIN — LIDOCAINE 5% 2 PATCH: 700 PATCH TOPICAL at 09:11

## 2024-11-25 RX ADMIN — IPRATROPIUM BROMIDE AND ALBUTEROL SULFATE 3 ML: .5; 3 SOLUTION RESPIRATORY (INHALATION) at 01:11

## 2024-11-25 RX ADMIN — SODIUM BICARBONATE 650 MG TABLET 1300 MG: at 08:11

## 2024-11-25 RX ADMIN — IPRATROPIUM BROMIDE AND ALBUTEROL SULFATE 3 ML: .5; 3 SOLUTION RESPIRATORY (INHALATION) at 07:11

## 2024-11-25 RX ADMIN — METOPROLOL TARTRATE 100 MG: 100 TABLET, FILM COATED ORAL at 09:11

## 2024-11-25 RX ADMIN — LORAZEPAM 1 MG: 1 TABLET ORAL at 02:11

## 2024-11-25 RX ADMIN — HYDRALAZINE HYDROCHLORIDE 100 MG: 100 TABLET ORAL at 08:11

## 2024-11-25 RX ADMIN — DILTIAZEM HYDROCHLORIDE 30 MG: 30 TABLET, FILM COATED ORAL at 02:11

## 2024-11-25 RX ADMIN — APIXABAN 5 MG: 5 TABLET, FILM COATED ORAL at 09:11

## 2024-11-25 RX ADMIN — APIXABAN 5 MG: 5 TABLET, FILM COATED ORAL at 08:11

## 2024-11-25 RX ADMIN — PANTOPRAZOLE SODIUM 40 MG: 40 INJECTION, POWDER, LYOPHILIZED, FOR SOLUTION INTRAVENOUS at 09:11

## 2024-11-25 RX ADMIN — DILTIAZEM HYDROCHLORIDE 30 MG: 30 TABLET, FILM COATED ORAL at 12:11

## 2024-11-25 RX ADMIN — CHLORHEXIDINE GLUCONATE 15 ML: 1.2 RINSE ORAL at 08:11

## 2024-11-25 RX ADMIN — IPRATROPIUM BROMIDE AND ALBUTEROL SULFATE 3 ML: .5; 3 SOLUTION RESPIRATORY (INHALATION) at 03:11

## 2024-11-25 RX ADMIN — NIFEDIPINE 60 MG: 60 TABLET, FILM COATED, EXTENDED RELEASE ORAL at 08:11

## 2024-11-25 RX ADMIN — METOPROLOL TARTRATE 100 MG: 100 TABLET, FILM COATED ORAL at 08:11

## 2024-11-25 RX ADMIN — EPOETIN ALFA-EPBX 10000 UNITS: 10000 INJECTION, SOLUTION INTRAVENOUS; SUBCUTANEOUS at 02:11

## 2024-11-25 RX ADMIN — QUETIAPINE FUMARATE 50 MG: 25 TABLET ORAL at 08:11

## 2024-11-25 RX ADMIN — DILTIAZEM HYDROCHLORIDE 30 MG: 30 TABLET, FILM COATED ORAL at 06:11

## 2024-11-25 RX ADMIN — SODIUM CHLORIDE 2000 ML: 9 INJECTION, SOLUTION INTRAVENOUS at 10:11

## 2024-11-25 RX ADMIN — ACETAMINOPHEN 650 MG: 325 TABLET ORAL at 02:11

## 2024-11-25 RX ADMIN — HYDRALAZINE HYDROCHLORIDE 100 MG: 100 TABLET ORAL at 09:11

## 2024-11-25 RX ADMIN — TRAZODONE HYDROCHLORIDE 25 MG: 50 TABLET ORAL at 08:11

## 2024-11-25 RX ADMIN — ALLOPURINOL 100 MG: 100 TABLET ORAL at 09:11

## 2024-11-25 RX ADMIN — HEPARIN SODIUM 4000 UNITS: 1000 INJECTION, SOLUTION INTRAVENOUS; SUBCUTANEOUS at 02:11

## 2024-11-25 NOTE — PLAN OF CARE
Problem: Adult Inpatient Plan of Care  Goal: Absence of Hospital-Acquired Illness or Injury  Outcome: Progressing     Problem: Gas Exchange Impaired  Goal: Optimal Gas Exchange  Outcome: Progressing     Problem: Airway Clearance Ineffective  Goal: Effective Airway Clearance  Outcome: Progressing

## 2024-11-25 NOTE — SUBJECTIVE & OBJECTIVE
Interval History: Patient seen resting comfortably sitting on the side of the bed.  No acute events overnight.  Patient on 4L NC with sats in mid 90's. Seen by nephology and given KCL 20 meq. Repeat potassium 3.0. Pain well controlled on Lidocaine path. [facility] declined due to insurance. Awaiting swing bed placement at [facility].     Review of Systems   Constitutional:  Positive for fatigue. Negative for fever.   HENT: Negative.     Respiratory:  Positive for shortness of breath. Negative for cough, choking and wheezing.    Cardiovascular:  Negative for chest pain, palpitations and leg swelling.   Gastrointestinal:  Negative for abdominal pain, nausea and vomiting.   Genitourinary:  Negative for decreased urine volume.        ESRD on HD   Musculoskeletal:         L. Foot fracture   Skin:  Positive for wound.   Neurological:  Negative for weakness, light-headedness and headaches.   All other systems reviewed and are negative.    Objective:     Vital Signs (Most Recent):  Temp: 99.1 °F (37.3 °C) (11/25/24 0729)  Pulse: 85 (11/25/24 0729)  Resp: 20 (11/25/24 0729)  BP: (!) 141/77 (11/25/24 0729)  SpO2: 96 % (11/25/24 0729) Vital Signs (24h Range):  Temp:  [98.3 °F (36.8 °C)-99.5 °F (37.5 °C)] 99.1 °F (37.3 °C)  Pulse:  [] 85  Resp:  [18-25] 20  SpO2:  [94 %-100 %] 96 %  BP: (123-141)/(73-82) 141/77     Weight: 112.1 kg (247 lb 3.2 oz)  Body mass index is 33.53 kg/m².  No intake or output data in the 24 hours ending 11/25/24 1128        Physical Exam  Vitals and nursing note reviewed.   Constitutional:       General: He is awake. He is not in acute distress.     Appearance: Normal appearance. He is well-developed. He is obese. He is not ill-appearing or toxic-appearing.   HENT:      Head: Normocephalic and atraumatic.      Mouth/Throat:      Mouth: Mucous membranes are dry.      Pharynx: Oropharynx is clear.   Eyes:      Comments: Right eye vision loss   Cardiovascular:      Rate and Rhythm: Normal rate and  regular rhythm.      Pulses: Normal pulses.      Heart sounds: Normal heart sounds. No murmur heard.     No friction rub. No gallop.   Pulmonary:      Breath sounds: No stridor. Rhonchi and rales present. No wheezing.   Abdominal:      General: Bowel sounds are normal.      Palpations: Abdomen is soft.      Tenderness: There is no abdominal tenderness. There is no guarding or rebound.   Musculoskeletal:      Right lower leg: No edema.      Left lower leg: No edema.      Comments: L. Chest wall tenderness. L. Foot boot in place   Skin:     General: Skin is warm and dry.      Capillary Refill: Capillary refill takes less than 2 seconds.      Comments: Bilateral heel protectors, sacral foam dressing in place appears c/d/i   Neurological:      Mental Status: He is alert and oriented to person, place, and time.   Psychiatric:         Mood and Affect: Mood normal.         Behavior: Behavior is cooperative.             Significant Labs: All pertinent labs within the past 24 hours have been reviewed.    Significant Imaging: I have reviewed all pertinent imaging results/findings within the past 24 hours.

## 2024-11-25 NOTE — ASSESSMENT & PLAN NOTE
Patient's most recent potassium results are listed below.   Recent Labs     11/23/24  0532 11/24/24  0528 11/25/24  0520   K 3.0* 2.7* 3.0*       Plan  - Given KCL 20 meq yesterday  - Hypokalemia improving   - Dialysis today   - Monitor potassium Daily  - Asymptomatic, with no events on telemetry

## 2024-11-25 NOTE — PT/OT/SLP PROGRESS
Occupational Therapy   Treatment    Name: Saúl Butt  MRN: [id number]  Admitting Diagnosis:  Acute respiratory failure with hypoxemia       Recommendations:     Discharge Recommendations: Moderate Intensity Therapy  Discharge Equipment Recommendations:  to be determined by next level of care  Barriers to discharge:  Other (Comment) (ongoing medical treatment)    Assessment:     Saúl Butt is a 54 y.o. male with a medical diagnosis of Acute respiratory failure with hypoxemia.  He presents with weakness and decline in ADLs. Performance deficits affecting function are weakness, impaired endurance, impaired self care skills, impaired functional mobility, gait instability, impaired balance.     Rehab Prognosis:  Good; patient would benefit from acute skilled OT services to address these deficits and reach maximum level of function.       Plan:     Patient to be seen 5 x/week to address the above listed problems via self-care/home management, therapeutic activities, therapeutic exercises  Plan of Care Expires: 12/17/24  Plan of Care Reviewed with: patient    Subjective     Chief Complaint: acute respiratory failure with hypoxemia  Patient/Family Comments/goals: pt agreeable to OT tx  Pain/Comfort:  Pain Rating 1: 0/10    Objective:     Communicated with: RON Acevedo prior to session.  Patient found HOB elevated with peripheral IV, oxygen, telemetry upon OT entry to room.    General Precautions: Standard, fall    Orthopedic Precautions:N/A  Braces: N/A  Respiratory Status: Nasal cannula, flow 3 L/min     Occupational Performance:     Bed Mobility:    Not performed     Functional Mobility/Transfers:  Not performed    Activities of Daily Living:  Not performed      [facility] 6 Click ADL:      Treatment & Education:  Pt performed x 15 reps each bicep curls 2#db and IR/ER 2#db in order to improve BUE strength and endurance to perform ADL and ADL t/f with less assist. Pt declined further tx.     Patient left HOB elevated with  all lines intact and call button in reach    GOALS:   Multidisciplinary Problems       Occupational Therapy Goals          Problem: Occupational Therapy    Goal Priority Disciplines Outcome Interventions   Occupational Therapy Goal     OT, PT/OT Progressing    Description: STG:  Pt will perform grooming with setup  Pt will bathe with ModA with setup at EOB  Pt will perform UE dressing with Genesis  Pt will perform LE dressing with ModA  Pt will sit EOB x 10 min with CGA  Pt will transfer bed/chair/bsc with Genesis with RW  Pt will perform standing task x 2 min with Genesis with RW  Pt will tolerate 15 minutes of tx without fatigue      LT.Restore to max I with self care and mobility.                           Time Tracking:     OT Date of Treatment: 24  OT Start Time: 946  OT Stop Time: 1000  OT Total Time (min): 14 min    Billable Minutes:Therapeutic Exercise 10 minutes    OT/NAVNEET: OT          2024

## 2024-11-25 NOTE — ASSESSMENT & PLAN NOTE
Overall impression at the time of admission was that patient had sepsis with an abnormal CXR. Pressor support not provided. Patient initial started on Rocephin 1 gm IV then switched to zosyn plus azithromycin. Patient received a total of 6 days of IV antibiotics. Blood cultures show no growth to date. Blood pressure is now stable. Lactic acid 1.5 on 11/14. Resolved. Patient will need close monitor give respiratory status.      Today patient appears more stable. Still require oxygen supplementation. Overnight was afebrile, stable bp. Will continue IV antibiotic coverage.     11/24: Patient stable with stable but elevated bp; currently on supplemental oxygen. Has been afebrile overnight. IV abx discontinued.   11/25: Stable. Will continue to monitor.

## 2024-11-25 NOTE — ASSESSMENT & PLAN NOTE
[place] Thyroid prescription re-sent to [facility]. I called the pharmacy and confirmed prescription.   Hyperkalemia is likely due to ESRD.The patients most recent potassium results are listed below.  Recent Labs     11/23/24  0532 11/24/24  0528 11/25/24  0520   K 3.0* 2.7* 3.0*       Plan  - Monitor for arrhythmias with EKG and/or continuous telemetry.   - Treat the hyperkalemia with Potassium Binders if great than 4.5.   - Monitor potassium:  daily with BMP  - The patient's hyperkalemia is resolved    11/22: Dialysis today with 4L taken off. Patient returned to floor on 15L. Attempt made to wean down and patient desaturated to 89%. Placed back on 10L. Nephrology thinks it was secondary to taking off fluid. Will continue to monitor with daily BMP. Anticipate patient will continue to have hyperkalemia. Will monitor.      11/24: Resolved. Scheduled for HD tomorrow with neprhology.  11/25: Regularly scheduled dialysis. Will continue to monitor.

## 2024-11-25 NOTE — PT/OT/SLP PROGRESS
Physical Therapy Treatment    Patient Name:  Saúl Butt   MRN:  [id number]    Recommendations:     Discharge Recommendations: Moderate Intensity Therapy  Discharge Equipment Recommendations: to be determined by next level of care  Barriers to discharge: Decreased caregiver support    Assessment:     Saúl Butt is a 54 y.o. male admitted with a medical diagnosis of Acute respiratory failure with hypoxemia.  He presents with the following impairments/functional limitations: weakness, impaired endurance, impaired self care skills, impaired functional mobility, decreased lower extremity function, decreased safety awareness Pt has good pain control. Pt has improved bed mobility and transfers. During exercise pt appeared mildly short of breath, but was able to complete all exercises. He is much improved but still likely needs swing bed    Rehab Prognosis: Good; patient would benefit from acute skilled PT services to address these deficits and reach maximum level of function.    Recent Surgery: * No surgery found *      Plan:     During this hospitalization, patient to be seen 5 x/week to address the identified rehab impairments via gait training, therapeutic activities, therapeutic exercises, neuromuscular re-education and progress toward the following goals:    Plan of Care Expires:  12/24/24    Subjective     Chief Complaint: none  Patient/Family Comments/goals: Pt is agreeable to PT.  Pain/Comfort:  Pain Rating 1: 0/10  Pain Rating Post-Intervention 1: 0/10      Objective:     Communicated with HELENA Faustin RN prior to session.  Patient found HOB elevated with peripheral IV, oxygen, telemetry upon PT entry to room.     General Precautions: Standard, fall  Orthopedic Precautions: N/A  Braces: N/A  Respiratory Status: Nasal cannula, flow 3 L/min     Functional Mobility:  Bed Mobility:     Scooting: independence  Supine to Sit: independence  Transfers:     Sit to Stand:  minimum assistance with rolling walker and gait  belt  Bed to Chair: contact guard assistance with  rolling walker  using  Step Transfer  Toilet Transfer: minimum assistance with  rolling walker and gait belt  using  Step Transfer  Balance: good      AM-PAC 6 CLICK MOBILITY  Turning over in bed (including adjusting bedclothes, sheets and blankets)?: 4  Sitting down on and standing up from a chair with arms (e.g., wheelchair, bedside commode, etc.): 3  Moving from lying on back to sitting on the side of the bed?: 4  Moving to and from a bed to a chair (including a wheelchair)?: 4  Need to walk in hospital room?: 2  Climbing 3-5 steps with a railing?: 1  Basic Mobility Total Score: 18       Treatment & Education:  Pt performed bilateral LE: bed level exercises: ankle pumps, quad sets, heel slides, and hip abduction and seated exercises: long arc quads and marches x 30 each      Patient left up in chair with all lines intact and call button in reach..    GOALS:   Multidisciplinary Problems       Physical Therapy Goals          Problem: Physical Therapy    Goal Priority Disciplines Outcome Interventions   Physical Therapy Goal     PT, PT/OT Progressing    Description: Short term goals:  . Supine to sit with Contact Guard Assistance  . Sit to stand transfer with Contact Guard Assistance  . Gait  x 75 feet with Contact Guard Assistance using Rolling Walker.     Long term goals:  Patient will regain full independent functional mobility with lowest level of assistive device to return to desired living arrangement and prior ADL's.                          Time Tracking:     PT Received On: 11/25/24  PT Start Time: 1547     PT Stop Time: 1618  PT Total Time (min): 31 min     Billable Minutes: Therapeutic Activity 11 and Therapeutic Exercise 20    Treatment Type: Treatment  PT/PTA: PT     Number of PTA visits since last PT visit: 0     11/25/2024

## 2024-11-25 NOTE — ASSESSMENT & PLAN NOTE
Creatine stable for now. BMP reviewed- noted Estimated Creatinine Clearance: 10.2 mL/min (A) (based on SCr of 10.96 mg/dL (H)). according to latest data. Based on current GFR, CKD stage is end stage.  Monitor UOP and serial BMP and adjust therapy as needed. Renally dose meds. Avoid nephrotoxic medications and procedures.     Continue HD, Monday Wednesday Friday.     11/22: Taken to dialysis today. Patient is net negative 4L.     11/24: Dialysis tomorrow.   11/25: Continue scheduled HD while in the hospital. Patient followed by nephrology.

## 2024-11-25 NOTE — PLAN OF CARE
[facility] declined pt due to pt does not meet the criteria for inpt rehab. Updates faxed to [facility] and awaiting approval. CM notified pt's mother of denial and updated on where we were at with [facility].
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
## 2024-11-25 NOTE — PLAN OF CARE
Pt's mother called and is requesting a referral to be sent to [facility]. CM faxed referral. Also wanted a referral to be sent to [facility] but they do not accept pt's insurance.
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
## 2024-11-25 NOTE — ASSESSMENT & PLAN NOTE
Anemia is likely due to chronic disease due to ESRD. Most recent hemoglobin and hematocrit are listed below.  Recent Labs     11/23/24  0532 11/24/24  0528 11/25/24  0520   HGB 9.1* 8.1* 7.5*   HCT 28.8* 26.1* 24.0*       Plan  - Monitor serial CBC: Daily  - Transfuse PRBC if patient becomes hemodynamically unstable, symptomatic or H/H drops below 7/21.  - Patient has received 2 units of PRBCs on 11/20/24  - Patient's anemia is currently stable at 8-9    Hemoglobin improved slightly overnight. On EPO 10,000 units.

## 2024-11-26 LAB
ANION GAP SERPL CALCULATED.3IONS-SCNC: 17 MMOL/L (ref 7–16)
BASOPHILS # BLD AUTO: 0.12 K/UL (ref 0–0.2)
BASOPHILS NFR BLD AUTO: 1.2 % (ref 0–1)
BUN SERPL-MCNC: 30 MG/DL (ref 8–26)
BUN/CREAT SERPL: 3 (ref 6–20)
CALCIUM SERPL-MCNC: 8.8 MG/DL (ref 8.4–10.2)
CHLORIDE SERPL-SCNC: 98 MMOL/L (ref 98–107)
CO2 SERPL-SCNC: 25 MMOL/L (ref 22–29)
CREAT SERPL-MCNC: 8.69 MG/DL (ref 0.72–1.25)
DIFFERENTIAL METHOD BLD: ABNORMAL
EGFR (NO RACE VARIABLE) (RUSH/TITUS): 7 ML/MIN/1.73M2
EOSINOPHIL # BLD AUTO: 0.57 K/UL (ref 0–0.5)
EOSINOPHIL NFR BLD AUTO: 5.6 % (ref 1–4)
ERYTHROCYTE [DISTWIDTH] IN BLOOD BY AUTOMATED COUNT: 17.7 % (ref 11.5–14.5)
GLUCOSE SERPL-MCNC: 108 MG/DL (ref 74–100)
HCT VFR BLD AUTO: 27.4 % (ref 40–54)
HGB BLD-MCNC: 8.6 G/DL (ref 13.5–18)
IMM GRANULOCYTES # BLD AUTO: 0.2 K/UL (ref 0–0.04)
IMM GRANULOCYTES NFR BLD: 2 % (ref 0–0.4)
LYMPHOCYTES # BLD AUTO: 1.35 K/UL (ref 1–4.8)
LYMPHOCYTES NFR BLD AUTO: 13.4 % (ref 27–41)
MAGNESIUM SERPL-MCNC: 2.3 MG/DL (ref 1.6–2.6)
MCH RBC QN AUTO: 30.6 PG (ref 27–31)
MCHC RBC AUTO-ENTMCNC: 31.4 G/DL (ref 32–36)
MCV RBC AUTO: 97.5 FL (ref 80–96)
MONOCYTES # BLD AUTO: 1.11 K/UL (ref 0–0.8)
MONOCYTES NFR BLD AUTO: 11 % (ref 2–6)
MPC BLD CALC-MCNC: 10.6 FL (ref 9.4–12.4)
NEUTROPHILS # BLD AUTO: 6.76 K/UL (ref 1.8–7.7)
NEUTROPHILS NFR BLD AUTO: 66.8 % (ref 53–65)
NRBC # BLD AUTO: 0 X10E3/UL
NRBC, AUTO (.00): 0 %
PHOSPHATE SERPL-MCNC: 4.9 MG/DL (ref 2.3–4.7)
PLATELET # BLD AUTO: 425 K/UL (ref 150–400)
POTASSIUM SERPL-SCNC: 3.1 MMOL/L (ref 3.5–5.1)
RBC # BLD AUTO: 2.81 M/UL (ref 4.6–6.2)
SODIUM SERPL-SCNC: 137 MMOL/L (ref 136–145)
TRIGL SERPL-MCNC: 183 MG/DL (ref 34–140)
WBC # BLD AUTO: 10.11 K/UL (ref 4.5–11)

## 2024-11-26 PROCEDURE — 36415 COLL VENOUS BLD VENIPUNCTURE: CPT | Performed by: INTERNAL MEDICINE

## 2024-11-26 PROCEDURE — 84100 ASSAY OF PHOSPHORUS: CPT | Performed by: STUDENT IN AN ORGANIZED HEALTH CARE EDUCATION/TRAINING PROGRAM

## 2024-11-26 PROCEDURE — 11000001 HC ACUTE MED/SURG PRIVATE ROOM

## 2024-11-26 PROCEDURE — 84478 ASSAY OF TRIGLYCERIDES: CPT | Performed by: INTERNAL MEDICINE

## 2024-11-26 PROCEDURE — 25000003 PHARM REV CODE 250

## 2024-11-26 PROCEDURE — 99233 SBSQ HOSP IP/OBS HIGH 50: CPT | Mod: GC,,, | Performed by: FAMILY MEDICINE

## 2024-11-26 PROCEDURE — 83735 ASSAY OF MAGNESIUM: CPT | Performed by: STUDENT IN AN ORGANIZED HEALTH CARE EDUCATION/TRAINING PROGRAM

## 2024-11-26 PROCEDURE — 25000003 PHARM REV CODE 250: Performed by: NURSE PRACTITIONER

## 2024-11-26 PROCEDURE — 25000003 PHARM REV CODE 250: Performed by: INTERNAL MEDICINE

## 2024-11-26 PROCEDURE — 85025 COMPLETE CBC W/AUTO DIFF WBC: CPT | Performed by: INTERNAL MEDICINE

## 2024-11-26 PROCEDURE — 97110 THERAPEUTIC EXERCISES: CPT

## 2024-11-26 PROCEDURE — 25000003 PHARM REV CODE 250: Performed by: HOSPITALIST

## 2024-11-26 PROCEDURE — 25000242 PHARM REV CODE 250 ALT 637 W/ HCPCS: Performed by: STUDENT IN AN ORGANIZED HEALTH CARE EDUCATION/TRAINING PROGRAM

## 2024-11-26 PROCEDURE — 27000221 HC OXYGEN, UP TO 24 HOURS

## 2024-11-26 PROCEDURE — 94761 N-INVAS EAR/PLS OXIMETRY MLT: CPT

## 2024-11-26 PROCEDURE — 63600175 PHARM REV CODE 636 W HCPCS

## 2024-11-26 PROCEDURE — 25000003 PHARM REV CODE 250: Performed by: STUDENT IN AN ORGANIZED HEALTH CARE EDUCATION/TRAINING PROGRAM

## 2024-11-26 PROCEDURE — 63600175 PHARM REV CODE 636 W HCPCS: Performed by: STUDENT IN AN ORGANIZED HEALTH CARE EDUCATION/TRAINING PROGRAM

## 2024-11-26 PROCEDURE — 80048 BASIC METABOLIC PNL TOTAL CA: CPT | Performed by: INTERNAL MEDICINE

## 2024-11-26 PROCEDURE — 99900035 HC TECH TIME PER 15 MIN (STAT)

## 2024-11-26 PROCEDURE — 94640 AIRWAY INHALATION TREATMENT: CPT

## 2024-11-26 RX ORDER — POTASSIUM CHLORIDE 750 MG/1
30 TABLET, EXTENDED RELEASE ORAL EVERY 6 HOURS
Status: COMPLETED | OUTPATIENT
Start: 2024-11-26 | End: 2024-11-27

## 2024-11-26 RX ADMIN — ALLOPURINOL 100 MG: 100 TABLET ORAL at 08:11

## 2024-11-26 RX ADMIN — IPRATROPIUM BROMIDE AND ALBUTEROL SULFATE 3 ML: .5; 3 SOLUTION RESPIRATORY (INHALATION) at 01:11

## 2024-11-26 RX ADMIN — Medication 1000 UNITS: at 08:11

## 2024-11-26 RX ADMIN — IPRATROPIUM BROMIDE AND ALBUTEROL SULFATE 3 ML: .5; 3 SOLUTION RESPIRATORY (INHALATION) at 12:11

## 2024-11-26 RX ADMIN — METOPROLOL TARTRATE 100 MG: 100 TABLET, FILM COATED ORAL at 09:11

## 2024-11-26 RX ADMIN — PIPERACILLIN SODIUM AND TAZOBACTAM SODIUM 4.5 G: 4; .5 INJECTION, POWDER, LYOPHILIZED, FOR SOLUTION INTRAVENOUS at 09:11

## 2024-11-26 RX ADMIN — HYDRALAZINE HYDROCHLORIDE 100 MG: 100 TABLET ORAL at 08:11

## 2024-11-26 RX ADMIN — NIFEDIPINE 60 MG: 60 TABLET, FILM COATED, EXTENDED RELEASE ORAL at 09:11

## 2024-11-26 RX ADMIN — SODIUM BICARBONATE 650 MG TABLET 1300 MG: at 09:11

## 2024-11-26 RX ADMIN — CHLORHEXIDINE GLUCONATE 15 ML: 1.2 RINSE ORAL at 09:11

## 2024-11-26 RX ADMIN — QUETIAPINE FUMARATE 50 MG: 25 TABLET ORAL at 09:11

## 2024-11-26 RX ADMIN — TRAZODONE HYDROCHLORIDE 25 MG: 50 TABLET ORAL at 09:11

## 2024-11-26 RX ADMIN — DILTIAZEM HYDROCHLORIDE 30 MG: 30 TABLET, FILM COATED ORAL at 11:11

## 2024-11-26 RX ADMIN — LORAZEPAM 1 MG: 1 TABLET ORAL at 04:11

## 2024-11-26 RX ADMIN — DILTIAZEM HYDROCHLORIDE 30 MG: 30 TABLET, FILM COATED ORAL at 06:11

## 2024-11-26 RX ADMIN — CHLORHEXIDINE GLUCONATE 15 ML: 1.2 RINSE ORAL at 08:11

## 2024-11-26 RX ADMIN — METOPROLOL TARTRATE 100 MG: 100 TABLET, FILM COATED ORAL at 08:11

## 2024-11-26 RX ADMIN — POTASSIUM CHLORIDE 30 MEQ: 750 TABLET, EXTENDED RELEASE ORAL at 06:11

## 2024-11-26 RX ADMIN — IPRATROPIUM BROMIDE AND ALBUTEROL SULFATE 3 ML: .5; 3 SOLUTION RESPIRATORY (INHALATION) at 07:11

## 2024-11-26 RX ADMIN — QUETIAPINE FUMARATE 50 MG: 25 TABLET ORAL at 08:11

## 2024-11-26 RX ADMIN — LIDOCAINE 5% 2 PATCH: 700 PATCH TOPICAL at 08:11

## 2024-11-26 RX ADMIN — DILTIAZEM HYDROCHLORIDE 30 MG: 30 TABLET, FILM COATED ORAL at 01:11

## 2024-11-26 RX ADMIN — PANTOPRAZOLE SODIUM 40 MG: 40 INJECTION, POWDER, LYOPHILIZED, FOR SOLUTION INTRAVENOUS at 08:11

## 2024-11-26 RX ADMIN — APIXABAN 5 MG: 5 TABLET, FILM COATED ORAL at 08:11

## 2024-11-26 RX ADMIN — HYDRALAZINE HYDROCHLORIDE 100 MG: 100 TABLET ORAL at 09:11

## 2024-11-26 RX ADMIN — APIXABAN 5 MG: 5 TABLET, FILM COATED ORAL at 09:11

## 2024-11-26 RX ADMIN — LORAZEPAM 1 MG: 1 TABLET ORAL at 01:11

## 2024-11-26 RX ADMIN — HYDRALAZINE HYDROCHLORIDE 100 MG: 100 TABLET ORAL at 04:11

## 2024-11-26 RX ADMIN — AMLODIPINE BESYLATE 10 MG: 10 TABLET ORAL at 08:11

## 2024-11-26 RX ADMIN — SODIUM BICARBONATE 650 MG TABLET 1300 MG: at 08:11

## 2024-11-26 NOTE — PROGRESS NOTES
[facility]  Adult Nutrition  Follow-up Note         Reason for Assessment  Reason For Assessment: RD follow-up   Nutrition Risk Screen: no indicators present    Assessment and Plan  11/26/2024 RD Follow up: Patient liberalized to a regular diet. Po intakes improved to 50% per flowsheets. Continues with HD. Current weight 112.1 kg. Recommend to add Novasource Renal BID to better meet estimated energy needs. Continue diet and encourage po intakes. RD Following.     11/22/2024: RD follow up. Patient advanced to a Renal diet. Poor PO intakes documented, generally 25% or refusing meals. Discussed with MD during IDT rounds. Decision to liberalize to see if regular diet will improve intakes. Encourage good PO intakes. If continued poor PO intakes, may need to resume alternate route of nutrition to meet needs. RD following.    11/19/2024: RD follow up. Patient extubated to 3L NC. SLP evaluation pending. Recommend advance diet per SLP recommendations. If unable to advance PO diet, recommend resume enteral feeding. Last BM 11/18. RD following.      11/15/2024: RD follow up. Patient remains intubated and on enteral feeding. Feeding currently at 30mL/hour with no tolerance issues noted. Recommend continue to advance towards goal rate of 45mL/hour as tolerated. RD following.     11/13/2024: Consult received and appreciated. Consult for nutrition recs-patient is intubated. Patient is a 53yo male admitted 11/11 for hyperkalemia.     Patient is 108.3kg with a BMI of 32.38 and is obese. He has a PMH of ESRD on HD. Recommend start Novasource Renal with a goal rate of 45mL/hour with 30mL/hour free water flush. Keep HOB at 30-45 degrees to reduce risk for aspiration. Start rate at 20mL/hour and advance by 10mL q8h until goal rate is reached. Monitor for tolerance: n/v/d/c. Hold feeding and notify RD if symptoms of intolerance develop.     Last BM 11/12 per flowsheet.    Medications/labs reviewed. RD following.            Learning Needs/Social Determinants of Health    Learning Assessment       11/11/2024 1310 [facility] (11/11/2024 - Present)   Created by Berenice Casillas, RN - RN (Nurse) Status: Complete                 PRIMARY LEARNER     Primary Learner Name:  Criss Butt EL - 11/11/2024 1310    Relationship:  Patient EL - 11/11/2024 1310    Does the primary learner have any barriers to learning?:  No Barriers EL - 11/11/2024 1310    What is the preferred language of the primary learner?:  English EL - 11/11/2024 1310    Is an  required?:  Yes EL - 11/11/2024 1310    How does the primary learner prefer to learn new concepts?:  Listening EL - 11/11/2024 1310    How often do you need to have someone help you read instructions, pamphlets, or written material from your doctor or pharmacy?:  Never EL - 11/11/2024 1310        CO-LEARNER #1     No question answered        CO-LEARNER #2     No question answered        SPECIAL TOPICS     No question answered        ANSWERED BY:     No question answered        Comments         Edit History       Berenice Casillas, RN - RN (Nurse)   11/11/2024 1310                           Social Drivers of Health     Tobacco Use: Low Risk  (11/11/2024)    Patient History     Smoking Tobacco Use: Never     Smokeless Tobacco Use: Never     Passive Exposure: Not on file   Alcohol Use: Not At Risk (6/19/2024)    AUDIT-C     Frequency of Alcohol Consumption: Never     Average Number of Drinks: Patient does not drink     Frequency of Binge Drinking: Never   Financial Resource Strain: Low Risk  (11/11/2024)    Overall Financial Resource Strain (CARDIA)     Difficulty of Paying Living Expenses: Not hard at all   Food Insecurity: No Food Insecurity (11/11/2024)    Hunger Vital Sign     Worried About Running Out of Food in the Last Year: Never true     Ran Out of Food in the Last Year: Never true   Transportation Needs: No Transportation Needs (11/11/2024)    TRANSPORTATION  NEEDS     Transportation : No   Physical Activity: Inactive (6/19/2024)    Exercise Vital Sign     Days of Exercise per Week: 0 days     Minutes of Exercise per Session: 0 min   Stress: No Stress Concern Present (11/11/2024)    [employer] - Occupational Stress Questionnaire     Feeling of Stress : Not at all   Housing Stability: Low Risk  (11/11/2024)    Housing Stability Vital Sign     Unable to Pay for Housing in the Last Year: No     Homeless in the Last Year: No   Depression: Low Risk  (6/17/2024)    Depression     Last PHQ-4: Flowsheet Data: 0   Utilities: Not At Risk (11/11/2024)    [facility] Utilities     Threatened with loss of utilities: No   Health Literacy: Adequate Health Literacy (11/11/2024)     Health Literacy     Frequency of need for help with medical instructions: Rarely   Social Isolation: Socially Integrated (6/19/2024)    Social Isolation     Social Isolation: 1          Malnutrition  Is Patient Malnourished: No    Nutrition Diagnosis  Increased Protein Needs related to Renal dysfunction as evidenced by HD  Comments: add novasource renal BID    Recent Labs   Lab 11/26/24  0645   *     Note: Glucose elevated. No PMH DM2. Likely related to stress response to infection.    Nutrition Prescription / Recommendations  Recommendation/Intervention: add novasource Renal oral supplement BID to better meet estimated energy needs  Goals: Weight maintenance during admission, tube feeding tolerance at goal  Nutrition Goal Status: progressing towards goal  Current Diet Order: regular  Nutrition Order Comments: 50% intakes  Oral Nutrition Supplement: add novasource renal BID  Chewing or Swallowing Difficulty?: No Chewing or swallowing difficulty  Recommended Diet: Renal (High Protein)  Recommended Oral Supplement: No Oral Supplements  Is Nutrition Support Recommended: No  Is Nutrition Education Recommended: No    Monitor and Evaluation  % current Intake: P.O. intake of 50 - 75  %  % intake to meet estimated needs: 50 - 75 %  Food and Nutrient Intake: enteral nutrition intake  Food and Nutrient Adminstration: enteral and parenteral nutrition administration  Anthropometric Measurements: weight, weight change  Biochemical Data, Medical Tests and Procedures: electrolyte and renal panel, gastrointestinal profile, glucose/endocrine profile, inflammatory profile, lipid profile  Tolerance: tolerating    Current Medical Diagnosis and Past Medical History  Diagnosis: other (see comments)  Past Medical History:   Diagnosis Date    Anemia in chronic kidney disease 12/06/2014    Chronic diastolic congestive heart failure     Deep vein thrombosis     ESRD on hemodialysis 06/17/2021    Heart failure with mildly reduced ejection fraction 11/16/2024    Hyperlipidemia     Hypertension     Malignant neoplasm of kidney     Moderate aortic stenosis 04/22/2023    Paroxysmal atrial fibrillation     Polycystic kidney disease     Primary osteoarthritis involving multiple joints 07/16/2024       Nutrition/Diet History  Spiritual, Cultural Beliefs, Restorationist Practices, Values that Affect Care: no    Lab/Procedures/Meds  Recent Labs   Lab 11/26/24  0645      K 3.1*   BUN 30*   CREATININE 8.69*   CALCIUM 8.8   CL 98   PHOS 4.9*   Note: Na+, Cl- K+, low. Phos, BUN, Cr elevated. PMH ESRD on HD  Last A1c:   Lab Results   Component Value Date    HGBA1C 6.0 07/11/2023     Lab Results   Component Value Date    RBC 2.81 (L) 11/26/2024    HGB 8.6 (L) 11/26/2024    HCT 27.4 (L) 11/26/2024    MCV 97.5 (H) 11/26/2024    MCH 30.6 11/26/2024    MCHC 31.4 (L) 11/26/2024   Note: H&H low. On HD.     Pertinent Labs Reviewed: reviewed  Pertinent Medications Reviewed: reviewed  Scheduled Meds:   0.9% NaCl   Intravenous Once    albuterol-ipratropium  3 mL Nebulization Q6H    allopurinoL  100 mg Oral Daily    amLODIPine  10 mg Oral Daily    apixaban  5 mg Oral BID    chlorhexidine  15 mL Mouth/Throat BID    diltiaZEM  30 mg Oral Q6H     epoetin milla-epbx  10,000 Units Intravenous Every Mon, Wed, Fri    hydrALAZINE  100 mg Oral TID    LIDOcaine  2 patch Transdermal Q24H    metoprolol tartrate  100 mg Oral BID    NIFEdipine  60 mg Oral QHS    pantoprazole  40 mg Intravenous Daily    QUEtiapine  50 mg Oral BID    sodium bicarbonate  1,300 mg Oral BID    traZODone  25 mg Oral QHS    vitamin D  1,000 Units Oral Daily     Continuous Infusions:      PRN Meds:.  Current Facility-Administered Medications:     0.9%  NaCl infusion (for blood administration), , Intravenous, Q24H PRN    0.9% NaCl, , Intravenous, PRN    0.9% NaCl, , Intravenous, PRN    0.9% NaCl, , Intravenous, PRN    acetaminophen, 650 mg, Oral, Q4H PRN    albuterol sulfate, 2.5 mg, Nebulization, Q4H PRN    haloperidol lactate, 0.5 mg, Intramuscular, Q12H PRN    heparin (porcine), 4,000 Units, Intra-Catheter, PRN    labetalol, 10 mg, Intravenous, Q4H PRN    LORazepam, 1 mg, Oral, Q6H PRN    metoprolol, 5 mg, Intravenous, Q12H PRN    ondansetron, 4 mg, Intravenous, Q8H PRN    sodium chloride 0.9%, 250 mL, Intravenous, PRN    sodium chloride 0.9%, 250 mL, Intravenous, PRN    sodium chloride 0.9%, 10 mL, Intravenous, PRN    Anthropometrics  Temp: 97.8 °F (36.6 °C)  Height Method: Stated  Height: 6' (182.9 cm)  Height (inches): 72 in  Weight Method: Bed Scale  Weight: 112.1 kg (247 lb 2.2 oz)  Weight (lb): 247.14 lb  Ideal Body Weight (IBW), Male: 178 lb  % Ideal Body Weight, Male (lb): 134.13 %  BMI (Calculated): 33.5       Estimated/Assessed Needs  RMR (Mellette-St. Jeor Equation): 2009   Total Ve: 10.5 L/m Temp: 97.8 °F (36.6 °C)Oral  Weight Used For Calorie Calculations: 108.3 kg (238 lb 12.1 oz)     Energy Calorie Requirements (kcal): 2166-2708kcal (20-25kcal/kg)  Weight Used For Protein Calculations: 108.3 kg (238 lb 12.1 oz)  Protein Requirements: 87-108g (0.8-1.0g/kg)       RDA Method (mL): 2166       Nutrition by Nursing  Diet/Nutrition Received: other (see comments)  Intake (%):  50%  Diet/Feeding Assistance: tray set-up  Diet/Feeding Tolerance: poor  Last Bowel Movement: 11/25/24  [REMOVED]      NG/OG Tube 11/13/24 0816 [place] sump 16 Fr. Right nostril-Feeding Type: continuous, by pump  [REMOVED]      NG/OG Tube 11/13/24 0816 [place] sump 16 Fr. Right nostril-Current Rate (mL/hr): 10 mL/hr  [REMOVED]      NG/OG Tube 11/13/24 0816 [place] sump 16 Fr. Right nostril-Goal Rate (mL/hr): 45 mL/hr  [REMOVED]      NG/OG Tube 11/13/24 0816 [place] sump 16 Fr. Right nostril-Formula Name: Novasource renal    Nutrition Follow-Up  RD Follow-up?: Yes      Nutrition Discharge Planning: RD following for discharge needs          Available via Secure Chat

## 2024-11-26 NOTE — ASSESSMENT & PLAN NOTE
Anemia is likely due to chronic disease due to ESRD. Most recent hemoglobin and hematocrit are listed below.  Recent Labs     11/24/24  0528 11/25/24  0520 11/26/24  0645   HGB 8.1* 7.5* 8.6*   HCT 26.1* 24.0* 27.4*       Plan  - Monitor serial CBC: Daily  - Transfuse PRBC if patient becomes hemodynamically unstable, symptomatic or H/H drops below 7/21.  - Patient has received 2 units of PRBCs on 11/20/24  - Patient's anemia is currently stable at 8-9    Hemoglobin improved slightly overnight. On EPO 10,000 units.
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
## 2024-11-26 NOTE — PROGRESS NOTES
Patient states he is breathing okay.  Review of systems GI he denies nausea or vomiting     Physical exam general patient is chronically ill-appearing, in no acute distress    Assessment/plan 1.  ESRD-continue HD support 2.  Hypokalemia-patient's potassium is 3.1, will supplement    3. HTN-controlled continue present antihypertensives   4. Anemia- Patient's hematocrit is 27% today continue EPO

## 2024-11-26 NOTE — ASSESSMENT & PLAN NOTE
Patient positive on admission. Continue Tamiflu due to HD.     11/26: Patient would like influenza vaccine prior to d/c

## 2024-11-26 NOTE — ASSESSMENT & PLAN NOTE
Patient's most recent potassium results are listed below.   Recent Labs     11/24/24  0528 11/25/24  0520 11/26/24  0645   K 2.7* 3.0* 3.1*       Plan  - Given KCL 20 meq yesterday  - Hypokalemia improving   - Dialysis today   - Monitor potassium Daily  - Asymptomatic, with no events on telemetry

## 2024-11-26 NOTE — SUBJECTIVE & OBJECTIVE
Interval History: Patient seen resting comfortably sitting on the side of the bed.  Episode of tachypnea and desaturation on 3L NC on Eliquis 5 mg bid.  Negative David sign. Discharge to [facility] pending placement. [facility] decline due to not meeting criteria. Awaiting [facility] response.     Review of Systems   Constitutional:  Positive for fatigue. Negative for fever.   HENT: Negative.     Respiratory:  Positive for shortness of breath. Negative for cough, choking and wheezing.    Cardiovascular:  Negative for chest pain, palpitations and leg swelling.   Gastrointestinal:  Negative for abdominal pain, nausea and vomiting.   Genitourinary:  Negative for decreased urine volume.        ESRD on HD   Musculoskeletal:         L. Foot fracture   Skin:  Positive for wound.   Neurological:  Negative for weakness, light-headedness and headaches.   All other systems reviewed and are negative.    Objective:     Vital Signs (Most Recent):  Temp: 98.1 °F (36.7 °C) (11/26/24 0726)  Pulse: (!) 111 (11/26/24 0726)  Resp: 20 (11/26/24 0726)  BP: (!) 170/88 (11/26/24 0726)  SpO2: (!) 86 % (11/26/24 0726) Vital Signs (24h Range):  Temp:  [97.6 °F (36.4 °C)-98.6 °F (37 °C)] 98.1 °F (36.7 °C)  Pulse:  [] 111  Resp:  [18-32] 20  SpO2:  [86 %-96 %] 86 %  BP: (116-170)/(63-92) 170/88     Weight: 112.1 kg (247 lb 2.2 oz)  Body mass index is 33.52 kg/m².    Intake/Output Summary (Last 24 hours) at 11/26/2024 0802  Last data filed at 11/25/2024 1420  Gross per 24 hour   Intake 500 ml   Output 3000 ml   Net -2500 ml           Physical Exam  Vitals and nursing note reviewed.   Constitutional:       General: He is awake. He is not in acute distress.     Appearance: Normal appearance. He is well-developed. He is obese. He is not ill-appearing or toxic-appearing.   HENT:      Head: Normocephalic and atraumatic.      Mouth/Throat:      Mouth: Mucous membranes are dry.      Pharynx: Oropharynx is clear.   Eyes:      Comments: Right eye vision  loss   Cardiovascular:      Rate and Rhythm: Normal rate and regular rhythm.      Pulses: Normal pulses.      Heart sounds: Normal heart sounds. No murmur heard.     No friction rub. No gallop.   Pulmonary:      Breath sounds: No stridor. Rhonchi and rales present. No wheezing.   Abdominal:      General: Bowel sounds are normal.      Palpations: Abdomen is soft.      Tenderness: There is no abdominal tenderness. There is no guarding or rebound.   Musculoskeletal:      Right lower leg: No edema.      Left lower leg: No edema.      Comments: L. Chest wall tenderness. L. Foot boot in place   Skin:     General: Skin is warm and dry.      Capillary Refill: Capillary refill takes less than 2 seconds.      Comments: Bilateral heel protectors, sacral foam dressing in place appears c/d/i   Neurological:      Mental Status: He is alert and oriented to person, place, and time.   Psychiatric:         Mood and Affect: Mood normal.         Behavior: Behavior is cooperative.             Significant Labs: All pertinent labs within the past 24 hours have been reviewed.    Significant Imaging: I have reviewed all pertinent imaging results/findings within the past 24 hours.

## 2024-11-26 NOTE — ASSESSMENT & PLAN NOTE
Admitted to the ICU for acute respiratory distress and hyperkalemia now s/p extubation. On 2L NC at 96%. Patient does not use home oxygen. Complaints of sob and increase work of breathing attributed to multiple rib fracture from resuscitative efforts while in the ICU. Patient will require close monitor. Patient is not stable for discharge.     11/22  Patient stable this am with sats in mid 90's on 2L NC. Returned to the floor on 15L highflow. Patient did not tolerated switching to NC. ABG showed metabolic acidosis. Placed back on 10L with an oximeter. Will attempt to wean off overnight. Goal of O2 sat of 88-92%. See respiratory therapy note.     11/23: Patient stable this morning SpO2 mid 90s on 4 L NC.    11/24: SpO2 stable in the mid 90's on 3L NC. Continue to wean oxygen.   11/25: Patient increased to 4L NC overnight with sats in mid-90's. Denies any sob. Duonebs Q6H. Will continue to wean today. Continue PT/OT while in the hospital.   11/26: This am patient desaturated to 86% on 3L NC; on repeat RR 18, /83, Sats 92%; on eliquis 5mg bid, lungs still sound congested but patient is afebrile with WCB improving. S/p IV abx course; current on duonebs, wean as tolerate; ordered chest xray plus V/Q scan to rule out PE and pneumonia. CXR showed RLL consolidation; will start zosyn for hospital acquired pneumonia. V/Q scan pending.

## 2024-11-26 NOTE — PLAN OF CARE
Problem: Adult Inpatient Plan of Care  Goal: Plan of Care Review  11/25/2024 1928 by Estella Fox LPN  Outcome: Progressing  11/25/2024 1928 by Estella Fox LPN  Outcome: Progressing  Goal: Patient-Specific Goal (Individualized)  11/25/2024 1928 by Estella Fox LPN  Outcome: Progressing  11/25/2024 1928 by Estella Fox LPN  Outcome: Progressing  Goal: Absence of Hospital-Acquired Illness or Injury  11/25/2024 1928 by Estella Fox LPN  Outcome: Progressing  11/25/2024 1928 by Estella Fox LPN  Outcome: Progressing  Goal: Optimal Comfort and Wellbeing  11/25/2024 1928 by Estella Fox LPN  Outcome: Progressing  11/25/2024 1928 by Estella Fox LPN  Outcome: Progressing  Goal: Readiness for Transition of Care  11/25/2024 1928 by Estella Fox LPN  Outcome: Progressing  11/25/2024 1928 by Estella Fox LPN  Outcome: Progressing     Problem: Infection  Goal: Absence of Infection Signs and Symptoms  11/25/2024 1928 by Estella Fox LPN  Outcome: Progressing  11/25/2024 1928 by Estella Fox LPN  Outcome: Progressing     Problem: Hemodialysis  Goal: Safe, Effective Therapy Delivery  11/25/2024 1928 by Estella Fox LPN  Outcome: Progressing  11/25/2024 1928 by Estella Fox LPN  Outcome: Progressing  Goal: Effective Tissue Perfusion  11/25/2024 1928 by Estella Fox LPN  Outcome: Progressing  11/25/2024 1928 by Estella Fox LPN  Outcome: Progressing  Goal: Absence of Infection Signs and Symptoms  11/25/2024 1928 by Estella Fox LPN  Outcome: Progressing  11/25/2024 1928 by Estella Fox LPN  Outcome: Progressing     Problem: Sepsis/Septic Shock  Goal: Optimal Coping  11/25/2024 1928 by Estella Fox LPN  Outcome: Progressing  11/25/2024 1928 by Estella Fox LPN  Outcome: Progressing  Goal: Absence of Bleeding  11/25/2024 1928 by Estella Fox LPN  Outcome: Progressing  11/25/2024 1928 by Estella Fox LPN  Outcome: Progressing  Goal: Blood Glucose Level Within Targeted  Range  11/25/2024 1928 by Estella Fox LPN  Outcome: Progressing  11/25/2024 1928 by Estella Fox LPN  Outcome: Progressing  Goal: Absence of Infection Signs and Symptoms  11/25/2024 1928 by Estella Fox LPN  Outcome: Progressing  11/25/2024 1928 by Estella Fox LPN  Outcome: Progressing  Goal: Optimal Nutrition Intake  11/25/2024 1928 by Estella Fox LPN  Outcome: Progressing  11/25/2024 1928 by Estella Fox LPN  Outcome: Progressing     Problem: Gas Exchange Impaired  Goal: Optimal Gas Exchange  11/25/2024 1928 by Estella Fox LPN  Outcome: Progressing  11/25/2024 1928 by Estella Fox LPN  Outcome: Progressing     Problem: Fall Injury Risk  Goal: Absence of Fall and Fall-Related Injury  11/25/2024 1928 by Estella Fox LPN  Outcome: Progressing  11/25/2024 1928 by Estella Fox LPN  Outcome: Progressing     Problem: Restraint, Nonviolent  Goal: Absence of Harm or Injury  11/25/2024 1928 by Estella Fox LPN  Outcome: Progressing  11/25/2024 1928 by Estella Fox LPN  Outcome: Progressing     Problem: Skin Injury Risk Increased  Goal: Skin Health and Integrity  11/25/2024 1928 by Estella Fox LPN  Outcome: Progressing  11/25/2024 1928 by Estella Fox LPN  Outcome: Progressing     Problem: ARDS (Acute Respiratory Distress Syndrome)  Goal: Effective Oxygenation  11/25/2024 1928 by Estella Fox LPN  Outcome: Progressing  11/25/2024 1928 by Estella Fox LPN  Outcome: Progressing     Problem: Airway Clearance Ineffective  Goal: Effective Airway Clearance  11/25/2024 1928 by Estella Fox LPN  Outcome: Progressing  11/25/2024 1928 by Estella Fox LPN  Outcome: Progressing

## 2024-11-26 NOTE — NURSING
DIALYSIS TREATMENT SUMMARY       Note: Consult with the attending physician for patient treatment orders, this document is not a physician order.      Patient Information   Patient Saúl Butt   Date of Birth June 26, 1970   Chart Number [id number]   Location [facility]   Location MRN [id number]   Gender Male   SSN (last 4) [id number]     Treatment Information   Treatment Type Hemodialysis   Treatment Id [id number]   Start Time November 25, 2024 11:15   End Time November 25, 2024 14:15   Acutal Duration 03:00     Treatment Balances   Total Saline Administered 500   Net Fluid Balance 2500    Hemodialysis Orders   Therapy Standard   Orders Verified Time 11/25/2024 11:00    Date Verified 11/25/2024   Duration 3:00   Isolated UF/Bypass No   BFR (mL) 450   DFR X2 BFR   Dialyzer Type OPTIFLUX 160NR   UF Order UF Range   UF Range (mL) 0 - 3000   With BP Parameters Yes   Crit-Line used No   Heparin Initial Units Bolus No   Heparin IV Maintenance Bolus No   Heparin IV Infusion No   Potassium (mEq/L) 4   Calcium (mEq/L) 2.5   Bicarb (mg/dL) 33   Sodium (mEq/L) 137   Additional Orders UF off for symptomatic hypotension and/or drop in systolic pressure of 20 or >, Maintain systolic BP at 90 or >, Epogen 10,000 u   Clinician Carla Mack RN    Dialysis Access   Access Type Central Venous Catheter   Central Venous Catheter   Access Type Catheter - Tunneled   Access Location Chest Wall - R   Current/New [facility] Patient Yes   1st Use Catheter Verified by Previous Use   Catheter Care Completed per Policy Yes   Dressing Dry and Intact on Arrival Yes   Dressing Changed Yes   Type of Dressing Film Biopatch/CHG   Dressing Changed By [facility] Staff   Type of HD Caps Not Listed   HD Caps Changed Yes   CVC Line Education Provided Yes - Infection Prevention      Vitals   Pre-Treatment Vitals   Time Is BP being recorded? Pre BP Map BP Method Pulse RR Temp How was Weight Obtained? Pre Weight Previous Dry Weight Previous Post Weight  Metric Target Fluid Removal (mL) Dialysate Confirmed Clinician    11/25/2024 11:06 BP/Map 116/63 (81) Noninvasive 85 19 97.5 How Obtained: Bed Scale 106.2   Kgs 3000 Yes Carla Mack RN    Comments: Rec'd via bed per hospital transport.      Post Treatment Vitals   Time Is BP being recorded? BP Map Pulse RR Temp How was Weight Obtained? Post Weight Metric BVP UF Goal Ordered NSS Given Intra-Procedure Total Machine UF Removed (mL) Crit-Line Ending Profile Crit-Line Refill Crit-Line Ending HCT Crit-Line Max BV% Clinician    11/25/2024 14:20 BP/Map 145/82 (103) 85 18 97.6 How Obtained: Bed scale 103.8 Kgs  2500 0 3000     Carla Mack RN    Comments:       Safety checks include: access uncovered and secured, Hemaclip secured for all central line access, machine checks performed, and alarm limits confirmed.     Labs   Hepatitis   HBsAG Lab Result HBsAG Lab Result HBsAG Draw Date Transient Antigenemia(MD Diagnosis Only) Anti-HBs Lab Result Anti-HBs Lab Value Anti-HBs Draw Date Documented By Documented Date Hepatitis Status Hepatitis Status   Negative  11/11/2024  Unknown   Carla Makc 11/25/2024  Susceptible   Notes:       Pre-Treatment Hepatitis Precautions Copy of hepatitis results verified in hospital EMR Yes Signing   Patient tx outside buffer zone Yes Hepatitis Information Entered By Carla Mack RN Signed By Carla Mack RN     Pre-Treatment Handoff   Staff Report Received Yes   Report Given by Primary Nurse Curtis Faustin   Time 10:45     Patient Arrival   Patient ID Verified Date of Birth    MRN    Full Name   Patient Consent to treatment verified Yes   Blood Transfusion Consent Verified N/A     Treatment Comments   Treatment Notes Tolerated without difficulty.     Post-Treatment Handoff   Report Given to Primary Nurse Curtis Faustin   Time Report Given 14:35   Report Given By Carla Mack RN    Machine Validation   Time 11:00   Date 11/25/2024   Auto Alarm Test Passed Yes   Machine Serial # [id number]    Portable RO Yes   RO Serial# [id number]   Residual Bleach Negative Yes   Was a manufactured mix used? No   Machine Log Completed Yes   Total Chlorine (less than 0.1)? Yes   Total Chlorine Log Completed Yes   Bicarb BiBag   Bibag Size 650   Machine Temp 37   Machine Conductivity 14.2   Meter Type N/A   Meter Conductivity    Independent Conductivity 14   pH Status Pass Pass   pH    TCD Value    TCD Alarm with +/- 0.5 Yes   NVL enabled validated 100 asymmetric Yes   Safety check complete Carla Mack RN   Second Verification Performed? Yes   Second Verification Performed By Reina Salgado RN   Reason Not Verified       Serum Lab Values   Time BUN Creatinine Na K (mEq/L) Cl CO2 Ca (mEq/L) Phos Mg (mg/dL) Alb (g/dL) Glucose Hgb Hct WBC Plt PT aPTT INR Other Clinician    11/25/2024 05:20 47 10.96 135 3  22 8.1     7.5 24 11.54      Carla Mack RN    Comments:         Administered Medications   Time Medication Dose Route Reason for Admin Clinician    11/25/2024 14:10 Epogen 10,000 u IVP Anemia Carla Mack RN    Comments:         Facility Information Location [facility]   Facility Information Room # 548 Diagnosis ACUTE RESPIRATORY FAILURE WITH HYPOXEMIA   Admission Date 11/11/2024 Patient Type Chronic dialysis patient with diagnosis of ESRD Isolation Information   Ordering MD Dr. Kiet Martinez Patient Chronic Unit [facility] Isolation Required? N   Account/Finance Number [id number] Patient Home Unit [facility] Completed by   Admission Status InPatient Code Status Full Code General Tx information Entered by Carla Mack RN     Start Treatment Time Out Confirmed by LUZ Mack Correct access site verified Yes   Treatment Initiation Connections Secured Time Out Completed 11:12 Treatment Start Date 11/25/2024    Saline line double clamped Correct patient verified Yes Treatment Start Time 11:15    Hemaclip Applied Correct procedure verified Yes Patient/Family questions and concerns addressed Yes     Pre  Focused Assessment Respiratory Efforts Unlabored LOC   Access Notes Clear on R, crackles on L. O2 via NC @ 2L/M. Denies sob. LOC Alert and Oriented x3   Signs and Symptoms of Infection? No Edema    Pain Screening Location Generalized  Anuric   Does the patient have pain? No Notes 1+ Completed by   Respiratory Cardiac Pre Treatment Focused Assessment Completed By Carla Mack RN   Lung Sounds Clear Heart Rhythm Regular Time 11:10    Crackles Telemetry No Signing   Location Left Skin Signed By Carla Mack RN    Right Skin Warm    Position Anterior  Dry      Education Method Knowledge / Understanding Assessed Teach back Method Knowledge / Understanding Assessed Teach back   Patient Education Family Education Provided? N/A Patient Education Introduced By   Patient Educated? Yes Caregiver Education Provided? Yes Patient Education Introduced By Carla Mack RN   Focus or Topic S&S of Infection Focus or Topic Hemodialysis treatment review      Post-Treatment HD machine external disinfection completed per policy Yes Completed by   Post Treatment Delay PRO external disinfection completed per policy Yes Post Treatment Form Completed By Carla Mack RN   Delay N Post Treatment General Information    Machine Disinfection Requirement Notes Post cath care per protocol. 2.5 L net fluid removed.      Post Focused Assessment Lung Sounds Clear Skin Warm   Changes from Pre Focused Assessment  Crackles  Dry   Changes from Pre Treatment Focused Assessment? No Location Left LOC   Access  Right LOC Alert and Oriented x3   Cath Packed with Heparin Position Anterior    Access Port(ml) 2 Respiratory Efforts Unlabored  Anuric   Return Port(ml) 2 Notes Clear on R, crackles on L. O2 via NC @ 2L/M. Denies sob. Completed by   Catheter clamped and capped Yes Edema Post Treatment Focused Assessment Completed by Carla Mack RN   Access Flow Positional Location Generalized Date 11/25/2024    Good Notes 1+ Time 14:20   Dialyzer  Clearance Dark Cardiac Signing   Pain Screening Heart Rhythm Regular Signed By Carla Mack RN   Does the patient have pain? No Telemetry No    Respiratory Skin

## 2024-11-26 NOTE — ASSESSMENT & PLAN NOTE
Patient has paroxysmal (<7 days) atrial fibrillation. Patient is currently in sinus rhythm. [id number] Score: The patient doesn't have any registry metric data available. The patients heart rate in the last 24 hours is as follows:  Pulse  Min: 63  Max: 111     Antiarrhythmics  metoprolol injection 5 mg, Every 12 hours PRN, Intravenous  metoprolol tartrate (LOPRESSOR) tablet 100 mg, 2 times daily, Oral  diltiaZEM tablet 30 mg, Every 6 hours, Oral    Anticoagulants  heparin (porcine) injection 4,000 Units, As needed (PRN), Intra-Catheter  apixaban tablet 5 mg, 2 times daily, Oral    Plan  - Replete lytes with a goal of K>4, Mg >2  - Patient is anticoagulated, see medications listed above.  - Patient's afib is currently controlled  - Telemetry; no events overnight, NS 94 this am  - Continue to monitor    Continue eliquis.     11/26: No events per telemetry; one episode of tachycardia this am, continue eliquis

## 2024-11-26 NOTE — ASSESSMENT & PLAN NOTE
Creatine stable for now. BMP reviewed- noted Estimated Creatinine Clearance: 12.9 mL/min (A) (based on SCr of 8.69 mg/dL (H)). according to latest data. Based on current GFR, CKD stage is end stage.  Monitor UOP and serial BMP and adjust therapy as needed. Renally dose meds. Avoid nephrotoxic medications and procedures.     Continue HD, Monday Wednesday Friday.     11/22: Taken to dialysis today. Patient is net negative 4L.     11/24: Dialysis tomorrow.   11/25: Continue scheduled HD while in the hospital. Patient followed by nephrology.   11/26: Dialyzed yesterday. Net negative 2500 ml. Continue current treatment (M,W,F) per nephrology.

## 2024-11-26 NOTE — ASSESSMENT & PLAN NOTE
Hyperkalemia is likely due to ESRD.The patients most recent potassium results are listed below.  Recent Labs     11/24/24  0528 11/25/24  0520 11/26/24  0645   K 2.7* 3.0* 3.1*       Plan  - Monitor for arrhythmias with EKG and/or continuous telemetry.   - Treat the hyperkalemia with Potassium Binders if great than 4.5.   - Monitor potassium:  daily with BMP  - The patient's hyperkalemia is resolved    11/22: Dialysis today with 4L taken off. Patient returned to floor on 15L. Attempt made to wean down and patient desaturated to 89%. Placed back on 10L. Nephrology thinks it was secondary to taking off fluid. Will continue to monitor with daily BMP. Anticipate patient will continue to have hyperkalemia. Will monitor.      11/24: Resolved. Scheduled for HD tomorrow with neprhology.  11/25: Regularly scheduled dialysis. Will continue to monitor.

## 2024-11-26 NOTE — PT/OT/SLP PROGRESS
Physical Therapy Treatment    Patient Name:  Saúl Butt   MRN:  [id number]    Recommendations:     Discharge Recommendations: Moderate Intensity Therapy  Discharge Equipment Recommendations: to be determined by next level of care  Barriers to discharge: Decreased caregiver support    Assessment:     Saúl Butt is a 54 y.o. male admitted with a medical diagnosis of Acute respiratory failure with hypoxemia.  He presents with the following impairments/functional limitations: weakness, impaired endurance, impaired self care skills, impaired functional mobility, decreased lower extremity function, decreased safety awareness Pt has good pain control. Pt is independent with bed mobility. Pt required minimal assistance for initial transfer to [facility], but was able to transfer back into bed independently. He is improving but still would benefit from swing bed. .    Rehab Prognosis: Good; patient would benefit from acute skilled PT services to address these deficits and reach maximum level of function.    Recent Surgery: * No surgery found *      Plan:     During this hospitalization, patient to be seen 5 x/week to address the identified rehab impairments via gait training, therapeutic activities, therapeutic exercises, neuromuscular re-education and progress toward the following goals:    Plan of Care Expires:  12/24/24    Subjective     Chief Complaint: none  Patient/Family Comments/goals: Pt is agreeable to PT. Pt stated that he felt tired multiple times throughout treatment  Pain/Comfort:  Pain Rating 1: 0/10  Pain Rating Post-Intervention 1: 0/10      Objective:     Communicated with KALANI Martínez RN prior to session.  Patient found HOB elevated with peripheral IV, oxygen, telemetry, pulse ox (continuous) upon PT entry to room.     General Precautions: Standard, fall  Orthopedic Precautions: N/A  Braces: N/A  Respiratory Status: Nasal cannula, flow 3 L/min     Functional Mobility:  Bed Mobility:     Scooting:  independence  Supine to Sit: independence  Sit to Supine: independence  Transfers:     Sit to Stand:  minimum assistance with gait belt  Toilet Transfer: contact guard assistance with  gait belt  using  Step Transfer  Balance: good      AM-PAC 6 CLICK MOBILITY  Turning over in bed (including adjusting bedclothes, sheets and blankets)?: 4  Sitting down on and standing up from a chair with arms (e.g., wheelchair, bedside commode, etc.): 4  Moving from lying on back to sitting on the side of the bed?: 4  Moving to and from a bed to a chair (including a wheelchair)?: 4  Need to walk in hospital room?: 3  Climbing 3-5 steps with a railing?: 2  Basic Mobility Total Score: 21       Treatment & Education:  Pt performed bilateral LE: bed level exercises: ankle pumps, quad sets, heel slides, and hip abduction and seated exercises: long arc quads x 30 each      Patient left HOB elevated with all lines intact and call button in reach..    GOALS:   Multidisciplinary Problems       Physical Therapy Goals          Problem: Physical Therapy    Goal Priority Disciplines Outcome Interventions   Physical Therapy Goal     PT, PT/OT Progressing    Description: Short term goals:  . Supine to sit with Contact Guard Assistance  . Sit to stand transfer with Contact Guard Assistance  . Gait  x 75 feet with Contact Guard Assistance using Rolling Walker.     Long term goals:  Patient will regain full independent functional mobility with lowest level of assistive device to return to desired living arrangement and prior ADL's.                          Time Tracking:     PT Received On: 11/26/24  PT Start Time: 1034     PT Stop Time: 1105  PT Total Time (min): 31 min     Billable Minutes: Therapeutic Exercise 24    Treatment Type: Treatment  PT/PTA: PT     Number of PTA visits since last PT visit: 0     11/26/2024

## 2024-11-26 NOTE — PT/OT/SLP PROGRESS
Occupational Therapy      Patient Name:  Saúl Butt   MRN:  [id number]    Patient not seen today secondary to Patient unwilling to participate (OT attempted tx x 2 attempts; pt declined OT tx despite maximum encouragement). Will follow-up 11/27/24.    11/26/2024

## 2024-11-27 LAB
ANION GAP SERPL CALCULATED.3IONS-SCNC: 17 MMOL/L (ref 7–16)
BACTERIA BLD CULT: NORMAL
BACTERIA BLD CULT: NORMAL
BASOPHILS # BLD AUTO: 0.16 K/UL (ref 0–0.2)
BASOPHILS NFR BLD AUTO: 1.9 % (ref 0–1)
BUN SERPL-MCNC: 46 MG/DL (ref 8–26)
BUN/CREAT SERPL: 4 (ref 6–20)
CALCIUM SERPL-MCNC: 8.4 MG/DL (ref 8.4–10.2)
CHLORIDE SERPL-SCNC: 99 MMOL/L (ref 98–107)
CO2 SERPL-SCNC: 26 MMOL/L (ref 22–29)
CREAT SERPL-MCNC: 10.63 MG/DL (ref 0.72–1.25)
DIFFERENTIAL METHOD BLD: ABNORMAL
EGFR (NO RACE VARIABLE) (RUSH/TITUS): 5 ML/MIN/1.73M2
EOSINOPHIL # BLD AUTO: 0.47 K/UL (ref 0–0.5)
EOSINOPHIL NFR BLD AUTO: 5.5 % (ref 1–4)
ERYTHROCYTE [DISTWIDTH] IN BLOOD BY AUTOMATED COUNT: 17.3 % (ref 11.5–14.5)
GLUCOSE SERPL-MCNC: 87 MG/DL (ref 74–100)
HCT VFR BLD AUTO: 26.9 % (ref 40–54)
HGB BLD-MCNC: 8.5 G/DL (ref 13.5–18)
IMM GRANULOCYTES # BLD AUTO: 0.12 K/UL (ref 0–0.04)
IMM GRANULOCYTES NFR BLD: 1.4 % (ref 0–0.4)
LYMPHOCYTES # BLD AUTO: 1.15 K/UL (ref 1–4.8)
LYMPHOCYTES NFR BLD AUTO: 13.5 % (ref 27–41)
MAGNESIUM SERPL-MCNC: 2.4 MG/DL (ref 1.6–2.6)
MCH RBC QN AUTO: 30.6 PG (ref 27–31)
MCHC RBC AUTO-ENTMCNC: 31.6 G/DL (ref 32–36)
MCV RBC AUTO: 96.8 FL (ref 80–96)
MONOCYTES # BLD AUTO: 1.25 K/UL (ref 0–0.8)
MONOCYTES NFR BLD AUTO: 14.7 % (ref 2–6)
MPC BLD CALC-MCNC: 10.8 FL (ref 9.4–12.4)
NEUTROPHILS # BLD AUTO: 5.34 K/UL (ref 1.8–7.7)
NEUTROPHILS NFR BLD AUTO: 63 % (ref 53–65)
NRBC # BLD AUTO: 0 X10E3/UL
NRBC, AUTO (.00): 0 %
PHOSPHATE SERPL-MCNC: 5.7 MG/DL (ref 2.3–4.7)
PLATELET # BLD AUTO: 375 K/UL (ref 150–400)
POTASSIUM SERPL-SCNC: 3.8 MMOL/L (ref 3.5–5.1)
RBC # BLD AUTO: 2.78 M/UL (ref 4.6–6.2)
SODIUM SERPL-SCNC: 138 MMOL/L (ref 136–145)
WBC # BLD AUTO: 8.49 K/UL (ref 4.5–11)

## 2024-11-27 PROCEDURE — 25000003 PHARM REV CODE 250: Performed by: INTERNAL MEDICINE

## 2024-11-27 PROCEDURE — 25000003 PHARM REV CODE 250

## 2024-11-27 PROCEDURE — 85025 COMPLETE CBC W/AUTO DIFF WBC: CPT | Performed by: INTERNAL MEDICINE

## 2024-11-27 PROCEDURE — A9567 TECHNETIUM TC-99M AEROSOL: HCPCS | Performed by: FAMILY MEDICINE

## 2024-11-27 PROCEDURE — 36415 COLL VENOUS BLD VENIPUNCTURE: CPT | Performed by: INTERNAL MEDICINE

## 2024-11-27 PROCEDURE — 11000001 HC ACUTE MED/SURG PRIVATE ROOM

## 2024-11-27 PROCEDURE — 25000003 PHARM REV CODE 250: Performed by: HOSPITALIST

## 2024-11-27 PROCEDURE — 25000242 PHARM REV CODE 250 ALT 637 W/ HCPCS: Performed by: STUDENT IN AN ORGANIZED HEALTH CARE EDUCATION/TRAINING PROGRAM

## 2024-11-27 PROCEDURE — 97110 THERAPEUTIC EXERCISES: CPT

## 2024-11-27 PROCEDURE — 84100 ASSAY OF PHOSPHORUS: CPT | Performed by: STUDENT IN AN ORGANIZED HEALTH CARE EDUCATION/TRAINING PROGRAM

## 2024-11-27 PROCEDURE — 63600175 PHARM REV CODE 636 W HCPCS: Performed by: STUDENT IN AN ORGANIZED HEALTH CARE EDUCATION/TRAINING PROGRAM

## 2024-11-27 PROCEDURE — 25000003 PHARM REV CODE 250: Performed by: STUDENT IN AN ORGANIZED HEALTH CARE EDUCATION/TRAINING PROGRAM

## 2024-11-27 PROCEDURE — 97530 THERAPEUTIC ACTIVITIES: CPT

## 2024-11-27 PROCEDURE — 25000003 PHARM REV CODE 250: Performed by: NURSE PRACTITIONER

## 2024-11-27 PROCEDURE — 63600175 PHARM REV CODE 636 W HCPCS: Performed by: INTERNAL MEDICINE

## 2024-11-27 PROCEDURE — 83735 ASSAY OF MAGNESIUM: CPT | Performed by: STUDENT IN AN ORGANIZED HEALTH CARE EDUCATION/TRAINING PROGRAM

## 2024-11-27 PROCEDURE — A9540 TC99M MAA: HCPCS | Performed by: FAMILY MEDICINE

## 2024-11-27 PROCEDURE — 80048 BASIC METABOLIC PNL TOTAL CA: CPT | Performed by: INTERNAL MEDICINE

## 2024-11-27 PROCEDURE — 63600175 PHARM REV CODE 636 W HCPCS

## 2024-11-27 PROCEDURE — 90935 HEMODIALYSIS ONE EVALUATION: CPT

## 2024-11-27 PROCEDURE — 94761 N-INVAS EAR/PLS OXIMETRY MLT: CPT

## 2024-11-27 PROCEDURE — 99233 SBSQ HOSP IP/OBS HIGH 50: CPT | Mod: GC,,, | Performed by: FAMILY MEDICINE

## 2024-11-27 PROCEDURE — 99900035 HC TECH TIME PER 15 MIN (STAT)

## 2024-11-27 PROCEDURE — 94640 AIRWAY INHALATION TREATMENT: CPT

## 2024-11-27 PROCEDURE — 27000221 HC OXYGEN, UP TO 24 HOURS

## 2024-11-27 RX ADMIN — NIFEDIPINE 60 MG: 60 TABLET, FILM COATED, EXTENDED RELEASE ORAL at 09:11

## 2024-11-27 RX ADMIN — APIXABAN 5 MG: 5 TABLET, FILM COATED ORAL at 08:11

## 2024-11-27 RX ADMIN — METOPROLOL TARTRATE 100 MG: 100 TABLET, FILM COATED ORAL at 08:11

## 2024-11-27 RX ADMIN — KIT FOR THE PREPARATION OF TECHNETIUM TC 99M ALBUMIN AGGREGATED 4 MILLICURIE: 2.5 INJECTION, POWDER, FOR SOLUTION INTRAVENOUS at 02:11

## 2024-11-27 RX ADMIN — APIXABAN 5 MG: 5 TABLET, FILM COATED ORAL at 09:11

## 2024-11-27 RX ADMIN — ALLOPURINOL 100 MG: 100 TABLET ORAL at 08:11

## 2024-11-27 RX ADMIN — IPRATROPIUM BROMIDE AND ALBUTEROL SULFATE 3 ML: .5; 3 SOLUTION RESPIRATORY (INHALATION) at 07:11

## 2024-11-27 RX ADMIN — QUETIAPINE FUMARATE 50 MG: 25 TABLET ORAL at 09:11

## 2024-11-27 RX ADMIN — IPRATROPIUM BROMIDE AND ALBUTEROL SULFATE 3 ML: .5; 3 SOLUTION RESPIRATORY (INHALATION) at 06:11

## 2024-11-27 RX ADMIN — AMLODIPINE BESYLATE 10 MG: 10 TABLET ORAL at 03:11

## 2024-11-27 RX ADMIN — HEPARIN SODIUM 4000 UNITS: 1000 INJECTION, SOLUTION INTRAVENOUS; SUBCUTANEOUS at 12:11

## 2024-11-27 RX ADMIN — DILTIAZEM HYDROCHLORIDE 30 MG: 30 TABLET, FILM COATED ORAL at 12:11

## 2024-11-27 RX ADMIN — HYDRALAZINE HYDROCHLORIDE 100 MG: 100 TABLET ORAL at 03:11

## 2024-11-27 RX ADMIN — HYDRALAZINE HYDROCHLORIDE 100 MG: 100 TABLET ORAL at 09:11

## 2024-11-27 RX ADMIN — CHLORHEXIDINE GLUCONATE 15 ML: 1.2 RINSE ORAL at 08:11

## 2024-11-27 RX ADMIN — DILTIAZEM HYDROCHLORIDE 30 MG: 30 TABLET, FILM COATED ORAL at 05:11

## 2024-11-27 RX ADMIN — CHLORHEXIDINE GLUCONATE 15 ML: 1.2 RINSE ORAL at 09:11

## 2024-11-27 RX ADMIN — PIPERACILLIN SODIUM AND TAZOBACTAM SODIUM 4.5 G: 4; .5 INJECTION, POWDER, LYOPHILIZED, FOR SOLUTION INTRAVENOUS at 08:11

## 2024-11-27 RX ADMIN — LORAZEPAM 1 MG: 1 TABLET ORAL at 12:11

## 2024-11-27 RX ADMIN — SODIUM CHLORIDE 2000 ML: 9 INJECTION, SOLUTION INTRAVENOUS at 12:11

## 2024-11-27 RX ADMIN — QUETIAPINE FUMARATE 50 MG: 25 TABLET ORAL at 08:11

## 2024-11-27 RX ADMIN — PANTOPRAZOLE SODIUM 40 MG: 40 INJECTION, POWDER, LYOPHILIZED, FOR SOLUTION INTRAVENOUS at 08:11

## 2024-11-27 RX ADMIN — IPRATROPIUM BROMIDE AND ALBUTEROL SULFATE 3 ML: .5; 3 SOLUTION RESPIRATORY (INHALATION) at 12:11

## 2024-11-27 RX ADMIN — SODIUM BICARBONATE 650 MG TABLET 1300 MG: at 08:11

## 2024-11-27 RX ADMIN — ACETAMINOPHEN 650 MG: 325 TABLET ORAL at 06:11

## 2024-11-27 RX ADMIN — Medication 1000 UNITS: at 08:11

## 2024-11-27 RX ADMIN — ACETAMINOPHEN 650 MG: 325 TABLET ORAL at 03:11

## 2024-11-27 RX ADMIN — KIT FOR THE PREPARATION OF TECHNETIUM TC 99M PENTETATE 40 MILLICURIE: 20 INJECTION, POWDER, LYOPHILIZED, FOR SOLUTION INTRAVENOUS; RESPIRATORY (INHALATION) at 02:11

## 2024-11-27 RX ADMIN — POTASSIUM CHLORIDE 30 MEQ: 750 TABLET, EXTENDED RELEASE ORAL at 12:11

## 2024-11-27 RX ADMIN — METOPROLOL TARTRATE 100 MG: 100 TABLET, FILM COATED ORAL at 09:11

## 2024-11-27 RX ADMIN — LORAZEPAM 1 MG: 1 TABLET ORAL at 09:11

## 2024-11-27 RX ADMIN — SODIUM BICARBONATE 650 MG TABLET 1300 MG: at 09:11

## 2024-11-27 RX ADMIN — TRAZODONE HYDROCHLORIDE 25 MG: 50 TABLET ORAL at 09:11

## 2024-11-27 RX ADMIN — LIDOCAINE 5% 2 PATCH: 700 PATCH TOPICAL at 03:11

## 2024-11-27 RX ADMIN — DILTIAZEM HYDROCHLORIDE 30 MG: 30 TABLET, FILM COATED ORAL at 06:11

## 2024-11-27 NOTE — SUBJECTIVE & OBJECTIVE
Interval History: Patient sitting on the edge of the bed off supplemental oxygen fully clothed. On rounds noted to be lethargic with O2 sat of 88%. Placed back on 3L NC. Discharge to [facility] pending placement. Awaiting [facility] response.     Review of Systems   Constitutional:  Positive for fatigue. Negative for fever.   HENT: Negative.     Respiratory:  Positive for shortness of breath. Negative for cough, choking and wheezing.    Cardiovascular:  Negative for chest pain, palpitations and leg swelling.   Gastrointestinal:  Negative for abdominal pain, nausea and vomiting.   Genitourinary:  Negative for decreased urine volume.        ESRD on HD   Musculoskeletal:         L. Foot fracture   Skin:  Positive for wound.   Neurological:  Negative for weakness, light-headedness and headaches.   All other systems reviewed and are negative.    Objective:     Vital Signs (Most Recent):  Temp: 97.5 °F (36.4 °C) (11/27/24 0741)  Pulse: 83 (11/27/24 0741)  Resp: 20 (11/27/24 0741)  BP: (!) 145/87 (11/27/24 0741)  SpO2: (!) 94 % (patient will not keep oxygen on, informed about benefits of oxygen) (11/27/24 0832) Vital Signs (24h Range):  Temp:  [97.4 °F (36.3 °C)-98.9 °F (37.2 °C)] 97.5 °F (36.4 °C)  Pulse:  [67-90] 83  Resp:  [16-20] 20  SpO2:  [92 %-100 %] 94 %  BP: (116-152)/(66-98) 145/87     Weight: 112.1 kg (247 lb 2.2 oz)  Body mass index is 33.52 kg/m².  No intake or output data in the 24 hours ending 11/27/24 1031          Physical Exam  Vitals and nursing note reviewed.   Constitutional:       General: He is awake. He is not in acute distress.     Appearance: Normal appearance. He is well-developed. He is obese. He is ill-appearing. He is not toxic-appearing.   HENT:      Head: Normocephalic and atraumatic.      Mouth/Throat:      Mouth: Mucous membranes are dry.      Pharynx: Oropharynx is clear.   Eyes:      Comments: Right eye vision loss   Cardiovascular:      Rate and Rhythm: Normal rate and regular rhythm.       Pulses: Normal pulses.      Heart sounds: Normal heart sounds. No murmur heard.     No friction rub. No gallop.   Pulmonary:      Breath sounds: No stridor. Rhonchi and rales present. No wheezing.   Abdominal:      General: Bowel sounds are normal.      Palpations: Abdomen is soft.      Tenderness: There is no abdominal tenderness. There is no guarding or rebound.   Musculoskeletal:      Right lower leg: No edema.      Left lower leg: No edema.      Comments: L. Chest wall tenderness. L. Foot boot in place   Skin:     General: Skin is warm and dry.      Capillary Refill: Capillary refill takes less than 2 seconds.      Comments: Bilateral heel protectors, sacral foam dressing in place appears c/d/i   Neurological:      Mental Status: He is alert and oriented to person, place, and time.   Psychiatric:         Mood and Affect: Mood normal.         Behavior: Behavior is cooperative.             Significant Labs: All pertinent labs within the past 24 hours have been reviewed.    Significant Imaging: I have reviewed all pertinent imaging results/findings within the past 24 hours.

## 2024-11-27 NOTE — ASSESSMENT & PLAN NOTE
Hyperkalemia is likely due to ESRD.The patients most recent potassium results are listed below.  Recent Labs     11/25/24  0520 11/26/24  0645 11/27/24  0633   K 3.0* 3.1* 3.8       Plan  - Monitor for arrhythmias with EKG and/or continuous telemetry.   - Treat the hyperkalemia with Potassium Binders if great than 4.5.   - Monitor potassium:  daily with BMP  - The patient's hyperkalemia is resolved    11/22: Dialysis today with 4L taken off. Patient returned to floor on 15L. Attempt made to wean down and patient desaturated to 89%. Placed back on 10L. Nephrology thinks it was secondary to taking off fluid. Will continue to monitor with daily BMP. Anticipate patient will continue to have hyperkalemia. Will monitor.      11/24: Resolved. Scheduled for HD tomorrow with neprhology.  11/25: Regularly scheduled dialysis. Will continue to monitor.
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
## 2024-11-27 NOTE — PT/OT/SLP PROGRESS
Occupational Therapy      Patient Name:  Saúl Butt   MRN:  [id number]    Patient not seen today secondary to Dialysis (OT attempted tx this PM; pt gone for dialysis). Will follow-up 11/29/24.    11/27/2024

## 2024-11-27 NOTE — NURSING
Walked in to check on pt found in bathroom with shower running states I am getting in shower I need to feel this water. Tried to encourage pt to allow us to compete bed bath pt declined placed pt in shower pt tolerated well . All linens changed . Placed back in bed . Pt having some anxiety PRN meds given see MAR . Attached back on  Tele monitor O2 replaced pt Sats 90% .Educated pt on Fall prevention . Verbalized understanding of all VSS NAD will continue to follow POC

## 2024-11-27 NOTE — ASSESSMENT & PLAN NOTE
Anemia is likely due to chronic disease due to ESRD. Most recent hemoglobin and hematocrit are listed below.  Recent Labs     11/25/24  0520 11/26/24  0645 11/27/24  0633   HGB 7.5* 8.6* 8.5*   HCT 24.0* 27.4* 26.9*       Plan  - Monitor serial CBC: Daily  - Transfuse PRBC if patient becomes hemodynamically unstable, symptomatic or H/H drops below 7/21.  - Patient has received 2 units of PRBCs on 11/20/24  - Patient's anemia is currently stable at 8-9    Hemoglobin improved slightly overnight. On EPO 10,000 units.    11/27: Continue EPO per nephrology

## 2024-11-27 NOTE — ASSESSMENT & PLAN NOTE
Patient has paroxysmal (<7 days) atrial fibrillation. Patient is currently in sinus rhythm. [id number] Score: The patient doesn't have any registry metric data available. The patients heart rate in the last 24 hours is as follows:  Pulse  Min: 67  Max: 90     Antiarrhythmics  metoprolol injection 5 mg, Every 12 hours PRN, Intravenous  metoprolol tartrate (LOPRESSOR) tablet 100 mg, 2 times daily, Oral  diltiaZEM tablet 30 mg, Every 6 hours, Oral    Anticoagulants  heparin (porcine) injection 4,000 Units, As needed (PRN), Intra-Catheter  apixaban tablet 5 mg, 2 times daily, Oral    Plan  - Replete lytes with a goal of K>4, Mg >2  - Patient is anticoagulated, see medications listed above.  - Patient's afib is currently controlled  - Telemetry; no events overnight, NS 94 this am  - Continue to monitor    Continue eliquis.     11/26: No events per telemetry; one episode of tachycardia this am, continue eliquis   11/27: No events overnight; continue eliquis

## 2024-11-27 NOTE — ASSESSMENT & PLAN NOTE
Admitted to the ICU for acute respiratory distress and hyperkalemia now s/p extubation. On 2L NC at 96%. Patient does not use home oxygen. Complaints of sob and increase work of breathing attributed to multiple rib fracture from resuscitative efforts while in the ICU. Patient will require close monitor. Patient is not stable for discharge.     11/22  Patient stable this am with sats in mid 90's on 2L NC. Returned to the floor on 15L highflow. Patient did not tolerated switching to NC. ABG showed metabolic acidosis. Placed back on 10L with an oximeter. Will attempt to wean off overnight. Goal of O2 sat of 88-92%. See respiratory therapy note.     11/23: Patient stable this morning SpO2 mid 90s on 4 L NC.    11/24: SpO2 stable in the mid 90's on 3L NC. Continue to wean oxygen.   11/25: Patient increased to 4L NC overnight with sats in mid-90's. Denies any sob. Duonebs Q6H. Will continue to wean today. Continue PT/OT while in the hospital.     11/26: This am patient desaturated to 86% on 3L NC; on repeat RR 18, /83, Sats 92%; on eliquis 5mg bid, lungs still sound congested but patient is afebrile with WCB improving. S/p IV abx course; current on duonebs, wean as tolerate; ordered chest xray plus V/Q scan to rule out PE and pneumonia. CXR showed RLL consolidation; will start zosyn for hospital acquired pneumonia. V/Q scan pending.     11/27: Marginal sats on room air; placed back on 3L NC with improvement to mid-90's; given hospital course which included intubation, aspiration, and resuscitation, positive influenza result and RLL consolidation; patient would benefit from continued IV antibiotics for 2-3 days with supplemental oxygen; goal of weaning off oxygen prior to discharge +/- home O2 evaluation

## 2024-11-27 NOTE — PLAN OF CARE
Problem: Adult Inpatient Plan of Care  Goal: Plan of Care Review  Outcome: Progressing  Goal: Patient-Specific Goal (Individualized)  Outcome: Progressing  Goal: Absence of Hospital-Acquired Illness or Injury  Outcome: Progressing  Goal: Optimal Comfort and Wellbeing  Outcome: Progressing  Goal: Readiness for Transition of Care  Outcome: Progressing     Problem: Infection  Goal: Absence of Infection Signs and Symptoms  Outcome: Progressing     Problem: Hemodialysis  Goal: Safe, Effective Therapy Delivery  Outcome: Progressing  Goal: Effective Tissue Perfusion  Outcome: Progressing  Goal: Absence of Infection Signs and Symptoms  Outcome: Progressing     Problem: Sepsis/Septic Shock  Goal: Optimal Coping  Outcome: Progressing  Goal: Absence of Bleeding  Outcome: Progressing  Goal: Blood Glucose Level Within Targeted Range  Outcome: Progressing  Goal: Absence of Infection Signs and Symptoms  Outcome: Progressing  Goal: Optimal Nutrition Intake  Outcome: Progressing     Problem: Fall Injury Risk  Goal: Absence of Fall and Fall-Related Injury  Outcome: Progressing     Problem: Restraint, Nonviolent  Goal: Absence of Harm or Injury  Outcome: Progressing     Problem: Skin Injury Risk Increased  Goal: Skin Health and Integrity  Outcome: Progressing     Problem: ARDS (Acute Respiratory Distress Syndrome)  Goal: Effective Oxygenation  Outcome: Progressing     Problem: Airway Clearance Ineffective  Goal: Effective Airway Clearance  Outcome: Progressing

## 2024-11-27 NOTE — ASSESSMENT & PLAN NOTE
Creatine stable for now. BMP reviewed- noted Estimated Creatinine Clearance: 10.5 mL/min (A) (based on SCr of 10.63 mg/dL (H)). according to latest data. Based on current GFR, CKD stage is end stage.  Monitor UOP and serial BMP and adjust therapy as needed. Renally dose meds. Avoid nephrotoxic medications and procedures.     Continue HD, Monday Wednesday Friday.     11/22: Taken to dialysis today. Patient is net negative 4L.     11/24: Dialysis tomorrow.   11/25: Continue scheduled HD while in the hospital. Patient followed by nephrology.   11/26: Dialyzed yesterday. Net negative 2500 ml. Continue current treatment (M,W,F) per nephrology.   11/27: Dialysis today

## 2024-11-27 NOTE — ASSESSMENT & PLAN NOTE
Patient's most recent potassium results are listed below.   Recent Labs     11/25/24  0520 11/26/24  0645 11/27/24  0633   K 3.0* 3.1* 3.8       Plan  - Given KCL 20 meq yesterday  - Hypokalemia resolved  - Dialysis today   - Monitor potassium Daily  - Asymptomatic, with no events on telemetry

## 2024-11-27 NOTE — PLAN OF CARE
JEF checking with Hayley at [facility] to see on pt's status for insurance approval for snf. ..    11:00- [facility] called and insurance has questions about pt's current medical status before they can make a decision about snf. JEF spoke with MD pt not currently ready today for discharge, needing antibiotics for a few more days. JEF sent [facility] [place] updates.

## 2024-11-27 NOTE — PT/OT/SLP PROGRESS
Physical Therapy Treatment    Patient Name:  Saúl Butt   MRN:  [id number]    Recommendations:     Discharge Recommendations: Moderate Intensity Therapy  Discharge Equipment Recommendations: to be determined by next level of care  Barriers to discharge: Decreased caregiver support    Assessment:     Saúl Butt is a 54 y.o. male admitted with a medical diagnosis of Acute respiratory failure with hypoxemia.  He presents with the following impairments/functional limitations: weakness, impaired endurance, impaired self care skills, impaired functional mobility, decreased lower extremity function, decreased safety awareness Pt became mildly short of breath during exercise. Pt has improved mobility, he requires minimal to no assistance with standing. Pt is progressing well, but still would benefit from swing bed.    Rehab Prognosis: Fair; patient would benefit from acute skilled PT services to address these deficits and reach maximum level of function.    Recent Surgery: * No surgery found *      Plan:     During this hospitalization, patient to be seen 5 x/week to address the identified rehab impairments via gait training, therapeutic activities, therapeutic exercises, neuromuscular re-education and progress toward the following goals:    Plan of Care Expires:  12/24/24    Subjective     Chief Complaint: none  Patient/Family Comments/goals: Pt is agreeable to PT. Pt stated that he felt congested during treatment.  Pain/Comfort:  Pain Rating 1: 0/10  Pain Rating Post-Intervention 1: 0/10      Objective:     Communicated with JOSE Banda RN prior to session.  Patient found up in chair with peripheral IV, oxygen, telemetry, pulse ox (continuous) upon PT entry to room.     General Precautions: Standard, fall  Orthopedic Precautions: N/A  Braces: N/A  Respiratory Status: Nasal cannula, flow 2 L/min     Functional Mobility:  Transfers:     Sit to Stand:  minimum assistance with rolling walker and gait belt  Toilet Transfer:  stand by assistance with  no AD  using  Step Transfer  Balance: good sitting. Fair-good standing      AM-PAC 6 CLICK MOBILITY  Turning over in bed (including adjusting bedclothes, sheets and blankets)?: 4  Sitting down on and standing up from a chair with arms (e.g., wheelchair, bedside commode, etc.): 4  Moving from lying on back to sitting on the side of the bed?: 4  Moving to and from a bed to a chair (including a wheelchair)?: 4  Need to walk in hospital room?: 3  Climbing 3-5 steps with a railing?: 2  Basic Mobility Total Score: 21       Treatment & Education:  Pt performed bilateral LE: seated exercises: ankle pumps, long arc quads, marches, and hip abduction x 30 each      Patient left up in chair with all lines intact and call button in reach..    GOALS:   Multidisciplinary Problems       Physical Therapy Goals          Problem: Physical Therapy    Goal Priority Disciplines Outcome Interventions   Physical Therapy Goal     PT, PT/OT Progressing    Description: Short term goals:  . Supine to sit with Contact Guard Assistance  . Sit to stand transfer with Contact Guard Assistance  . Gait  x 75 feet with Contact Guard Assistance using Rolling Walker.     Long term goals:  Patient will regain full independent functional mobility with lowest level of assistive device to return to desired living arrangement and prior ADL's.                          Time Tracking:     PT Received On: 11/27/24  PT Start Time: 1533     PT Stop Time: 1601  PT Total Time (min): 28 min     Billable Minutes: Therapeutic Activity 10 and Therapeutic Exercise 18    Treatment Type: Treatment  PT/PTA: PT     Number of PTA visits since last PT visit: 0     11/27/2024

## 2024-11-27 NOTE — NURSING
Patient sitting on the side of the bed awake alert confusion noted oriented to person place time. Reoriented to surrounding and use safety measures and call bell for assistance. Resp even and unlabored at this time patient has oxygen at the bedside refused to keep oxygen on informed about the benefits of oxygen. Cont. Oxygen monitor on the patient. States no pain at this time no signs of discomfort noted. States want to leave today and going to leave. Informed about the importance of treatment and care. Patient voiced understanding. Needs are met. Assist with positioning for comfort. Patient walking cane at the bedside. Call bell personal items in reach.

## 2024-11-28 LAB
ANION GAP SERPL CALCULATED.3IONS-SCNC: 15 MMOL/L (ref 7–16)
BASOPHILS # BLD AUTO: 0.16 K/UL (ref 0–0.2)
BASOPHILS NFR BLD AUTO: 2.3 % (ref 0–1)
BUN SERPL-MCNC: 31 MG/DL (ref 8–26)
BUN/CREAT SERPL: 4 (ref 6–20)
CALCIUM SERPL-MCNC: 8.5 MG/DL (ref 8.4–10.2)
CHLORIDE SERPL-SCNC: 100 MMOL/L (ref 98–107)
CO2 SERPL-SCNC: 26 MMOL/L (ref 22–29)
CREAT SERPL-MCNC: 8.01 MG/DL (ref 0.72–1.25)
DIFFERENTIAL METHOD BLD: ABNORMAL
EGFR (NO RACE VARIABLE) (RUSH/TITUS): 7 ML/MIN/1.73M2
EOSINOPHIL # BLD AUTO: 0.5 K/UL (ref 0–0.5)
EOSINOPHIL NFR BLD AUTO: 7.2 % (ref 1–4)
ERYTHROCYTE [DISTWIDTH] IN BLOOD BY AUTOMATED COUNT: 17.3 % (ref 11.5–14.5)
GLUCOSE SERPL-MCNC: 110 MG/DL (ref 74–100)
HCT VFR BLD AUTO: 26.4 % (ref 40–54)
HGB BLD-MCNC: 8.1 G/DL (ref 13.5–18)
IMM GRANULOCYTES # BLD AUTO: 0.08 K/UL (ref 0–0.04)
IMM GRANULOCYTES NFR BLD: 1.1 % (ref 0–0.4)
LYMPHOCYTES # BLD AUTO: 0.85 K/UL (ref 1–4.8)
LYMPHOCYTES NFR BLD AUTO: 12.2 % (ref 27–41)
MAGNESIUM SERPL-MCNC: 2.3 MG/DL (ref 1.6–2.6)
MCH RBC QN AUTO: 29.9 PG (ref 27–31)
MCHC RBC AUTO-ENTMCNC: 30.7 G/DL (ref 32–36)
MCV RBC AUTO: 97.4 FL (ref 80–96)
MONOCYTES # BLD AUTO: 1.08 K/UL (ref 0–0.8)
MONOCYTES NFR BLD AUTO: 15.5 % (ref 2–6)
MPC BLD CALC-MCNC: 11 FL (ref 9.4–12.4)
NEUTROPHILS # BLD AUTO: 4.3 K/UL (ref 1.8–7.7)
NEUTROPHILS NFR BLD AUTO: 61.7 % (ref 53–65)
NRBC # BLD AUTO: 0 X10E3/UL
NRBC, AUTO (.00): 0 %
PHOSPHATE SERPL-MCNC: 4.6 MG/DL (ref 2.3–4.7)
PLATELET # BLD AUTO: 395 K/UL (ref 150–400)
POTASSIUM SERPL-SCNC: 3.7 MMOL/L (ref 3.5–5.1)
RBC # BLD AUTO: 2.71 M/UL (ref 4.6–6.2)
SODIUM SERPL-SCNC: 137 MMOL/L (ref 136–145)
TRIGL SERPL-MCNC: 125 MG/DL (ref 34–140)
WBC # BLD AUTO: 6.97 K/UL (ref 4.5–11)

## 2024-11-28 PROCEDURE — 63600175 PHARM REV CODE 636 W HCPCS

## 2024-11-28 PROCEDURE — 25000003 PHARM REV CODE 250: Performed by: STUDENT IN AN ORGANIZED HEALTH CARE EDUCATION/TRAINING PROGRAM

## 2024-11-28 PROCEDURE — 63600175 PHARM REV CODE 636 W HCPCS: Performed by: STUDENT IN AN ORGANIZED HEALTH CARE EDUCATION/TRAINING PROGRAM

## 2024-11-28 PROCEDURE — 85025 COMPLETE CBC W/AUTO DIFF WBC: CPT | Performed by: INTERNAL MEDICINE

## 2024-11-28 PROCEDURE — 25000003 PHARM REV CODE 250: Performed by: HOSPITALIST

## 2024-11-28 PROCEDURE — 94640 AIRWAY INHALATION TREATMENT: CPT

## 2024-11-28 PROCEDURE — 25000003 PHARM REV CODE 250: Performed by: INTERNAL MEDICINE

## 2024-11-28 PROCEDURE — 11000001 HC ACUTE MED/SURG PRIVATE ROOM

## 2024-11-28 PROCEDURE — 99900035 HC TECH TIME PER 15 MIN (STAT)

## 2024-11-28 PROCEDURE — 84100 ASSAY OF PHOSPHORUS: CPT | Performed by: STUDENT IN AN ORGANIZED HEALTH CARE EDUCATION/TRAINING PROGRAM

## 2024-11-28 PROCEDURE — 36415 COLL VENOUS BLD VENIPUNCTURE: CPT | Performed by: INTERNAL MEDICINE

## 2024-11-28 PROCEDURE — 80048 BASIC METABOLIC PNL TOTAL CA: CPT | Performed by: INTERNAL MEDICINE

## 2024-11-28 PROCEDURE — 27000221 HC OXYGEN, UP TO 24 HOURS

## 2024-11-28 PROCEDURE — 25000003 PHARM REV CODE 250

## 2024-11-28 PROCEDURE — 25000242 PHARM REV CODE 250 ALT 637 W/ HCPCS: Performed by: STUDENT IN AN ORGANIZED HEALTH CARE EDUCATION/TRAINING PROGRAM

## 2024-11-28 PROCEDURE — 83735 ASSAY OF MAGNESIUM: CPT | Performed by: STUDENT IN AN ORGANIZED HEALTH CARE EDUCATION/TRAINING PROGRAM

## 2024-11-28 PROCEDURE — 25000003 PHARM REV CODE 250: Performed by: NURSE PRACTITIONER

## 2024-11-28 PROCEDURE — 99232 SBSQ HOSP IP/OBS MODERATE 35: CPT | Mod: GC,,, | Performed by: FAMILY MEDICINE

## 2024-11-28 PROCEDURE — 84478 ASSAY OF TRIGLYCERIDES: CPT | Performed by: INTERNAL MEDICINE

## 2024-11-28 RX ADMIN — METOPROLOL TARTRATE 100 MG: 100 TABLET, FILM COATED ORAL at 08:11

## 2024-11-28 RX ADMIN — DILTIAZEM HYDROCHLORIDE 30 MG: 30 TABLET, FILM COATED ORAL at 12:11

## 2024-11-28 RX ADMIN — ACETAMINOPHEN 650 MG: 325 TABLET ORAL at 08:11

## 2024-11-28 RX ADMIN — HYDRALAZINE HYDROCHLORIDE 100 MG: 100 TABLET ORAL at 02:11

## 2024-11-28 RX ADMIN — ALLOPURINOL 100 MG: 100 TABLET ORAL at 08:11

## 2024-11-28 RX ADMIN — LIDOCAINE 5% 2 PATCH: 700 PATCH TOPICAL at 08:11

## 2024-11-28 RX ADMIN — Medication 1000 UNITS: at 08:11

## 2024-11-28 RX ADMIN — ACETAMINOPHEN 650 MG: 325 TABLET ORAL at 04:11

## 2024-11-28 RX ADMIN — IPRATROPIUM BROMIDE AND ALBUTEROL SULFATE 3 ML: .5; 3 SOLUTION RESPIRATORY (INHALATION) at 07:11

## 2024-11-28 RX ADMIN — QUETIAPINE FUMARATE 50 MG: 25 TABLET ORAL at 08:11

## 2024-11-28 RX ADMIN — HYDRALAZINE HYDROCHLORIDE 100 MG: 100 TABLET ORAL at 09:11

## 2024-11-28 RX ADMIN — CHLORHEXIDINE GLUCONATE 15 ML: 1.2 RINSE ORAL at 08:11

## 2024-11-28 RX ADMIN — IPRATROPIUM BROMIDE AND ALBUTEROL SULFATE 3 ML: .5; 3 SOLUTION RESPIRATORY (INHALATION) at 12:11

## 2024-11-28 RX ADMIN — ALBUTEROL SULFATE 2.5 MG: 2.5 SOLUTION RESPIRATORY (INHALATION) at 03:11

## 2024-11-28 RX ADMIN — HYDRALAZINE HYDROCHLORIDE 100 MG: 100 TABLET ORAL at 08:11

## 2024-11-28 RX ADMIN — NIFEDIPINE 60 MG: 60 TABLET, FILM COATED, EXTENDED RELEASE ORAL at 09:11

## 2024-11-28 RX ADMIN — PANTOPRAZOLE SODIUM 40 MG: 40 INJECTION, POWDER, LYOPHILIZED, FOR SOLUTION INTRAVENOUS at 08:11

## 2024-11-28 RX ADMIN — PIPERACILLIN SODIUM AND TAZOBACTAM SODIUM 4.5 G: 4; .5 INJECTION, POWDER, LYOPHILIZED, FOR SOLUTION INTRAVENOUS at 08:11

## 2024-11-28 RX ADMIN — DILTIAZEM HYDROCHLORIDE 30 MG: 30 TABLET, FILM COATED ORAL at 11:11

## 2024-11-28 RX ADMIN — DILTIAZEM HYDROCHLORIDE 30 MG: 30 TABLET, FILM COATED ORAL at 04:11

## 2024-11-28 RX ADMIN — ACETAMINOPHEN 650 MG: 325 TABLET ORAL at 02:11

## 2024-11-28 RX ADMIN — QUETIAPINE FUMARATE 50 MG: 25 TABLET ORAL at 09:11

## 2024-11-28 RX ADMIN — CHLORHEXIDINE GLUCONATE 15 ML: 1.2 RINSE ORAL at 09:11

## 2024-11-28 RX ADMIN — SODIUM BICARBONATE 650 MG TABLET 1300 MG: at 08:11

## 2024-11-28 RX ADMIN — APIXABAN 5 MG: 5 TABLET, FILM COATED ORAL at 09:11

## 2024-11-28 RX ADMIN — SODIUM BICARBONATE 650 MG TABLET 1300 MG: at 09:11

## 2024-11-28 RX ADMIN — LORAZEPAM 1 MG: 1 TABLET ORAL at 09:11

## 2024-11-28 RX ADMIN — METOPROLOL TARTRATE 100 MG: 100 TABLET, FILM COATED ORAL at 09:11

## 2024-11-28 RX ADMIN — PIPERACILLIN SODIUM AND TAZOBACTAM SODIUM 4.5 G: 4; .5 INJECTION, POWDER, LYOPHILIZED, FOR SOLUTION INTRAVENOUS at 09:11

## 2024-11-28 RX ADMIN — AMLODIPINE BESYLATE 10 MG: 10 TABLET ORAL at 08:11

## 2024-11-28 RX ADMIN — TRAZODONE HYDROCHLORIDE 25 MG: 50 TABLET ORAL at 09:11

## 2024-11-28 RX ADMIN — DILTIAZEM HYDROCHLORIDE 30 MG: 30 TABLET, FILM COATED ORAL at 05:11

## 2024-11-28 RX ADMIN — APIXABAN 5 MG: 5 TABLET, FILM COATED ORAL at 08:11
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
## 2024-11-28 NOTE — PROGRESS NOTES
[facility] - [facility] Tele[facility]  Nephrology  Progress Note    Patient Name: Saúl Butt  MRN: [id number]  Admission Date: 11/11/2024  Hospital Length of Stay: 17 days  Attending Provider: Kiet Velasquez Jr., MD   Primary Care Physician: Ruth, Primary Doctor  Principal Problem:Acute respiratory failure with hypoxemia    Consults  Subjective:     Interval History:  Mr. Butt is seen in follow-up of his end-stage renal disease.  He is doing well today and is appropriate and interactive.  He is without complaint and has 1+ edema of his legs.  His chest is clear he is not short of breath    Review of patient's allergies indicates:   Allergen Reactions    Fish containing products Swelling    Iodinated contrast media Swelling    Iodine Shortness Of Breath, Swelling and Anaphylaxis     Sob and swelling/itching/throat closes up per pt  Other reaction(s): Swelling, Hives, DifficultyBreat  Sob and swelling/itching/throat closes up per pt      Shellfish containing products Swelling    Iodine and iodide containing products      Current Facility-Administered Medications   Medication Frequency    0.9%  NaCl infusion (for blood administration) Q24H PRN    0.9%  NaCl infusion PRN    0.9%  NaCl infusion PRN    0.9% NaCl infusion PRN    0.9% NaCl infusion Once    acetaminophen tablet 650 mg Q4H PRN    albuterol nebulizer solution 2.5 mg Q4H PRN    albuterol-ipratropium 2.5 mg-0.5 mg/3 mL nebulizer solution 3 mL Q6H    allopurinoL tablet 100 mg Daily    amLODIPine tablet 10 mg Daily    apixaban tablet 5 mg BID    chlorhexidine 0.12 % solution 15 mL BID    diltiaZEM tablet 30 mg Q6H    epoetin milla-epbx injection 10,000 Units Every Mon, Wed, Fri    haloperidol lactate injection 0.5 mg Q12H PRN    heparin (porcine) injection 4,000 Units PRN    hydrALAZINE tablet 100 mg TID    labetaloL injection 10 mg Q4H PRN    LIDOcaine 5 % patch 2 patch Q24H    LORazepam tablet 1 mg Q6H PRN    metoprolol injection 5 mg Q12H PRN     metoprolol tartrate (LOPRESSOR) tablet 100 mg BID    NIFEdipine 24 hr tablet 60 mg QHS    ondansetron injection 4 mg Q8H PRN    pantoprazole injection 40 mg Daily    piperacillin-tazobactam (ZOSYN) 4.5 g in D5W 100 mL IVPB (MB+) Q12H    QUEtiapine tablet 50 mg BID    sodium bicarbonate tablet 1,300 mg BID    sodium chloride 0.9% bolus 250 mL 250 mL PRN    sodium chloride 0.9% bolus 250 mL 250 mL PRN    sodium chloride 0.9% flush 10 mL PRN    trazodone split tablet 25 mg QHS    vitamin D 1000 units tablet 1,000 Units Daily       Objective:     Vital Signs (Most Recent):  Temp: 98.3 °F (36.8 °C) (11/28/24 1102)  Pulse: 62 (11/28/24 1247)  Resp: 20 (11/28/24 1247)  BP: 127/85 (11/28/24 1102)  SpO2: 96 % (11/28/24 1247) Vital Signs (24h Range):  Temp:  [97.4 °F (36.3 °C)-98.3 °F (36.8 °C)] 98.3 °F (36.8 °C)  Pulse:  [58-98] 62  Resp:  [16-24] 20  SpO2:  [93 %-100 %] 96 %  BP: (127-168)/(79-96) 127/85     Weight: 112.1 kg (247 lb 2.2 oz) (11/26/24 0458)  Body mass index is 33.52 kg/m².  Body surface area is 2.39 meters squared.    I/O last 3 completed shifts:  In: 360 [P.O.:360]  Out: -     Physical Exam chest clear no shortness of breath.  1+ leg edema.    Significant Labs:sureBMP:   Recent Labs   Lab 11/28/24  0613   *      K 3.7      CO2 26   BUN 31*   CREATININE 8.01*   CALCIUM 8.5   MG 2.3     All labs within the past 24 hours have been reviewed.    Significant Imaging:  Labs: Reviewed    Assessment/Plan:     Active Diagnoses:    Diagnosis Date Noted POA    PRINCIPAL PROBLEM:  Acute respiratory failure with hypoxemia [J96.01] 11/13/2024 No    Vision loss of right eye [H54.61] 11/24/2024 Yes    Hypokalemia [E87.6] 11/24/2024 No    Pupil irregularity, right [H21.561] 11/24/2024 Yes    Vitamin D deficiency [E55.9] 11/22/2024 Yes    Closed fracture of multiple ribs [S22.49XA] 11/15/2024 No    Bacterial pneumonia [J15.9] 11/14/2024 Yes    Acute metabolic encephalopathy [G93.41] 11/13/2024 No     Aspiration pneumonitis [J69.0] 11/13/2024 No    Aspiration pneumonia of both lungs due to gastric secretions [J69.0] 11/13/2024 Yes    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] 11/13/2024 Yes    Acute pulmonary embolism without acute cor pulmonale [I26.99] 11/13/2024 Yes    Cardiac arrest [I46.9] 11/13/2024 No    Atrial fibrillation with RVR [I48.91] 11/12/2024 No    Influenza A [J10.1] 11/12/2024 Yes    Hyperkalemia [E87.5] 11/11/2024 Yes    Severe sepsis [A41.9, R65.20] 11/11/2024 Yes    ESRD on hemodialysis [N18.6, Z99.2] 06/17/2021 Not Applicable    Anemia in chronic kidney disease [N18.9, D63.1] 12/06/2014 Yes      Problems Resolved During this Admission:    Diagnosis Date Noted Date Resolved POA    Heart failure with mildly reduced ejection fraction [I50.22] 11/16/2024 11/23/2024 Yes    ESRD (end stage renal disease) [N18.6] 11/15/2024 11/23/2024 Yes    Acute respiratory distress syndrome [J80] 11/13/2024 11/16/2024 No    Hypervolemia associated with renal insufficiency [E87.70, N28.9] 11/12/2024 11/13/2024 Yes    Hypertension [I10] 11/11/2024 11/13/2024 Yes    Shakes [R25.1] 11/11/2024 11/13/2024 Yes    Elevated brain natriuretic peptide (BNP) level [R79.89] 11/11/2024 11/13/2024 Yes    Lactic acidosis [E87.20] 11/11/2024 11/13/2024 Yes       Potassium is stable at less than 4 and he is doing well.  He will continue with dialysis as scheduled.  He tells me he is anticipating swing bed Monday.    Thank you for your consult. I will follow-up with patient. Please contact us if you have any additional questions.    Main Murillo MD  Nephrology  [facility] - [address] [place] [address]

## 2024-11-28 NOTE — ASSESSMENT & PLAN NOTE
Admitted to the ICU for acute respiratory distress and hyperkalemia now s/p extubation. On 2L NC at 96%. Patient does not use home oxygen. Complaints of sob and increase work of breathing attributed to multiple rib fracture from resuscitative efforts while in the ICU. Patient will require close monitor. Patient is not stable for discharge.     11/22  Patient stable this am with sats in mid 90's on 2L NC. Returned to the floor on 15L highflow. Patient did not tolerated switching to NC. ABG showed metabolic acidosis. Placed back on 10L with an oximeter. Will attempt to wean off overnight. Goal of O2 sat of 88-92%. See respiratory therapy note.     11/23: Patient stable this morning SpO2 mid 90s on 4 L NC.    11/24: SpO2 stable in the mid 90's on 3L NC. Continue to wean oxygen.   11/25: Patient increased to 4L NC overnight with sats in mid-90's. Denies any sob. Duonebs Q6H. Will continue to wean today. Continue PT/OT while in the hospital.     11/26: This am patient desaturated to 86% on 3L NC; on repeat RR 18, /83, Sats 92%; on eliquis 5mg bid, lungs still sound congested but patient is afebrile with WCB improving. S/p IV abx course; current on duonebs, wean as tolerate; ordered chest xray plus V/Q scan to rule out PE and pneumonia. CXR showed RLL consolidation; will start zosyn for hospital acquired pneumonia. V/Q scan pending.     11/27: Marginal sats on room air; placed back on 3L NC with improvement to mid-90's; given hospital course which included intubation, aspiration, and resuscitation, positive influenza result and RLL consolidation; patient would benefit from continued IV antibiotics for 2-3 days with supplemental oxygen; goal of weaning off oxygen prior to discharge +/- home O2 evaluation     11/28: Nuclear medicine scan shows low probability for PE. Patient currently on 1.5L NC with O2 sat of 95%. Patient would benefit from 2 more days of IV abx. Wean oxygen as tolerated with a goal of discharging on  Monday to [facility] [employer] or some other facility. Discussed the importance of instinctive spirometry throughout the day such as during commercial breaks.

## 2024-11-28 NOTE — ASSESSMENT & PLAN NOTE
Patient's most recent potassium results are listed below.   Recent Labs     11/26/24  0645 11/27/24  0633 11/28/24  0613   K 3.1* 3.8 3.7       Plan  - Given KCL 20 meq yesterday  - Hypokalemia resolved  - Dialysis today   - Monitor potassium Daily  - Asymptomatic, with no events on telemetry

## 2024-11-28 NOTE — ASSESSMENT & PLAN NOTE
Anemia is likely due to chronic disease due to ESRD. Most recent hemoglobin and hematocrit are listed below.  Recent Labs     11/26/24  0645 11/27/24  0633 11/28/24  0613   HGB 8.6* 8.5* 8.1*   HCT 27.4* 26.9* 26.4*       Plan  - Monitor serial CBC: Daily  - Transfuse PRBC if patient becomes hemodynamically unstable, symptomatic or H/H drops below 7/21.  - Patient has received 2 units of PRBCs on 11/20/24  - Patient's anemia is currently stable at 8-9    Hemoglobin improved slightly overnight. On EPO 10,000 units.    11/27: Continue EPO per nephrology

## 2024-11-28 NOTE — SUBJECTIVE & OBJECTIVE
Interval History: Patient alert and oriented x 3. No acute distress. Engaging in conversation. O2 sat of 95%. Placed back on 1.5 L NC. Instinctive spirometer at bedside. Discharge to [facility] pending placement. Awaiting [facility] response.     Review of Systems   Constitutional:  Positive for fatigue. Negative for fever.   HENT: Negative.     Respiratory:  Positive for shortness of breath. Negative for cough, choking and wheezing.    Cardiovascular:  Negative for chest pain, palpitations and leg swelling.   Gastrointestinal:  Negative for abdominal pain, nausea and vomiting.   Genitourinary:  Negative for decreased urine volume.        ESRD on HD   Musculoskeletal:         L. Foot fracture   Skin:  Positive for wound.   Neurological:  Negative for weakness, light-headedness and headaches.   All other systems reviewed and are negative.    Objective:     Vital Signs (Most Recent):  Temp: 97.8 °F (36.6 °C) (11/28/24 0745)  Pulse: 85 (11/28/24 0745)  Resp: 20 (11/28/24 0745)  BP: (!) 142/81 (11/28/24 0745)  SpO2: 97 % (11/28/24 0745) Vital Signs (24h Range):  Temp:  [97.4 °F (36.3 °C)-97.9 °F (36.6 °C)] 97.8 °F (36.6 °C)  Pulse:  [74-98] 85  Resp:  [16-24] 20  SpO2:  [93 %-98 %] 97 %  BP: (127-168)/(73-96) 142/81     Weight: 112.1 kg (247 lb 2.2 oz)  Body mass index is 33.52 kg/m².    Intake/Output Summary (Last 24 hours) at 11/28/2024 0843  Last data filed at 11/27/2024 2130  Gross per 24 hour   Intake 240 ml   Output --   Net 240 ml             Physical Exam  Vitals and nursing note reviewed.   Constitutional:       General: He is awake. He is not in acute distress.     Appearance: Normal appearance. He is well-developed. He is obese. He is ill-appearing. He is not toxic-appearing.   HENT:      Head: Normocephalic and atraumatic.      Mouth/Throat:      Mouth: Mucous membranes are dry.      Pharynx: Oropharynx is clear.   Eyes:      Comments: Right eye vision loss   Cardiovascular:      Rate and Rhythm: Normal rate and  regular rhythm.      Pulses: Normal pulses.      Heart sounds: Normal heart sounds. No murmur heard.     No friction rub. No gallop.   Pulmonary:      Breath sounds: No stridor. Rhonchi and rales present. No wheezing.   Abdominal:      General: Bowel sounds are normal.      Palpations: Abdomen is soft.      Tenderness: There is no abdominal tenderness. There is no guarding or rebound.   Musculoskeletal:      Right lower leg: No edema.      Left lower leg: No edema.      Comments: L. Chest wall tenderness. L. Foot boot in place   Skin:     General: Skin is warm and dry.      Capillary Refill: Capillary refill takes less than 2 seconds.      Comments: Bilateral heel protectors, sacral foam dressing in place appears c/d/i   Neurological:      Mental Status: He is alert and oriented to person, place, and time.   Psychiatric:         Mood and Affect: Mood normal.         Behavior: Behavior is cooperative.             Significant Labs: All pertinent labs within the past 24 hours have been reviewed.    Significant Imaging: I have reviewed all pertinent imaging results/findings within the past 24 hours.

## 2024-11-28 NOTE — ASSESSMENT & PLAN NOTE
Creatine stable for now. BMP reviewed- noted Estimated Creatinine Clearance: 13.9 mL/min (A) (based on SCr of 8.01 mg/dL (H)). according to latest data. Based on current GFR, CKD stage is end stage.  Monitor UOP and serial BMP and adjust therapy as needed. Renally dose meds. Avoid nephrotoxic medications and procedures.     Continue HD, Monday Wednesday Friday.     11/22: Taken to dialysis today. Patient is net negative 4L.     11/24: Dialysis tomorrow.   11/25: Continue scheduled HD while in the hospital. Patient followed by nephrology.   11/26: Dialyzed yesterday. Net negative 2500 ml. Continue current treatment (M,W,F) per nephrology.   11/27: Dialysis today   11/28: Net negative 2L during HD. Will continue current plan for dialysis on MWF.

## 2024-11-28 NOTE — ASSESSMENT & PLAN NOTE
Hyperkalemia is likely due to ESRD.The patients most recent potassium results are listed below.  Recent Labs     11/26/24  0645 11/27/24  0633 11/28/24  0613   K 3.1* 3.8 3.7       Plan  - Monitor for arrhythmias with EKG and/or continuous telemetry.   - Treat the hyperkalemia with Potassium Binders if great than 4.5.   - Monitor potassium:  daily with BMP  - The patient's hyperkalemia is resolved    11/22: Dialysis today with 4L taken off. Patient returned to floor on 15L. Attempt made to wean down and patient desaturated to 89%. Placed back on 10L. Nephrology thinks it was secondary to taking off fluid. Will continue to monitor with daily BMP. Anticipate patient will continue to have hyperkalemia. Will monitor.      11/24: Resolved. Scheduled for HD tomorrow with neprhology.  11/25: Regularly scheduled dialysis. Will continue to monitor.    11/28: Continue to monitor

## 2024-11-28 NOTE — NURSING
[facility]  DIALYSIS TREATMENT SUMMARY      Note: Consult with the attending physician for patient treatment orders, this document is not a physician order.      Patient Information   Patient Saúl Butt   Date of Birth June 26, 1970   Chart Number [id number]   Location [facility]   Location MRN [id number]   Gender Male   SSN (last 4) [id number]     Treatment Information   Treatment Type Hemodialysis   Treatment Id [id number]   Start Time November 27, 2024 10:45   End Time November 27, 2024 13:45   Acutal Duration 03:00     Treatment Balances   Total Saline Administered 500   Net Fluid Balance 2000    Hemodialysis Orders   Therapy Standard   Orders Verified Time 11/27/2024 10:30    Date Verified 11/27/2024   Duration 3:00   Isolated UF/Bypass No   BFR (mL) 350   DFR X2 BFR   DFR (mL) 700   Dialyzer Type OPTIFLUX 160NR   UF Order UF Goal Ordered   UF Goal Ordered (mL) 2000   With BP Parameters Yes   As Tolerated Yes   Crit-Line used No   Heparin Initial Units Bolus No   Heparin IV Maintenance Bolus No   Heparin IV Infusion No   Potassium (mEq/L) 3   Calcium (mEq/L) 2.5   Bicarb (mg/dL) 33   Sodium (mEq/L) 137   Additional Orders if sbp drops 20 points and patient becomes symptomatic turn uf off, heparin 2,000 units each port of HD catheter   Clinician Oscar Elmore, RON    Dialysis Access   Access Type Central Venous Catheter   Central Venous Catheter   Access Type Catheter - Tunneled   Access Location Chest Wall - R   Current/New [facility] Patient Yes   Catheter Care Completed per Policy Yes   Dressing Dry and Intact on Arrival Yes   Dressing Changed Yes   Type of Dressing Film Biopatch/CHG   Dressing Changed By [facility] Staff   Type of HD Caps Curos   HD Caps Changed Yes   CVC Line Education Provided Yes - Infection Prevention      Vitals   Pre-Treatment Vitals   Time Is BP being recorded? Pre BP Map BP Method Pulse RR Temp How was Weight Obtained? Pre Weight Previous Dry Weight Previous Post Weight  Metric Target Fluid Removal (mL) Dialysate Confirmed Clinician    11/27/2024 10:40 BP/Map 133/77 (96) Noninvasive 88 20 97.6 How Obtained: Bed Scale 112.1   Kgs 2500 Yes Oscar Elmore RN    Comments: catheter care      Post Treatment Vitals   Time Is BP being recorded? BP Map Pulse RR Temp How was Weight Obtained? Post Weight Metric BVP UF Goal Ordered NSS Given Intra-Procedure Total Machine UF Removed (mL) Crit-Line Ending Profile Crit-Line Refill Crit-Line Ending HCT Crit-Line Max BV% Clinician    11/27/2024 13:50 BP/Map 133/76 (95) 97 18 97.4 How Obtained: Bed scale 110.1 Kgs 58.1 2000 0 2500     Oscar Elmore RN    Comments: blood rinsed back      Safety checks include: access uncovered and secured, Hemaclip secured for all central line access, machine checks performed, and alarm limits confirmed.     Labs   Hepatitis   HBsAG Lab Result HBsAG Lab Result HBsAG Draw Date Transient Antigenemia(MD Diagnosis Only) Anti-HBs Lab Result Anti-HBs Lab Value Anti-HBs Draw Date Documented By Documented Date Hepatitis Status Hepatitis Status   Negative  11/11/2024  Negative  11/11/2024 Oscar Elmore 11/27/2024  Susceptible   Notes:       Pre-Treatment Hepatitis Precautions Hard copy verified by nurse and placed in patients hospital chart Yes Signing   Patient tx at bedside 1:1 Yes Copy of hepatitis results verified in hospital EMR Yes Signed By Oscar Elmore RN   Patient tx with immune pts only Yes Labs Drawn Yes    Patient tx outside buffer zone Yes Hepatitis Information Entered By Oscar Elmore RN      Pre-Treatment Handoff   Staff Report Received Yes   Report Given by Primary Nurse Rosy Maurer   Time 09:45     Patient Arrival   Patient ID Verified Date of Birth    Full Name   Patient Consent to treatment verified Yes   Blood Transfusion Consent Verified N/A     Treatment Comments   Treatment Notes tolerated treatment well     Post-Treatment Handoff   Report Given to Primary Nurse rosy maurer   Time Report Given  14:10   Report Given By Chasity Elmore RN    Machine Validation   Time 10:40   Date 11/27/2024   Auto Alarm Test Passed Yes   Machine Serial # [id number]   Portable RO Yes   RO Serial# [id number]   Residual Bleach Negative Yes   Was a manufactured mix used? No   Machine Log Completed Yes   Total Chlorine (less than 0.1)? Yes   Total Chlorine Log Completed Yes   Bicarb BiBag   Bibag Size 650   Machine Temp 36.4   Machine Conductivity 14   Meter Type N/A   Meter Conductivity    Independent Conductivity 13.9   pH Status Pass Pass   pH    TCD Value    TCD Alarm with +/- 0.5 Yes   NVL enabled validated 100 asymmetric Yes   Safety check complete Chasity Elmore RN   Second Verification Performed? Yes   Second Verification Performed By Georgie Choi RN   Reason Not Verified       Serum Lab Values   Time BUN Creatinine Na K (mEq/L) Cl CO2 Ca (mEq/L) Phos Mg (mg/dL) Alb (g/dL) Glucose Hgb Hct WBC Plt PT aPTT INR Other Clinician    11/27/2024 06:33 46 10.6 138 3.8  26      8.5 26.9 8.4      Chasity Elmore RN    Comments:         Administered Medications   Time Medication Dose Route Reason for Admin Clinician    11/27/2024 13:50 heparin 4 ml IVP cath lock Chasity Elmore RN    Comments:         Facility Information Location Dialysis [facility] Multi Diagnosis Acute respiratory failure with hypoxemia   Facility Information Room # 548 Isolation Information   Admission Date 11/11/2024 Patient Type Chronic dialysis patient with diagnosis of ESRD Isolation Required? N   Ordering MD Kiet Martinez Patient Chronic Unit [facility] Completed by   Account/Finance Number [id number] Code Status Full Code General Tx information Entered by Chasity Elmore RN   Admission Status InPatient Diagnosis      Start Treatment Time Out Confirmed by chasity elmore rn Correct access site verified Yes   Treatment Initiation Connections Secured Time Out Completed 10:43 Treatment Start Date 11/27/2024    Saline line double clamped Correct patient verified Yes Treatment  Start Time 10:45    Hemaclip Applied Correct procedure verified Yes Patient/Family questions and concerns addressed Yes     Pre Focused Assessment Edema  Oriented to Place   Access Location Generalized GI / Bowels   Signs and Symptoms of Infection? No Edema Grade 1+ GI Soft   Pain Screening Cardiac    Does the patient have pain? No Heart Rhythm Regular  Voids   Respiratory Telemetry No Completed by   Lung Sounds Clear Skin Pre Treatment Focused Assessment Completed By Oscar Elmore RN   Location Bases Skin Warm Time 10:40    Bilateral  Dry Signing   Position Bases LOC Signed By Oscar Elmore RN    Anterior LOC Alert    Respiratory Efforts Unlabored  Oriented to Person      Education Focus or Topic Hemodialysis treatment review Caregiver Education Provided? N/A   Patient Education Method Knowledge / Understanding Assessed Teach back Patient Education Reinforced By   Patient Educated? Yes Family Education Provided? N/A Patient Education Reinforced by Oscar Elmore RN     Post-Treatment HD machine external disinfection completed per policy Yes Completed by   Post Treatment Delay PRO external disinfection completed per policy Yes Post Treatment Form Completed By Oscar Elmore RN   Delay N Post Treatment General Information    Machine Disinfection Requirement Notes stable    Post Focused Assessment Position Bases LOC Oriented to Person   Changes from Pre Focused Assessment  Anterior  Oriented to Place   Changes from Pre Treatment Focused Assessment? No Respiratory Efforts Unlabored  Alert   Access Edema GI / Bowels   Catheter clamped and capped Yes Location Generalized GI Soft   Access Flow Good Edema Grade 1+    Dialyzer Clearance Streaked Cardiac  Voids   Pain Screening Heart Rhythm Regular Completed by   Does the patient have pain? No Telemetry No Post Treatment Focused Assessment Completed by Oscar Elmore RN   Respiratory Skin Date 11/27/2024   Lung Sounds Clear Skin Warm Time 13:55   Location Bilateral   Dry Signing    Bases LOC Signed By Oscar Elmore RN

## 2024-11-28 NOTE — NURSING
Patient sitting on the side of the bed on the cell phone awake alert no signs of discomfort noted no SOB noted oxygen in place and con monitor. States no pain at this time. Skin warm dry. Needs are met.Informed about care treatment safety measures and use of the call bell for assistance. Needs are met. Call bell personal items in reach. Bed low side rail up.

## 2024-11-28 NOTE — ASSESSMENT & PLAN NOTE
Patient has paroxysmal (<7 days) atrial fibrillation. Patient is currently in sinus rhythm. [id number] Score: The patient doesn't have any registry metric data available. The patients heart rate in the last 24 hours is as follows:  Pulse  Min: 74  Max: 98     Antiarrhythmics  metoprolol injection 5 mg, Every 12 hours PRN, Intravenous  metoprolol tartrate (LOPRESSOR) tablet 100 mg, 2 times daily, Oral  diltiaZEM tablet 30 mg, Every 6 hours, Oral    Anticoagulants  heparin (porcine) injection 4,000 Units, As needed (PRN), Intra-Catheter  apixaban tablet 5 mg, 2 times daily, Oral    Plan  - Replete lytes with a goal of K>4, Mg >2  - Patient is anticoagulated, see medications listed above.  - Patient's afib is currently controlled  - Telemetry; no events overnight, NS 94 this am  - Continue to monitor    Continue eliquis.     11/26: No events per telemetry; one episode of tachycardia this am, continue eliquis   11/27: No events overnight; continue eliquis  11/28: Continue eliquis

## 2024-11-29 PROBLEM — G93.41 ACUTE METABOLIC ENCEPHALOPATHY: Status: RESOLVED | Noted: 2024-11-13 | Resolved: 2024-11-29

## 2024-11-29 PROBLEM — E87.6 HYPOKALEMIA: Status: RESOLVED | Noted: 2024-11-24 | Resolved: 2024-11-29

## 2024-11-29 LAB
ANION GAP SERPL CALCULATED.3IONS-SCNC: 19 MMOL/L (ref 7–16)
ANISOCYTOSIS BLD QL SMEAR: ABNORMAL
BASOPHILS # BLD AUTO: 0.19 K/UL (ref 0–0.2)
BASOPHILS NFR BLD AUTO: 3.1 % (ref 0–1)
BUN SERPL-MCNC: 37 MG/DL (ref 8–26)
BUN/CREAT SERPL: 4 (ref 6–20)
CALCIUM SERPL-MCNC: 8.7 MG/DL (ref 8.4–10.2)
CHLORIDE SERPL-SCNC: 99 MMOL/L (ref 98–107)
CO2 SERPL-SCNC: 23 MMOL/L (ref 22–29)
CREAT SERPL-MCNC: 10.51 MG/DL (ref 0.72–1.25)
DIFFERENTIAL METHOD BLD: ABNORMAL
EGFR (NO RACE VARIABLE) (RUSH/TITUS): 5 ML/MIN/1.73M2
EOSINOPHIL # BLD AUTO: 0.51 K/UL (ref 0–0.5)
EOSINOPHIL NFR BLD AUTO: 8.4 % (ref 1–4)
EOSINOPHIL NFR BLD MANUAL: 7 % (ref 1–4)
ERYTHROCYTE [DISTWIDTH] IN BLOOD BY AUTOMATED COUNT: 17.5 % (ref 11.5–14.5)
GLUCOSE SERPL-MCNC: 120 MG/DL (ref 74–100)
HCT VFR BLD AUTO: 27.8 % (ref 40–54)
HGB BLD-MCNC: 8.4 G/DL (ref 13.5–18)
HYPOCHROMIA BLD QL SMEAR: ABNORMAL
IMM GRANULOCYTES # BLD AUTO: 0.04 K/UL (ref 0–0.04)
IMM GRANULOCYTES NFR BLD: 0.7 % (ref 0–0.4)
LYMPHOCYTES # BLD AUTO: 1.23 K/UL (ref 1–4.8)
LYMPHOCYTES NFR BLD AUTO: 20.2 % (ref 27–41)
LYMPHOCYTES NFR BLD MANUAL: 22 % (ref 27–41)
MACROCYTES BLD QL SMEAR: ABNORMAL
MAGNESIUM SERPL-MCNC: 2.3 MG/DL (ref 1.6–2.6)
MCH RBC QN AUTO: 30.5 PG (ref 27–31)
MCHC RBC AUTO-ENTMCNC: 30.2 G/DL (ref 32–36)
MCV RBC AUTO: 101.1 FL (ref 80–96)
MONOCYTES # BLD AUTO: 0.79 K/UL (ref 0–0.8)
MONOCYTES NFR BLD AUTO: 13 % (ref 2–6)
MONOCYTES NFR BLD MANUAL: 13 % (ref 2–6)
MPC BLD CALC-MCNC: 11.2 FL (ref 9.4–12.4)
NEUTROPHILS # BLD AUTO: 3.34 K/UL (ref 1.8–7.7)
NEUTROPHILS NFR BLD AUTO: 54.6 % (ref 53–65)
NEUTS SEG NFR BLD MANUAL: 58 % (ref 50–62)
NRBC # BLD AUTO: 0 X10E3/UL
NRBC, AUTO (.00): 0 %
PHOSPHATE SERPL-MCNC: 5.5 MG/DL (ref 2.3–4.7)
PLATELET # BLD AUTO: 376 K/UL (ref 150–400)
PLATELET MORPHOLOGY: NORMAL
POLYCHROMASIA BLD QL SMEAR: ABNORMAL
POTASSIUM SERPL-SCNC: 3.6 MMOL/L (ref 3.5–5.1)
RBC # BLD AUTO: 2.75 M/UL (ref 4.6–6.2)
SODIUM SERPL-SCNC: 137 MMOL/L (ref 136–145)
TARGETS BLD QL SMEAR: ABNORMAL
WBC # BLD AUTO: 6.1 K/UL (ref 4.5–11)

## 2024-11-29 PROCEDURE — 25000003 PHARM REV CODE 250: Performed by: HOSPITALIST

## 2024-11-29 PROCEDURE — 94761 N-INVAS EAR/PLS OXIMETRY MLT: CPT

## 2024-11-29 PROCEDURE — 25000003 PHARM REV CODE 250

## 2024-11-29 PROCEDURE — 63600175 PHARM REV CODE 636 W HCPCS: Performed by: INTERNAL MEDICINE

## 2024-11-29 PROCEDURE — 25000003 PHARM REV CODE 250: Performed by: INTERNAL MEDICINE

## 2024-11-29 PROCEDURE — 27000221 HC OXYGEN, UP TO 24 HOURS

## 2024-11-29 PROCEDURE — 94640 AIRWAY INHALATION TREATMENT: CPT

## 2024-11-29 PROCEDURE — 80048 BASIC METABOLIC PNL TOTAL CA: CPT | Performed by: INTERNAL MEDICINE

## 2024-11-29 PROCEDURE — 25000003 PHARM REV CODE 250: Performed by: STUDENT IN AN ORGANIZED HEALTH CARE EDUCATION/TRAINING PROGRAM

## 2024-11-29 PROCEDURE — 36415 COLL VENOUS BLD VENIPUNCTURE: CPT | Performed by: INTERNAL MEDICINE

## 2024-11-29 PROCEDURE — 90935 HEMODIALYSIS ONE EVALUATION: CPT

## 2024-11-29 PROCEDURE — 97110 THERAPEUTIC EXERCISES: CPT

## 2024-11-29 PROCEDURE — 25000242 PHARM REV CODE 250 ALT 637 W/ HCPCS: Performed by: STUDENT IN AN ORGANIZED HEALTH CARE EDUCATION/TRAINING PROGRAM

## 2024-11-29 PROCEDURE — 63600175 PHARM REV CODE 636 W HCPCS

## 2024-11-29 PROCEDURE — 85025 COMPLETE CBC W/AUTO DIFF WBC: CPT | Performed by: INTERNAL MEDICINE

## 2024-11-29 PROCEDURE — 83735 ASSAY OF MAGNESIUM: CPT | Performed by: STUDENT IN AN ORGANIZED HEALTH CARE EDUCATION/TRAINING PROGRAM

## 2024-11-29 PROCEDURE — 63600175 PHARM REV CODE 636 W HCPCS: Performed by: STUDENT IN AN ORGANIZED HEALTH CARE EDUCATION/TRAINING PROGRAM

## 2024-11-29 PROCEDURE — 11000001 HC ACUTE MED/SURG PRIVATE ROOM

## 2024-11-29 PROCEDURE — 25000003 PHARM REV CODE 250: Performed by: NURSE PRACTITIONER

## 2024-11-29 PROCEDURE — 99232 SBSQ HOSP IP/OBS MODERATE 35: CPT | Mod: GC,,, | Performed by: FAMILY MEDICINE

## 2024-11-29 PROCEDURE — 84100 ASSAY OF PHOSPHORUS: CPT | Performed by: STUDENT IN AN ORGANIZED HEALTH CARE EDUCATION/TRAINING PROGRAM

## 2024-11-29 PROCEDURE — 99900035 HC TECH TIME PER 15 MIN (STAT)

## 2024-11-29 RX ORDER — PANTOPRAZOLE SODIUM 40 MG/1
40 TABLET, DELAYED RELEASE ORAL DAILY
Status: DISCONTINUED | OUTPATIENT
Start: 2024-11-30 | End: 2024-12-02 | Stop reason: HOSPADM

## 2024-11-29 RX ADMIN — IPRATROPIUM BROMIDE AND ALBUTEROL SULFATE 3 ML: .5; 3 SOLUTION RESPIRATORY (INHALATION) at 06:11

## 2024-11-29 RX ADMIN — EPOETIN ALFA 10000 UNITS: 10000 SOLUTION INTRAVENOUS; SUBCUTANEOUS at 09:11

## 2024-11-29 RX ADMIN — ALLOPURINOL 100 MG: 100 TABLET ORAL at 11:11

## 2024-11-29 RX ADMIN — SODIUM BICARBONATE 650 MG TABLET 1300 MG: at 08:11

## 2024-11-29 RX ADMIN — HEPARIN SODIUM 4000 UNITS: 1000 INJECTION, SOLUTION INTRAVENOUS; SUBCUTANEOUS at 11:11

## 2024-11-29 RX ADMIN — APIXABAN 5 MG: 5 TABLET, FILM COATED ORAL at 11:11

## 2024-11-29 RX ADMIN — Medication 1000 UNITS: at 11:11

## 2024-11-29 RX ADMIN — APIXABAN 5 MG: 5 TABLET, FILM COATED ORAL at 08:11

## 2024-11-29 RX ADMIN — DILTIAZEM HYDROCHLORIDE 30 MG: 30 TABLET, FILM COATED ORAL at 05:11

## 2024-11-29 RX ADMIN — HYDRALAZINE HYDROCHLORIDE 100 MG: 100 TABLET ORAL at 08:11

## 2024-11-29 RX ADMIN — NIFEDIPINE 60 MG: 60 TABLET, FILM COATED, EXTENDED RELEASE ORAL at 08:11

## 2024-11-29 RX ADMIN — ACETAMINOPHEN 650 MG: 325 TABLET ORAL at 11:11

## 2024-11-29 RX ADMIN — ACETAMINOPHEN 650 MG: 325 TABLET ORAL at 10:11

## 2024-11-29 RX ADMIN — PIPERACILLIN SODIUM AND TAZOBACTAM SODIUM 4.5 G: 4; .5 INJECTION, POWDER, LYOPHILIZED, FOR SOLUTION INTRAVENOUS at 07:11

## 2024-11-29 RX ADMIN — IPRATROPIUM BROMIDE AND ALBUTEROL SULFATE 3 ML: .5; 3 SOLUTION RESPIRATORY (INHALATION) at 01:11

## 2024-11-29 RX ADMIN — PANTOPRAZOLE SODIUM 40 MG: 40 INJECTION, POWDER, LYOPHILIZED, FOR SOLUTION INTRAVENOUS at 11:11

## 2024-11-29 RX ADMIN — PIPERACILLIN SODIUM AND TAZOBACTAM SODIUM 4.5 G: 4; .5 INJECTION, POWDER, LYOPHILIZED, FOR SOLUTION INTRAVENOUS at 11:11

## 2024-11-29 RX ADMIN — METOPROLOL TARTRATE 100 MG: 100 TABLET, FILM COATED ORAL at 11:11

## 2024-11-29 RX ADMIN — ACETAMINOPHEN 650 MG: 325 TABLET ORAL at 06:11

## 2024-11-29 RX ADMIN — QUETIAPINE FUMARATE 50 MG: 25 TABLET ORAL at 08:11

## 2024-11-29 RX ADMIN — SODIUM CHLORIDE: 9 INJECTION, SOLUTION INTRAVENOUS at 08:11

## 2024-11-29 RX ADMIN — QUETIAPINE FUMARATE 50 MG: 25 TABLET ORAL at 11:11

## 2024-11-29 RX ADMIN — TRAZODONE HYDROCHLORIDE 25 MG: 50 TABLET ORAL at 08:11

## 2024-11-29 RX ADMIN — HYDRALAZINE HYDROCHLORIDE 100 MG: 100 TABLET ORAL at 11:11

## 2024-11-29 RX ADMIN — ACETAMINOPHEN 650 MG: 325 TABLET ORAL at 01:11

## 2024-11-29 RX ADMIN — METOPROLOL TARTRATE 100 MG: 100 TABLET, FILM COATED ORAL at 08:11

## 2024-11-29 RX ADMIN — SODIUM BICARBONATE 650 MG TABLET 1300 MG: at 11:11

## 2024-11-29 RX ADMIN — DILTIAZEM HYDROCHLORIDE 30 MG: 30 TABLET, FILM COATED ORAL at 11:11

## 2024-11-29 RX ADMIN — LORAZEPAM 1 MG: 1 TABLET ORAL at 08:11

## 2024-11-29 RX ADMIN — AMLODIPINE BESYLATE 10 MG: 10 TABLET ORAL at 11:11

## 2024-11-29 RX ADMIN — CHLORHEXIDINE GLUCONATE 15 ML: 1.2 RINSE ORAL at 11:11

## 2024-11-29 RX ADMIN — DILTIAZEM HYDROCHLORIDE 30 MG: 30 TABLET, FILM COATED ORAL at 12:11

## 2024-11-29 RX ADMIN — HYDRALAZINE HYDROCHLORIDE 100 MG: 100 TABLET ORAL at 03:11

## 2024-11-29 RX ADMIN — IPRATROPIUM BROMIDE AND ALBUTEROL SULFATE 3 ML: .5; 3 SOLUTION RESPIRATORY (INHALATION) at 07:11

## 2024-11-29 RX ADMIN — LIDOCAINE 5% 2 PATCH: 700 PATCH TOPICAL at 11:11

## 2024-11-29 NOTE — ASSESSMENT & PLAN NOTE
Creatine stable for now. BMP reviewed- noted Estimated Creatinine Clearance: 10.6 mL/min (A) (based on SCr of 10.51 mg/dL (H)). according to latest data. Based on current GFR, CKD stage is end stage.  Monitor UOP and serial BMP and adjust therapy as needed. Renally dose meds. Avoid nephrotoxic medications and procedures.     Continue HD, Monday Wednesday Friday.     11/22: Taken to dialysis today. Patient is net negative 4L.     11/24: Dialysis tomorrow.   11/25: Continue scheduled HD while in the hospital. Patient followed by nephrology.   11/26: Dialyzed yesterday. Net negative 2500 ml. Continue current treatment (M,W,F) per nephrology.   11/27: Dialysis today   11/28: Net negative 2L during HD. Will continue current plan for dialysis on MWF.   11/29: Dialysis today without issue

## 2024-11-29 NOTE — SUBJECTIVE & OBJECTIVE
Interval History: Patient seen resting comfortably in bed. No acute events overnight. Patient seen after dialysis, went well. Continue antibiotics. Patient breathing well. Anticipate discharge on Monday after completion of antibiotics.    Review of Systems   Constitutional:  Positive for fatigue. Negative for fever.   HENT: Negative.     Respiratory:  Positive for shortness of breath. Negative for cough, choking and wheezing.    Cardiovascular:  Negative for chest pain, palpitations and leg swelling.   Gastrointestinal:  Negative for abdominal pain, nausea and vomiting.   Genitourinary:  Positive for decreased urine volume. Negative for flank pain.        ESRD on HD   Musculoskeletal:         L. Foot fracture   Skin:  Positive for wound.   Neurological:  Negative for weakness, light-headedness and headaches.   All other systems reviewed and are negative.    Objective:     Vital Signs (Most Recent):  Temp: 99.1 °F (37.3 °C) (11/29/24 1120)  Pulse: 88 (11/29/24 1304)  Resp: 18 (11/29/24 1304)  BP: 134/81 (11/29/24 1150)  SpO2: 96 % (11/29/24 1304) Vital Signs (24h Range):  Temp:  [98.5 °F (36.9 °C)-99.1 °F (37.3 °C)] 99.1 °F (37.3 °C)  Pulse:  [60-88] 88  Resp:  [16-20] 18  SpO2:  [96 %-99 %] 96 %  BP: (125-147)/(74-94) 134/81     Weight: 112.1 kg (247 lb 2.2 oz)  Body mass index is 33.52 kg/m².    Intake/Output Summary (Last 24 hours) at 11/29/2024 1352  Last data filed at 11/29/2024 1120  Gross per 24 hour   Intake 600 ml   Output 3000 ml   Net -2400 ml         Physical Exam  Vitals and nursing note reviewed.   Constitutional:       General: He is awake. He is not in acute distress.     Appearance: Normal appearance. He is well-developed. He is obese. He is ill-appearing. He is not toxic-appearing.   HENT:      Head: Normocephalic and atraumatic.      Mouth/Throat:      Pharynx: Oropharynx is clear.   Eyes:      Conjunctiva/sclera: Conjunctivae normal.      Comments: Right eye vision loss   Cardiovascular:      Rate  and Rhythm: Normal rate and regular rhythm.      Pulses: Normal pulses.      Heart sounds: Normal heart sounds. No murmur heard.     No friction rub. No gallop.   Pulmonary:      Breath sounds: No stridor. Rhonchi and rales present. No wheezing.   Abdominal:      General: Bowel sounds are normal.      Palpations: Abdomen is soft.      Tenderness: There is no abdominal tenderness. There is no guarding or rebound.   Musculoskeletal:      Right lower leg: No edema.      Left lower leg: No edema.      Comments: L. Chest wall tenderness. L. Foot boot in place   Skin:     General: Skin is warm and dry.      Capillary Refill: Capillary refill takes less than 2 seconds.      Comments: Bilateral heel protectors, sacral foam dressing in place appears c/d/i   Neurological:      Mental Status: He is alert and oriented to person, place, and time.   Psychiatric:         Mood and Affect: Mood normal.         Behavior: Behavior normal. Behavior is cooperative.         Thought Content: Thought content normal.             Significant Labs: All pertinent labs within the past 24 hours have been reviewed.    Significant Imaging: I have reviewed all pertinent imaging results/findings within the past 24 hours.
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
## 2024-11-29 NOTE — ASSESSMENT & PLAN NOTE
Anemia is likely due to chronic disease due to ESRD. Most recent hemoglobin and hematocrit are listed below.  Recent Labs     11/27/24  0633 11/28/24  0613 11/29/24  0300   HGB 8.5* 8.1* 8.4*   HCT 26.9* 26.4* 27.8*       Plan  - Monitor serial CBC: Daily  - Transfuse PRBC if patient becomes hemodynamically unstable, symptomatic or H/H drops below 7/21.  - Patient has received 2 units of PRBCs on 11/20/24  - Patient's anemia is currently stable at 8-9    Hemoglobin improved slightly overnight. On EPO 10,000 units.    Continue EPO per nephrology

## 2024-11-29 NOTE — ASSESSMENT & PLAN NOTE
Hyperkalemia is likely due to ESRD.The patients most recent potassium results are listed below.  Recent Labs     11/27/24  0633 11/28/24  0613 11/29/24  0300   K 3.8 3.7 3.6       Plan  - Monitor for arrhythmias with EKG and/or continuous telemetry.   - Treat the hyperkalemia with Potassium Binders if great than 4.5.   - Monitor potassium:  daily with BMP  - The patient's hyperkalemia is resolved    11/22: Dialysis today with 4L taken off. Patient returned to floor on 15L. Attempt made to wean down and patient desaturated to 89%. Placed back on 10L. Nephrology thinks it was secondary to taking off fluid. Will continue to monitor with daily BMP. Anticipate patient will continue to have hyperkalemia. Will monitor.      11/24: Resolved. Scheduled for HD tomorrow with neprhology.  11/25: Regularly scheduled dialysis. Will continue to monitor.    11/28: Continue to monitor  11/29: dialysis today

## 2024-11-29 NOTE — PT/OT/SLP PROGRESS
Occupational Therapy      Patient Name:  Saúl Butt   MRN:  [id number]    Patient not seen today secondary to Dialysis. Will follow-up 12/2/24.    11/29/2024

## 2024-11-29 NOTE — ASSESSMENT & PLAN NOTE
Patient has paroxysmal (<7 days) atrial fibrillation. Patient is currently in sinus rhythm. [id number] Score: The patient doesn't have any registry metric data available. The patients heart rate in the last 24 hours is as follows:  Pulse  Min: 60  Max: 88     Antiarrhythmics  metoprolol injection 5 mg, Every 12 hours PRN, Intravenous  metoprolol tartrate (LOPRESSOR) tablet 100 mg, 2 times daily, Oral  diltiaZEM tablet 30 mg, Every 6 hours, Oral    Anticoagulants  heparin (porcine) injection 4,000 Units, As needed (PRN), Intra-Catheter  apixaban tablet 5 mg, 2 times daily, Oral    Plan  - Replete lytes with a goal of K>4, Mg >2  - Patient is anticoagulated, see medications listed above.  - Patient's afib is currently controlled  - Telemetry; no events overnight, NS 94 this am  - Continue to monitor    Continue eliquis.

## 2024-11-29 NOTE — ASSESSMENT & PLAN NOTE
Admitted to the ICU for acute respiratory distress and hyperkalemia now s/p extubation. On 2L NC at 96%. Patient does not use home oxygen. Complaints of sob and increase work of breathing attributed to multiple rib fracture from resuscitative efforts while in the ICU. Patient will require close monitor. Patient is not stable for discharge.     11/22  Patient stable this am with sats in mid 90's on 2L NC. Returned to the floor on 15L highflow. Patient did not tolerated switching to NC. ABG showed metabolic acidosis. Placed back on 10L with an oximeter. Will attempt to wean off overnight. Goal of O2 sat of 88-92%. See respiratory therapy note.     11/23: Patient stable this morning SpO2 mid 90s on 4 L NC.    11/24: SpO2 stable in the mid 90's on 3L NC. Continue to wean oxygen.   11/25: Patient increased to 4L NC overnight with sats in mid-90's. Denies any sob. Duonebs Q6H. Will continue to wean today. Continue PT/OT while in the hospital.     11/26: This am patient desaturated to 86% on 3L NC; on repeat RR 18, /83, Sats 92%; on eliquis 5mg bid, lungs still sound congested but patient is afebrile with WCB improving. S/p IV abx course; current on duonebs, wean as tolerate; ordered chest xray plus V/Q scan to rule out PE and pneumonia. CXR showed RLL consolidation; will start zosyn for hospital acquired pneumonia. V/Q scan pending.     11/27: Marginal sats on room air; placed back on 3L NC with improvement to mid-90's; given hospital course which included intubation, aspiration, and resuscitation, positive influenza result and RLL consolidation; patient would benefit from continued IV antibiotics for 2-3 days with supplemental oxygen; goal of weaning off oxygen prior to discharge +/- home O2 evaluation     11/28: Nuclear medicine scan shows low probability for PE. Patient currently on 1.5L NC with O2 sat of 95%. Patient would benefit from 2 more days of IV abx. Wean oxygen as tolerated with a goal of discharging on  Monday to [facility] or some other facility. Discussed the importance of instinctive spirometry throughout the day such as during commercial breaks.     11/29: Dialysis today. Breathing well. Continue Zosyn IV

## 2024-11-29 NOTE — PT/OT/SLP PROGRESS
Physical Therapy Treatment    Patient Name:  Saúl Butt   MRN:  [id number]    Recommendations:     Discharge Recommendations: Moderate Intensity Therapy  Discharge Equipment Recommendations: to be determined by next level of care  Barriers to discharge: Inaccessible home and Decreased caregiver support    Assessment:     Saúl Butt is a 54 y.o. male admitted with a medical diagnosis of Acute respiratory failure with hypoxemia.  He presents with the following impairments/functional limitations: weakness, impaired endurance, impaired self care skills, impaired functional mobility, decreased lower extremity function, decreased safety awareness Pt demonstrated improved strength with bed exercise. He was able to stand with minimal assistance. Pt ambulated with contact guard assistance and improved quality. Pt was motivated to begin swing bed on Monday.    Rehab Prognosis: Good; patient would benefit from acute skilled PT services to address these deficits and reach maximum level of function.    Recent Surgery: * No surgery found *      Plan:     During this hospitalization, patient to be seen 5 x/week to address the identified rehab impairments via gait training, therapeutic activities, therapeutic exercises, neuromuscular re-education and progress toward the following goals:    Plan of Care Expires:  12/24/24    Subjective     Chief Complaint: none  Patient/Family Comments/goals: Pt is agreeable to PT.  Pain/Comfort:  Pain Rating 1: 0/10  Pain Rating Post-Intervention 1: 0/10      Objective:     Communicated with LUZ Mccall RN prior to session.  Patient found HOB elevated with peripheral IV, oxygen, telemetry, pulse ox (continuous) upon PT entry to room.     General Precautions: Standard, fall  Orthopedic Precautions: N/A  Braces: N/A  Respiratory Status: Nasal cannula, flow 2 L/min     Functional Mobility:  Bed Mobility:     Scooting: stand by assistance  Supine to Sit: stand by assistance  Transfers:     Sit to  Stand:  minimum assistance with rolling walker  Gait: 130 x 1 trial. Contact guard assistance with rolling walker, CAM walking boot. Demonstrated varying speeds and had externally rotated LE.  Balance: good      AM-PAC 6 CLICK MOBILITY  Turning over in bed (including adjusting bedclothes, sheets and blankets)?: 4  Sitting down on and standing up from a chair with arms (e.g., wheelchair, bedside commode, etc.): 4  Moving from lying on back to sitting on the side of the bed?: 4  Moving to and from a bed to a chair (including a wheelchair)?: 4  Need to walk in hospital room?: 4  Climbing 3-5 steps with a railing?: 2  Basic Mobility Total Score: 22       Treatment & Education:  Pt performed bilateral LE: bed level exercises: ankle pumps, quad sets, and heel slides x 30 each  Mobility noted above.    Patient left up in chair with all lines intact and call button in reach..    GOALS:   Multidisciplinary Problems       Physical Therapy Goals          Problem: Physical Therapy    Goal Priority Disciplines Outcome Interventions   Physical Therapy Goal     PT, PT/OT Progressing    Description: Short term goals:  . Supine to sit with Contact Guard Assistance  . Sit to stand transfer with Contact Guard Assistance  . Gait  x 75 feet with Contact Guard Assistance using Rolling Walker.     Long term goals:  Patient will regain full independent functional mobility with lowest level of assistive device to return to desired living arrangement and prior ADL's.                          Time Tracking:     PT Received On: 11/29/24  PT Start Time: 1339     PT Stop Time: 1400  PT Total Time (min): 21 min     Billable Minutes: Therapeutic Exercise 21    Treatment Type: Treatment  PT/PTA: PT     Number of PTA visits since last PT visit: 0     11/29/2024

## 2024-11-29 NOTE — NURSING
[facility] INPATIENT SERVICES  DIALYSIS TREATMENT SUMMARY      Note: Consult with the attending physician for patient treatment orders, this document is not a physician order.      Patient Information   Patient Saúl Butt   Date of Birth June 26, 1970   Chart Number [id number]   Location [facility]   Location MRN [id number]   Gender Male   SSN (last 4) [id number]     Treatment Information   Treatment Type Hemodialysis   Treatment Id [id number]   Start Time November 29, 2024 08:45   End Time November 29, 2024 11:15   Acutal Duration 02:30     Treatment Balances   Total Saline Administered 500   Net Fluid Balance 2500    Hemodialysis Orders   Therapy Standard   Orders Verified Time 11/29/2024 08:15    Date Verified 11/29/2024   Duration 2:30   Isolated UF/Bypass No   BFR (mL) 400   DFR X2 BFR   DFR (mL) 800   Dialyzer Type OPTIFLUX 160NR   UF Order UF Range   UF Range (mL) 1000 - 3000   Crit-Line used No   Heparin Initial Units Bolus No   Heparin IV Maintenance Bolus No   Heparin IV Infusion No   Potassium (mEq/L) 3.0   Calcium (mEq/L) 2.5   Bicarb (mg/dL) 33   Sodium (mEq/L) 137   Additional Orders 1. Turn UF off if pt becomes symptomatic associated with drop in SBP >20mmHg. 2. Hep-lock HD ports x 2 with 2,000 units of Heparin post HD tx. 3. Administer Epoetin 10,000 units IVP during HD tx.   Clinician Patricia Mack RN    Dialysis Access   Access Type Central Venous Catheter   Central Venous Catheter   Access Type Catheter - Tunneled   Access Location Chest Wall - R   Current/New [facility] Patient Yes   Catheter Care Completed per Policy Yes   Dressing Dry and Intact on Arrival Yes   Dressing Changed Yes   Type of Dressing Film Biopatch/CHG   Dressing Changed By [facility] Staff   Type of HD Caps Clear Guard   HD Caps Changed Yes   CVC Line Education Provided Yes - Infection Prevention      Vitals   Pre-Treatment Vitals   Time Is BP being recorded? Pre BP Map BP Method Pulse RR Temp How was Weight Obtained? Pre  Weight Previous Dry Weight Previous Post Weight Metric Target Fluid Removal (mL) Dialysate Confirmed Clinician    11/29/2024 08:34 BP/Map 143/89 (107) Noninvasive 81 18 98.9 How Obtained: Bed Scale 103.5   Kgs 3000 Yes Josefina Valdivia, RON    Comments:       Post Treatment Vitals   Time Is BP being recorded? BP Map Pulse RR Temp How was Weight Obtained? Post Weight Metric BVP UF Goal Ordered NSS Given Intra-Procedure Total Machine UF Removed (mL) Crit-Line Ending Profile Crit-Line Refill Crit-Line Ending HCT Crit-Line Max BV% Clinician   11/29/2024 11:20 BP/Map 127/82 (97) 83 16 99.1 How Obtained: Bed scale 224 Lbs 50.2 3000 0 3000     Patricia Mack RN   Comments: Post cath care performed per dialysis RN.      Safety checks include: access uncovered and secured, Hemaclip secured for all central line access, machine checks performed, and alarm limits confirmed.     Labs   Hepatitis   HBsAG Lab Result HBsAG Lab Result HBsAG Draw Date Transient Antigenemia(MD Diagnosis Only) Anti-HBs Lab Result Anti-HBs Lab Value Anti-HBs Draw Date Documented By Documented Date Hepatitis Status Hepatitis Status   Negative  11/11/2024  Unknown   Patricia Mack 11/29/2024  Susceptible   Notes:       Pre-Treatment Hepatitis Precautions Copy of hepatitis results verified in hospital EMR Yes Signed By Patricia Mack RN   Patient tx with immune pts only Yes Hepatitis Information Entered By Patricia Mack RN    Patient tx outside buffer zone Yes Signing      Pre-Treatment Handoff   Staff Report Received Yes   Report Given by Primary Nurse Do Mccall RN   Time 08:15     Patient Arrival   Patient ID Verified Date of Birth    MRN    Full Name   Patient Consent to treatment verified Yes   Blood Transfusion Consent Verified N/A     Treatment Comments   Treatment Notes Pt cayden HD tx well.     Post-Treatment Handoff   Report Given to Primary Nurse Do Mccall RN   Time Report Given 11:30   Report Given By Patricia Mack RN    Machine  Validation   Time 08:25   Date 11/29/2024   Auto Alarm Test Passed Yes   Machine Serial # [id number]   Portable RO Yes   RO Serial# [id number]   Residual Bleach Negative Yes   Was a manufactured mix used? Yes   Machine Log Completed Yes   Total Chlorine (less than 0.1)? Yes   Total Chlorine Log Completed Yes   Bicarb BiBag   Bibag Size 650   Machine Temp 23   Machine Conductivity 7   Meter Type N/A   Meter Conductivity    Independent Conductivity 7   pH Status Pass Pass   pH    TCD Value 7   TCD Alarm with +/- 0.5 Yes   NVL enabled validated 100 asymmetric Yes   Safety check complete Josefina Valdivia RN   Second Verification Performed? Yes   Second Verification Performed By Patricia Mack RN   Reason Not Verified       Serum Lab Values   Time BUN Creatinine Na K (mEq/L) Cl CO2 Ca (mEq/L) Phos Mg (mg/dL) Alb (g/dL) Glucose Hgb Hct WBC Plt PT aPTT INR Other Clinician    11/29/2024 03:00 37 10.51 137 3.6 99 23 8.7 5.5 2.3  120 8.4 27.8 6.10 376     Patricia Mack RN    Comments:         Administered Medications   Time Medication Dose Route Reason for Admin Clinician    11/29/2024 09:10 Epoetin 10,000 units IVP anemia Patricia Mack RN    Comments:         Facility Information Location Dialysis Suite Multi [facility]   Facility Information Room # 548 Diagnosis Acute resp failure with hypoxemia   Admission Date 11/11/2024 Patient Type Chronic dialysis patient with diagnosis of ESRD Isolation Information   Ordering MD Dr. Main Murillo Patient Chronic Unit [facility] Isolation Required? N   Account/Finance Number [facility] [id number] Patient Home Unit [place] Completed by   Admission Status InPatient Code Status Full Code General Tx information Entered by Patricia Mack RN     Start Treatment Time Out Confirmed by ANTHONY Doyle RN Correct access site verified Yes   Treatment Initiation Connections Secured Time Out Completed 08:44 Treatment Start Date 11/29/2024    Saline line double clamped Correct patient verified Yes  Treatment Start Time 08:45    Hemaclip Applied Correct procedure verified Yes Patient/Family questions and concerns addressed Yes     Pre Focused Assessment Edema GI / Bowels   Access Location Lower Ext. GI Soft   Signs and Symptoms of Infection? No  Pedal  Bowel sounds present   Pain Screening Cardiac    Does the patient have pain? No Heart Rhythm Regular  Voids   Respiratory Telemetry Yes Completed by   Lung Sounds Coarse Skin Pre Treatment Focused Assessment Completed By Josefina Valdivia RN   Location Bilateral Skin Warm Time 08:35    Bases  Dry Signing   Position Anterior LOC Signed By Josefina Valdivia RN   Respiratory Efforts Unlabored LOC Alert and Oriented x3      Education Focus or Topic Hemodialysis treatment review Caregiver Education Provided? N/A   Patient Education Method Knowledge / Understanding Assessed Teach back Patient Education Reinforced By   Patient Educated? Yes Family Education Provided? N/A Patient Education Reinforced by Patricia Mack RN     Post-Treatment Delay N Post Treatment General Information   Post Treatment Machine Disinfection Requirement Notes Pt cay[facility] HD tx well.   Patient Transferred to Higher Level of Care? No HD machine external disinfection completed per policy Yes Completed by   Post Treatment Delay PRO external disinfection completed per policy Yes Post Treatment Form Completed By Patricia Mack RN     Post Focused Assessment Location Bilateral LOC Alert and Oriented x3   Changes from Pre Focused Assessment  Bases GI / Bowels   Changes from Pre Treatment Focused Assessment? No Position Anterior GI Soft   Access Respiratory Efforts Unlabored  Bowel sounds present   Cath Packed with Heparin Edema    Access Port(ml) 2 Location Lower Ext.  Voids   Return Port(ml) 2  Pedal Completed by   Catheter clamped and capped Yes Cardiac Post Treatment Focused Assessment Completed by Patricia Mack RN   Access Flow Good Heart Rhythm Regular Date 11/29/2024   Dialyzer Clearance  Streaked Telemetry Yes Time 11:25   Pain Screening Skin Signing   Does the patient have pain? No Skin Warm Signed By Patricia Mack RN   Respiratory  Dry    Lung Sounds Coarse LOC

## 2024-11-29 NOTE — ASSESSMENT & PLAN NOTE
Overall impression at the time of admission was that patient had sepsis with an abnormal CXR. Pressor support not provided. Patient initial started on Rocephin 1 gm IV then switched to zosyn plus azithromycin. Patient received a total of 6 days of IV antibiotics. Blood cultures show no growth to date. Blood pressure is now stable. Lactic acid 1.5 on 11/14. Resolved. Patient will need close monitor give respiratory status.      Today patient appears more stable. Still require oxygen supplementation. Overnight was afebrile, stable bp. Will continue IV antibiotic coverage.     Stable. Will continue to monitor.

## 2024-11-29 NOTE — DIALYSIS ROUNDING
Mr. Butt is seen in follow-up of his end-stage renal disease.  He is currently dialyzing and were seeing him in the dialysis unit.  He is lying flat and doing well with dialysis.  His chest is clear.  We will continue with his scheduled dialysis treatments.

## 2024-11-29 NOTE — PROGRESS NOTES
Pharmacist Intervention IV to PO Note    Saúl Butt is a 54 y.o. male being treated with IV medication pantoprazole    Patient Data:    Vital Signs (Most Recent):  Temp: 99.1 °F (37.3 °C) (11/29/24 1120)  Pulse: 88 (11/29/24 1304)  Resp: 18 (11/29/24 1304)  BP: 134/81 (11/29/24 1150)  SpO2: 96 % (11/29/24 1304) Vital Signs (72h Range):  Temp:  [97.4 °F (36.3 °C)-99.1 °F (37.3 °C)]   Pulse:  [58-98]   Resp:  [16-24]   BP: (119-168)/(66-98)   SpO2:  [92 %-100 %]      CBC:  Recent Labs   Lab 11/27/24  0633 11/28/24  0613 11/29/24  0300   WBC 8.49 6.97 6.10   RBC 2.78* 2.71* 2.75*   HGB 8.5* 8.1* 8.4*   HCT 26.9* 26.4* 27.8*    395 376   MCV 96.8* 97.4* 101.1*   MCH 30.6 29.9 30.5   MCHC 31.6* 30.7* 30.2*     CMP:     Recent Labs   Lab 11/27/24  0633 11/28/24  0613 11/29/24  0300   GLU 87 110* 120*   CALCIUM 8.4 8.5 8.7    137 137   K 3.8 3.7 3.6   CO2 26 26 23   CL 99 100 99   BUN 46* 31* 37*   CREATININE 10.63* 8.01* 10.51*       Dietary Orders:  Diet Orders            Dietary nutrition supplements By Mouth; BID; Novasource Renal - Any flavor starting at 11/26 0935    Diet Adult Regular: Regular starting at 11/22 1212            Based on the following criteria, this patient qualifies for intravenous to oral conversion:  [x] The patients gastrointestinal tract is functioning (tolerating medications via oral or enteral route for 24 hours and tolerating food or enteral feeds for 24 hours).  [x] The patient is hemodynamically stable for 24 hours (heart rate <100 beats per minute, systolic blood pressure >99 mm Hg, and respiratory rate <20 breaths per minute).  [x] The patient shows clinical improvement (afebrile for at least 24 hours and white blood cell count downtrending or normalized). Additionally, the patient must be non-neutropenic (absolute neutrophil count >500 cells/mm3).  [x] For antimicrobials, the patient has received IV therapy for at least 24 hours.    IV medication pantoprazole will be  changed to oral medication pantoprazole    Pharmacist's Name: Asha Wilkinson  Pharmacist's Extension: [phone]

## 2024-11-30 LAB
ANION GAP SERPL CALCULATED.3IONS-SCNC: 14 MMOL/L (ref 7–16)
BASOPHILS # BLD AUTO: 0.19 K/UL (ref 0–0.2)
BASOPHILS NFR BLD AUTO: 2.9 % (ref 0–1)
BUN SERPL-MCNC: 31 MG/DL (ref 8–26)
BUN/CREAT SERPL: 4 (ref 6–20)
CALCIUM SERPL-MCNC: 8.4 MG/DL (ref 8.4–10.2)
CHLORIDE SERPL-SCNC: 100 MMOL/L (ref 98–107)
CO2 SERPL-SCNC: 25 MMOL/L (ref 22–29)
CREAT SERPL-MCNC: 8.15 MG/DL (ref 0.72–1.25)
DIFFERENTIAL METHOD BLD: ABNORMAL
EGFR (NO RACE VARIABLE) (RUSH/TITUS): 7 ML/MIN/1.73M2
EOSINOPHIL # BLD AUTO: 0.52 K/UL (ref 0–0.5)
EOSINOPHIL NFR BLD AUTO: 7.9 % (ref 1–4)
ERYTHROCYTE [DISTWIDTH] IN BLOOD BY AUTOMATED COUNT: 17.2 % (ref 11.5–14.5)
GLUCOSE SERPL-MCNC: 102 MG/DL (ref 74–100)
HCT VFR BLD AUTO: 25.7 % (ref 40–54)
HGB BLD-MCNC: 7.9 G/DL (ref 13.5–18)
IMM GRANULOCYTES # BLD AUTO: 0.05 K/UL (ref 0–0.04)
IMM GRANULOCYTES NFR BLD: 0.8 % (ref 0–0.4)
LYMPHOCYTES # BLD AUTO: 1.09 K/UL (ref 1–4.8)
LYMPHOCYTES NFR BLD AUTO: 16.6 % (ref 27–41)
MAGNESIUM SERPL-MCNC: 2.2 MG/DL (ref 1.6–2.6)
MCH RBC QN AUTO: 31 PG (ref 27–31)
MCHC RBC AUTO-ENTMCNC: 30.7 G/DL (ref 32–36)
MCV RBC AUTO: 100.8 FL (ref 80–96)
MONOCYTES # BLD AUTO: 0.91 K/UL (ref 0–0.8)
MONOCYTES NFR BLD AUTO: 13.9 % (ref 2–6)
MPC BLD CALC-MCNC: 11.8 FL (ref 9.4–12.4)
NEUTROPHILS # BLD AUTO: 3.79 K/UL (ref 1.8–7.7)
NEUTROPHILS NFR BLD AUTO: 57.9 % (ref 53–65)
NRBC # BLD AUTO: 0 X10E3/UL
NRBC, AUTO (.00): 0 %
PHOSPHATE SERPL-MCNC: 4.9 MG/DL (ref 2.3–4.7)
PLATELET # BLD AUTO: 340 K/UL (ref 150–400)
POTASSIUM SERPL-SCNC: 4.1 MMOL/L (ref 3.5–5.1)
RBC # BLD AUTO: 2.55 M/UL (ref 4.6–6.2)
SODIUM SERPL-SCNC: 135 MMOL/L (ref 136–145)
TRIGL SERPL-MCNC: 110 MG/DL (ref 34–140)
WBC # BLD AUTO: 6.55 K/UL (ref 4.5–11)

## 2024-11-30 PROCEDURE — 94640 AIRWAY INHALATION TREATMENT: CPT

## 2024-11-30 PROCEDURE — 83735 ASSAY OF MAGNESIUM: CPT | Performed by: STUDENT IN AN ORGANIZED HEALTH CARE EDUCATION/TRAINING PROGRAM

## 2024-11-30 PROCEDURE — 99900035 HC TECH TIME PER 15 MIN (STAT)

## 2024-11-30 PROCEDURE — 11000001 HC ACUTE MED/SURG PRIVATE ROOM

## 2024-11-30 PROCEDURE — 25000003 PHARM REV CODE 250: Performed by: INTERNAL MEDICINE

## 2024-11-30 PROCEDURE — 99900031 HC PATIENT EDUCATION (STAT)

## 2024-11-30 PROCEDURE — 25000003 PHARM REV CODE 250

## 2024-11-30 PROCEDURE — 25000003 PHARM REV CODE 250: Performed by: HOSPITALIST

## 2024-11-30 PROCEDURE — 25000003 PHARM REV CODE 250: Performed by: STUDENT IN AN ORGANIZED HEALTH CARE EDUCATION/TRAINING PROGRAM

## 2024-11-30 PROCEDURE — 36415 COLL VENOUS BLD VENIPUNCTURE: CPT | Performed by: INTERNAL MEDICINE

## 2024-11-30 PROCEDURE — 84100 ASSAY OF PHOSPHORUS: CPT | Performed by: STUDENT IN AN ORGANIZED HEALTH CARE EDUCATION/TRAINING PROGRAM

## 2024-11-30 PROCEDURE — 25000003 PHARM REV CODE 250: Performed by: NURSE PRACTITIONER

## 2024-11-30 PROCEDURE — 85025 COMPLETE CBC W/AUTO DIFF WBC: CPT | Performed by: INTERNAL MEDICINE

## 2024-11-30 PROCEDURE — 80048 BASIC METABOLIC PNL TOTAL CA: CPT | Performed by: INTERNAL MEDICINE

## 2024-11-30 PROCEDURE — 25000242 PHARM REV CODE 250 ALT 637 W/ HCPCS: Performed by: STUDENT IN AN ORGANIZED HEALTH CARE EDUCATION/TRAINING PROGRAM

## 2024-11-30 PROCEDURE — 84478 ASSAY OF TRIGLYCERIDES: CPT | Performed by: INTERNAL MEDICINE

## 2024-11-30 PROCEDURE — 27000221 HC OXYGEN, UP TO 24 HOURS

## 2024-11-30 PROCEDURE — 25000003 PHARM REV CODE 250: Performed by: FAMILY MEDICINE

## 2024-11-30 PROCEDURE — 94761 N-INVAS EAR/PLS OXIMETRY MLT: CPT

## 2024-11-30 PROCEDURE — 99232 SBSQ HOSP IP/OBS MODERATE 35: CPT | Mod: GC,,, | Performed by: FAMILY MEDICINE

## 2024-11-30 PROCEDURE — 63600175 PHARM REV CODE 636 W HCPCS

## 2024-11-30 RX ADMIN — HYDRALAZINE HYDROCHLORIDE 100 MG: 100 TABLET ORAL at 03:11

## 2024-11-30 RX ADMIN — IPRATROPIUM BROMIDE AND ALBUTEROL SULFATE 3 ML: .5; 3 SOLUTION RESPIRATORY (INHALATION) at 07:11

## 2024-11-30 RX ADMIN — ALBUTEROL SULFATE 2.5 MG: 2.5 SOLUTION RESPIRATORY (INHALATION) at 03:11

## 2024-11-30 RX ADMIN — METOPROLOL TARTRATE 100 MG: 100 TABLET, FILM COATED ORAL at 08:11

## 2024-11-30 RX ADMIN — SODIUM BICARBONATE 650 MG TABLET 1300 MG: at 08:11

## 2024-11-30 RX ADMIN — PIPERACILLIN SODIUM AND TAZOBACTAM SODIUM 4.5 G: 4; .5 INJECTION, POWDER, LYOPHILIZED, FOR SOLUTION INTRAVENOUS at 09:11

## 2024-11-30 RX ADMIN — QUETIAPINE FUMARATE 50 MG: 25 TABLET ORAL at 08:11

## 2024-11-30 RX ADMIN — ALLOPURINOL 100 MG: 100 TABLET ORAL at 08:11

## 2024-11-30 RX ADMIN — LIDOCAINE 5% 2 PATCH: 700 PATCH TOPICAL at 08:11

## 2024-11-30 RX ADMIN — APIXABAN 5 MG: 5 TABLET, FILM COATED ORAL at 08:11

## 2024-11-30 RX ADMIN — IPRATROPIUM BROMIDE AND ALBUTEROL SULFATE 3 ML: .5; 3 SOLUTION RESPIRATORY (INHALATION) at 12:11

## 2024-11-30 RX ADMIN — Medication 1000 UNITS: at 08:11

## 2024-11-30 RX ADMIN — PANTOPRAZOLE SODIUM 40 MG: 40 TABLET, DELAYED RELEASE ORAL at 08:11

## 2024-11-30 RX ADMIN — TRAZODONE HYDROCHLORIDE 25 MG: 50 TABLET ORAL at 08:11

## 2024-11-30 RX ADMIN — LORAZEPAM 1 MG: 1 TABLET ORAL at 08:11

## 2024-11-30 RX ADMIN — HYDRALAZINE HYDROCHLORIDE 100 MG: 100 TABLET ORAL at 08:11

## 2024-11-30 RX ADMIN — DILTIAZEM HYDROCHLORIDE 30 MG: 30 TABLET, FILM COATED ORAL at 05:11

## 2024-11-30 RX ADMIN — DILTIAZEM HYDROCHLORIDE 30 MG: 30 TABLET, FILM COATED ORAL at 11:11

## 2024-11-30 RX ADMIN — AMLODIPINE BESYLATE 10 MG: 10 TABLET ORAL at 08:11

## 2024-11-30 RX ADMIN — NIFEDIPINE 60 MG: 60 TABLET, FILM COATED, EXTENDED RELEASE ORAL at 08:11

## 2024-11-30 RX ADMIN — LORAZEPAM 1 MG: 1 TABLET ORAL at 04:11

## 2024-11-30 RX ADMIN — PIPERACILLIN SODIUM AND TAZOBACTAM SODIUM 4.5 G: 4; .5 INJECTION, POWDER, LYOPHILIZED, FOR SOLUTION INTRAVENOUS at 08:11

## 2024-11-30 NOTE — ASSESSMENT & PLAN NOTE
Creatine stable for now. BMP reviewed- noted Estimated Creatinine Clearance: 13.7 mL/min (A) (based on SCr of 8.15 mg/dL (H)). according to latest data. Based on current GFR, CKD stage is end stage.  Monitor UOP and serial BMP and adjust therapy as needed. Renally dose meds. Avoid nephrotoxic medications and procedures.     Continue HD, Monday Wednesday Friday.     11/22: Taken to dialysis today. Patient is net negative 4L.     11/24: Dialysis tomorrow.   11/25: Continue scheduled HD while in the hospital. Patient followed by nephrology.   11/26: Dialyzed yesterday. Net negative 2500 ml. Continue current treatment (M,W,F) per nephrology.   11/27: Dialysis today   11/28: Net negative 2L during HD. Will continue current plan for dialysis on MWF.   11/29: Dialysis today without issue  11/30: Hemodialysis MWF per nephrology. Plan dialysis prior d/c on Monday.

## 2024-11-30 NOTE — ASSESSMENT & PLAN NOTE
Hyperkalemia is likely due to ESRD.The patients most recent potassium results are listed below.  Recent Labs     11/28/24  0613 11/29/24  0300 11/30/24  0439   K 3.7 3.6 4.1       Plan  - Monitor for arrhythmias with EKG and/or continuous telemetry.   - Treat the hyperkalemia with Potassium Binders if great than 4.5.   - Monitor potassium:  daily with BMP  - The patient's hyperkalemia is resolved    11/22: Dialysis today with 4L taken off. Patient returned to floor on 15L. Attempt made to wean down and patient desaturated to 89%. Placed back on 10L. Nephrology thinks it was secondary to taking off fluid. Will continue to monitor with daily BMP. Anticipate patient will continue to have hyperkalemia. Will monitor.      11/24: Resolved. Scheduled for HD tomorrow with neprhology.  11/25: Regularly scheduled dialysis. Will continue to monitor.    11/28: Continue to monitor  11/29: dialysis today  11/30: Potassium wnl today. Continue dialysis while inpatient.

## 2024-11-30 NOTE — SUBJECTIVE & OBJECTIVE
Interval History: The patient is sitting up  eating his lunch in the chair.  No acute changes.    Review of patient's allergies indicates:   Allergen Reactions    Fish containing products Swelling    Iodinated contrast media Swelling    Iodine Shortness Of Breath, Swelling and Anaphylaxis     Sob and swelling/itching/throat closes up per pt  Other reaction(s): Swelling, Hives, DifficultyBreat  Sob and swelling/itching/throat closes up per pt      Shellfish containing products Swelling    Iodine and iodide containing products      Current Facility-Administered Medications   Medication Frequency    0.9%  NaCl infusion (for blood administration) Q24H PRN    0.9%  NaCl infusion PRN    0.9%  NaCl infusion PRN    0.9% NaCl infusion PRN    0.9% NaCl infusion Once    acetaminophen tablet 650 mg Q4H PRN    albuterol nebulizer solution 2.5 mg Q4H PRN    albuterol-ipratropium 2.5 mg-0.5 mg/3 mL nebulizer solution 3 mL Q6H    allopurinoL tablet 100 mg Daily    amLODIPine tablet 10 mg Daily    apixaban tablet 5 mg BID    diltiaZEM tablet 30 mg Q6H    epoetin milla injection 10,000 Units Every Mon, Wed, Fri    haloperidol lactate injection 0.5 mg Q12H PRN    heparin (porcine) injection 4,000 Units PRN    hydrALAZINE tablet 100 mg TID    labetaloL injection 10 mg Q4H PRN    LIDOcaine 5 % patch 2 patch Q24H    LORazepam tablet 1 mg Q6H PRN    metoprolol injection 5 mg Q12H PRN    metoprolol tartrate (LOPRESSOR) tablet 100 mg BID    NIFEdipine 24 hr tablet 60 mg QHS    ondansetron injection 4 mg Q8H PRN    pantoprazole EC tablet 40 mg Daily    piperacillin-tazobactam (ZOSYN) 4.5 g in D5W 100 mL IVPB (MB+) Q12H    QUEtiapine tablet 50 mg BID    sodium bicarbonate tablet 1,300 mg BID    sodium chloride 0.9% bolus 250 mL 250 mL PRN    sodium chloride 0.9% bolus 250 mL 250 mL PRN    sodium chloride 0.9% flush 10 mL PRN    trazodone split tablet 25 mg QHS    vitamin D 1000 units tablet 1,000 Units Daily       Objective:     Vital Signs  (Most Recent):  Temp: 98.2 °F (36.8 °C) (11/30/24 1213)  Pulse: 62 (11/30/24 1213)  Resp: 20 (11/30/24 1213)  BP: 115/68 (11/30/24 1213)  SpO2: 100 % (11/30/24 1213) Vital Signs (24h Range):  Temp:  [97.3 °F (36.3 °C)-98.9 °F (37.2 °C)] 98.2 °F (36.8 °C)  Pulse:  [61-88] 62  Resp:  [18-20] 20  SpO2:  [96 %-100 %] 100 %  BP: (109-141)/(66-79) 115/68     Weight: 112.1 kg (247 lb 2.2 oz) (11/26/24 0458)  Body mass index is 33.52 kg/m².  Body surface area is 2.39 meters squared.    I/O last 3 completed shifts:  In: 360 [P.O.:360]  Out: 3000 [Other:3000]     Physical Exam  Vitals reviewed.   Constitutional:       Appearance: Normal appearance.   HENT:      Head: Normocephalic and atraumatic.      Mouth/Throat:      Mouth: Mucous membranes are moist.   Eyes:      Pupils: Pupils are equal, round, and reactive to light.   Cardiovascular:      Rate and Rhythm: Regular rhythm.      Heart sounds: Normal heart sounds.   Pulmonary:      Breath sounds: Normal breath sounds.   Abdominal:      Palpations: Abdomen is soft.   Musculoskeletal:      Cervical back: Neck supple.   Skin:     General: Skin is warm.   Neurological:      Mental Status: He is alert and oriented to person, place, and time.   Psychiatric:         Mood and Affect: Mood normal.          Significant Labs:  BMP:   Recent Labs   Lab 11/30/24 0439   *   *   K 4.1      CO2 25   BUN 31*   CREATININE 8.15*   CALCIUM 8.4   MG 2.2     CBC:   Recent Labs   Lab 11/30/24 0439   WBC 6.55   RBC 2.55*   HGB 7.9*   HCT 25.7*      .8*   MCH 31.0   MCHC 30.7*        Significant Imaging:  Labs: Reviewed

## 2024-11-30 NOTE — ASSESSMENT & PLAN NOTE
Admitted to the ICU for acute respiratory distress and hyperkalemia now s/p extubation. On 2L NC at 96%. Patient does not use home oxygen. Complaints of sob and increase work of breathing attributed to multiple rib fracture from resuscitative efforts while in the ICU. Patient will require close monitor. Patient is not stable for discharge.     11/22  Patient stable this am with sats in mid 90's on 2L NC. Returned to the floor on 15L highflow. Patient did not tolerated switching to NC. ABG showed metabolic acidosis. Placed back on 10L with an oximeter. Will attempt to wean off overnight. Goal of O2 sat of 88-92%. See respiratory therapy note.     11/23: Patient stable this morning SpO2 mid 90s on 4 L NC.    11/24: SpO2 stable in the mid 90's on 3L NC. Continue to wean oxygen.   11/25: Patient increased to 4L NC overnight with sats in mid-90's. Denies any sob. Duonebs Q6H. Will continue to wean today. Continue PT/OT while in the hospital.     11/26: This am patient desaturated to 86% on 3L NC; on repeat RR 18, /83, Sats 92%; on eliquis 5mg bid, lungs still sound congested but patient is afebrile with WCB improving. S/p IV abx course; current on duonebs, wean as tolerate; ordered chest xray plus V/Q scan to rule out PE and pneumonia. CXR showed RLL consolidation; will start zosyn for hospital acquired pneumonia. V/Q scan pending.     11/27: Marginal sats on room air; placed back on 3L NC with improvement to mid-90's; given hospital course which included intubation, aspiration, and resuscitation, positive influenza result and RLL consolidation; patient would benefit from continued IV antibiotics for 2-3 days with supplemental oxygen; goal of weaning off oxygen prior to discharge +/- home O2 evaluation     11/28: Nuclear medicine scan shows low probability for PE. Patient currently on 1.5L NC with O2 sat of 95%. Patient would benefit from 2 more days of IV abx. Wean oxygen as tolerated with a goal [email or web address]. Discussed the importance of instinctive spirometry throughout the day such as during commercial breaks.     11/29: Dialysis today. Breathing well. Continue Zosyn IV    11/30: Off supplemental oxygen. Day 5 of Zosyn. Continue IV antibiotics.

## 2024-11-30 NOTE — PLAN OF CARE
Problem: Adult Inpatient Plan of Care  Goal: Plan of Care Review  11/30/2024 0503 by Alina Jesus RN  Outcome: Progressing  11/30/2024 0503 by Alina Jesus RN  Outcome: Progressing  Goal: Patient-Specific Goal (Individualized)  11/30/2024 0503 by Alina Jesus RN  Outcome: Progressing  11/30/2024 0503 by Alina Jesus RN  Outcome: Progressing  Goal: Absence of Hospital-Acquired Illness or Injury  11/30/2024 0503 by Alina Jesus RN  Outcome: Progressing  11/30/2024 0503 by Alina Jesus RN  Outcome: Progressing  Goal: Optimal Comfort and Wellbeing  11/30/2024 0503 by Alina Jesus RN  Outcome: Progressing  11/30/2024 0503 by Alina Jesus RN  Outcome: Progressing  Goal: Readiness for Transition of Care  11/30/2024 0503 by Alina Jesus RN  Outcome: Progressing  11/30/2024 0503 by Alina Jesus RN  Outcome: Progressing     Problem: Infection  Goal: Absence of Infection Signs and Symptoms  11/30/2024 0503 by Alina Jesus RN  Outcome: Progressing  11/30/2024 0503 by Alina Jesus RN  Outcome: Progressing     Problem: Hemodialysis  Goal: Safe, Effective Therapy Delivery  11/30/2024 0503 by Alina Jesus RN  Outcome: Progressing  11/30/2024 0503 by Alina Jesus RN  Outcome: Progressing  Goal: Effective Tissue Perfusion  11/30/2024 0503 by Alina Jesus RN  Outcome: Progressing  11/30/2024 0503 by Alina Jesus RN  Outcome: Progressing  Goal: Absence of Infection Signs and Symptoms  11/30/2024 0503 by Alina Jesus RN  Outcome: Progressing  11/30/2024 0503 by Alina Jesus RN  Outcome: Progressing     Problem: Sepsis/Septic Shock  Goal: Optimal Coping  11/30/2024 0503 by Alina Jesus RN  Outcome: Progressing  11/30/2024 0503 by Alina Jesus RN  Outcome: Progressing  Goal: Absence of Bleeding  11/30/2024 0503 by Alina Jesus RN  Outcome: Progressing  11/30/2024 0503 by  Alina Jesus RN  Outcome: Progressing  Goal: Blood Glucose Level Within Targeted Range  11/30/2024 0503 by Alina Jesus RN  Outcome: Progressing  11/30/2024 0503 by Alina Jesus RN  Outcome: Progressing  Goal: Absence of Infection Signs and Symptoms  11/30/2024 0503 by Alina Jesus RN  Outcome: Progressing  11/30/2024 0503 by Alina Jesus RN  Outcome: Progressing  Goal: Optimal Nutrition Intake  11/30/2024 0503 by Alina Jesus RN  Outcome: Progressing  11/30/2024 0503 by Alina Jesus RN  Outcome: Progressing     Problem: Gas Exchange Impaired  Goal: Optimal Gas Exchange  11/30/2024 0503 by Alina Jesus RN  Outcome: Progressing  11/30/2024 0503 by Alina Jesus RN  Outcome: Progressing     Problem: Fall Injury Risk  Goal: Absence of Fall and Fall-Related Injury  11/30/2024 0503 by Alina Jesus RN  Outcome: Progressing  11/30/2024 0503 by Alina Jesus RN  Outcome: Progressing     Problem: Restraint, Nonviolent  Goal: Absence of Harm or Injury  11/30/2024 0503 by Alina Jesus RN  Outcome: Progressing  11/30/2024 0503 by Alina Jesus RN  Outcome: Progressing     Problem: Skin Injury Risk Increased  Goal: Skin Health and Integrity  11/30/2024 0503 by Alina Jesus RN  Outcome: Progressing  11/30/2024 0503 by Alina Jesus RN  Outcome: Progressing     Problem: ARDS (Acute Respiratory Distress Syndrome)  Goal: Effective Oxygenation  11/30/2024 0503 by Alina Jesus RN  Outcome: Progressing  11/30/2024 0503 by Alina Jesus RN  Outcome: Progressing     Problem: Airway Clearance Ineffective  Goal: Effective Airway Clearance  11/30/2024 0503 by Alina Jesus RN  Outcome: Progressing  11/30/2024 0503 by Alina Jesus RN  Outcome: Progressing
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
## 2024-11-30 NOTE — SUBJECTIVE & OBJECTIVE
Interval History: Patient seen resting comfortably in bed. No acute events overnight. Patient tolerated dialysis yesterday. O2 sats normal on room air. Continue antibiotics. Anticipate discharge on Monday after completion of antibiotics.    Review of Systems   Constitutional:  Negative for fatigue and fever.   HENT: Negative.     Respiratory:  Negative for cough, choking, shortness of breath and wheezing.    Cardiovascular:  Negative for chest pain, palpitations and leg swelling.   Gastrointestinal:  Negative for abdominal pain, nausea and vomiting.   Genitourinary:  Positive for decreased urine volume. Negative for flank pain.        ESRD on HD   Musculoskeletal:         L. Foot fracture   Skin:  Positive for wound.   Neurological:  Negative for weakness, light-headedness and headaches.   All other systems reviewed and are negative.    Objective:     Vital Signs (Most Recent):  Temp: 98.2 °F (36.8 °C) (11/30/24 1213)  Pulse: 62 (11/30/24 1250)  Resp: 18 (11/30/24 1250)  BP: 115/68 (11/30/24 1213)  SpO2: 99 % (11/30/24 1250) Vital Signs (24h Range):  Temp:  [97.3 °F (36.3 °C)-98.9 °F (37.2 °C)] 98.2 °F (36.8 °C)  Pulse:  [61-78] 62  Resp:  [18-20] 18  SpO2:  [96 %-100 %] 99 %  BP: (109-141)/(66-79) 115/68     Weight: 112.1 kg (247 lb 2.2 oz)  Body mass index is 33.52 kg/m².  No intake or output data in the 24 hours ending 11/30/24 1307        Physical Exam  Vitals and nursing note reviewed.   Constitutional:       General: He is awake. He is not in acute distress.     Appearance: Normal appearance. He is well-developed. He is obese. He is ill-appearing. He is not toxic-appearing.   HENT:      Head: Normocephalic and atraumatic.      Mouth/Throat:      Pharynx: Oropharynx is clear.   Eyes:      Conjunctiva/sclera: Conjunctivae normal.      Comments: Right eye vision loss   Cardiovascular:      Rate and Rhythm: Normal rate and regular rhythm.      Pulses: Normal pulses.      Heart sounds: Normal heart sounds. No  murmur heard.     No friction rub. No gallop.   Pulmonary:      Breath sounds: No stridor. Rhonchi and rales present. No wheezing.   Abdominal:      General: Bowel sounds are normal.      Palpations: Abdomen is soft.      Tenderness: There is no abdominal tenderness. There is no guarding or rebound.   Musculoskeletal:      Right lower leg: No edema.      Left lower leg: No edema.      Comments: L. Chest wall tenderness. L. Foot boot in place   Skin:     General: Skin is warm and dry.      Capillary Refill: Capillary refill takes less than 2 seconds.      Comments: Bilateral heel protectors, sacral foam dressing in place appears c/d/i   Neurological:      Mental Status: He is alert and oriented to person, place, and time.   Psychiatric:         Mood and Affect: Mood normal.         Behavior: Behavior normal. Behavior is cooperative.         Thought Content: Thought content normal.             Significant Labs: All pertinent labs within the past 24 hours have been reviewed.    Significant Imaging: I have reviewed all pertinent imaging results/findings within the past 24 hours.

## 2024-11-30 NOTE — PROGRESS NOTES
[facility] - [facility]  Nephrology  Progress Note    Patient Name: Saúl Butt  MRN: [id number]  Admission Date: 11/11/2024  Hospital Length of Stay: 19 days  Attending Provider: Kiet Velasquez Jr., MD   Primary Care Physician: Ruth, Primary Doctor  Principal Problem:Acute respiratory failure with hypoxemia    Subjective:     HPI: Chief complaint:  Shortness of breath    History of present illness-Mr. Butt is a 54-year-old  gentleman with end-stage renal disease who dialyzes on a Monday Wednesday Friday basis in [facility].  The patient's last dialysis was this past Friday.  The patient states he started to feel bad about 2 days prior to admission.  He has been having some nausea vomiting and diarrhea.  He has also developed shortness of breath associated with a cough productive yellowish sputum.  The patient feels like he has a virus.  He has had some sore throat as well.  The patient also complains of having shakes and chills.  We were asked see the patient for dialysis.  The patient's potassium was noted to be 8.4 on admission.  The patient states he has been eating couple oranges a day as well as a couple of containers of yogurt daily.    Interval History: The patient is sitting up  eating his lunch in the chair.  No acute changes.    Review of patient's allergies indicates:   Allergen Reactions    Fish containing products Swelling    Iodinated contrast media Swelling    Iodine Shortness Of Breath, Swelling and Anaphylaxis     Sob and swelling/itching/throat closes up per pt  Other reaction(s): Swelling, Hives, DifficultyBreat  Sob and swelling/itching/throat closes up per pt      Shellfish containing products Swelling    Iodine and iodide containing products      Current Facility-Administered Medications   Medication Frequency    0.9%  NaCl infusion (for blood administration) Q24H PRN    0.9%  NaCl infusion PRN    0.9%  NaCl infusion PRN    0.9% NaCl  infusion PRN    0.9% NaCl infusion Once    acetaminophen tablet 650 mg Q4H PRN    albuterol nebulizer solution 2.5 mg Q4H PRN    albuterol-ipratropium 2.5 mg-0.5 mg/3 mL nebulizer solution 3 mL Q6H    allopurinoL tablet 100 mg Daily    amLODIPine tablet 10 mg Daily    apixaban tablet 5 mg BID    diltiaZEM tablet 30 mg Q6H    epoetin milla injection 10,000 Units Every Mon, Wed, Fri    haloperidol lactate injection 0.5 mg Q12H PRN    heparin (porcine) injection 4,000 Units PRN    hydrALAZINE tablet 100 mg TID    labetaloL injection 10 mg Q4H PRN    LIDOcaine 5 % patch 2 patch Q24H    LORazepam tablet 1 mg Q6H PRN    metoprolol injection 5 mg Q12H PRN    metoprolol tartrate (LOPRESSOR) tablet 100 mg BID    NIFEdipine 24 hr tablet 60 mg QHS    ondansetron injection 4 mg Q8H PRN    pantoprazole EC tablet 40 mg Daily    piperacillin-tazobactam (ZOSYN) 4.5 g in D5W 100 mL IVPB (MB+) Q12H    QUEtiapine tablet 50 mg BID    sodium bicarbonate tablet 1,300 mg BID    sodium chloride 0.9% bolus 250 mL 250 mL PRN    sodium chloride 0.9% bolus 250 mL 250 mL PRN    sodium chloride 0.9% flush 10 mL PRN    trazodone split tablet 25 mg QHS    vitamin D 1000 units tablet 1,000 Units Daily       Objective:     Vital Signs (Most Recent):  Temp: 98.2 °F (36.8 °C) (11/30/24 1213)  Pulse: 62 (11/30/24 1213)  Resp: 20 (11/30/24 1213)  BP: 115/68 (11/30/24 1213)  SpO2: 100 % (11/30/24 1213) Vital Signs (24h Range):  Temp:  [97.3 °F (36.3 °C)-98.9 °F (37.2 °C)] 98.2 °F (36.8 °C)  Pulse:  [61-88] 62  Resp:  [18-20] 20  SpO2:  [96 %-100 %] 100 %  BP: (109-141)/(66-79) 115/68     Weight: 112.1 kg (247 lb 2.2 oz) (11/26/24 5878)  Body mass index is 33.52 kg/m².  Body surface area is 2.39 meters squared.    I/O last 3 completed shifts:  In: 360 [P.O.:360]  Out: 3000 [Other:3000]     Physical Exam  Vitals reviewed.   Constitutional:       Appearance: Normal appearance.   HENT:      Head: Normocephalic and atraumatic.      Mouth/Throat:      Mouth:  Mucous membranes are moist.   Eyes:      Pupils: Pupils are equal, round, and reactive to light.   Cardiovascular:      Rate and Rhythm: Regular rhythm.      Heart sounds: Normal heart sounds.   Pulmonary:      Breath sounds: Normal breath sounds.   Abdominal:      Palpations: Abdomen is soft.   Musculoskeletal:      Cervical back: Neck supple.   Skin:     General: Skin is warm.   Neurological:      Mental Status: He is alert and oriented to person, place, and time.   Psychiatric:         Mood and Affect: Mood normal.          Significant Labs:  BMP:   Recent Labs   Lab 11/30/24 0439   *   *   K 4.1      CO2 25   BUN 31*   CREATININE 8.15*   CALCIUM 8.4   MG 2.2     CBC:   Recent Labs   Lab 11/30/24 0439   WBC 6.55   RBC 2.55*   HGB 7.9*   HCT 25.7*      .8*   MCH 31.0   MCHC 30.7*        Significant Imaging:  Labs: Reviewed  Assessment/Plan:     Renal/  ESRD on hemodialysis  Continue with scheduled hemodialysis for this patient.        Thank you for your consult. I will follow-up with patient. Please contact us if you have any additional questions.    George Rowell Jr, MD  Nephrology  [facility] - [address] [place] [address]

## 2024-12-01 LAB
ANION GAP SERPL CALCULATED.3IONS-SCNC: 20 MMOL/L (ref 7–16)
BASOPHILS # BLD AUTO: 0.17 K/UL (ref 0–0.2)
BASOPHILS NFR BLD AUTO: 2.3 % (ref 0–1)
BUN SERPL-MCNC: 42 MG/DL (ref 8–26)
BUN/CREAT SERPL: 4 (ref 6–20)
CALCIUM SERPL-MCNC: 8.5 MG/DL (ref 8.4–10.2)
CHLORIDE SERPL-SCNC: 97 MMOL/L (ref 98–107)
CO2 SERPL-SCNC: 22 MMOL/L (ref 22–29)
CREAT SERPL-MCNC: 10.5 MG/DL (ref 0.72–1.25)
DIFFERENTIAL METHOD BLD: ABNORMAL
EGFR (NO RACE VARIABLE) (RUSH/TITUS): 5 ML/MIN/1.73M2
EOSINOPHIL # BLD AUTO: 0.54 K/UL (ref 0–0.5)
EOSINOPHIL NFR BLD AUTO: 7.2 % (ref 1–4)
ERYTHROCYTE [DISTWIDTH] IN BLOOD BY AUTOMATED COUNT: 16.7 % (ref 11.5–14.5)
GLUCOSE SERPL-MCNC: 93 MG/DL (ref 74–100)
HCT VFR BLD AUTO: 28.9 % (ref 40–54)
HGB BLD-MCNC: 8.7 G/DL (ref 13.5–18)
IMM GRANULOCYTES # BLD AUTO: 0.04 K/UL (ref 0–0.04)
IMM GRANULOCYTES NFR BLD: 0.5 % (ref 0–0.4)
LYMPHOCYTES # BLD AUTO: 1.13 K/UL (ref 1–4.8)
LYMPHOCYTES NFR BLD AUTO: 15.1 % (ref 27–41)
MAGNESIUM SERPL-MCNC: 2.3 MG/DL (ref 1.6–2.6)
MCH RBC QN AUTO: 29.9 PG (ref 27–31)
MCHC RBC AUTO-ENTMCNC: 30.1 G/DL (ref 32–36)
MCV RBC AUTO: 99.3 FL (ref 80–96)
MONOCYTES # BLD AUTO: 0.7 K/UL (ref 0–0.8)
MONOCYTES NFR BLD AUTO: 9.4 % (ref 2–6)
MPC BLD CALC-MCNC: 11.7 FL (ref 9.4–12.4)
NEUTROPHILS # BLD AUTO: 4.88 K/UL (ref 1.8–7.7)
NEUTROPHILS NFR BLD AUTO: 65.5 % (ref 53–65)
NRBC # BLD AUTO: 0 X10E3/UL
NRBC, AUTO (.00): 0 %
PHOSPHATE SERPL-MCNC: 6.6 MG/DL (ref 2.3–4.7)
PLATELET # BLD AUTO: 353 K/UL (ref 150–400)
POTASSIUM SERPL-SCNC: 4.7 MMOL/L (ref 3.5–5.1)
RBC # BLD AUTO: 2.91 M/UL (ref 4.6–6.2)
SODIUM SERPL-SCNC: 134 MMOL/L (ref 136–145)
WBC # BLD AUTO: 7.46 K/UL (ref 4.5–11)

## 2024-12-01 PROCEDURE — 25000003 PHARM REV CODE 250: Performed by: INTERNAL MEDICINE

## 2024-12-01 PROCEDURE — 94761 N-INVAS EAR/PLS OXIMETRY MLT: CPT

## 2024-12-01 PROCEDURE — 25000003 PHARM REV CODE 250: Performed by: HOSPITALIST

## 2024-12-01 PROCEDURE — 99232 SBSQ HOSP IP/OBS MODERATE 35: CPT | Mod: GC,,, | Performed by: FAMILY MEDICINE

## 2024-12-01 PROCEDURE — 25000003 PHARM REV CODE 250: Performed by: STUDENT IN AN ORGANIZED HEALTH CARE EDUCATION/TRAINING PROGRAM

## 2024-12-01 PROCEDURE — 84100 ASSAY OF PHOSPHORUS: CPT | Performed by: STUDENT IN AN ORGANIZED HEALTH CARE EDUCATION/TRAINING PROGRAM

## 2024-12-01 PROCEDURE — 25000242 PHARM REV CODE 250 ALT 637 W/ HCPCS: Performed by: STUDENT IN AN ORGANIZED HEALTH CARE EDUCATION/TRAINING PROGRAM

## 2024-12-01 PROCEDURE — 11000001 HC ACUTE MED/SURG PRIVATE ROOM

## 2024-12-01 PROCEDURE — 93005 ELECTROCARDIOGRAM TRACING: CPT

## 2024-12-01 PROCEDURE — 36415 COLL VENOUS BLD VENIPUNCTURE: CPT

## 2024-12-01 PROCEDURE — 85025 COMPLETE CBC W/AUTO DIFF WBC: CPT

## 2024-12-01 PROCEDURE — 80048 BASIC METABOLIC PNL TOTAL CA: CPT

## 2024-12-01 PROCEDURE — 25000003 PHARM REV CODE 250: Performed by: FAMILY MEDICINE

## 2024-12-01 PROCEDURE — 25000003 PHARM REV CODE 250

## 2024-12-01 PROCEDURE — 94640 AIRWAY INHALATION TREATMENT: CPT

## 2024-12-01 PROCEDURE — 63600175 PHARM REV CODE 636 W HCPCS

## 2024-12-01 PROCEDURE — 83735 ASSAY OF MAGNESIUM: CPT

## 2024-12-01 PROCEDURE — 25000003 PHARM REV CODE 250: Performed by: NURSE PRACTITIONER

## 2024-12-01 RX ORDER — DILTIAZEM HYDROCHLORIDE 30 MG/1
30 TABLET, FILM COATED ORAL EVERY 6 HOURS
Status: DISCONTINUED | OUTPATIENT
Start: 2024-12-01 | End: 2024-12-01

## 2024-12-01 RX ORDER — DILTIAZEM HYDROCHLORIDE 30 MG/1
30 TABLET, FILM COATED ORAL EVERY 6 HOURS
Status: DISCONTINUED | OUTPATIENT
Start: 2024-12-02 | End: 2024-12-01

## 2024-12-01 RX ORDER — METOPROLOL TARTRATE 25 MG/1
25 TABLET, FILM COATED ORAL 2 TIMES DAILY
Status: DISCONTINUED | OUTPATIENT
Start: 2024-12-02 | End: 2024-12-01

## 2024-12-01 RX ORDER — METOPROLOL TARTRATE 50 MG/1
50 TABLET ORAL 2 TIMES DAILY
Status: DISCONTINUED | OUTPATIENT
Start: 2024-12-02 | End: 2024-12-02 | Stop reason: HOSPADM

## 2024-12-01 RX ORDER — DILTIAZEM HYDROCHLORIDE 30 MG/1
30 TABLET, FILM COATED ORAL EVERY 6 HOURS
Status: DISCONTINUED | OUTPATIENT
Start: 2024-12-01 | End: 2024-12-02 | Stop reason: HOSPADM

## 2024-12-01 RX ADMIN — ACETAMINOPHEN 650 MG: 325 TABLET ORAL at 05:12

## 2024-12-01 RX ADMIN — LIDOCAINE 5% 2 PATCH: 700 PATCH TOPICAL at 09:12

## 2024-12-01 RX ADMIN — IPRATROPIUM BROMIDE AND ALBUTEROL SULFATE 3 ML: .5; 3 SOLUTION RESPIRATORY (INHALATION) at 12:12

## 2024-12-01 RX ADMIN — PIPERACILLIN SODIUM AND TAZOBACTAM SODIUM 4.5 G: 4; .5 INJECTION, POWDER, LYOPHILIZED, FOR SOLUTION INTRAVENOUS at 07:12

## 2024-12-01 RX ADMIN — SODIUM BICARBONATE 650 MG TABLET 1300 MG: at 08:12

## 2024-12-01 RX ADMIN — APIXABAN 5 MG: 5 TABLET, FILM COATED ORAL at 08:12

## 2024-12-01 RX ADMIN — PIPERACILLIN SODIUM AND TAZOBACTAM SODIUM 4.5 G: 4; .5 INJECTION, POWDER, LYOPHILIZED, FOR SOLUTION INTRAVENOUS at 08:12

## 2024-12-01 RX ADMIN — NIFEDIPINE 60 MG: 60 TABLET, FILM COATED, EXTENDED RELEASE ORAL at 08:12

## 2024-12-01 RX ADMIN — HYDRALAZINE HYDROCHLORIDE 100 MG: 100 TABLET ORAL at 02:12

## 2024-12-01 RX ADMIN — DILTIAZEM HYDROCHLORIDE 30 MG: 30 TABLET, FILM COATED ORAL at 05:12

## 2024-12-01 RX ADMIN — ALLOPURINOL 100 MG: 100 TABLET ORAL at 08:12

## 2024-12-01 RX ADMIN — METOPROLOL TARTRATE 100 MG: 100 TABLET, FILM COATED ORAL at 08:12

## 2024-12-01 RX ADMIN — QUETIAPINE FUMARATE 50 MG: 25 TABLET ORAL at 08:12

## 2024-12-01 RX ADMIN — PANTOPRAZOLE SODIUM 40 MG: 40 TABLET, DELAYED RELEASE ORAL at 08:12

## 2024-12-01 RX ADMIN — HYDRALAZINE HYDROCHLORIDE 100 MG: 100 TABLET ORAL at 08:12

## 2024-12-01 RX ADMIN — IPRATROPIUM BROMIDE AND ALBUTEROL SULFATE 3 ML: .5; 3 SOLUTION RESPIRATORY (INHALATION) at 07:12

## 2024-12-01 RX ADMIN — TRAZODONE HYDROCHLORIDE 25 MG: 50 TABLET ORAL at 08:12

## 2024-12-01 RX ADMIN — Medication 1000 UNITS: at 08:12

## 2024-12-01 RX ADMIN — IPRATROPIUM BROMIDE AND ALBUTEROL SULFATE 3 ML: .5; 3 SOLUTION RESPIRATORY (INHALATION) at 08:12

## 2024-12-01 RX ADMIN — ACETAMINOPHEN 650 MG: 325 TABLET ORAL at 10:12

## 2024-12-01 RX ADMIN — DILTIAZEM HYDROCHLORIDE 30 MG: 30 TABLET, FILM COATED ORAL at 12:12

## 2024-12-01 RX ADMIN — LORAZEPAM 1 MG: 1 TABLET ORAL at 11:12

## 2024-12-01 RX ADMIN — AMLODIPINE BESYLATE 10 MG: 10 TABLET ORAL at 08:12
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
## 2024-12-01 NOTE — ASSESSMENT & PLAN NOTE
Overall impression at the time of admission was that patient had sepsis with an abnormal CXR. Pressor support not provided. Patient initial started on Rocephin 1 gm IV then switched to zosyn plus azithromycin. Patient received a total of 6 days of IV antibiotics. Blood cultures show no growth to date. Blood pressure is now stable. Lactic acid 1.5 on 11/14. Resolved. Patient will need close monitor give respiratory status.      Today patient appears more stable. Still require oxygen supplementation. Overnight was afebrile, stable bp. Will continue IV antibiotic coverage.     Stable. Will continue to monitor.     12/01  Continue Zosyn IV till D/C

## 2024-12-01 NOTE — PROGRESS NOTES
[facility] - [address] [place] [address]  Nephrology  Progress Note    Patient Name: Saúl Butt  MRN: [id number]  Admission Date: 11/11/2024  Hospital Length of Stay: 20 days  Attending Provider: Kiet Velasquez Jr., MD   Primary Care Physician: Ruth, Primary Doctor  Principal Problem:Acute respiratory failure with hypoxemia    Subjective:     HPI: Chief complaint:  Shortness of breath    History of present illness-Mr. Butt is a 54-year-old  gentleman with end-stage renal disease who dialyzes on a Monday Wednesday Friday basis in [facility].  The patient's last dialysis was this past Friday.  The patient states he started to feel bad about 2 days prior to admission.  He has been having some nausea vomiting and diarrhea.  He has also developed shortness of breath associated with a cough productive yellowish sputum.  The patient feels like he has a virus.  He has had some sore throat as well.  The patient also complains of having shakes and chills.  We were asked see the patient for dialysis.  The patient's potassium was noted to be 8.4 on admission.  The patient states he has been eating couple oranges a day as well as a couple of containers of yogurt daily.    Interval History: The patient is sitting up in the chair.  He voices no complaints.  No shortness of breath or chest pain.    Review of patient's allergies indicates:   Allergen Reactions    Fish containing products Swelling    Iodinated contrast media Swelling    Iodine Shortness Of Breath, Swelling and Anaphylaxis     Sob and swelling/itching/throat closes up per pt  Other reaction(s): Swelling, Hives, DifficultyBreat  Sob and swelling/itching/throat closes up per pt      Shellfish containing products Swelling    Iodine and iodide containing products      Current Facility-Administered Medications   Medication Frequency    0.9%  NaCl infusion (for blood administration) Q24H PRN    0.9%  NaCl infusion PRN    0.9%  NaCl  infusion PRN    0.9% NaCl infusion PRN    0.9% NaCl infusion Once    acetaminophen tablet 650 mg Q4H PRN    albuterol nebulizer solution 2.5 mg Q4H PRN    albuterol-ipratropium 2.5 mg-0.5 mg/3 mL nebulizer solution 3 mL Q6H    allopurinoL tablet 100 mg Daily    amLODIPine tablet 10 mg Daily    apixaban tablet 5 mg BID    epoetin milla injection 10,000 Units Every Mon, Wed, Fri    haloperidol lactate injection 0.5 mg Q12H PRN    heparin (porcine) injection 4,000 Units PRN    hydrALAZINE tablet 100 mg TID    labetaloL injection 10 mg Q4H PRN    LIDOcaine 5 % patch 2 patch Q24H    LORazepam tablet 1 mg Q6H PRN    metoprolol injection 5 mg Q12H PRN    NIFEdipine 24 hr tablet 60 mg QHS    ondansetron injection 4 mg Q8H PRN    pantoprazole EC tablet 40 mg Daily    piperacillin-tazobactam (ZOSYN) 4.5 g in D5W 100 mL IVPB (MB+) Q12H    QUEtiapine tablet 50 mg BID    sodium bicarbonate tablet 1,300 mg BID    sodium chloride 0.9% bolus 250 mL 250 mL PRN    sodium chloride 0.9% bolus 250 mL 250 mL PRN    sodium chloride 0.9% flush 10 mL PRN    trazodone split tablet 25 mg QHS    vitamin D 1000 units tablet 1,000 Units Daily       Objective:     Vital Signs (Most Recent):  Temp: 98.4 °F (36.9 °C) (12/01/24 1110)  Pulse: 61 (12/01/24 1110)  Resp: 18 (12/01/24 1110)  BP: 134/78 (12/01/24 1110)  SpO2: 97 % (12/01/24 1110) Vital Signs (24h Range):  Temp:  [97.9 °F (36.6 °C)-98.8 °F (37.1 °C)] 98.4 °F (36.9 °C)  Pulse:  [61-83] 61  Resp:  [18-20] 18  SpO2:  [93 %-100 %] 97 %  BP: (115-164)/(68-84) 134/78     Weight: 112.1 kg (247 lb 2.2 oz) (11/26/24 0458)  Body mass index is 33.52 kg/m².  Body surface area is 2.39 meters squared.    No intake/output data recorded.     Physical Exam  Vitals reviewed.   Constitutional:       Appearance: Normal appearance.   HENT:      Head: Normocephalic and atraumatic.      Mouth/Throat:      Mouth: Mucous membranes are moist.   Eyes:      Pupils: Pupils are equal, round, and reactive to light.    Cardiovascular:      Rate and Rhythm: Normal rate and regular rhythm.   Pulmonary:      Effort: Pulmonary effort is normal.   Abdominal:      General: Abdomen is flat.      Palpations: Abdomen is soft.   Musculoskeletal:      Cervical back: Neck supple.   Skin:     General: Skin is warm.   Neurological:      Mental Status: He is alert.   Psychiatric:         Mood and Affect: Mood normal.          Significant Labs:  BMP:   Recent Labs   Lab 11/30/24  0439   *   *   K 4.1      CO2 25   BUN 31*   CREATININE 8.15*   CALCIUM 8.4   MG 2.2     CBC:   Recent Labs   Lab 11/30/24  0439   WBC 6.55   RBC 2.55*   HGB 7.9*   HCT 25.7*      .8*   MCH 31.0   MCHC 30.7*        Significant Imaging:  Labs: Reviewed  Assessment/Plan:     Renal/  ESRD on hemodialysis  Continue with scheduled hemodialysis for this patient.        Thank you for your consult. I will follow-up with patient. Please contact us if you have any additional questions.    George Rowell Jr, MD  Nephrology  [facility] - [address] [place] [address]

## 2024-12-01 NOTE — SUBJECTIVE & OBJECTIVE
Interval History: The patient is sitting up in the chair.  He voices no complaints.  No shortness of breath or chest pain.    Review of patient's allergies indicates:   Allergen Reactions    Fish containing products Swelling    Iodinated contrast media Swelling    Iodine Shortness Of Breath, Swelling and Anaphylaxis     Sob and swelling/itching/throat closes up per pt  Other reaction(s): Swelling, Hives, DifficultyBreat  Sob and swelling/itching/throat closes up per pt      Shellfish containing products Swelling    Iodine and iodide containing products      Current Facility-Administered Medications   Medication Frequency    0.9%  NaCl infusion (for blood administration) Q24H PRN    0.9%  NaCl infusion PRN    0.9%  NaCl infusion PRN    0.9% NaCl infusion PRN    0.9% NaCl infusion Once    acetaminophen tablet 650 mg Q4H PRN    albuterol nebulizer solution 2.5 mg Q4H PRN    albuterol-ipratropium 2.5 mg-0.5 mg/3 mL nebulizer solution 3 mL Q6H    allopurinoL tablet 100 mg Daily    amLODIPine tablet 10 mg Daily    apixaban tablet 5 mg BID    epoetin milla injection 10,000 Units Every Mon, Wed, Fri    haloperidol lactate injection 0.5 mg Q12H PRN    heparin (porcine) injection 4,000 Units PRN    hydrALAZINE tablet 100 mg TID    labetaloL injection 10 mg Q4H PRN    LIDOcaine 5 % patch 2 patch Q24H    LORazepam tablet 1 mg Q6H PRN    metoprolol injection 5 mg Q12H PRN    NIFEdipine 24 hr tablet 60 mg QHS    ondansetron injection 4 mg Q8H PRN    pantoprazole EC tablet 40 mg Daily    piperacillin-tazobactam (ZOSYN) 4.5 g in D5W 100 mL IVPB (MB+) Q12H    QUEtiapine tablet 50 mg BID    sodium bicarbonate tablet 1,300 mg BID    sodium chloride 0.9% bolus 250 mL 250 mL PRN    sodium chloride 0.9% bolus 250 mL 250 mL PRN    sodium chloride 0.9% flush 10 mL PRN    trazodone split tablet 25 mg QHS    vitamin D 1000 units tablet 1,000 Units Daily       Objective:     Vital Signs (Most Recent):  Temp: 98.4 °F (36.9 °C) (12/01/24  1110)  Pulse: 61 (12/01/24 1110)  Resp: 18 (12/01/24 1110)  BP: 134/78 (12/01/24 1110)  SpO2: 97 % (12/01/24 1110) Vital Signs (24h Range):  Temp:  [97.9 °F (36.6 °C)-98.8 °F (37.1 °C)] 98.4 °F (36.9 °C)  Pulse:  [61-83] 61  Resp:  [18-20] 18  SpO2:  [93 %-100 %] 97 %  BP: (115-164)/(68-84) 134/78     Weight: 112.1 kg (247 lb 2.2 oz) (11/26/24 0458)  Body mass index is 33.52 kg/m².  Body surface area is 2.39 meters squared.    No intake/output data recorded.     Physical Exam  Vitals reviewed.   Constitutional:       Appearance: Normal appearance.   HENT:      Head: Normocephalic and atraumatic.      Mouth/Throat:      Mouth: Mucous membranes are moist.   Eyes:      Pupils: Pupils are equal, round, and reactive to light.   Cardiovascular:      Rate and Rhythm: Normal rate and regular rhythm.   Pulmonary:      Effort: Pulmonary effort is normal.   Abdominal:      General: Abdomen is flat.      Palpations: Abdomen is soft.   Musculoskeletal:      Cervical back: Neck supple.   Skin:     General: Skin is warm.   Neurological:      Mental Status: He is alert.   Psychiatric:         Mood and Affect: Mood normal.          Significant Labs:  BMP:   Recent Labs   Lab 11/30/24  0439   *   *   K 4.1      CO2 25   BUN 31*   CREATININE 8.15*   CALCIUM 8.4   MG 2.2     CBC:   Recent Labs   Lab 11/30/24  0439   WBC 6.55   RBC 2.55*   HGB 7.9*   HCT 25.7*      .8*   MCH 31.0   MCHC 30.7*        Significant Imaging:  Labs: Reviewed

## 2024-12-01 NOTE — ASSESSMENT & PLAN NOTE
Admitted to the ICU for acute respiratory distress and hyperkalemia now s/p extubation. On 2L NC at 96%. Patient does not use home oxygen. Complaints of sob and increase work of breathing attributed to multiple rib fracture from resuscitative efforts while in the ICU. Patient will require close monitor. Patient is not stable for discharge.     11/22  Patient stable this am with sats in mid 90's on 2L NC. Returned to the floor on 15L highflow. Patient did not tolerated switching to NC. ABG showed metabolic acidosis. Placed back on 10L with an oximeter. Will attempt to wean off overnight. Goal of O2 sat of 88-92%. See respiratory therapy note.     11/23: Patient stable this morning SpO2 mid 90s on 4 L NC.    11/24: SpO2 stable in the mid 90's on 3L NC. Continue to wean oxygen.   11/25: Patient increased to 4L NC overnight with sats in mid-90's. Denies any sob. Duonebs Q6H. Will continue to wean today. Continue PT/OT while in the hospital.     11/26: This am patient desaturated to 86% on 3L NC; on repeat RR 18, /83, Sats 92%; on eliquis 5mg bid, lungs still sound congested but patient is afebrile with WCB improving. S/p IV abx course; current on duonebs, wean as tolerate; ordered chest xray plus V/Q scan to rule out PE and pneumonia. CXR showed RLL consolidation; will start zosyn for hospital acquired pneumonia. V/Q scan pending.     11/27: Marginal sats on room air; placed back on 3L NC with improvement to mid-90's; given hospital course which included intubation, aspiration, and resuscitation, positive influenza result and RLL consolidation; patient would benefit from continued IV antibiotics for 2-3 days with supplemental oxygen; goal of weaning off oxygen prior to discharge +/- home O2 evaluation     11/28: Nuclear medicine scan shows low probability for PE. Patient currently on 1.5L NC with O2 sat of 95%. Patient would benefit from 2 more days of IV abx. Wean oxygen as tolerated with a [facility]. Discussed the importance of instinctive spirometry throughout the day such as during commercial breaks.     11/29: Dialysis today. Breathing well. Continue Zosyn IV    11/30: Off supplemental oxygen. Day 5 of Zosyn. Continue IV antibiotics.     12/01  -Continue IV Zosyn (Day 6)

## 2024-12-01 NOTE — PLAN OF CARE
Problem: Adult Inpatient Plan of Care  Goal: Plan of Care Review  Outcome: Progressing  Goal: Patient-Specific Goal (Individualized)  Outcome: Progressing  Goal: Absence of Hospital-Acquired Illness or Injury  Outcome: Progressing  Goal: Optimal Comfort and Wellbeing  Outcome: Progressing     Problem: Gas Exchange Impaired  Goal: Optimal Gas Exchange  Outcome: Progressing     Problem: Skin Injury Risk Increased  Goal: Skin Health and Integrity  Outcome: Progressing     Problem: ARDS (Acute Respiratory Distress Syndrome)  Goal: Effective Oxygenation  Outcome: Progressing     Problem: Airway Clearance Ineffective  Goal: Effective Airway Clearance  Outcome: Progressing

## 2024-12-01 NOTE — NURSING
Pt heart rate going in and out of high 30s-40s reported to Dr tapia via secure chat. Rhythm Afib, Pt quietly resting in chair. No new orders at this time.

## 2024-12-01 NOTE — ASSESSMENT & PLAN NOTE
Hyperkalemia is likely due to ESRD.The patients most recent potassium results are listed below.  Recent Labs     11/29/24  0300 11/30/24  0439   K 3.6 4.1       Plan  - Monitor for arrhythmias with EKG and/or continuous telemetry.   - Treat the hyperkalemia with Potassium Binders if great than 4.5.   - Monitor potassium:  daily with BMP  - The patient's hyperkalemia is resolved    11/22: Dialysis today with 4L taken off. Patient returned to floor on 15L. Attempt made to wean down and patient desaturated to 89%. Placed back on 10L. Nephrology thinks it was secondary to taking off fluid. Will continue to monitor with daily BMP. Anticipate patient will continue to have hyperkalemia. Will monitor.      11/24: Resolved. Scheduled for HD tomorrow with neprhology.  11/25: Regularly scheduled dialysis. Will continue to monitor.    11/28: Continue to monitor  11/29: dialysis today  11/30: Potassium wnl today. Continue dialysis while inpatient.

## 2024-12-01 NOTE — SUBJECTIVE & OBJECTIVE
Interval History: Pt seen this sitting on a chair. He denies any SOB. Awaiting SWB placment at [facility]. Do Mccall RN reported bradycardic readings in 30-40's range on telemetry and Metoprolol and Diltiazem held. HR  back to 61 at the time of writing this progress note.     Review of Systems   Constitutional:  Negative for fatigue and fever.   HENT: Negative.     Respiratory:  Negative for cough, choking, shortness of breath and wheezing.    Cardiovascular:  Negative for chest pain, palpitations and leg swelling.   Gastrointestinal:  Negative for abdominal pain, nausea and vomiting.   Genitourinary:  Positive for decreased urine volume. Negative for flank pain.        ESRD on HD   Musculoskeletal:         L. Foot fracture   Skin:  Positive for wound.   Neurological:  Negative for weakness, light-headedness and headaches.   All other systems reviewed and are negative.    Objective:     Vital Signs (Most Recent):  Temp: 98.4 °F (36.9 °C) (12/01/24 1110)  Pulse: 61 (12/01/24 1110)  Resp: 18 (12/01/24 1110)  BP: 134/78 (12/01/24 1110)  SpO2: 97 % (12/01/24 1110) Vital Signs (24h Range):  Temp:  [97.9 °F (36.6 °C)-98.8 °F (37.1 °C)] 98.4 °F (36.9 °C)  Pulse:  [61-83] 61  Resp:  [18-20] 18  SpO2:  [93 %-99 %] 97 %  BP: (118-164)/(69-84) 134/78     Weight: 112.1 kg (247 lb 2.2 oz)  Body mass index is 33.52 kg/m².  No intake or output data in the 24 hours ending 12/01/24 1245      Physical Exam  Vitals reviewed.   Constitutional:       Appearance: Normal appearance.   HENT:      Head: Normocephalic and atraumatic.      Mouth/Throat:      Mouth: Mucous membranes are moist.   Eyes:      Pupils: Pupils are equal, round, and reactive to light.   Cardiovascular:      Rate and Rhythm: Normal rate and regular rhythm.   Pulmonary:      Effort: Pulmonary effort is normal.   Abdominal:      General: Abdomen is flat.      Palpations: Abdomen is soft.   Musculoskeletal:      Cervical back: Neck supple.   Skin:     General: Skin  is warm.   Neurological:      Mental Status: He is alert.   Psychiatric:         Mood and Affect: Mood normal.             Significant Labs: All pertinent labs within the past 24 hours have been reviewed.    Significant Imaging: I have reviewed all pertinent imaging results/findings within the past 24 hours.

## 2024-12-01 NOTE — ASSESSMENT & PLAN NOTE
Anemia is likely due to chronic disease due to ESRD. Most recent hemoglobin and hematocrit are listed below.  Recent Labs     11/29/24  0300 11/30/24  0439   HGB 8.4* 7.9*   HCT 27.8* 25.7*       Plan  - Monitor serial CBC: Daily  - Transfuse PRBC if patient becomes hemodynamically unstable, symptomatic or H/H drops below 7/21.  - Patient has received 2 units of PRBCs on 11/20/24  - Patient's anemia is currently stable at 8-9    Hemoglobin improved slightly overnight. On EPO 10,000 units.    Continue EPO per nephrology

## 2024-12-01 NOTE — ASSESSMENT & PLAN NOTE
Patient has paroxysmal (<7 days) atrial fibrillation. Patient is currently in sinus rhythm. [id number] Score: The patient doesn't have any registry metric data available. The patients heart rate in the last 24 hours is as follows:  Pulse  Min: 61  Max: 83     Antiarrhythmics  metoprolol injection 5 mg, Every 12 hours PRN, Intravenous    Anticoagulants  heparin (porcine) injection 4,000 Units, As needed (PRN), Intra-Catheter  apixaban tablet 5 mg, 2 times daily, Oral    Plan  - Replete lytes with a goal of K>4, Mg >2  - Patient is anticoagulated, see medications listed above.  - Patient's afib is currently controlled  - Telemetry; no events overnight, NS 94 this am  - Continue to monitor    Continue eliquis.

## 2024-12-02 VITALS
RESPIRATION RATE: 20 BRPM | WEIGHT: 247.13 LBS | SYSTOLIC BLOOD PRESSURE: 171 MMHG | BODY MASS INDEX: 33.47 KG/M2 | HEART RATE: 88 BPM | TEMPERATURE: 98 F | HEIGHT: 72 IN | OXYGEN SATURATION: 98 % | DIASTOLIC BLOOD PRESSURE: 103 MMHG

## 2024-12-02 PROBLEM — I46.9 CARDIAC ARREST: Status: RESOLVED | Noted: 2024-11-13 | Resolved: 2024-12-02

## 2024-12-02 LAB
ANION GAP SERPL CALCULATED.3IONS-SCNC: 21 MMOL/L (ref 7–16)
ANISOCYTOSIS BLD QL SMEAR: ABNORMAL
BASOPHILS # BLD AUTO: 0.22 K/UL (ref 0–0.2)
BASOPHILS NFR BLD AUTO: 3.5 % (ref 0–1)
BUN SERPL-MCNC: 46 MG/DL (ref 8–26)
BUN/CREAT SERPL: 4 (ref 6–20)
CALCIUM SERPL-MCNC: 8.6 MG/DL (ref 8.4–10.2)
CHLORIDE SERPL-SCNC: 98 MMOL/L (ref 98–107)
CO2 SERPL-SCNC: 18 MMOL/L (ref 22–29)
CREAT SERPL-MCNC: 12.05 MG/DL (ref 0.72–1.25)
DACRYOCYTES BLD QL SMEAR: ABNORMAL
DIFFERENTIAL METHOD BLD: ABNORMAL
EGFR (NO RACE VARIABLE) (RUSH/TITUS): 5 ML/MIN/1.73M2
EOSINOPHIL # BLD AUTO: 0.51 K/UL (ref 0–0.5)
EOSINOPHIL NFR BLD AUTO: 8.1 % (ref 1–4)
EOSINOPHIL NFR BLD MANUAL: 3 % (ref 1–4)
ERYTHROCYTE [DISTWIDTH] IN BLOOD BY AUTOMATED COUNT: 17.2 % (ref 11.5–14.5)
GLUCOSE SERPL-MCNC: 129 MG/DL (ref 74–100)
HCT VFR BLD AUTO: 30.7 % (ref 40–54)
HGB BLD-MCNC: 8.3 G/DL (ref 13.5–18)
IMM GRANULOCYTES # BLD AUTO: 0.05 K/UL (ref 0–0.04)
IMM GRANULOCYTES NFR BLD: 0.8 % (ref 0–0.4)
LYMPHOCYTES # BLD AUTO: 0.94 K/UL (ref 1–4.8)
LYMPHOCYTES NFR BLD AUTO: 15 % (ref 27–41)
LYMPHOCYTES NFR BLD MANUAL: 11 % (ref 27–41)
MACROCYTES BLD QL SMEAR: ABNORMAL
MAGNESIUM SERPL-MCNC: 2.3 MG/DL (ref 1.6–2.6)
MCH RBC QN AUTO: 31.1 PG (ref 27–31)
MCHC RBC AUTO-ENTMCNC: 27 G/DL (ref 32–36)
MCV RBC AUTO: 115 FL (ref 80–96)
MONOCYTES # BLD AUTO: 0.59 K/UL (ref 0–0.8)
MONOCYTES NFR BLD AUTO: 9.4 % (ref 2–6)
MONOCYTES NFR BLD MANUAL: 8 % (ref 2–6)
MPC BLD CALC-MCNC: 11.6 FL (ref 9.4–12.4)
NEUTROPHILS # BLD AUTO: 3.95 K/UL (ref 1.8–7.7)
NEUTROPHILS NFR BLD AUTO: 63.2 % (ref 53–65)
NEUTS BAND NFR BLD MANUAL: 3 % (ref 1–5)
NEUTS SEG NFR BLD MANUAL: 75 % (ref 50–62)
NRBC # BLD AUTO: 0 X10E3/UL
NRBC, AUTO (.00): 0 %
PHOSPHATE SERPL-MCNC: 6.2 MG/DL (ref 2.3–4.7)
PLATELET # BLD AUTO: 217 K/UL (ref 150–400)
PLATELET MORPHOLOGY: ABNORMAL
POTASSIUM SERPL-SCNC: 3.9 MMOL/L (ref 3.5–5.1)
RBC # BLD AUTO: 2.67 M/UL (ref 4.6–6.2)
SODIUM SERPL-SCNC: 133 MMOL/L (ref 136–145)
TRIGL SERPL-MCNC: 112 MG/DL (ref 34–140)
WBC # BLD AUTO: 6.26 K/UL (ref 4.5–11)

## 2024-12-02 PROCEDURE — 84478 ASSAY OF TRIGLYCERIDES: CPT | Performed by: INTERNAL MEDICINE

## 2024-12-02 PROCEDURE — 84100 ASSAY OF PHOSPHORUS: CPT | Performed by: STUDENT IN AN ORGANIZED HEALTH CARE EDUCATION/TRAINING PROGRAM

## 2024-12-02 PROCEDURE — 25000003 PHARM REV CODE 250

## 2024-12-02 PROCEDURE — 25000003 PHARM REV CODE 250: Performed by: FAMILY MEDICINE

## 2024-12-02 PROCEDURE — 94761 N-INVAS EAR/PLS OXIMETRY MLT: CPT

## 2024-12-02 PROCEDURE — 63600175 PHARM REV CODE 636 W HCPCS: Performed by: INTERNAL MEDICINE

## 2024-12-02 PROCEDURE — 97110 THERAPEUTIC EXERCISES: CPT

## 2024-12-02 PROCEDURE — 83735 ASSAY OF MAGNESIUM: CPT | Performed by: STUDENT IN AN ORGANIZED HEALTH CARE EDUCATION/TRAINING PROGRAM

## 2024-12-02 PROCEDURE — 1111F DSCHRG MED/CURRENT MED MERGE: CPT | Mod: CPTII,GC,, | Performed by: FAMILY MEDICINE

## 2024-12-02 PROCEDURE — 85025 COMPLETE CBC W/AUTO DIFF WBC: CPT | Performed by: INTERNAL MEDICINE

## 2024-12-02 PROCEDURE — 25000003 PHARM REV CODE 250: Performed by: STUDENT IN AN ORGANIZED HEALTH CARE EDUCATION/TRAINING PROGRAM

## 2024-12-02 PROCEDURE — 63600175 PHARM REV CODE 636 W HCPCS: Performed by: STUDENT IN AN ORGANIZED HEALTH CARE EDUCATION/TRAINING PROGRAM

## 2024-12-02 PROCEDURE — 99239 HOSP IP/OBS DSCHRG MGMT >30: CPT | Mod: GC,,, | Performed by: FAMILY MEDICINE

## 2024-12-02 PROCEDURE — 25000003 PHARM REV CODE 250: Performed by: INTERNAL MEDICINE

## 2024-12-02 PROCEDURE — 90935 HEMODIALYSIS ONE EVALUATION: CPT

## 2024-12-02 PROCEDURE — 25000003 PHARM REV CODE 250: Performed by: HOSPITALIST

## 2024-12-02 PROCEDURE — 36415 COLL VENOUS BLD VENIPUNCTURE: CPT | Performed by: INTERNAL MEDICINE

## 2024-12-02 PROCEDURE — 25000242 PHARM REV CODE 250 ALT 637 W/ HCPCS: Performed by: STUDENT IN AN ORGANIZED HEALTH CARE EDUCATION/TRAINING PROGRAM

## 2024-12-02 PROCEDURE — 94640 AIRWAY INHALATION TREATMENT: CPT

## 2024-12-02 PROCEDURE — 80048 BASIC METABOLIC PNL TOTAL CA: CPT | Performed by: INTERNAL MEDICINE

## 2024-12-02 PROCEDURE — 25000003 PHARM REV CODE 250: Performed by: NURSE PRACTITIONER

## 2024-12-02 PROCEDURE — 99900035 HC TECH TIME PER 15 MIN (STAT)

## 2024-12-02 RX ORDER — METOPROLOL TARTRATE 50 MG/1
50 TABLET ORAL 2 TIMES DAILY
Qty: 180 TABLET | Refills: 3 | Status: SHIPPED | OUTPATIENT
Start: 2024-12-02 | End: 2025-12-02

## 2024-12-02 RX ORDER — ALLOPURINOL 100 MG/1
100 TABLET ORAL DAILY
Qty: 30 TABLET | Refills: 0 | Status: SHIPPED | OUTPATIENT
Start: 2024-12-02 | End: 2025-01-01

## 2024-12-02 RX ORDER — DILTIAZEM HYDROCHLORIDE 30 MG/1
30 TABLET, FILM COATED ORAL EVERY 6 HOURS
Qty: 120 TABLET | Refills: 0 | Status: SHIPPED | OUTPATIENT
Start: 2024-12-02 | End: 2025-01-04

## 2024-12-02 RX ADMIN — LORAZEPAM 1 MG: 1 TABLET ORAL at 05:12

## 2024-12-02 RX ADMIN — DILTIAZEM HYDROCHLORIDE 30 MG: 30 TABLET, FILM COATED ORAL at 12:12

## 2024-12-02 RX ADMIN — DILTIAZEM HYDROCHLORIDE 30 MG: 30 TABLET, FILM COATED ORAL at 05:12

## 2024-12-02 RX ADMIN — PANTOPRAZOLE SODIUM 40 MG: 40 TABLET, DELAYED RELEASE ORAL at 11:12

## 2024-12-02 RX ADMIN — Medication 1000 UNITS: at 11:12

## 2024-12-02 RX ADMIN — EPOETIN ALFA 10000 UNITS: 10000 SOLUTION INTRAVENOUS; SUBCUTANEOUS at 11:12

## 2024-12-02 RX ADMIN — IPRATROPIUM BROMIDE AND ALBUTEROL SULFATE 3 ML: .5; 3 SOLUTION RESPIRATORY (INHALATION) at 01:12

## 2024-12-02 RX ADMIN — METOPROLOL TARTRATE 50 MG: 50 TABLET, FILM COATED ORAL at 11:12

## 2024-12-02 RX ADMIN — DILTIAZEM HYDROCHLORIDE 30 MG: 30 TABLET, FILM COATED ORAL at 01:12

## 2024-12-02 RX ADMIN — ALLOPURINOL 100 MG: 100 TABLET ORAL at 11:12

## 2024-12-02 RX ADMIN — QUETIAPINE FUMARATE 50 MG: 25 TABLET ORAL at 11:12

## 2024-12-02 RX ADMIN — AMLODIPINE BESYLATE 10 MG: 10 TABLET ORAL at 11:12

## 2024-12-02 RX ADMIN — HALOPERIDOL LACTATE 0.5 MG: 5 INJECTION, SOLUTION INTRAMUSCULAR at 04:12

## 2024-12-02 RX ADMIN — SODIUM BICARBONATE 650 MG TABLET 1300 MG: at 11:12

## 2024-12-02 RX ADMIN — HYDRALAZINE HYDROCHLORIDE 100 MG: 100 TABLET ORAL at 11:12

## 2024-12-02 RX ADMIN — IPRATROPIUM BROMIDE AND ALBUTEROL SULFATE 3 ML: .5; 3 SOLUTION RESPIRATORY (INHALATION) at 06:12

## 2024-12-02 RX ADMIN — HEPARIN SODIUM 4000 UNITS: 1000 INJECTION, SOLUTION INTRAVENOUS; SUBCUTANEOUS at 11:12

## 2024-12-02 RX ADMIN — APIXABAN 5 MG: 5 TABLET, FILM COATED ORAL at 11:12

## 2024-12-02 RX ADMIN — SODIUM CHLORIDE: 9 INJECTION, SOLUTION INTRAVENOUS at 11:12

## 2024-12-02 RX ADMIN — HYDRALAZINE HYDROCHLORIDE 100 MG: 100 TABLET ORAL at 01:12

## 2024-12-02 NOTE — PT/OT/SLP PROGRESS
Physical Therapy Treatment    Patient Name:  Saúl Butt   MRN:  [id number]    Recommendations:     Discharge Recommendations: Moderate Intensity Therapy  Discharge Equipment Recommendations: to be determined by next level of care  Barriers to discharge: None    Assessment:     Saúl Butt is a 54 y.o. male admitted with a medical diagnosis of Acute respiratory failure with hypoxemia.  He presents with the following impairments/functional limitations: weakness, impaired endurance, impaired self care skills, impaired functional mobility, decreased lower extremity function, decreased safety awareness Pt demonstrated improved strength and endurance during exercise. He was independent with mobility and ambulation. Pt is clear to return home from a PT standpoint.    Rehab Prognosis: Good; patient would benefit from acute skilled PT services to address these deficits and reach maximum level of function.    Recent Surgery: * No surgery found *      Plan:     During this hospitalization, patient to be seen 5 x/week to address the identified rehab impairments via gait training, therapeutic activities, therapeutic exercises, neuromuscular re-education and progress toward the following goals:    Plan of Care Expires:  12/24/24    Subjective     Chief Complaint: none  Patient/Family Comments/goals: Pt is agreeable to PT.  Pain/Comfort:  Pain Rating 1: 0/10  Pain Rating Post-Intervention 1: 0/10      Objective:     Communicated with LUZ Mccall RN prior to session.  Patient found sitting edge of bed with peripheral IV upon PT entry to room.     General Precautions: Standard, fall  Orthopedic Precautions: N/A  Braces: N/A  Respiratory Status: Room air     Functional Mobility:  Transfers:     Sit to Stand:  stand by assistance with rolling walker and gait belt  Gait: ambulated 200 ft x 2 trials. Demonstrated externally rotated LE.  Balance: good      AM-PAC 6 CLICK MOBILITY  Turning over in bed (including adjusting bedclothes,  sheets and blankets)?: 4  Sitting down on and standing up from a chair with arms (e.g., wheelchair, bedside commode, etc.): 4  Moving from lying on back to sitting on the side of the bed?: 4  Moving to and from a bed to a chair (including a wheelchair)?: 4  Need to walk in hospital room?: 4  Climbing 3-5 steps with a railing?: 3  Basic Mobility Total Score: 23       Treatment & Education:  Pt performed bilateral LE: seated exercises: ankle pumps, long arc quads, marches, hip abduction, and hip adduction x 30 each      Patient left up in chair with all lines intact and call button in reach..    GOALS:   Multidisciplinary Problems       Physical Therapy Goals          Problem: Physical Therapy    Goal Priority Disciplines Outcome Interventions   Physical Therapy Goal     PT, PT/OT Progressing    Description: Short term goals:  . Supine to sit with Contact Guard Assistance  . Sit to stand transfer with Contact Guard Assistance  . Gait  x 75 feet with Contact Guard Assistance using Rolling Walker.     Long term goals:  Patient will regain full independent functional mobility with lowest level of assistive device to return to desired living arrangement and prior ADL's.                          Time Tracking:     PT Received On: 12/02/24  PT Start Time: 1323     PT Stop Time: 1352  PT Total Time (min): 29 min     Billable Minutes: Therapeutic Exercise 29    Treatment Type: Treatment  PT/PTA: PT     Number of PTA visits since last PT visit: 0     12/02/2024

## 2024-12-02 NOTE — PROGRESS NOTES
Nephrology Department            NAME: Saúl Butt   YOB: 1970  MRN: [id number]  NOTE DATE: 12/02/2024       Seen in Dialysis Unit. He is tolerating the procedure.    Patient complains:  None.      REVIEW OF SYSTEMS:  he reports no shortness of breath, nausea or vomiting. No acute changes.    VITALS:  height is 6' (1.829 m) and weight is 112.1 kg (247 lb 2.2 oz). His oral temperature is 98.3 °F (36.8 °C). His blood pressure is 145/88 (abnormal) and his pulse is 86. His respiration is 18 and oxygen saturation is 96%.  Hemodialysis documentation flowsheet reviewed with dialysis nurse, including weight and vitals.    Resting comfortably, NAD.  Respiration unlabored. Lungs clear.  Heart regular, no rub.  Abdomen soft, nontender, positive bowl sounds  No edema.    Recent Labs   Lab 11/30/24  0439 12/01/24  1155 12/02/24  0703   * 134* 133*   K 4.1 4.7 3.9    97* 98   CO2 25 22 18*   BUN 31* 42* 46*   CREATININE 8.15* 10.50* 12.05*   CALCIUM 8.4 8.5 8.6   PHOS 4.9* 6.6* 6.2*     Recent Labs   Lab 11/30/24  0439 12/01/24  1155 12/02/24  0620   WBC 6.55 7.46 6.26   HGB 7.9* 8.7* 8.3*   HCT 25.7* 28.9* 30.7*    353 217       ASSESSMENT/PLAN:  Continue with scheduled hemodialysis for this patient.    George Rowell Jr, MD

## 2024-12-02 NOTE — PLAN OF CARE
CM sent updates to [facility]. Awaiting insurance approval for snf    11:00- [employer] offering a peer to peer on pt. CM set up peer to peer for 2:30 PM with Dr German. CM will notified Dr Duran.     14:55- CM was notified that pt will need to discharge home. CM will notify family     1505- CM setting up home health for pt. Pt was current with medicaid waiver he thinks not home health services. CM obtained a choice for [facility] and notified Sanaz Christine of referral.

## 2024-12-02 NOTE — ASSESSMENT & PLAN NOTE
Creatine stable for now. BMP reviewed- noted Estimated Creatinine Clearance: 9.3 mL/min (A) (based on SCr of 12.05 mg/dL (H)). according to latest data. Based on current GFR, CKD stage is end stage.  Monitor UOP and serial BMP and adjust therapy as needed. Renally dose meds. Avoid nephrotoxic medications and procedures.     Continue HD, Monday Wednesday Friday.     11/22: Taken to dialysis today. Patient is net negative 4L.     11/24: Dialysis tomorrow.   11/25: Continue scheduled HD while in the hospital. Patient followed by nephrology.   11/26: Dialyzed yesterday. Net negative 2500 ml. Continue current treatment (M,W,F) per nephrology.   11/27: Dialysis today   11/28: Net negative 2L during HD. Will continue current plan for dialysis on MWF.   11/29: Dialysis today without issue  11/30: Hemodialysis MWF per nephrology. Plan dialysis prior d/c on Monday.   12/02: Hemodialysis Today. D/C soon   Patient called and scheduled

## 2024-12-02 NOTE — ASSESSMENT & PLAN NOTE
Anemia is likely due to chronic disease due to ESRD. Most recent hemoglobin and hematocrit are listed below.  Recent Labs     11/30/24  0439 12/01/24  1155 12/02/24  0620   HGB 7.9* 8.7* 8.3*   HCT 25.7* 28.9* 30.7*       Plan  - Monitor serial CBC: Daily  - Transfuse PRBC if patient becomes hemodynamically unstable, symptomatic or H/H drops below 7/21.  - Patient has received 2 units of PRBCs on 11/20/24  - Patient's anemia is currently stable at 8-9    Hemoglobin improved slightly overnight. On EPO 10,000 units.    Continue EPO per nephrology

## 2024-12-02 NOTE — ASSESSMENT & PLAN NOTE
Admitted to the ICU for acute respiratory distress and hyperkalemia now s/p extubation. On 2L NC at 96%. Patient does not use home oxygen. Complaints of sob and increase work of breathing attributed to multiple rib fracture from resuscitative efforts while in the ICU. Patient will require close monitor. Patient is not stable for discharge.     11/22  Patient stable this am with sats in mid 90's on 2L NC. Returned to the floor on 15L highflow. Patient did not tolerated switching to NC. ABG showed metabolic acidosis. Placed back on 10L with an oximeter. Will attempt to wean off overnight. Goal of O2 sat of 88-92%. See respiratory therapy note.     11/23: Patient stable this morning SpO2 mid 90s on 4 L NC.    11/24: SpO2 stable in the mid 90's on 3L NC. Continue to wean oxygen.   11/25: Patient increased to 4L NC overnight with sats in mid-90's. Denies any sob. Duonebs Q6H. Will continue to wean today. Continue PT/OT while in the hospital.     11/26: This am patient desaturated to 86% on 3L NC; on repeat RR 18, /83, Sats 92%; on eliquis 5mg bid, lungs still sound congested but patient is afebrile with WCB improving. S/p IV abx course; current on duonebs, wean as tolerate; ordered chest xray plus V/Q scan to rule out PE and pneumonia. CXR showed RLL consolidation; will start zosyn for hospital acquired pneumonia. V/Q scan pending.     11/27: Marginal sats on room air; placed back on 3L NC with improvement to mid-90's; given hospital course which included intubation, aspiration, and resuscitation, positive influenza result and RLL consolidation; patient would benefit from continued IV antibiotics for 2-3 days with supplemental oxygen; goal of weaning off oxygen prior to discharge +/- home O2 evaluation     11/28: Nuclear medicine scan shows low probability for PE. Patient currently on 1.5L NC with O2 sat of 95%. Patient would benefit from 2 more days of IV abx. Wean oxygen as tolerated with a goal of discharging on  Monday to [facility] or some other facility. Discussed the importance of instinctive spirometry throughout the day such as during commercial breaks.     11/29: Dialysis today. Breathing well. Continue Zosyn IV    11/30: Off supplemental oxygen. Day 5 of Zosyn. Continue IV antibiotics.     12/01  -Continue IV Zosyn (Day 6)    12/02  - Patient has no distress. Room Air

## 2024-12-02 NOTE — PLAN OF CARE
Problem: Adult Inpatient Plan of Care  Goal: Plan of Care Review  Outcome: Progressing  Goal: Absence of Hospital-Acquired Illness or Injury  Outcome: Progressing  Goal: Optimal Comfort and Wellbeing  Outcome: Progressing     Problem: Hemodialysis  Goal: Safe, Effective Therapy Delivery  Outcome: Progressing  Goal: Effective Tissue Perfusion  Outcome: Progressing

## 2024-12-02 NOTE — NURSING
[facility] INPATIENT SERVICES  DIALYSIS TREATMENT SUMMARY      Note: Consult with the attending physician for patient treatment orders, this document is not a physician order.      Patient Information   Patient Saúl Butt   Date of Birth June 26, 1970   Chart Number [id number]   Location [facility]   Location MRN [id number]   Gender Male   SSN (last 4) [id number]     Treatment Information   Treatment Type Hemodialysis   Treatment Id [id number]   Start Time December 02, 2024 08:25   End Time December 02, 2024 11:25   Acutal Duration 03:00     Treatment Balances   Total Saline Administered 500   Net Fluid Balance 2500    Hemodialysis Orders   Therapy Standard   Orders Verified Time 12/02/2024 07:50    Date Verified 12/02/2024   Duration 3:00   Isolated UF/Bypass No   BFR (mL) 400   DFR (mL) 800   Dialyzer Type OPTIFLUX 160NR   UF Order UF Range   UF Range (mL) 2000 - 3000   Crit-Line used No   Heparin Initial Units Bolus No   Heparin IV Maintenance Bolus No   Heparin IV Infusion No   Potassium (mEq/L) 3   Calcium (mEq/L) 2.5   Bicarb (mg/dL) 33   Sodium (mEq/L) 137   Additional Orders 1. Lock HD cath, each port with 2000 units of heparin 2. Turn UF off if pt becomes symptomatic associated with drop in SBP >20mmHg 3. Epogen 10,000 units IVP every HD treatment   Clinician Damien Valdivia RN    Dialysis Access   Access Type Central Venous Catheter   Central Venous Catheter   Access Type Catheter - Tunneled   Access Location Chest Wall - R   Catheter Care Completed per Policy Yes   Dressing Dry and Intact on Arrival Yes   Dressing Changed Yes   Type of Dressing Film Biopatch/CHG   Dressing Changed By [facility] Staff   Type of HD Caps Not Listed   HD Caps Changed Yes   CVC Line Education Provided Yes - Dressing Change Frequency      Vitals   Pre-Treatment Vitals   Time Is BP being recorded? Pre BP Map BP Method Pulse RR Temp How was Weight Obtained? Pre Weight Previous Dry Weight Previous Post Weight Metric Target Fluid  Removal (mL) Dialysate Confirmed Clinician   12/02/2024 08:20 BP/Map 161/90 (114) Noninvasive 83 18 98.3 Unable to Obtain: Floor to Weigh     3500 Yes Oscar Luna RN   Comments:       Post Treatment Vitals   Time Is BP being recorded? BP Map Pulse RR Temp How was Weight Obtained? Post Weight Metric BVP UF Goal Ordered NSS Given Intra-Procedure Total Machine UF Removed (mL) Crit-Line Ending Profile Crit-Line Refill Crit-Line Ending HCT Crit-Line Max BV% Clinician   12/02/2024 11:30 BP/Map 148/92 (111) 85 18 98.2 Unable to Obtain: Floor to Weigh   67.5  0 3000     Oscar Luna, RON   Comments:       Safety checks include: access uncovered and secured, Hemaclip secured for all central line access, machine checks performed, and alarm limits confirmed.     Labs   Hepatitis   HBsAG Lab Result HBsAG Lab Result HBsAG Draw Date Transient Antigenemia(MD Diagnosis Only) Anti-HBs Lab Result Anti-HBs Lab Value Anti-HBs Draw Date Documented By Documented Date Hepatitis Status Hepatitis Status   Negative  11/11/2024  Unknown  11/11/2024 Damien Valdivia 12/02/2024  Susceptible   Notes:       Pre-Treatment Hepatitis Precautions Copy of hepatitis results verified in hospital EMR Yes Signing   Patient tx outside buffer zone Yes Hepatitis Information Entered By Damien Valdivia RN Signed By Daimen Valdivia RN     Pre-Treatment Handoff   Staff Report Received Yes   Report Given by Primary Nurse Omer Scanlon   Time 08:00     Patient Arrival   Patient ID Verified Date of Birth    Full Name   Patient Consent to treatment verified Yes   Blood Transfusion Consent Verified N/A     Treatment Comments   Treatment Notes Treatment for 3 hrs, tolerated well, hemodynamically stable     Post-Treatment Handoff   Report Given to Primary Nurse Omer Scanlon   Time Report Given 11:40   Report Given By Oscar Luna RN    Machine Validation   Time 07:45   Date 12/2/2024   Auto Alarm Test Passed Yes   Machine Serial # [id number]   Portable  RO Yes   RO Serial# [id number]   Residual Bleach Negative Yes   Was a manufactured mix used? No   Machine Log Completed Yes   Total Chlorine (less than 0.1)? Yes   Total Chlorine Log Completed Yes   Bicarb BiBag   Bibag Size 650   Machine Temp 37   Machine Conductivity 14   Meter Type N/A   Meter Conductivity    Independent Conductivity 14   pH Status Pass Pass   pH    TCD Value 13.7   TCD Alarm with +/- 0.5 Yes   NVL enabled validated 100 asymmetric Yes   Safety check complete Oscar Luna RN   Second Verification Performed? Yes   Second Verification Performed By Damien Valdivia RN   Reason Not Verified       Serum Lab Values   Time BUN Creatinine Na K (mEq/L) Cl CO2 Ca (mEq/L) Phos Mg (mg/dL) Alb (g/dL) Glucose Hgb Hct WBC Plt PT aPTT INR Other Clinician   12/02/2024 07:03 46 12.05 133 3.9 98 18 8.6 6.2 2.3  129 8.3 30.7 6.26 217     Damien Valdivia RN   Comments:         Facility Information Location [facility]   Facility Information Room # 548 Diagnosis ACUTE RESPIRATORY FAILURE WITH HYPOXEMIA   Admission Date 11/11/2024 Stat Treatment No Isolation Information   Ordering MD Dr. Kiet Martinez Patient Type Chronic dialysis patient with diagnosis of ESRD Isolation Required? N   Account/Finance Number [id number] Patient Chronic Unit [facility] Completed by   Admission Status InPatient Code Status Full Code General Tx information Entered by Oscar Luna RN     Start Treatment Time Out Confirmed by Oscar Luna Correct access site verified Yes   Treatment Initiation Connections Secured Time Out Completed 08:23 Treatment Start Date 12/02/2024    Saline line double clamped Correct patient verified Yes Treatment Start Time 08:25    Hemaclip Applied Correct procedure verified Yes Patient/Family questions and concerns addressed Yes     Pre Focused Assessment Edema LOC Alert and Oriented x3   Access Location Generalized GI / Bowels   Signs and Symptoms of Infection? No Edema Grade 2+ GI Soft    Pain Screening Cardiac    Does the patient have pain? No Heart Rhythm Regular  Anuric   Respiratory Telemetry No Completed by   Lung Sounds Diminished Skin Pre Treatment Focused Assessment Completed By Oscar Luna RN   Location Bilateral Skin Warm Time 08:21   Position Bases  Dry Signing   Respiratory Efforts Unlabored LOC Signed By Oscar Luna RN     Education  Treatment Options Patient Education Reinforced By   Patient Education Method Knowledge / Understanding Assessed Demonstration Patient Education Reinforced by Oscar Luna RN   Patient Educated? Yes Family Education Provided? N/A    Focus or Topic Infection Prevention Caregiver Education Provided? N/A      Post-Treatment HD machine external disinfection completed per policy Yes Completed by   Post Treatment Delay PRO external disinfection completed per policy Yes Post Treatment Form Completed By Oscar Luna RN   Delay N Post Treatment General Information    Machine Disinfection Requirement Notes Net uf, vss, hemodynamically stable      Post Focused Assessment Lung Sounds Diminished LOC   Changes from Pre Focused Assessment Location Bilateral LOC Alert and Oriented x3   Changes from Pre Treatment Focused Assessment? No Position Bases GI / Bowels   Access Respiratory Efforts Unlabored GI Soft   Cath Packed with Heparin Edema    Access Port(ml) 2 Location Generalized  Anuric   Return Port(ml) 2 Edema Grade 2+ Completed by   Catheter clamped and capped Yes Cardiac Post Treatment Focused Assessment Completed by Oscar Luna RN   Access Flow Good Heart Rhythm Regular Date 12/02/2024   Dialyzer Clearance Streaked Telemetry No Time 11:35   Pain Screening Skin Signing   Does the patient have pain? No Skin Warm Signed By Oscar Luna RN   Respiratory  Dry

## 2024-12-02 NOTE — DISCHARGE SUMMARY
[facility] Hospital Medicine  Discharge Summary      Patient Name: Saúl Butt  MRN: [id number]  [facility]: [id number]  Patient Class: IP- Inpatient  Admission Date: 11/11/2024  Hospital Length of Stay: 21 days  Discharge Date and Time:  12/02/2024 2:52 PM  Attending Physician: Kiet Velasquez Jr., MD   Discharging Provider: Ryan Duran MD  Primary Care Provider: Ruth, Primary Doctor    Primary Care Team: Networked reference to record PCT     HPI:   54-year-old  male presented to the ED via EMS.  Past medical history of end-stage renal disease, hypertension, Chronic anemia, Chronic diastolic CHF, moderate aortic stenosis, DVT, Hyperlipidemia, Paroxysmal atrial fibrillation, and malignant neoplasm of kidney.  Patient states he goes to dialysis Monday, Wednesday, and Friday.  States last day to dialyze was this past Friday, 11/8/24.  Patient states he has not missed any dialysis days. Patient was due to dialyze today, however he presented to the ED with 3 day history of shortness of breath which is worse with lying down, and he also reports having a cough.  He also reports 3 day history of nausea, vomiting, diarrhea. Patient reports having some fever on and off.  Denies any pain at present.     ED workup revealed sodium 131, potassium 8.4, anion gap 17, BUN and creatinine 60/13.4.  Venous blood gas revealed pH 7.42, pCO2 44, sodium 131, potassium 8.2, bicarb 28.5, lactate 1.5, and glucose 130.  Chest x-ray showed cardiomegaly with pulmonary edema.  BNP 55,331. In the ED patient received calcium gluconate 1 g IV, D10 500 mL IV, and Humulin R 10 units.  Critical care service was asked to admit patient to the ICU, where he will be dialyzed emergently to treat hyperkalemia.    * No surgery found *      Hospital Course:    Patient admitted  with acute respiratory failure with hypoxemia.   Records reviewed. Admitted 11/11 to ICU with SOB and orthopnea.   Echo EF 50%, Mod  AS,  No DVT on US.   PMHx:  end-stage renal disease (dialysis MWF / old AV fistula arm but uses right subclavin area dialysis cath) , hypertension, Chronic anemia, Chronic diastolic CHF, moderate aortic stenosis, DVT, Hyperlipidemia, Paroxysmal atrial fibrillation, and malignant neoplasm of kidney.   11/12-- potassium 6.8 - HD gain today - went into Afib rvr on HD   11/13 Intubated progressive resp failure. PEA arrest and required CPR and evidence aspiration. Has rib fxs.  11/20 extubated. Transferred to floor  Needs stronger. Maybe to SWB at DC  11/22 Better.Mother in room and convinced him to go to SWB.   Therapy following. No DVT. Discussed with Dr Martinez.    11/23 Breathing better but still weak. Eating some. Work on strength. Look into SWB placement.  11/24 Weak but stronger daily.  Rib pain from prior injury try Lidoderm patch   Potassium low but to address at dialysis.  12/1: Patient Heart rate decreased to 40s after morning medication. BB and Cardizem Held. Will decrease dose for next dosage  12/2 Dialysis today. HR 80s no distress noted. Patient sitting up in chair ready for discharge  Patient has reached maximum benefit from this admission. Patient educated on the importance of medication compliance and follow up with appointments Patient verbalized understanding. New medication explained to patient and patient verbalized understanding. Patient will be discharged back to [facility].                   Goals of Care Treatment Preferences:  Code Status: Full Code      SDOH Screening:  The patient was screened for utility difficulties, food insecurity, transport difficulties, housing insecurity, and interpersonal safety and there were no concerns identified this admission.     Consults:   Consults (From admission, onward)          Status Ordering Provider     Inpatient consult to General Surgery  Once        Provider:  Frank Daniel, DO    Completed FRANK DANIEL     Inpatient consult to Registered  Dietitian/Nutritionist  Once        Provider:  (Not yet assigned)    Completed MBAE, JUNE     Nephrology  Once        Provider:  Kiet Martinez MD    Acknowledged MBAE, JUNE            No new Assessment & Plan notes have been filed under this hospital service since the last note was generated.  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] 11/13/2024 Yes    Acute pulmonary embolism without acute cor pulmonale [I26.99] 11/13/2024 Yes    Atrial fibrillation with RVR [I48.91] 11/12/2024 No    Influenza A [J10.1] 11/12/2024 Yes    Hyperkalemia [E87.5] 11/11/2024 Yes    Severe sepsis [A41.9, R65.20] 11/11/2024 Yes    ESRD on hemodialysis [N18.6, Z99.2] 06/17/2021 Not Applicable    Anemia in chronic kidney disease [N18.9, D63.1] 12/06/2014 Yes      Problems Resolved During this Admission:    Diagnosis Date Noted Date Resolved POA    Hypokalemia [E87.6] 11/24/2024 11/29/2024 No    Heart failure with mildly reduced ejection fraction [I50.22] 11/16/2024 11/23/2024 Yes    ESRD (end stage renal disease) [N18.6] 11/15/2024 11/23/2024 Yes    Acute metabolic encephalopathy [G93.41] 11/13/2024 11/29/2024 No    Acute respiratory distress syndrome [J80] 11/13/2024 11/16/2024 No    Cardiac arrest [I46.9] 11/13/2024 12/02/2024 No    Hypervolemia associated with renal insufficiency [E87.70, N28.9] 11/12/2024 11/13/2024 Yes    Hypertension [I10] 11/11/2024 11/13/2024 Yes    Shakes [R25.1]  11/11/2024 11/13/2024 Yes    Elevated brain natriuretic peptide (BNP) level [R79.89] 11/11/2024 11/13/2024 Yes    Lactic acidosis [E87.20] 11/11/2024 11/13/2024 Yes       Discharged Condition: stable    Disposition: Home or Self Care    Follow Up:   Follow-up Information       No, Primary Doctor .               Roxanna Merrill MD. Schedule an appointment as soon as possible for a visit on 12/10/2024.    Specialty: Family Medicine  Why: Hospital follow up; 10:15 am  Contact information:  [address] [facility]  [place] [address]  [phone]                           Patient Instructions:      Activity as tolerated       Significant Diagnostic Studies: Labs: CMP   Recent Labs   Lab 12/01/24  1155 12/02/24  0703   * 133*   K 4.7 3.9   CL 97* 98   CO2 22 18*   GLU 93 129*   BUN 42* 46*   CREATININE 10.50* 12.05*   CALCIUM 8.5 8.6   ANIONGAP 20* 21*    and CBC   Recent Labs   Lab 12/01/24  1155 12/02/24  0620   WBC 7.46 6.26   HGB 8.7* 8.3*   HCT 28.9* 30.7*    217       Pending Diagnostic Studies:       Procedure Component Value Units Date/Time    EXTRA TUBES [[id number]]     Order Status: Sent Lab Status: No result     Specimen: Blood, Venous     Narrative:      The following orders were created for panel order EXTRA TUBES.  Procedure                               Abnormality         Status                     ---------                               -----------         ------                     Lavender Top Hold[[id number]]                                                            Please view results for these tests on the individual orders.    EXTRA TUBES [[id number]] Collected: 11/26/24 0645    Order Status: Sent Lab Status: In process Updated: 11/26/24 0652    Specimen: Blood, Venous     Narrative:      The following orders were created for panel order EXTRA TUBES.  Procedure                               Abnormality         Status                     ---------                                -----------         ------                     Gold Top Hold[[id number]]                                   In process                   Please view results for these tests on the individual orders.    EXTRA TUBES [[id number]] Collected: 11/16/24 0424    Order Status: Sent Lab Status: In process Updated: 11/16/24 0520    Specimen: Blood, Venous     Narrative:      The following orders were created for panel order EXTRA TUBES.  Procedure                               Abnormality         Status                     ---------                               -----------         ------                     Light Green Top Hold[[id number]]                            In process                   Please view results for these tests on the individual orders.    EXTRA TUBES [[id number]] Collected: 11/15/24 0517    Order Status: Sent Lab Status: In process Updated: 11/15/24 0606    Specimen: Blood, Venous     Narrative:      The following orders were created for panel order EXTRA TUBES.  Procedure                               Abnormality         Status                     ---------                               -----------         ------                     Light Green Top Hold[[id number]]                            In process                   Please view results for these tests on the individual orders.    EXTRA TUBES [[id number]] Collected: 11/13/24 1014    Order Status: Sent Lab Status: In process Updated: 11/13/24 1031    Specimen: Blood, Venous     Narrative:      The following orders were created for panel order EXTRA TUBES.  Procedure                               Abnormality         Status                     ---------                               -----------         ------                     Light Blue Top Hold[[id number]]                             In process                 Gold Top Hold[[id number]]                                   In process                   Please view results for these tests on the individual  orders.    EXTRA TUBES [[id number]] Collected: 11/11/24 1734    Order Status: Sent Lab Status: In process Updated: 11/11/24 1734    Specimen: Blood, Venous     Narrative:      The following orders were created for panel order EXTRA TUBES.  Procedure                               Abnormality         Status                     ---------                               -----------         ------                     Light Green Top Hold[[id number]]                            In process                 Lavender Top Hold[[id number]]                               In process                   Please view results for these tests on the individual orders.    Lavender Top Hold [[id number]]     Order Status: Sent Lab Status: No result     Specimen: Blood, Venous            Medications:  Reconciled Home Medications:      Medication List        START taking these medications      allopurinoL 100 MG tablet  Commonly known as: ZYLOPRIM  Take 1 tablet (100 mg total) by mouth once daily.     diltiaZEM 30 MG tablet  Commonly known as: CARDIZEM  Take 1 tablet (30 mg total) by mouth every 6 (six) hours.            CHANGE how you take these medications      metoprolol tartrate 50 MG tablet  Commonly known as: LOPRESSOR  Take 1 tablet (50 mg total) by mouth 2 (two) times daily.  What changed:   medication strength  how much to take  when to take this            CONTINUE taking these medications      ELIQUIS 2.5 mg Tab  Generic drug: apixaban  Take 2.5 mg by mouth 2 (two) times daily.     gabapentin 600 MG tablet  Commonly known as: NEURONTIN  take 1 tablet by mouth twice a day     hydrALAZINE 100 MG tablet  Commonly known as: APRESOLINE  Take 1 tablet (100 mg total) by mouth 3 (three) times daily as needed for blood pressure     isosorbide mononitrate 60 MG 24 hr tablet  Commonly known as: IMDUR  Take 60 mg by mouth every morning.     lanthanum 1000 MG chewable tablet  Commonly known as: FOSRENOL  Take 1,000 mg by mouth.     losartan 50 MG  tablet  Commonly known as: COZAAR  Take 1 tablet (50 mg total) by mouth 2 (two) times a day.     meloxicam 15 MG tablet  Commonly known as: MOBIC  Take 15 mg by mouth once daily.     NIFEdipine 60 MG (OSM) 24 hr tablet  Commonly known as: PROCARDIA-XL  Take 1 tablet by mouth daily     pantoprazole 40 MG tablet  Commonly known as: PROTONIX  Take 40 mg by mouth once daily.     prazosin 1 MG Cap  Commonly known as: MINIPRESS  Take 1 capsule (1 mg total) by mouth 2 (two) times a day.            STOP taking these medications      amLODIPine 10 MG tablet  Commonly known as: NORVASC     cloNIDine 0.3 MG tablet  Commonly known as: CATAPRES     sevelamer carbonate 800 mg Tab  Commonly known as: RENVELA     VELPHORO 500 mg Chew  Generic drug: sucroferric oxyhydroxide              Indwelling Lines/Drains at time of discharge:   Lines/Drains/Airways       Central Venous Catheter Line  Duration                  Hemodialysis Catheter right subclavian -- days                    Time spent on the discharge of patient: 45   minutes         Ryan Duran MD  Department of Hospital Medicine  [facility]

## 2024-12-02 NOTE — SUBJECTIVE & OBJECTIVE
Interval History: Patient in dialysis no complaints today. Patient HR improved from yesterday. No over night complaints noted. Awaiting placement    Review of Systems   Constitutional:  Negative for fatigue and fever.   HENT: Negative.     Respiratory:  Negative for cough, choking, shortness of breath and wheezing.    Cardiovascular:  Negative for chest pain, palpitations and leg swelling.   Gastrointestinal:  Negative for abdominal pain, nausea and vomiting.   Genitourinary:  Positive for decreased urine volume. Negative for flank pain.        ESRD on HD   Musculoskeletal:         L. Foot fracture   Skin:  Negative for wound.   Neurological:  Negative for weakness, light-headedness and headaches.   All other systems reviewed and are negative.  Objective:     Vital Signs (Most Recent):  Temp: 98.3 °F (36.8 °C) (12/02/24 0700)  Pulse: 85 (12/02/24 1000)  Resp: 18 (12/02/24 1000)  BP: (!) 144/90 (12/02/24 1000)  SpO2: 96 % (12/02/24 0900) Vital Signs (24h Range):  Temp:  [98.1 °F (36.7 °C)-98.7 °F (37.1 °C)] 98.3 °F (36.8 °C)  Pulse:  [] 85  Resp:  [18-20] 18  SpO2:  [95 %-98 %] 96 %  BP: (119-166)/(76-97) 144/90     Weight: 112.1 kg (247 lb 2.2 oz)  Body mass index is 33.52 kg/m².  No intake or output data in the 24 hours ending 12/02/24 1028      Physical Exam  Vitals and nursing note reviewed.   Constitutional:       General: He is awake. He is not in acute distress.     Appearance: Normal appearance. He is well-developed. He is obese. He is not ill-appearing or toxic-appearing.   HENT:      Head: Normocephalic and atraumatic.      Mouth/Throat:      Pharynx: Oropharynx is clear.   Eyes:      Conjunctiva/sclera: Conjunctivae normal.      Comments: Right eye vision loss   Cardiovascular:      Rate and Rhythm: Normal rate and regular rhythm.      Pulses: Normal pulses.      Heart sounds: Normal heart sounds. No murmur heard.     No friction rub. No gallop.   Pulmonary:      Breath sounds: No stridor. Rhonchi  present. No wheezing or rales.   Abdominal:      General: Bowel sounds are normal.      Palpations: Abdomen is soft.      Tenderness: There is no abdominal tenderness. There is no guarding or rebound.   Musculoskeletal:      Right lower leg: No edema.      Left lower leg: No edema.      Comments: L. Chest wall tenderness. L. Foot boot in place   Skin:     General: Skin is warm and dry.      Capillary Refill: Capillary refill takes less than 2 seconds.      Comments: Bilateral heel protectors, sacral foam dressing in place appears c/d/i   Neurological:      Mental Status: He is alert and oriented to person, place, and time.   Psychiatric:         Mood and Affect: Mood normal.         Behavior: Behavior normal. Behavior is cooperative.         Thought Content: Thought content normal.         Significant Labs: All pertinent labs within the past 24 hours have been reviewed.  BMP:   Recent Labs   Lab 12/02/24  0703   *   *   K 3.9   CL 98   CO2 18*   BUN 46*   CREATININE 12.05*   CALCIUM 8.6   MG 2.3     CBC:   Recent Labs   Lab 12/01/24  1155 12/02/24  0620   WBC 7.46 6.26   HGB 8.7* 8.3*   HCT 28.9* 30.7*    217       Significant Imaging: I have reviewed all pertinent imaging results/findings within the past 24 hours.

## 2024-12-02 NOTE — NURSING
Pt dc instructions reviewed with mother and verbalized understanding. Pt dc to home or self care. No acute distress upon discharge.

## 2024-12-02 NOTE — ASSESSMENT & PLAN NOTE
Hyperkalemia is likely due to ESRD.The patients most recent potassium results are listed below.  Recent Labs     11/30/24  0439 12/01/24  1155 12/02/24  0703   K 4.1 4.7 3.9       Plan  - Monitor for arrhythmias with EKG and/or continuous telemetry.   - Treat the hyperkalemia with Potassium Binders if great than 4.5.   - Monitor potassium:  daily with BMP  - The patient's hyperkalemia is resolved    11/22: Dialysis today with 4L taken off. Patient returned to floor on 15L. Attempt made to wean down and patient desaturated to 89%. Placed back on 10L. Nephrology thinks it was secondary to taking off fluid. Will continue to monitor with daily BMP. Anticipate patient will continue to have hyperkalemia. Will monitor.      11/24: Resolved. Scheduled for HD tomorrow with neprhology.  11/25: Regularly scheduled dialysis. Will continue to monitor.    11/28: Continue to monitor  11/29: dialysis today  11/30: Potassium wnl today. Continue dialysis while inpatient.   12/02: Potassium WNL. Dialysis today
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
## 2024-12-03 ENCOUNTER — PATIENT OUTREACH (OUTPATIENT)
Dept: ADMINISTRATIVE | Facility: CLINIC | Age: 54
End: 2024-12-03
Payer: MEDICARE

## 2024-12-03 NOTE — PROGRESS NOTES
C3 nurse spoke with patient's mother Alka Mcfarland, she reports pt re-admitted to a non [facility] facility last night 12/2/24.

## 2024-12-03 NOTE — PLAN OF CARE
[facility] [place] [facility]  Discharge Final Note    Primary Care Provider: Ruth Primary Doctor    Expected Discharge Date: 12/2/2024    Final Discharge Note (most recent)       Final Note - 12/03/24 0857          Final Note    Assessment Type Final Discharge Note     Anticipated Discharge Disposition Home-Health Care Svc        Post-Acute Status    Post-Acute Authorization Home Health     Home Health Status Set-up Complete/Auth obtained     Patient choice form signed by patient/caregiver List with quality metrics by geographic area provided;List from CMS Compare;List from System Post-Acute Care     Discharge Delays None known at this time                     Important Message from Medicare  Important Message from Medicare regarding Discharge Appeal Rights: Given to patient/caregiver, Explained to patient/caregiver, Signed/date by patient/caregiver     Date IMM was signed: 12/02/24  Time IMM was signed: 1225    Contact Info       Ruth Primary Doctor   Relationship: PCP - General        Next Steps: Follow up    Roxanna Merrill MD   Specialty: Family Medicine    [address] [facility] [address]   Phone: [phone]       Next Steps: Schedule an appointment as soon as possible for a visit on 12/10/2024    Instructions: Hospital follow up; 10:15 am          Pt discharged home with home health.

## 2024-12-11 ENCOUNTER — TELEPHONE (OUTPATIENT)
Dept: FAMILY MEDICINE | Facility: CLINIC | Age: 54
End: 2024-12-11
Payer: MEDICARE

## 2024-12-11 NOTE — TELEPHONE ENCOUNTER
Attempted to call the patient regarding his missed appointment on 12/10/24. Phone ranged, but unable to leave a message. Spoke to his emergency contact, Ms. Mcfarland, who is his mother. I stressed the importance of early follow-up after a hospital admission and offered to reschedule the patient. Ms. Mcfarland told he had a follow up with Dr. Munoz, his primary physician. I told her we're here if he needs our assistance.

## 2024-12-13 NOTE — NURSING
Problem: Chronic Conditions and Co-morbidities  Goal: Patient's chronic conditions and co-morbidity symptoms are monitored and maintained or improved  Outcome: Progressing     Problem: Discharge Planning  Goal: Discharge to home or other facility with appropriate resources  Outcome: Progressing     Problem: Safety - Adult  Goal: Free from fall injury  Outcome: Progressing     Problem: Pain  Goal: Verbalizes/displays adequate comfort level or baseline comfort level  Outcome: Progressing     Problem: Nutrition Deficit:  Goal: Optimize nutritional status  Outcome: Progressing      Received pt from [place] [facility]. Pt observed resting in bed, call bell in reach. States he slept well last night. Lung sounds coarse. Chest rise equal. Educated on fall risk as patient is refusing to wear  socks. States he will put them on later. Fluids provided. Bed low. No other needs voiced.

## 2024-12-17 ENCOUNTER — EXTERNAL HOME HEALTH (OUTPATIENT)
Dept: HOME HEALTH SERVICES | Facility: HOSPITAL | Age: 54
End: 2024-12-17
Payer: MEDICARE

## 2025-01-14 RX ORDER — DILTIAZEM HYDROCHLORIDE 30 MG/1
30 TABLET, FILM COATED ORAL EVERY 6 HOURS
Qty: 120 TABLET | Refills: 0 | Status: SHIPPED | OUTPATIENT
Start: 2025-01-14 | End: 2025-02-13

## 2025-05-08 NOTE — PROGRESS NOTES
Continue all current medications as prescribed.  Followup with Dr. Ortiz in 6 months.    If you have any questions or cardiac concerns, please call our office at [phone].    Pt. Is feeling some better this morning, his shortness of breath is better. Ros: gi: no n/v but continues with some diarrhea    PE in nad    A/P 1. ESRD - will dialyze again this am for k of 6.8  2. Hyperkalemia see above  3. Influenza A illness-started on oseltamivir  4. HTN improved cont present tx    Vitals:    11/12/24 0500 11/12/24 0515 11/12/24 0530 11/12/24 0545   BP: (!) 192/100 (!) 107/91 (!) 207/91 (!) 145/79   Pulse: 107 104 97 95   Resp: (!) 31 (!) 26 (!) 23 19   Temp:       TempSrc:       SpO2: (!) 89% 97% 95% 95%   Weight:       Height:

## 2025-05-23 ENCOUNTER — HOSPITAL ENCOUNTER (EMERGENCY)
Facility: HOSPITAL | Age: 55
Discharge: HOME | End: 2025-05-23
Payer: MEDICARE

## 2025-05-23 VITALS
BODY MASS INDEX: 33.46 KG/M2 | SYSTOLIC BLOOD PRESSURE: 124 MMHG | WEIGHT: 247 LBS | HEIGHT: 72 IN | OXYGEN SATURATION: 100 % | RESPIRATION RATE: 18 BRPM | HEART RATE: 94 BPM | TEMPERATURE: 98 F | DIASTOLIC BLOOD PRESSURE: 76 MMHG

## 2025-05-23 DIAGNOSIS — H10.31 ACUTE BACTERIAL CONJUNCTIVITIS OF RIGHT EYE: Primary | ICD-10-CM

## 2025-05-23 PROCEDURE — 99284 EMERGENCY DEPT VISIT MOD MDM: CPT

## 2025-05-23 PROCEDURE — 25000003 PHARM REV CODE 250

## 2025-05-23 RX ORDER — TOBRAMYCIN AND DEXAMETHASONE 3; 1 MG/ML; MG/ML
1 SUSPENSION/ DROPS OPHTHALMIC EVERY 6 HOURS
Qty: 2.5 ML | Refills: 0 | Status: SHIPPED | OUTPATIENT
Start: 2025-05-23 | End: 2025-05-23

## 2025-05-23 RX ORDER — ERYTHROMYCIN 5 MG/G
OINTMENT OPHTHALMIC
Status: COMPLETED | OUTPATIENT
Start: 2025-05-23 | End: 2025-05-23

## 2025-05-23 RX ORDER — TOBRAMYCIN AND DEXAMETHASONE 3; 1 MG/ML; MG/ML
1 SUSPENSION/ DROPS OPHTHALMIC EVERY 6 HOURS
Qty: 2.5 ML | Refills: 0 | Status: SHIPPED | OUTPATIENT
Start: 2025-05-23

## 2025-05-23 RX ADMIN — ERYTHROMYCIN: 5 OINTMENT OPHTHALMIC at 05:05

## 2025-05-23 NOTE — DISCHARGE INSTRUCTIONS
- Instill eyedrops as directed by the label on the bottle.   - Follow up with local Optometrist as needed if symptoms continue and/or worsen.

## 2025-05-23 NOTE — ED PROVIDER NOTES
Encounter Date: 5/23/2025       History     Chief Complaint   Patient presents with    Eye Problem    Headache     WD is a 53 y/o AAM    Patient has a PMHx of Anemia, CHF, ESRD, HTN, HLD, PKD, A-Fib, and Osteoarthritis.     Patient presents to the ED POV with c/o right eye pain and swelling. Patient stated his current symptoms start several hours ago after waking up from a nap. Patient endorses purulent discharge from right eye. Patient's conjunctiva is erythematic. Patient denies headache, photophobia, blurry and/or changes in his vision.     The history is provided by the patient.   Eye Problem  This is a new problem. The current episode started 1 to 2 hours ago. The problem occurs constantly. The problem has not changed since onset.Associated symptoms include headaches. Nothing aggravates the symptoms. Nothing relieves the symptoms. He has tried nothing for the symptoms. The treatment provided no relief.   Headache   Associated symptoms include eye pain and eye redness.     Review of patient's allergies indicates:   Allergen Reactions    Fish containing products Swelling    Iodinated contrast media Swelling    Iodine Shortness Of Breath, Swelling and Anaphylaxis     Sob and swelling/itching/throat closes up per pt  Other reaction(s): Swelling, Hives, DifficultyBreat  Sob and swelling/itching/throat closes up per pt      Shellfish containing products Swelling    Iodine and iodide containing products      Past Medical History:   Diagnosis Date    Anemia in chronic kidney disease 12/06/2014    Chronic diastolic congestive heart failure     Deep vein thrombosis     ESRD on hemodialysis 06/17/2021    Heart failure with mildly reduced ejection fraction 11/16/2024    Hyperlipidemia     Hypertension     Malignant neoplasm of kidney     Moderate aortic stenosis 04/22/2023    Paroxysmal atrial fibrillation     Polycystic kidney disease     Primary osteoarthritis involving multiple joints 07/16/2024     Past Surgical  History:   Procedure Laterality Date    AV FISTULA PLACEMENT      EYE SURGERY      HD CATHETER      SELECTIVE INJECTION OF ANESTHETIC AGENT AROUND LUMBAR SPINAL NERVE ROOT BY TRANSFORAMINAL APPROACH Left 6/13/2024    Procedure: Left L5-S1 TFESi;  Surgeon: Lily Cuellar MD;  Location: [facility];  Service: Pain Management;  Laterality: Left;    SURGICAL REMOVAL OF ULCER      clamped ulcer in stomach    TUNNELED VENOUS CATHETER PLACEMENT       No family history on file.  Social History[1]  Review of Systems   Constitutional: Negative.    HENT: Negative.     Eyes:  Positive for pain, discharge and redness.   Respiratory: Negative.     Cardiovascular: Negative.    Gastrointestinal: Negative.    Endocrine: Negative.    Genitourinary: Negative.    Musculoskeletal: Negative.    Skin: Negative.    Allergic/Immunologic: Negative.    Neurological:  Positive for headaches.   Hematological: Negative.    Psychiatric/Behavioral: Negative.         Physical Exam     Initial Vitals [05/23/25 1631]   BP Pulse Resp Temp SpO2   124/76 94 18 98 °F (36.7 °C) 100 %      MAP       --         Physical Exam    Nursing note and vitals reviewed.  Constitutional: Vital signs are normal. He appears well-developed and well-nourished. He is cooperative. He appears distressed.   Eyes:       Neck: Trachea normal and phonation normal. Neck supple. No thyroid mass and no thyromegaly present.   Normal range of motion.   Full passive range of motion without pain.     Cardiovascular:  Normal rate, regular rhythm, S1 normal, S2 normal, normal heart sounds, intact distal pulses and normal pulses.           Pulmonary/Chest: Effort normal and breath sounds normal.   Musculoskeletal:      Cervical back: Full passive range of motion without pain, normal range of motion and neck supple.     Lymphadenopathy:     He has no cervical adenopathy.     He has no axillary adenopathy.   Neurological: He is alert and oriented to person, place, and time. He  has normal strength and normal reflexes. He displays normal reflexes. No cranial nerve deficit or sensory deficit. He displays a negative Romberg sign. GCS eye subscore is 4. GCS verbal subscore is 5. GCS motor subscore is 6.   Skin: Skin is warm, dry and intact. Capillary refill takes less than 2 seconds. No rash noted.   Psychiatric: He has a normal mood and affect. His speech is normal and behavior is normal. Judgment and thought content normal. Cognition and memory are normal.         Medical Screening Exam   See Full Note    ED Course   Procedures  Labs Reviewed - No data to display       Imaging Results    None          Medications   erythromycin 5 mg/gram (0.5 %) ophthalmic ointment (has no administration in time range)     Medical Decision Making  he presentation of Saúl Butt is consistent with conjunctivitis without any red flags (visual acuity changes, ciliary flush, photophobia, severe foreign body sensation, corneal opacity, fixed pupil, or headache with nausea). The presentation of Saúl Butt is NOT consistent with iritis, acute glaucoma, foreign body, contact lens complications, uveitis, keratoconjunctivitis, chemical irritant exposure, or nasolacrimal duct obstruction.    Upon discharge, Saúl Butt had no evidence of serious visual compromise.  meets outpatient treatment criteria.    Data Reviewed/Counseling: I have reviewed the patient's vital signs, nursing notes, and other relevant ancillary testing/information. I have had a detailed discussion with the patient regarding the historical points, examination findings, and any diagnostic results. I have also discussed the need for outpatient follow-up.    Saúl Butt has been counseled to return to the Emergency Department if symptoms worsen or if there are any questions or concerns that arise while at home.    Clinicians are often asked to advise patients and families as to when it is safe to return to work or school. Bacterial and viral  conjunctivitis are both highly contagious and spread by direct contact with secretions or contact with contaminated objects. Infected individuals should not share handkerchiefs, tissues, towels, cosmetics, linens, or silverware. The safest approach to prevent spread to others is to stay home until there is no longer any discharge, but this is not always feasible for most students nor for those who work outside the home. Most  centers and schools require that students receive 24 hours of topical therapy before returning to school. Such therapy will probably reduce the transmission of conjunctivitis due to bacterial infection but will do nothing to reduce the spread of viral infections.    I have recommend to this patient to consider that their problem is like a cold, and their decision to return to work or school should be similar to the one they would make in that situation. Those who have contact with the very old, the very young, and immune-compromised individuals should take care to avoid spread of infection from their eye secretions to these susceptible people. Further questions about when to return should be addressed to the patient's primary care provider at follow-up.      Risk  Prescription drug management.  Risk Details: Low;   Conjunctivitis right eye                                       Clinical Impression:   Final diagnoses:  [H10.31] Acute bacterial conjunctivitis of right eye (Primary)        ED Disposition Condition    Discharge Stable          ED Prescriptions       Medication Sig Dispense Start Date End Date Auth. Provider    tobramycin-dexAMETHasone 0.3-0.1% (TOBRADEX) 0.3-0.1 % DrpS Place 1 drop into the right eye every 6 (six) hours. 2.5 mL 5/23/2025 -- Aidan Fried FNP          Follow-up Information    None            [1]   Social History  Tobacco Use    Smoking status: Never    Smokeless tobacco: Never   Substance Use Topics    Alcohol use: Never    Drug use: Never        Corky  Aidan, [facility]  05/23/25 1707

## 2025-06-13 ENCOUNTER — HOSPITAL ENCOUNTER (EMERGENCY)
Facility: HOSPITAL | Age: 55
Discharge: HOME | End: 2025-06-13
Attending: EMERGENCY MEDICINE
Payer: MEDICARE

## 2025-06-13 VITALS
HEART RATE: 43 BPM | DIASTOLIC BLOOD PRESSURE: 59 MMHG | TEMPERATURE: 98 F | HEIGHT: 72 IN | BODY MASS INDEX: 31.83 KG/M2 | SYSTOLIC BLOOD PRESSURE: 96 MMHG | RESPIRATION RATE: 16 BRPM | OXYGEN SATURATION: 100 % | WEIGHT: 235 LBS

## 2025-06-13 DIAGNOSIS — R00.1 BRADYCARDIA: Primary | ICD-10-CM

## 2025-06-13 LAB
ALBUMIN SERPL BCP-MCNC: 3.5 G/DL (ref 3.5–5)
ALBUMIN/GLOB SERPL: 0.7 {RATIO}
ALP SERPL-CCNC: 96 U/L (ref 40–150)
ALT SERPL W P-5'-P-CCNC: 9 U/L
ANION GAP SERPL CALCULATED.3IONS-SCNC: 19 MMOL/L (ref 7–16)
APTT PPP: 107.6 SECONDS (ref 25.2–37.3)
AST SERPL W P-5'-P-CCNC: 23 U/L (ref 11–45)
BASOPHILS # BLD AUTO: 0.1 K/UL (ref 0–0.2)
BASOPHILS NFR BLD AUTO: 0.9 % (ref 0–1)
BILIRUB SERPL-MCNC: 0.5 MG/DL
BUN SERPL-MCNC: 20 MG/DL (ref 8–26)
BUN/CREAT SERPL: 4 (ref 6–20)
CALCIUM SERPL-MCNC: 9.4 MG/DL (ref 8.4–10.2)
CHLORIDE SERPL-SCNC: 94 MMOL/L (ref 98–107)
CO2 SERPL-SCNC: 27 MMOL/L (ref 22–29)
CREAT SERPL-MCNC: 5.46 MG/DL (ref 0.72–1.25)
DIFFERENTIAL METHOD BLD: ABNORMAL
EGFR (NO RACE VARIABLE) (RUSH/TITUS): 12 ML/MIN/1.73M2
EOSINOPHIL # BLD AUTO: 0.35 K/UL (ref 0–0.5)
EOSINOPHIL NFR BLD AUTO: 3.1 % (ref 1–4)
ERYTHROCYTE [DISTWIDTH] IN BLOOD BY AUTOMATED COUNT: 15.4 % (ref 11.5–14.5)
GLOBULIN SER-MCNC: 5.1 G/DL (ref 2–4)
GLUCOSE SERPL-MCNC: 101 MG/DL (ref 74–100)
HCT VFR BLD AUTO: 42.9 % (ref 40–54)
HGB BLD-MCNC: 13.9 G/DL (ref 13.5–18)
IMM GRANULOCYTES # BLD AUTO: 0.08 K/UL (ref 0–0.04)
IMM GRANULOCYTES NFR BLD: 0.7 % (ref 0–0.4)
INR BLD: 1.07
LYMPHOCYTES # BLD AUTO: 1.89 K/UL (ref 1–4.8)
LYMPHOCYTES NFR BLD AUTO: 16.9 % (ref 27–41)
MAGNESIUM SERPL-MCNC: 2 MG/DL (ref 1.6–2.6)
MCH RBC QN AUTO: 30.6 PG (ref 27–31)
MCHC RBC AUTO-ENTMCNC: 32.4 G/DL (ref 32–36)
MCV RBC AUTO: 94.5 FL (ref 80–96)
MONOCYTES # BLD AUTO: 0.88 K/UL (ref 0–0.8)
MONOCYTES NFR BLD AUTO: 7.9 % (ref 2–6)
MPC BLD CALC-MCNC: 12.7 FL (ref 9.4–12.4)
NEUTROPHILS # BLD AUTO: 7.87 K/UL (ref 1.8–7.7)
NEUTROPHILS NFR BLD AUTO: 70.5 % (ref 53–65)
NRBC # BLD AUTO: 0 X10E3/UL
NRBC, AUTO (.00): 0 %
NT-PROBNP SERPL-MCNC: 9655 PG/ML (ref 1–125)
PLATELET # BLD AUTO: 215 K/UL (ref 150–400)
POTASSIUM SERPL-SCNC: 4.1 MMOL/L (ref 3.5–5.1)
PROT SERPL-MCNC: 8.6 G/DL (ref 6.4–8.3)
PROTHROMBIN TIME: 14 SECONDS (ref 11.7–14.7)
RBC # BLD AUTO: 4.54 M/UL (ref 4.6–6.2)
SODIUM SERPL-SCNC: 136 MMOL/L (ref 136–145)
TROPONIN I SERPL HS-MCNC: 57.1 NG/L
TROPONIN I SERPL HS-MCNC: 59.1 NG/L
TSH SERPL DL<=0.005 MIU/L-ACNC: 0.78 UIU/ML (ref 0.35–4.94)
WBC # BLD AUTO: 11.17 K/UL (ref 4.5–11)

## 2025-06-13 PROCEDURE — 99285 EMERGENCY DEPT VISIT HI MDM: CPT | Mod: 25

## 2025-06-13 PROCEDURE — 83735 ASSAY OF MAGNESIUM: CPT | Performed by: EMERGENCY MEDICINE

## 2025-06-13 PROCEDURE — 85610 PROTHROMBIN TIME: CPT | Performed by: EMERGENCY MEDICINE

## 2025-06-13 PROCEDURE — 85730 THROMBOPLASTIN TIME PARTIAL: CPT | Performed by: EMERGENCY MEDICINE

## 2025-06-13 PROCEDURE — 84443 ASSAY THYROID STIM HORMONE: CPT | Performed by: EMERGENCY MEDICINE

## 2025-06-13 PROCEDURE — 84484 ASSAY OF TROPONIN QUANT: CPT | Performed by: EMERGENCY MEDICINE

## 2025-06-13 PROCEDURE — 93010 ELECTROCARDIOGRAM REPORT: CPT | Mod: ,,, | Performed by: INTERNAL MEDICINE

## 2025-06-13 PROCEDURE — 83880 ASSAY OF NATRIURETIC PEPTIDE: CPT | Performed by: EMERGENCY MEDICINE

## 2025-06-13 PROCEDURE — 85025 COMPLETE CBC W/AUTO DIFF WBC: CPT | Performed by: EMERGENCY MEDICINE

## 2025-06-13 PROCEDURE — 93005 ELECTROCARDIOGRAM TRACING: CPT

## 2025-06-13 PROCEDURE — 80053 COMPREHEN METABOLIC PANEL: CPT | Performed by: EMERGENCY MEDICINE

## 2025-06-13 NOTE — DISCHARGE INSTRUCTIONS
FOLLOW UP WITH CARDIOLOGY.  A REFERRAL HAS BEEN PLACED IN THIS REGARD.  RETURN TO THE EMERGENCY DEPARTMENT AS NEEDED.

## 2025-06-13 NOTE — ED PROVIDER NOTES
Encounter Date: 6/13/2025    SCRIBE #1 NOTE: ILily, am scribing for, and in the presence of,  Tay Harvey MD.       History     Chief Complaint   Patient presents with    Bradycardia     Pt presents to the ed via ems from dialysis w/ c/o bradycardia      This 54 y.o. male pt presents to the ED with Bradycardia. The pt has hx of Dialysis and completed full treatment today. Pt states he has been feeling extremely weak and lightheaded and has a cough where he coughs up phlegm. Pt said he has been feeling this way for about a week. Pt has low heart rate while here in the ED.     The history is provided by the patient. No  was used.     Review of patient's allergies indicates:   Allergen Reactions    Fish containing products Swelling    Iodinated contrast media Swelling    Iodine Shortness Of Breath, Swelling and Anaphylaxis     Sob and swelling/itching/throat closes up per pt  Other reaction(s): Swelling, Hives, DifficultyBreat  Sob and swelling/itching/throat closes up per pt      Shellfish containing products Swelling    Iodine and iodide containing products      Past Medical History:   Diagnosis Date    Anemia in chronic kidney disease 12/06/2014    Chronic diastolic congestive heart failure     Deep vein thrombosis     ESRD on hemodialysis 06/17/2021    Heart failure with mildly reduced ejection fraction 11/16/2024    Hyperlipidemia     Hypertension     Malignant neoplasm of kidney     Moderate aortic stenosis 04/22/2023    Paroxysmal atrial fibrillation     Polycystic kidney disease     Primary osteoarthritis involving multiple joints 07/16/2024     Past Surgical History:   Procedure Laterality Date    AV FISTULA PLACEMENT      EYE SURGERY      HD CATHETER      SELECTIVE INJECTION OF ANESTHETIC AGENT AROUND LUMBAR SPINAL NERVE ROOT BY TRANSFORAMINAL APPROACH Left 6/13/2024    Procedure: Left L5-S1 TFESi;  Surgeon: Lily Cuellar MD;  Location: [facility];  Service: Pain  Management;  Laterality: Left;    SURGICAL REMOVAL OF ULCER      clamped ulcer in stomach    TUNNELED VENOUS CATHETER PLACEMENT       No family history on file.  Social History[1]  Review of Systems   Constitutional:  Negative for activity change, chills, diaphoresis and fever.   HENT:  Negative for congestion, drooling, ear pain, rhinorrhea, sneezing, sore throat and trouble swallowing.    Eyes:  Negative for pain.   Respiratory:  Positive for cough. Negative for chest tightness, shortness of breath, wheezing and stridor.    Cardiovascular:  Negative for chest pain, palpitations and leg swelling.   Gastrointestinal:  Negative for abdominal distention, abdominal pain, constipation, diarrhea, nausea and vomiting.   Endocrine: Negative for polyuria.   Genitourinary:  Negative for difficulty urinating, dysuria, flank pain, frequency, testicular pain and urgency.   Musculoskeletal:  Negative for arthralgias, back pain, myalgias, neck pain and neck stiffness.   Skin:  Negative for pallor, rash and wound.   Neurological:  Positive for weakness and light-headedness. Negative for dizziness, syncope, numbness and headaches.   All other systems reviewed and are negative.      Physical Exam     Initial Vitals [06/13/25 1126]   BP Pulse Resp Temp SpO2   (!) 91/56 (!) 45 14 97.6 °F (36.4 °C) 100 %      MAP       --         Physical Exam    Nursing note and vitals reviewed.  Constitutional: He appears well-developed and well-nourished.   HENT:   Head: Normocephalic and atraumatic.   Eyes: Conjunctivae and EOM are normal. Pupils are equal, round, and reactive to light.   Neck: Neck supple.   Normal range of motion.  Cardiovascular:  Regular rhythm, normal heart sounds and intact distal pulses.   Bradycardia present.         Pulmonary/Chest: Breath sounds normal.   Abdominal: Abdomen is soft. Bowel sounds are normal.   Musculoskeletal:         General: Normal range of motion.      Cervical back: Normal range of motion and neck  supple.     Neurological: He is alert and oriented to person, place, and time. He has normal strength.   Skin: Skin is warm and dry. Capillary refill takes less than 2 seconds.   Psychiatric: He has a normal mood and affect. Thought content normal.         ED Course   Procedures  Labs Reviewed   COMPREHENSIVE METABOLIC PANEL - Abnormal       Result Value    Sodium 136      Potassium 4.1      Chloride 94 (*)     CO2 27      Anion Gap 19 (*)     Glucose 101 (*)     BUN 20      Creatinine 5.46 (*)     BUN/Creatinine Ratio 4 (*)     Calcium 9.4      Total Protein 8.6 (*)     Albumin 3.5      Globulin 5.1 (*)     A/G Ratio 0.7      Bilirubin, Total 0.5      Alk Phos 96      ALT 9      AST 23      eGFR 12 (*)    NT-PRO NATRIURETIC PEPTIDE - Abnormal    ProBNP 9,655 (*)    APTT - Abnormal    .6 (*)    TROPONIN I - Abnormal    Troponin I High Sensitivity 57.1 (*)    CBC WITH DIFFERENTIAL - Abnormal    WBC 11.17 (*)     RBC 4.54 (*)     Hemoglobin 13.9      Hematocrit 42.9      MCV 94.5      MCH 30.6      MCHC 32.4      RDW 15.4 (*)     Platelet Count 215      MPV 12.7 (*)     Neutrophils % 70.5 (*)     Lymphocytes % 16.9 (*)     Monocytes % 7.9 (*)     Eosinophils % 3.1      Basophils % 0.9      Immature Granulocytes % 0.7 (*)     nRBC, Auto 0.0      Neutrophils, Abs 7.87 (*)     Lymphocytes, Absolute 1.89      Monocytes, Absolute 0.88 (*)     Eosinophils, Absolute 0.35      Basophils, Absolute 0.10      Immature Granulocytes, Absolute 0.08 (*)     nRBC, Absolute 0.00      Diff Type Auto     TROPONIN I - Abnormal    Troponin I High Sensitivity 59.1 (*)    MAGNESIUM - Normal    Magnesium 2.0     PROTIME-INR - Normal    PT 14.0      INR 1.07     TSH - Normal    TSH 0.784     CBC W/ AUTO DIFFERENTIAL    Narrative:     The following orders were created for panel order CBC auto differential.  Procedure                               Abnormality         Status                     ---------                                -----------         ------                     CBC with Differential[[id number]]       Abnormal            Final result                 Please view results for these tests on the individual orders.     EKG Readings: (Independently Interpreted)   Heart Rate: 44 bpm.   ACUTE/ISCHEMIA  Atrial flutter with slow ventricular responses with high degree A-V block  Marked right axis deviation  Right bundle branch block  Lateral infarct-age undetermined  Possible anteroseptal infarct-age undetermined  inferior ST-T abnormality suggests myocardial infarct  Abnormal ECG       Imaging Results              X-Ray Chest AP Portable (Final result)  Result time 06/13/25 11:57:10      Final result by Kuldeep Davila MD (06/13/25 11:57:10)                   Impression:      No evidence of acute cardiopulmonary disease.      Electronically signed by: Kuldeep Davila  Date:    06/13/2025  Time:    11:57               Narrative:    EXAMINATION:  XR CHEST AP PORTABLE    CLINICAL HISTORY:  Bradycardia, unspecified    FINDINGS:  Portable chest radiograph at 11:46 hours compared to prior exams shows right subclavian central venous catheter with distal tip overlying the right atrium.  The cardiac silhouette is enlarged, stable in size, with normal pulmonary vascularity.    The lungs are normally expanded, with no consolidation, large pleural effusion, or evidence of pulmonary edema. No pneumothorax. There are no acute fractures.                                    X-Rays:   Independently Interpreted Readings:   Other Readings:  Narrative & Impression  EXAMINATION:  XR CHEST AP PORTABLE     CLINICAL HISTORY:  Bradycardia, unspecified     FINDINGS:  Portable chest radiograph at 11:46 hours compared to prior exams shows right subclavian central venous catheter with distal tip overlying the right atrium.  The cardiac silhouette is enlarged, stable in size, with normal pulmonary vascularity.     The lungs are normally expanded, with no consolidation,  large pleural effusion, or evidence of pulmonary edema. No pneumothorax. There are no acute fractures.     Impression:     No evidence of acute cardiopulmonary disease.        Electronically signed by:Kuldeep Davila  Date:                                            06/13/2025  Time:                                           11:57      Exam Ended: 06/13/25 11:54 CDT Last Resulted: 06/13/25 11:57 CDT        Medications - No data to display  Medical Decision Making  Pt presents to the ed via ems from dialysis w/ c/o bradycardia    Amount and/or Complexity of Data Reviewed  Labs: ordered.  Radiology: ordered.              Attending Attestation:           Physician Attestation for Scribe:  Physician Attestation Statement for Scribe #1: I, Tay Harvey MD, reviewed documentation, as scribed by Lily Freeman in my presence, and it is both accurate and complete.             ED Course as of 06/13/25 1500   Fri Jun 13, 2025   1258 06/13/25 1159  X-Ray Chest AP Portable  Performed: 06/13/25 1154  Final         Impression:  No evidence of acute cardiopulmonary disease.      Electronically signed by: Kuldeep Davila  Date: 06/13/2025  Time: 11:57       [CM]      ED Course User Index  [CM] Lily Freeman                           Clinical Impression:  Final diagnoses:  [R00.1] Bradycardia (Primary)          ED Disposition Condition    Discharge Stable          ED Prescriptions    None       Follow-up Information       Follow up With Specialties Details Why Contact Info    [facility] - Emergency Department Emergency Medicine  As needed [address] [place] [id number] [phone]                   [1]   Social History  Tobacco Use    Smoking status: Never    Smokeless tobacco: Never   Substance Use Topics    Alcohol use: Never    Drug use: Never        Tay Harvey MD  06/13/25 1500     96

## 2025-06-16 LAB
OHS QRS DURATION: 176 MS
OHS QTC CALCULATION: 448 MS

## 2025-08-19 RX ORDER — HYDRALAZINE HYDROCHLORIDE 50 MG/1
50 TABLET, FILM COATED ORAL 3 TIMES DAILY
Qty: 90 TABLET | Refills: 0 | Status: CANCELLED | OUTPATIENT
Start: 2025-08-19

## (undated) DEVICE — SOL CONTINU-FLO SET 2 LAV

## (undated) DEVICE — CATH IV INTROCAN 24G X 3/4

## (undated) DEVICE — TRAY NERVE BLOCK UNIV 10/CA

## (undated) DEVICE — SLIPPER FALL PREV YEL BARIAT

## (undated) DEVICE — APPLICATOR CHLORAPREP ORN 26ML

## (undated) DEVICE — SET EXT STD BORE CATH 7.6IN

## (undated) DEVICE — NDL TUOHY 22G X 3.5

## (undated) DEVICE — KIT IV START

## (undated) DEVICE — GLOVE SENSICARE PI SURG 6.5